# Patient Record
Sex: MALE | Race: WHITE | NOT HISPANIC OR LATINO | Employment: OTHER | URBAN - METROPOLITAN AREA
[De-identification: names, ages, dates, MRNs, and addresses within clinical notes are randomized per-mention and may not be internally consistent; named-entity substitution may affect disease eponyms.]

---

## 2019-12-05 ENCOUNTER — APPOINTMENT (INPATIENT)
Dept: RADIOLOGY | Facility: HOSPITAL | Age: 84
DRG: 089 | End: 2019-12-05
Payer: MEDICARE

## 2019-12-05 ENCOUNTER — APPOINTMENT (EMERGENCY)
Dept: RADIOLOGY | Facility: HOSPITAL | Age: 84
DRG: 089 | End: 2019-12-05
Payer: MEDICARE

## 2019-12-05 ENCOUNTER — APPOINTMENT (OUTPATIENT)
Dept: RADIOLOGY | Facility: HOSPITAL | Age: 84
DRG: 089 | End: 2019-12-05
Payer: MEDICARE

## 2019-12-05 ENCOUNTER — HOSPITAL ENCOUNTER (INPATIENT)
Facility: HOSPITAL | Age: 84
LOS: 4 days | Discharge: NURSING, REHAB OR LONG-TERM CARE | DRG: 089 | End: 2019-12-09
Attending: SURGERY | Admitting: EMERGENCY MEDICINE
Payer: MEDICARE

## 2019-12-05 DIAGNOSIS — K59.00 CONSTIPATION: ICD-10-CM

## 2019-12-05 DIAGNOSIS — I48.91 ATRIAL FIBRILLATION WITH RAPID VENTRICULAR RESPONSE (HCC): ICD-10-CM

## 2019-12-05 DIAGNOSIS — S43.015A ANTERIOR DISLOCATION OF LEFT SHOULDER, INITIAL ENCOUNTER: Primary | ICD-10-CM

## 2019-12-05 DIAGNOSIS — R42 POSTURAL DIZZINESS WITH NEAR SYNCOPE: ICD-10-CM

## 2019-12-05 DIAGNOSIS — N32.1 VESICOCOLONIC FISTULA: ICD-10-CM

## 2019-12-05 DIAGNOSIS — S00.531A CONTUSION OF LIP, INITIAL ENCOUNTER: ICD-10-CM

## 2019-12-05 DIAGNOSIS — R55 POSTURAL DIZZINESS WITH NEAR SYNCOPE: ICD-10-CM

## 2019-12-05 DIAGNOSIS — R26.2 AMBULATORY DYSFUNCTION: ICD-10-CM

## 2019-12-05 DIAGNOSIS — S43.015A ANTERIOR DISLOCATION OF LEFT SHOULDER: ICD-10-CM

## 2019-12-05 PROBLEM — Z87.09 HISTORY OF CHRONIC OBSTRUCTIVE AIRWAY DISEASE: Status: ACTIVE | Noted: 2019-12-05

## 2019-12-05 PROBLEM — I10 BENIGN HYPERTENSION: Status: ACTIVE | Noted: 2019-12-05

## 2019-12-05 PROBLEM — N40.0 BENIGN PROSTATIC HYPERPLASIA: Status: ACTIVE | Noted: 2019-12-05

## 2019-12-05 PROBLEM — K52.9 COLITIS: Status: ACTIVE | Noted: 2019-12-05

## 2019-12-05 PROBLEM — S06.0XAA CLOSED HEAD INJURY WITH CONCUSSION: Status: ACTIVE | Noted: 2019-12-05

## 2019-12-05 PROBLEM — M10.9 GOUT: Status: ACTIVE | Noted: 2019-12-05

## 2019-12-05 PROBLEM — S06.0X9A CLOSED HEAD INJURY WITH CONCUSSION: Status: ACTIVE | Noted: 2019-12-05

## 2019-12-05 PROBLEM — R73.01 IMPAIRED FASTING GLUCOSE: Status: ACTIVE | Noted: 2019-12-05

## 2019-12-05 LAB
ANION GAP SERPL CALCULATED.3IONS-SCNC: 2 MMOL/L (ref 4–13)
APTT PPP: 33 SECONDS (ref 23–37)
BASOPHILS # BLD AUTO: 0.06 THOUSANDS/ΜL (ref 0–0.1)
BASOPHILS NFR BLD AUTO: 1 % (ref 0–1)
BUN SERPL-MCNC: 11 MG/DL (ref 5–25)
CALCIUM SERPL-MCNC: 8.9 MG/DL (ref 8.3–10.1)
CHLORIDE SERPL-SCNC: 110 MMOL/L (ref 100–108)
CO2 SERPL-SCNC: 31 MMOL/L (ref 21–32)
CREAT SERPL-MCNC: 1.09 MG/DL (ref 0.6–1.3)
EOSINOPHIL # BLD AUTO: 0.27 THOUSAND/ΜL (ref 0–0.61)
EOSINOPHIL NFR BLD AUTO: 2 % (ref 0–6)
ERYTHROCYTE [DISTWIDTH] IN BLOOD BY AUTOMATED COUNT: 16.4 % (ref 11.6–15.1)
GFR SERPL CREATININE-BSD FRML MDRD: 63 ML/MIN/1.73SQ M
GLUCOSE SERPL-MCNC: 118 MG/DL (ref 65–140)
HCT VFR BLD AUTO: 48.6 % (ref 36.5–49.3)
HGB BLD-MCNC: 14.8 G/DL (ref 12–17)
IMM GRANULOCYTES # BLD AUTO: 0.11 THOUSAND/UL (ref 0–0.2)
IMM GRANULOCYTES NFR BLD AUTO: 1 % (ref 0–2)
INR PPP: 1.22 (ref 0.84–1.19)
LYMPHOCYTES # BLD AUTO: 1.03 THOUSANDS/ΜL (ref 0.6–4.47)
LYMPHOCYTES NFR BLD AUTO: 9 % (ref 14–44)
MCH RBC QN AUTO: 27.1 PG (ref 26.8–34.3)
MCHC RBC AUTO-ENTMCNC: 30.5 G/DL (ref 31.4–37.4)
MCV RBC AUTO: 89 FL (ref 82–98)
MONOCYTES # BLD AUTO: 0.96 THOUSAND/ΜL (ref 0.17–1.22)
MONOCYTES NFR BLD AUTO: 9 % (ref 4–12)
NEUTROPHILS # BLD AUTO: 8.67 THOUSANDS/ΜL (ref 1.85–7.62)
NEUTS SEG NFR BLD AUTO: 78 % (ref 43–75)
NRBC BLD AUTO-RTO: 0 /100 WBCS
PLATELET # BLD AUTO: 195 THOUSANDS/UL (ref 149–390)
PMV BLD AUTO: 10.6 FL (ref 8.9–12.7)
POTASSIUM SERPL-SCNC: 4.1 MMOL/L (ref 3.5–5.3)
PROTHROMBIN TIME: 15 SECONDS (ref 11.6–14.5)
RBC # BLD AUTO: 5.47 MILLION/UL (ref 3.88–5.62)
SODIUM SERPL-SCNC: 143 MMOL/L (ref 136–145)
WBC # BLD AUTO: 11.1 THOUSAND/UL (ref 4.31–10.16)

## 2019-12-05 PROCEDURE — 70486 CT MAXILLOFACIAL W/O DYE: CPT

## 2019-12-05 PROCEDURE — 93005 ELECTROCARDIOGRAM TRACING: CPT

## 2019-12-05 PROCEDURE — NC001 PR NO CHARGE: Performed by: SURGERY

## 2019-12-05 PROCEDURE — 99222 1ST HOSP IP/OBS MODERATE 55: CPT | Performed by: INTERNAL MEDICINE

## 2019-12-05 PROCEDURE — 70450 CT HEAD/BRAIN W/O DYE: CPT

## 2019-12-05 PROCEDURE — 85610 PROTHROMBIN TIME: CPT | Performed by: SURGERY

## 2019-12-05 PROCEDURE — NC001 PR NO CHARGE: Performed by: NURSE PRACTITIONER

## 2019-12-05 PROCEDURE — 96366 THER/PROPH/DIAG IV INF ADDON: CPT

## 2019-12-05 PROCEDURE — 85014 HEMATOCRIT: CPT

## 2019-12-05 PROCEDURE — 90471 IMMUNIZATION ADMIN: CPT

## 2019-12-05 PROCEDURE — 74177 CT ABD & PELVIS W/CONTRAST: CPT

## 2019-12-05 PROCEDURE — 82947 ASSAY GLUCOSE BLOOD QUANT: CPT

## 2019-12-05 PROCEDURE — 99285 EMERGENCY DEPT VISIT HI MDM: CPT

## 2019-12-05 PROCEDURE — 36415 COLL VENOUS BLD VENIPUNCTURE: CPT

## 2019-12-05 PROCEDURE — 12011 RPR F/E/E/N/L/M 2.5 CM/<: CPT | Performed by: SURGERY

## 2019-12-05 PROCEDURE — 82803 BLOOD GASES ANY COMBINATION: CPT

## 2019-12-05 PROCEDURE — 99223 1ST HOSP IP/OBS HIGH 75: CPT | Performed by: COLON & RECTAL SURGERY

## 2019-12-05 PROCEDURE — 85730 THROMBOPLASTIN TIME PARTIAL: CPT | Performed by: SURGERY

## 2019-12-05 PROCEDURE — 0CQ0XZZ REPAIR UPPER LIP, EXTERNAL APPROACH: ICD-10-PCS | Performed by: SURGERY

## 2019-12-05 PROCEDURE — 23650 CLTX SHO DSLC W/MNPJ WO ANES: CPT | Performed by: EMERGENCY MEDICINE

## 2019-12-05 PROCEDURE — 84295 ASSAY OF SERUM SODIUM: CPT

## 2019-12-05 PROCEDURE — 0RSKXZZ REPOSITION LEFT SHOULDER JOINT, EXTERNAL APPROACH: ICD-10-PCS | Performed by: EMERGENCY MEDICINE

## 2019-12-05 PROCEDURE — 1123F ACP DISCUSS/DSCN MKR DOCD: CPT | Performed by: INTERNAL MEDICINE

## 2019-12-05 PROCEDURE — 84132 ASSAY OF SERUM POTASSIUM: CPT

## 2019-12-05 PROCEDURE — 80048 BASIC METABOLIC PNL TOTAL CA: CPT | Performed by: SURGERY

## 2019-12-05 PROCEDURE — 82330 ASSAY OF CALCIUM: CPT

## 2019-12-05 PROCEDURE — 96375 TX/PRO/DX INJ NEW DRUG ADDON: CPT

## 2019-12-05 PROCEDURE — 72125 CT NECK SPINE W/O DYE: CPT

## 2019-12-05 PROCEDURE — 73060 X-RAY EXAM OF HUMERUS: CPT

## 2019-12-05 PROCEDURE — 96365 THER/PROPH/DIAG IV INF INIT: CPT

## 2019-12-05 PROCEDURE — 72072 X-RAY EXAM THORAC SPINE 3VWS: CPT

## 2019-12-05 PROCEDURE — 99223 1ST HOSP IP/OBS HIGH 75: CPT | Performed by: SURGERY

## 2019-12-05 PROCEDURE — 99223 1ST HOSP IP/OBS HIGH 75: CPT | Performed by: INTERNAL MEDICINE

## 2019-12-05 PROCEDURE — 73030 X-RAY EXAM OF SHOULDER: CPT

## 2019-12-05 PROCEDURE — NC001 PR NO CHARGE: Performed by: EMERGENCY MEDICINE

## 2019-12-05 PROCEDURE — 85025 COMPLETE CBC W/AUTO DIFF WBC: CPT | Performed by: SURGERY

## 2019-12-05 PROCEDURE — 71045 X-RAY EXAM CHEST 1 VIEW: CPT

## 2019-12-05 PROCEDURE — 90715 TDAP VACCINE 7 YRS/> IM: CPT | Performed by: EMERGENCY MEDICINE

## 2019-12-05 RX ORDER — ZOLPIDEM TARTRATE 6.25 MG/1
10 TABLET, FILM COATED, EXTENDED RELEASE ORAL
COMMUNITY
End: 2019-12-09 | Stop reason: HOSPADM

## 2019-12-05 RX ORDER — SODIUM CHLORIDE, SODIUM GLUCONATE, SODIUM ACETATE, POTASSIUM CHLORIDE, MAGNESIUM CHLORIDE, SODIUM PHOSPHATE, DIBASIC, AND POTASSIUM PHOSPHATE .53; .5; .37; .037; .03; .012; .00082 G/100ML; G/100ML; G/100ML; G/100ML; G/100ML; G/100ML; G/100ML
INJECTION, SOLUTION INTRAVENOUS
Status: COMPLETED | OUTPATIENT
Start: 2019-12-05 | End: 2019-12-05

## 2019-12-05 RX ORDER — ALLOPURINOL 100 MG/1
100 TABLET ORAL DAILY
COMMUNITY

## 2019-12-05 RX ORDER — FENTANYL CITRATE 50 UG/ML
INJECTION, SOLUTION INTRAMUSCULAR; INTRAVENOUS CODE/TRAUMA/SEDATION MEDICATION
Status: COMPLETED | OUTPATIENT
Start: 2019-12-05 | End: 2019-12-05

## 2019-12-05 RX ORDER — ETOMIDATE 2 MG/ML
10 INJECTION INTRAVENOUS ONCE
Status: COMPLETED | OUTPATIENT
Start: 2019-12-05 | End: 2019-12-05

## 2019-12-05 RX ORDER — METOPROLOL TARTRATE 50 MG/1
50 TABLET, FILM COATED ORAL EVERY 12 HOURS SCHEDULED
COMMUNITY

## 2019-12-05 RX ORDER — ALLOPURINOL 100 MG/1
100 TABLET ORAL DAILY
Status: DISCONTINUED | OUTPATIENT
Start: 2019-12-05 | End: 2019-12-09 | Stop reason: HOSPADM

## 2019-12-05 RX ORDER — SENNOSIDES 8.6 MG
2 TABLET ORAL
Status: DISCONTINUED | OUTPATIENT
Start: 2019-12-05 | End: 2019-12-09 | Stop reason: HOSPADM

## 2019-12-05 RX ORDER — LOSARTAN POTASSIUM 25 MG/1
25 TABLET ORAL DAILY
Status: DISCONTINUED | OUTPATIENT
Start: 2019-12-05 | End: 2019-12-06

## 2019-12-05 RX ORDER — DOCUSATE SODIUM 100 MG/1
100 CAPSULE, LIQUID FILLED ORAL 2 TIMES DAILY
Status: DISCONTINUED | OUTPATIENT
Start: 2019-12-05 | End: 2019-12-09 | Stop reason: HOSPADM

## 2019-12-05 RX ORDER — ASPIRIN 81 MG/1
81 TABLET ORAL DAILY
COMMUNITY

## 2019-12-05 RX ORDER — LIDOCAINE HYDROCHLORIDE AND EPINEPHRINE 10; 10 MG/ML; UG/ML
1 INJECTION, SOLUTION INFILTRATION; PERINEURAL ONCE
Status: DISCONTINUED | OUTPATIENT
Start: 2019-12-05 | End: 2019-12-05

## 2019-12-05 RX ORDER — SODIUM CHLORIDE, SODIUM GLUCONATE, SODIUM ACETATE, POTASSIUM CHLORIDE, MAGNESIUM CHLORIDE, SODIUM PHOSPHATE, DIBASIC, AND POTASSIUM PHOSPHATE .53; .5; .37; .037; .03; .012; .00082 G/100ML; G/100ML; G/100ML; G/100ML; G/100ML; G/100ML; G/100ML
1000 INJECTION, SOLUTION INTRAVENOUS ONCE
Status: COMPLETED | OUTPATIENT
Start: 2019-12-05 | End: 2019-12-05

## 2019-12-05 RX ORDER — ASPIRIN 81 MG/1
81 TABLET, CHEWABLE ORAL DAILY
Status: DISCONTINUED | OUTPATIENT
Start: 2019-12-05 | End: 2019-12-09 | Stop reason: HOSPADM

## 2019-12-05 RX ORDER — METOPROLOL TARTRATE 50 MG/1
50 TABLET, FILM COATED ORAL EVERY 12 HOURS SCHEDULED
Status: DISCONTINUED | OUTPATIENT
Start: 2019-12-05 | End: 2019-12-06

## 2019-12-05 RX ORDER — VALSARTAN 40 MG/1
10 TABLET ORAL DAILY
COMMUNITY

## 2019-12-05 RX ORDER — OMEGA-3S/DHA/EPA/FISH OIL/D3 300MG-1000
400 CAPSULE ORAL DAILY
COMMUNITY

## 2019-12-05 RX ORDER — POLYETHYLENE GLYCOL 3350 17 G/17G
17 POWDER, FOR SOLUTION ORAL DAILY PRN
Status: DISCONTINUED | OUTPATIENT
Start: 2019-12-05 | End: 2019-12-09 | Stop reason: HOSPADM

## 2019-12-05 RX ORDER — ACETAMINOPHEN 325 MG/1
975 TABLET ORAL EVERY 8 HOURS SCHEDULED
Status: DISCONTINUED | OUTPATIENT
Start: 2019-12-05 | End: 2019-12-09 | Stop reason: HOSPADM

## 2019-12-05 RX ORDER — LIDOCAINE HYDROCHLORIDE AND EPINEPHRINE 10; 10 MG/ML; UG/ML
5 INJECTION, SOLUTION INFILTRATION; PERINEURAL ONCE
Status: COMPLETED | OUTPATIENT
Start: 2019-12-05 | End: 2019-12-05

## 2019-12-05 RX ADMIN — LIDOCAINE HYDROCHLORIDE AND EPINEPHRINE 5 ML: 10; 10 INJECTION, SOLUTION INFILTRATION; PERINEURAL at 04:53

## 2019-12-05 RX ADMIN — SODIUM CHLORIDE, SODIUM GLUCONATE, SODIUM ACETATE, POTASSIUM CHLORIDE, MAGNESIUM CHLORIDE, SODIUM PHOSPHATE, DIBASIC, AND POTASSIUM PHOSPHATE 1000 ML: .53; .5; .37; .037; .03; .012; .00082 INJECTION, SOLUTION INTRAVENOUS at 03:50

## 2019-12-05 RX ADMIN — ACETAMINOPHEN 975 MG: 325 TABLET ORAL at 11:03

## 2019-12-05 RX ADMIN — SODIUM CHLORIDE, SODIUM GLUCONATE, SODIUM ACETATE, POTASSIUM CHLORIDE, MAGNESIUM CHLORIDE, SODIUM PHOSPHATE, DIBASIC, AND POTASSIUM PHOSPHATE 1000 ML: .53; .5; .37; .037; .03; .012; .00082 INJECTION, SOLUTION INTRAVENOUS at 09:20

## 2019-12-05 RX ADMIN — TETANUS TOXOID, REDUCED DIPHTHERIA TOXOID AND ACELLULAR PERTUSSIS VACCINE, ADSORBED 0.5 ML: 5; 2.5; 8; 8; 2.5 SUSPENSION INTRAMUSCULAR at 04:52

## 2019-12-05 RX ADMIN — DOCUSATE SODIUM 100 MG: 100 CAPSULE, LIQUID FILLED ORAL at 17:56

## 2019-12-05 RX ADMIN — METOPROLOL TARTRATE 50 MG: 50 TABLET, FILM COATED ORAL at 09:49

## 2019-12-05 RX ADMIN — ETOMIDATE 10 MG: 20 INJECTION, SOLUTION INTRAVENOUS at 04:28

## 2019-12-05 RX ADMIN — SENNOSIDES 17.2 MG: 8.6 TABLET, FILM COATED ORAL at 22:18

## 2019-12-05 RX ADMIN — APIXABAN 5 MG: 5 TABLET, FILM COATED ORAL at 11:03

## 2019-12-05 RX ADMIN — METOPROLOL TARTRATE 50 MG: 50 TABLET, FILM COATED ORAL at 22:17

## 2019-12-05 RX ADMIN — ASPIRIN 81 MG 81 MG: 81 TABLET ORAL at 09:49

## 2019-12-05 RX ADMIN — DOCUSATE SODIUM 100 MG: 100 CAPSULE, LIQUID FILLED ORAL at 09:49

## 2019-12-05 RX ADMIN — FENTANYL CITRATE 50 MCG: 50 INJECTION, SOLUTION INTRAMUSCULAR; INTRAVENOUS at 03:50

## 2019-12-05 RX ADMIN — LOSARTAN POTASSIUM 25 MG: 25 TABLET, FILM COATED ORAL at 11:03

## 2019-12-05 RX ADMIN — IOHEXOL 100 ML: 350 INJECTION, SOLUTION INTRAVENOUS at 04:25

## 2019-12-05 RX ADMIN — APIXABAN 5 MG: 5 TABLET, FILM COATED ORAL at 17:56

## 2019-12-05 RX ADMIN — ACETAMINOPHEN 975 MG: 325 TABLET ORAL at 22:17

## 2019-12-05 RX ADMIN — ALLOPURINOL 100 MG: 100 TABLET ORAL at 11:03

## 2019-12-05 NOTE — ASSESSMENT & PLAN NOTE
Small laceration along the mucosal surface that did not penetrate through the outer skin, closed with a single Vicryl suture in the ER

## 2019-12-05 NOTE — CONSULTS
Orthopedics   Jordyn Greenwood 82 y o  male MRN: [id number]  Unit/Bed#: [facility] 604-01      Chief Complaint:   left shoulder pain    HPI:  82 y o  left hand dominant male complaining of left shoulder pain  Patient was admitted as a trauma after he fell down a flight of stairs  Patient underwent conscious sedation and reduction by the trauma staff  Patient denies any numbness or tingling in the extremity  Patient denies any further orthopaedic complaints at this time  He notes previous pain to the left shoulder mostly with overhead activities  Review Of Systems:   · Skin: Normal  · Neuro: See HPI  · Musculoskeletal: See HPI  · 14 point review of systems negative except as stated above     Past Medical History:   Past Medical History:   Diagnosis Date    [facility]     [facility] ([facility] )     Hypertension        Past Surgical History:   Past Surgical History:   Procedure Laterality Date    REPLACEMENT TOTAL HIP W/  RESURFACING IMPLANTS         Family History:  Family history reviewed and non-contributory  History reviewed  No pertinent family history      Social History:  Social History     Socioeconomic History    Marital status: /Civil Union     Spouse name: None    Number of children: None    Years of education: None    Highest education level: None   Occupational History    None   Social Needs    Financial resource strain: None    Food insecurity:     Worry: None     Inability: None    Transportation needs:     Medical: None     Non-medical: None   Tobacco Use    Smoking status: Never Smoker    Smokeless tobacco: Never Used   Substance and Sexual Activity    Alcohol use: Not Currently    Drug use: Not Currently    Sexual activity: None   Lifestyle    Physical activity:     Days per week: None     Minutes per session: None    Stress: None   Relationships    Social connections:     Talks on phone: None     Gets together: None     Attends Confucianism service: None     Active member of club or organization: None     Attends meetings of clubs or organizations: None     Relationship status: None    Intimate partner violence:     Fear of current or ex partner: None     Emotionally abused: None     Physically abused: None     Forced sexual activity: None   Other Topics Concern    None   Social History Narrative    None       Allergies:   No Known Allergies        Labs:  0   Lab Value Date/Time    HCT 48 6 12/05/2019 0353    HGB 14 8 12/05/2019 0353    INR 1 22 (H) 12/05/2019 0353    WBC 11 10 (H) 12/05/2019 0353       Meds:    Current Facility-Administered Medications:     acetaminophen (TYLENOL) tablet 975 mg, 975 mg, Oral, Q8H [facility], CONY Boudreaux, 975 mg at 12/05/19 1103    allopurinol (ZYLOPRIM) tablet 100 mg, 100 mg, Oral, Daily, Ellie Torres MD, 100 mg at 12/05/19 1103    apixaban (ELIQUIS) tablet 5 mg, 5 mg, Oral, BID, Ellie Torres MD, 5 mg at 12/05/19 1103    aspirin chewable tablet 81 mg, 81 mg, Oral, Daily, Ellie Torres MD, 81 mg at 12/05/19 0949    docusate sodium (COLACE) capsule 100 mg, 100 mg, Oral, BID, CONY Costello, 100 mg at 12/05/19 7186    losartan (COZAAR) tablet 25 mg, 25 mg, Oral, Daily, Ellie Torres MD, 25 mg at 12/05/19 1103    metoprolol tartrate (LOPRESSOR) tablet 50 mg, 50 mg, Oral, Q12H [facility], Ellie Torres MD, 50 mg at 12/05/19 0949    polyethylene glycol (MIRALAX) packet 17 g, 17 g, Oral, Daily PRN, Lorrie Linton MD    senna (SENOKOT) tablet 17 2 mg, 2 tablet, Oral, HS, CONY Rashid    Blood Culture:   No results found for: BLOODCX    Wound Culture:   No results found for: WOUNDCULT    Ins and Outs:  No intake/output data recorded            Physical Exam:   BP (!) 105/49   Pulse 104   Temp 98 1 °F (36 7 °C)   Resp 18   Ht 5' 9" (1 753 m)   Wt 105 kg (231 lb 7 7 oz)   SpO2 95%   BMI 34 18 kg/m²   Gen: Alert and oriented to person, place  HEENT: EOMI, eyes clear, moist mucus membranes, hearing intact  Respiratory: Bilateral chest rise  No audible wheezing found  Cardiovascular: Regular Rate and intact distal pulses   Abdomen: soft nontender/nondistended  Musculoskeletal: left upper extremity  · Skin pink, dry, and intact, ecchymosis of there anterior aspect of the proximal upper arm  · Painful range of motion especially to ER of the shoulder   · Active and passive ROM limited by pain  · Sensation intact to axillary, musculocutaneous, radial, ulna, median nerves  Sensation intact over the deltoid  · Motor intact to musculocutaneous, radial, ulnar, and median nerves  Intact deltoid twitch  · Palpable radial pulse    Radiology:   I personally reviewed the films    X-rays of left shoulder shows interval reduction of the glenohumeral joint, also with apparent calcific tendinosis, post reduction films do not demonstrate obvious fracture of the glenoid    _*_*_*_*_*_*_*_*_*_*_*_*_*_*_*_*_*_*_*_*_*_*_*_*_*_*_*_*_*_*_*_*_*_*_*_*_*_*_*_*_*           _*_*_*_*_*_*_*_*_*_*_*_*_*_*_*_*_*_*_*_*_*_*_*_*_*_*_*_*_*_*_*_*_*_*_*_*_*_*_*_*_*    Assessment:  82 y o  male with left shoulder dislocation s/p closed reduction and likely acute on chronic rotator cuff injury    Plan:   · NWB left upper extremity in sling  · Will obtain left shoulder CT to better assess glenoid and proximal humerus   · Analgesics for pain  · PT/OT evaluation   · F/U in 2 weeks for follow up and scheduling of OP PT  · Ortho will follow       Shayla Ramirez MD

## 2019-12-05 NOTE — H&P
H&P Exam - Trauma   Lizy Davis 82 y o  male MRN: [id number]  Unit/Bed#: ED 11 Encounter: [id number]    Assessment/Plan   Trauma Alert: Level B  Model of Arrival: Ambulance  Trauma Team: Attending Mali Fraga and Residents Billy  Consultants: None        Atrial fibrillation with rapid ventricular response [facility])  Assessment & Plan  Patient has reported history of atrial fibrillation on metoprolol daily and Eliquis  Continue home medications  Bolus 1 L normal saline  Patient reports near syncopal episode with standing up, this may be symptomatic from the rapid ventricular response  Admit for observation on telemetry  Consider cardiology consult if no improvement  * Ambulatory dysfunction  Assessment & Plan  History of using cane for arthritis of LLE, unable to given LUE in sling  Will get PT/OT consult    Postural dizziness with near syncope  Assessment & Plan  Labs stable  EKG shows AFib with RVR, may be the cause of symptoms  Continue to monitor on tele    Anterior dislocation of left shoulder  Assessment & Plan  Reduced in the emergency department  Shoulder immobilizer to left upper extremity  Pain control    Vesicocolonic fistula  Assessment & Plan  Identified on trauma CT of the abdomen and pelvis  Patient denies any urinary symptoms  Denies previous history  Gout  Assessment & Plan  Continue home allopurinol    Closed head injury with concussion  Assessment & Plan  Patient presented with confusion and amnesia to event, confusion improved while in the emergency department though he remains amnestic, suspect concussion    Contusion of lip  Assessment & Plan  Small laceration along the mucosal surface that did not penetrate through the outer skin, closed with a single Vicryl suture in the ER      Benign hypertension  Assessment & Plan  Continue home medications      Chief Complaint:  Left shoulder pain    History of Present Illness   HPI:  Lizy Davis is a 82 y o  male who presents with left shoulder pain and bleeding from his mouth after his wife found him laying at the bottom of his stairs early this morning  On presentation the patient is confused and EMS provide the history  Wife told them that he took an Ambien last night and when he does he occasionally will get confused and wander in the house  She was sleeping downstairs and did not hear him fall but when she woke up in the middle the night she noticed him lying at the bottom of the stairs  On EMS arrival he was confused and did not recall the events around his fall  His only complaint was left shoulder pain and pain at the left side of his lip where he has bleeding  After observation in the emergency department patient's mental status improved and he continued to deny any complaints other than above  Continues to not remember how he fell  Mechanism:Fall    Review of Systems   Constitutional: Negative  Negative for fatigue  HENT: Positive for facial swelling  Negative for dental problem, nosebleeds, trouble swallowing and voice change  Eyes: Negative for visual disturbance  Respiratory: Negative for chest tightness and shortness of breath  Cardiovascular: Negative for chest pain and palpitations  Gastrointestinal: Negative for abdominal pain, nausea and vomiting  Genitourinary: Negative  Musculoskeletal: Positive for arthralgias  Negative for back pain, gait problem, joint swelling, neck pain and neck stiffness  Skin: Positive for wound  Negative for pallor  Neurological: Negative for weakness, numbness and headaches  12-point, complete review of systems was reviewed and negative except as stated above         Historical Information       Past Medical History:   Diagnosis Date    [facility]     [facility]     Hypertension      Past Surgical History:   Procedure Laterality Date    REPLACEMENT TOTAL HIP W/  RESURFACING IMPLANTS       Social History   Social History     Substance and Sexual Activity   Alcohol Use Not Currently     Social History     Substance and Sexual Activity   Drug Use Not Currently     Social History     Tobacco Use   Smoking Status Never Smoker   Smokeless Tobacco Never Used     Immunization History   Administered Date(s) Administered    Tdap 12/05/2019     Last Tetanus:  Unknown  Family History: Non-contributory    Meds/Allergies   PTA meds:   Prior to Admission Medications   Prescriptions Last Dose Informant Patient Reported? Taking?   allopurinol (ZYLOPRIM) 100 mg tablet   Yes Yes   Sig: Take 100 mg by mouth daily   apixaban (ELIQUIS) 5 mg   Yes Yes   Sig: Take 5 mg by mouth 2 (two) times a day   aspirin (ECOTRIN LOW STRENGTH) 81 mg EC tablet   Yes Yes   Sig: Take 81 mg by mouth daily   cholecalciferol (VITAMIN D3) 400 units tablet   Yes Yes   Sig: Take 400 Units by mouth daily   metoprolol tartrate (LOPRESSOR) 50 mg tablet   Yes Yes   Sig: Take 50 mg by mouth every 12 (twelve) hours   valsartan (DIOVAN) 40 mg tablet   Yes Yes   Sig: Take 10 mg by mouth daily   vitamin A 10,000 units capsule   Yes Yes   Sig: Take 10,000 Units by mouth daily   zolpidem (AMBIEN CR) 6 25 MG CR tablet   Yes Yes   Sig: Take 10 mg by mouth daily at bedtime as needed for sleep      Facility-Administered Medications: None       No Known Allergies      PHYSICAL EXAM    Objective   Vitals:   First set: Temperature: 97 8 °F (36 6 °C) (12/05/19 0340)  Pulse: 101 (12/05/19 0340)  Respirations: 18 (12/05/19 0340)  Blood Pressure: 118/69 (12/05/19 0340)    Primary Survey:   (A) Airway:  Intact  (B) Breathing:  CTAB  (C) Circulation: Pulses:   normal  (D) Disabliity:  GCS Total:  15  (E) Expose:  Completed    Secondary Survey: (Click on Physical Exam tab above)  Physical Exam   Constitutional: He is oriented to person, place, and time  He appears well-developed and well-nourished  HENT:   Head: Normocephalic     Right Ear: External ear normal    Left Ear: External ear normal    Nose: Nose normal    Mouth/Throat: Oropharynx is clear and moist    Contusion to the left upper lip with 1 cm laceration on the mucosal surface  Eyes: Pupils are equal, round, and reactive to light  EOM are normal    Neck: No tracheal deviation present  No neck tenderness   Cardiovascular: Normal rate, regular rhythm, normal heart sounds and intact distal pulses  Pulmonary/Chest: Effort normal and breath sounds normal  No respiratory distress  He exhibits no tenderness  Abdominal: Soft  He exhibits no distension  There is no tenderness  Musculoskeletal: He exhibits tenderness and deformity  He exhibits no edema  On presentation patient is holding his left upper extremity across his chest   Has extremely limited range of motion secondary to pain in the shoulder  There is visible deformity of the left shoulder when compared to the right with loss of shoulder contour  No midline spinal tenderness  Stable pelvis  No pain with AP or lateral compression of the chest wall  No bony tenderness to the  lower extremities  Neurological: He is alert and oriented to person, place, and time  No cranial nerve deficit or sensory deficit (Normal sensation over the deltoid)  He exhibits normal muscle tone  Coordination normal    Skin: Skin is warm and dry  Capillary refill takes less than 2 seconds  No pallor  Nursing note and vitals reviewed        Invasive Devices     Peripheral Intravenous Line            Peripheral IV 12/05/19 Right Antecubital less than 1 day    Peripheral IV 12/05/19 Right Hand less than 1 day                Lab Results:   BMP/CMP:   Lab Results   Component Value Date    SODIUM 143 12/05/2019    K 4 1 12/05/2019     (H) 12/05/2019    CO2 31 12/05/2019    BUN 11 12/05/2019    CREATININE 1 09 12/05/2019    CALCIUM 8 9 12/05/2019    EGFR 63 12/05/2019   , CBC:   Lab Results   Component Value Date    WBC 11 10 (H) 12/05/2019    HGB 14 8 12/05/2019    HCT 48 6 12/05/2019    MCV 89 12/05/2019     12/05/2019    MCH 27 1 12/05/2019 MCHC 30 5 (L) 12/05/2019    RDW 16 4 (H) 12/05/2019    MPV 10 6 12/05/2019    NRBC 0 12/05/2019    and Coagulation:   Lab Results   Component Value Date    INR 1 22 (H) 12/05/2019     Imaging/EKG Studies: Results: I have personally reviewed pertinent reports  and I have personally reviewed pertinent films in PACS   Trauma - Ct Head Wo Contrast    Addendum Date: 12/5/2019    ADDENDUM: Large soft tissue hematoma overlying the right parietal bone  Result Date: 12/5/2019  Impression: No intracranial hemorrhage or calvarial fracture  Findings discussed with Dr Mali Fraga 4:30 AM 12/5/19 Workstation performed: [id number]     Trauma - Ct Facial Bones Wo Contrast    Result Date: 12/5/2019  Impression: No evidence of acute traumatic injury to the facial bones  Findings discussed with Dr Mali Fraga at 4:30 AM, 12/5/2019 Workstation performed: [id number]     Trauma - Ct Spine Cervical Wo Contrast    Addendum Date: 12/5/2019    ADDENDUM: Findings discussed with Dr Mali Fraga at 4:30 AM, 12/5/2019    Result Date: 12/5/2019  Impression: No cervical spine fracture or traumatic malalignment  Workstation performed: [id number]     Trauma - Ct Abdomen Pelvis W Contrast    Result Date: 12/5/2019  Impression: No evidence of acute traumatic injury throughout the abdomen or pelvis  3 3 cm thick-walled air and fluid collection contiguous with sigmoid colon and abutting the left superior dome of the bladder, potentially representing chronic diverticular abscess versus prominent diverticulum  No evidence of active inflammation  Wall thickening along the left superior bladder dome which abuts adjacent thick-walled air and fluid collection with air within the bladder  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

## 2019-12-05 NOTE — PROGRESS NOTES
Progress Note - Tertiary Trauma Survery   Gianluca Vines 82 y o  male MRN: [id number]  Unit/Bed#: ED 11 Encounter: [id number]    Summary of Diagnosed Injuries: Upper lip laceration and hematoma  Eft anterior shoulder dislocation  Right ear ecchymosis  LLE edema  Vertigo with possible syncope and fall  Atrial fibrillation  Gout  Concussion    Clinical Plan: Admitted to trauma  GEriatric consult  Cardiology consult  Orthopedic consult  Pain control  LUE sling and NWB, Neruovascular checks  Neuro checks  DVT prophylaxis        Bedside nurse rounds completed with nurse Johnny Vinson  Prophylaxis: Sequential compression device (Venodyne)     Disposition: case management to assist with this, previously at home with his wife    Code status:  Level 1 - Full Code    Consultants: Cardiology  Orthopedics  Therapy    Is the patient 72 years or older?: YES:    1  Before the illness or injury that brought you to the Emergency, did you need someone to help you on a regular basis? 0=No   2  Since the illness or injury that brought you to the Emergency, have you needed more help than usual to take care of yourself? 1=Yes   3  Have you been hospitalized for one or more nights during the past 6 months (excluding a stay in the Emergency Department)? 0=No   4  In general, do you see well? 0=Yes   5  In general, do you have serious problems with your memory? 0=No   6  Do you take more than three different medications everyday? 1=Yes   TOTAL   3     Did you order a geriatric consult if the score was 2 or greater?: yes    SUBJECTIVE:     Transfer from: site of injury  Outside Films Received: no  Tertiary Exam Due on: 12/15/19    Mechanism of Injury:Fall    Details related to Injury: +LOC:  unknown    Chief Complaint: Hungry    HPI/Last 24 hour events: Admitted and Geriatrics and Cardiology consulted, Upper lip sutures, LLE edema and A-fib with tachycardia in 130 to 150"s    Awake and alert, dislocated left shoulder and re-located, Ortho consulted  Active medications:           Current Facility-Administered Medications:     acetaminophen (TYLENOL) tablet 975 mg, 975 mg, Oral, Q8H ROYER    allopurinol (ZYLOPRIM) tablet 100 mg, 100 mg, Oral, Daily    apixaban (ELIQUIS) tablet 5 mg, 5 mg, Oral, BID    aspirin chewable tablet 81 mg, 81 mg, Oral, Daily    docusate sodium (COLACE) capsule 100 mg, 100 mg, Oral, BID    losartan (COZAAR) tablet 25 mg, 25 mg, Oral, Daily    metoprolol tartrate (LOPRESSOR) tablet 50 mg, 50 mg, Oral, Q12H ROYER    multi-electrolyte (PLASMALYTE-A/ISOLYTE-S PH 7 4) IV solution 1,000 mL, 1,000 mL, Intravenous, Once    polyethylene glycol (MIRALAX) packet 17 g, 17 g, Oral, Daily PRN    senna (SENOKOT) tablet 17 2 mg, 2 tablet, Oral, HS    Current Outpatient Medications:     allopurinol (ZYLOPRIM) 100 mg tablet    apixaban (ELIQUIS) 5 mg    aspirin (ECOTRIN LOW STRENGTH) 81 mg EC tablet    cholecalciferol (VITAMIN D3) 400 units tablet    metoprolol tartrate (LOPRESSOR) 50 mg tablet    valsartan (DIOVAN) 40 mg tablet    vitamin A 10,000 units capsule    zolpidem (AMBIEN CR) 6 25 MG CR tablet      OBJECTIVE:     Vitals:   Vitals:    12/05/19 0859   BP: 128/63   Pulse: (!) 127   Resp: 18   Temp:    SpO2: 93%       Physical Exam:   GENERAL APPEARANCE: slightly agitated/confusion  Pulled out his IV's  Easily re-directed  NEURO: GCS - 15  HEENT: EOm's intact,  Left upper lip internal lac and hematoma, Right ear ecchymosis, no hematoma  CV: tachycardic, medications given, Consult to Cardiology, LLE edema  LUNGS: CTA bilaterally, no shortness of breath  GI: good appetite  : voiding  MSK: difficulty lifting and moving left leg, Left arm in a sling  SKIN: warm and dry, slightly reddened in left lower extremity      I/O:   I/O     None          Invasive Devices:    Invasive Devices     Peripheral Intravenous Line            Peripheral IV 12/05/19 Right Antecubital less than 1 day    Peripheral IV 12/05/19 Right Hand less than 1 day                  Imaging:   Xr Shoulder 2+ Vw Left    Result Date: 12/5/2019  Impression: Interval reduction of anterior shoulder dislocation with now anatomic alignment  Workstation performed: [id number]     Trauma - Ct Head Wo Contrast    Addendum Date: 12/5/2019    ADDENDUM: Large soft tissue hematoma overlying the right parietal bone  Result Date: 12/5/2019  Impression: No intracranial hemorrhage or calvarial fracture  Findings discussed with Dr Irina Williamson 4:30 AM 12/5/19 Workstation performed: [id number]     Trauma - Ct Facial Bones Wo Contrast    Result Date: 12/5/2019  Impression: No evidence of acute traumatic injury to the facial bones  Findings discussed with Dr Irina Williamson at 4:30 AM, 12/5/2019 Workstation performed: [id number]     Trauma - Ct Spine Cervical Wo Contrast    Addendum Date: 12/5/2019    ADDENDUM: Findings discussed with Dr Irina Williamson at 4:30 AM, 12/5/2019    Result Date: 12/5/2019  Impression: No cervical spine fracture or traumatic malalignment  Workstation performed: [id number]     Xr Trauma Multiple    Result Date: 12/5/2019  Impression: No acute cardiopulmonary disease within limitations of supine imaging  Anterior shoulder dislocation with Hill-Sachs deformity and Bankart fracture  Workstation performed: [id number]     Trauma - Ct Abdomen Pelvis W Contrast    Result Date: 12/5/2019  Impression: No evidence of acute traumatic injury throughout the abdomen or pelvis  3 3 cm thick-walled air and fluid collection contiguous with sigmoid colon and abutting the left superior dome of the bladder, potentially representing chronic diverticular abscess versus prominent diverticulum  No evidence of active inflammation  Wall thickening along the left superior bladder dome which abuts adjacent thick-walled air and fluid collection with air within the bladder  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   Findings discussed with Dr Irina Williamson at 4:50 AM, 12/5/2019 Workstation performed: [id number]       Labs: Results for Bin Tom (MRN [id number]) as of 12/5/2019 09:12   Ref   Range 12/5/2019 03:53 12/5/2019 04:47   Sodium Latest Ref Range: 136 - 145 mmol/L 143    Potassium Latest Ref Range: 3 5 - 5 3 mmol/L 4 1    Chloride Latest Ref Range: 100 - 108 mmol/L 110 (H)    CO2 Latest Ref Range: 21 - 32 mmol/L 31    Anion Gap Latest Ref Range: 4 - 13 mmol/L 2 (L)    BUN Latest Ref Range: 5 - 25 mg/dL 11    Creatinine Latest Ref Range: 0 60 - 1 30 mg/dL 1 09    Glucose, Random Latest Ref Range: 65 - 140 mg/dL 118    Calcium Latest Ref Range: 8 3 - 10 1 mg/dL 8 9    eGFR Latest Units: ml/min/1 73sq m 63    WBC Latest Ref Range: 4 31 - 10 16 Thousand/uL 11 10 (H)    Red Blood Cell Count Latest Ref Range: 3 88 - 5 62 Million/uL 5 47    Hemoglobin Latest Ref Range: 12 0 - 17 0 g/dL 14 8    HCT Latest Ref Range: 36 5 - 49 3 % 48 6    MCV Latest Ref Range: 82 - 98 fL 89    MCH Latest Ref Range: 26 8 - 34 3 pg 27 1    MCHC Latest Ref Range: 31 4 - 37 4 g/dL 30 5 (L)    RDW Latest Ref Range: 11 6 - 15 1 % 16 4 (H)    Platelet Count Latest Ref Range: 149 - 390 Thousands/uL 195    MPV Latest Ref Range: 8 9 - 12 7 fL 10 6    nRBC Latest Units: /100 WBCs 0    Neutrophils % Latest Ref Range: 43 - 75 % 78 (H)    Immat GRANS % Latest Ref Range: 0 - 2 % 1    Lymphocytes Relative Latest Ref Range: 14 - 44 % 9 (L)    Monocytes Relative Latest Ref Range: 4 - 12 % 9    Eosinophils Latest Ref Range: 0 - 6 % 2    Basophils Relative Latest Ref Range: 0 - 1 % 1    Immature Grans Absolute Latest Ref Range: 0 00 - 0 20 Thousand/uL 0 11    Absolute Neutrophils Latest Ref Range: 1 85 - 7 62 Thousands/µL 8 67 (H)    Lymphocytes Absolute Latest Ref Range: 0 60 - 4 47 Thousands/µL 1 03    Absolute Monocytes Latest Ref Range: 0 17 - 1 22 Thousand/µL 0 96    Absolute Eosinophils Latest Ref Range: 0 00 - 0 61 Thousand/µL 0 27    Basophils Absolute Latest Ref Range: 0 00 - 0 10 Thousands/µL 0 06 Protime Latest Ref Range: 11 6 - 14 5 seconds 15 0 (H)    INR Latest Ref Range: 0 84 - 1 19  1 22 (H)    PTT Latest Ref Range: 23 - 37 seconds 33    XR SHOULDER 2+ VW LEFT Unknown  Rpt

## 2019-12-05 NOTE — PLAN OF CARE
Problem: Prexisting or High Potential for Compromised Skin Integrity  Goal: Skin integrity is maintained or improved  Description  INTERVENTIONS:  - Identify patients at risk for skin breakdown  - Assess and monitor skin integrity  - Assess and monitor nutrition and hydration status  - Monitor labs   - Assess for incontinence   - Turn and reposition patient  - Assist with mobility/ambulation  - Relieve pressure over bony prominences  - Avoid friction and shearing  - Provide appropriate hygiene as needed including keeping skin clean and dry  - Evaluate need for skin moisturizer/barrier cream  - Collaborate with interdisciplinary team   - Patient/family teaching  - Consider wound care consult   Outcome: Progressing     Problem: PAIN - ADULT  Goal: Verbalizes/displays adequate comfort level or baseline comfort level  Description  Interventions:  - Encourage patient to monitor pain and request assistance  - Assess pain using appropriate pain scale  - Administer analgesics based on type and severity of pain and evaluate response  - Implement non-pharmacological measures as appropriate and evaluate response  - Consider cultural and social influences on pain and pain management  - Notify physician/advanced practitioner if interventions unsuccessful or patient reports new pain  Outcome: Progressing     Problem: INFECTION - ADULT  Goal: Absence or prevention of progression during hospitalization  Description  INTERVENTIONS:  - Assess and monitor for signs and symptoms of infection  - Monitor lab/diagnostic results  - Monitor all insertion sites, i e  indwelling lines, tubes, and drains  - Administer medications as ordered  - Instruct and encourage patient and family to use good hand hygiene technique  - Identify and instruct in appropriate isolation precautions for identified infection/condition  Outcome: Progressing  Goal: Absence of fever/infection during neutropenic period  Description  INTERVENTIONS:  - Monitor WBC    Outcome: Progressing

## 2019-12-05 NOTE — CONSULTS
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 given CT findings  Plan:  No acute intervention  Non-urgent workup for colo-vesicular fistula w/ colorectal surgery - will see us outpatient and we will likely repeat scope, discuss w/ urology CT cysto vs regular cysto at that time  Follow up information placed in chart        History of Present Illness     HPI:  Mandi Dawson is a 82 y o  male who presents to the trauma service s/p fall  His injuries include a left shoulder disclocation and lip swelling/abrasion  On CTAP imaging there is seen a 3 3 cm thick walled air fluid collection contiguous w/ the sigmoid and abutting the bladder  The patient does relay that he has a known history of diverticulosis but does not believe he has had diverticulitis in the past  He sees Dr Cyndy Jean Baptiste and his last colonscopy was 3 yrs ago, reported as normal  He is due for one this coming summer he states  He denies any symptoms of a colovesicular fistula such as air in his urinary stream or burning / UTIs             Inpatient consult to Colorectal Surgery     Date/Time 12/5/2019 7:14 AM     Performed by  Evert Combs MD     Authorized by Mao Aiken MD              Review of Systems 12 point ROS completed, negative unless noted in HPI    Historical Information   Past Medical History:   Diagnosis Date    Atrial fibrillation ([facility] [address] )     Cancer ([facility] [address] )     Hypertension      Past Surgical History:   Procedure Laterality Date    REPLACEMENT TOTAL HIP W/  RESURFACING IMPLANTS       Social History   Social History     Substance and Sexual Activity   Alcohol Use Not Currently     Social History     Substance and Sexual Activity   Drug Use Not Currently     Social History     Tobacco Use   Smoking Status Never Smoker   Smokeless Tobacco Never Used     Family History: History reviewed  No pertinent family history  Meds/Allergies   all current active meds have been reviewed  No Known Allergies    Objective   First Vitals:   Blood Pressure: 118/69 (12/05/19 0340)  Pulse: 101 (12/05/19 0340)  Temperature: 97 8 °F (36 6 °C) (12/05/19 0340)  Temp Source: Tympanic (12/05/19 0340)  Respirations: 18 (12/05/19 0340)  Weight - Scale: 105 kg (231 lb 7 7 oz) (12/05/19 0345)  SpO2: 91 % (12/05/19 0340)    Current Vitals:   Blood Pressure: 121/59 (12/05/19 0630)  Pulse: 104 (12/05/19 0630)  Temperature: 97 8 °F (36 6 °C) (12/05/19 0340)  Temp Source: Tympanic (12/05/19 0340)  Respirations: 18 (12/05/19 0630)  Weight - Scale: 105 kg (231 lb 7 7 oz) (12/05/19 0345)  SpO2: 92 % (12/05/19 0630)    No intake or output data in the 24 hours ending 12/05/19 0714    Invasive Devices     Peripheral Intravenous Line            Peripheral IV 12/05/19 Right Antecubital less than 1 day    Peripheral IV 12/05/19 Right Hand less than 1 day                Physical Exam   Constitutional: He is oriented to person, place, and time  He appears well-developed and well-nourished  No distress  HENT:   Head: Normocephalic and atraumatic  Mouth/Throat:       Left upper lip swelling   Eyes: Pupils are equal, round, and reactive to light  EOM are normal    Neck: Normal range of motion  Cardiovascular: Normal rate and regular rhythm  Pulmonary/Chest: Effort normal  No respiratory distress  Abdominal: Soft  He exhibits no distension and no mass  There is no tenderness  There is no guarding  Musculoskeletal: Normal range of motion  He exhibits no edema, tenderness or deformity  Neurological: He is alert and oriented to person, place, and time  Skin: Skin is warm   Capillary refill takes less than 2 seconds  He is not diaphoretic  Psychiatric: He has a normal mood and affect  His behavior is normal        Lab Results:   CBC:   Lab Results   Component Value Date    WBC 11 10 (H) 12/05/2019    HGB 14 8 12/05/2019    HCT 48 6 12/05/2019    MCV 89 12/05/2019     12/05/2019    MCH 27 1 12/05/2019    MCHC 30 5 (L) 12/05/2019    RDW 16 4 (H) 12/05/2019    MPV 10 6 12/05/2019    NRBC 0 12/05/2019   , CMP:   Lab Results   Component Value Date    SODIUM 143 12/05/2019    K 4 1 12/05/2019     (H) 12/05/2019    CO2 31 12/05/2019    BUN 11 12/05/2019    CREATININE 1 09 12/05/2019    CALCIUM 8 9 12/05/2019    EGFR 63 12/05/2019   , Coagulation:   Lab Results   Component Value Date    INR 1 22 (H) 12/05/2019     Imaging: I have personally reviewed pertinent reports  TRAUMA - CT head wo contrast   Final Result by  (12/05 0741)   Addendum 1 of 1 by Phu Millard MD (12/05 5968)   ADDENDUM:      Large soft tissue hematoma overlying the right parietal bone  Final      TRAUMA - CT spine cervical wo contrast   Final Result by  (12/05 0741)   Addendum 1 of 1 by Phu Millard MD (12/05 1743)   ADDENDUM:      Findings discussed with Dr Sang Torres at 4:30 AM, 12/5/2019      Final      TRAUMA - CT facial bones wo contrast   Final Result by Phu Millard MD (12/05 4336)      No evidence of acute traumatic injury to the facial bones  Findings discussed with Dr Sang Torres at 4:30 AM, 12/5/2019               Workstation performed: [id number]         TRAUMA - CT abdomen pelvis w contrast   Final Result by Phu Millard MD (12/05 6499)      No evidence of acute traumatic injury throughout the abdomen or pelvis  3 3 cm thick-walled air and fluid collection contiguous with sigmoid colon and abutting the left superior dome of the bladder, potentially representing chronic diverticular abscess versus prominent diverticulum  No evidence of active inflammation        Wall thickening along the left superior bladder dome which abuts adjacent thick-walled air and fluid collection with air within the bladder  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  Findings discussed with Dr Patricia Gifford at 4:50 AM, 12/5/2019            Workstation performed: [id number]         XR Trauma multiple    (Results Pending)   XR chest 1 view    (Results Pending)   XR humerus left    (Results Pending)   XR shoulder 2+ vw left    (Results Pending)       EKG, Pathology, and Other Studies: I have personally reviewed pertinent reports  Counseling / Coordination of Care  Total floor / unit time spent today 30 minutes  Greater than 50% of total time was spent with the patient and / or family counseling and / or coordination of care  A description of the counseling / coordination of care: Veronica Laboy

## 2019-12-05 NOTE — PROCEDURES
Pre-Procedural Sedation  Performed by: Effie Viera MD  Authorized by: Effie Viera MD     Consent:     Consent obtained:  Emergent situation  Universal protocol:     Procedure explained and questions answered to patient or proxy's satisfaction: yes      Relevant documents present and verified: yes      Test results available and properly labeled: yes      Radiology Images displayed and confirmed    If images not available, report reviewed: yes      Required blood products, implants, devices, and special equipment available: yes      Site/side marked: yes      Immediately prior to procedure a time out was called: yes      Patient identity confirmation method:  Verbally with patient  Indications:     Sedation purpose:  Dislocation reduction    Procedure necessitating sedation performed by:  Physician performing sedation    Intended level of sedation:  Moderate (conscious sedation)  Pre-sedation assessment:     NPO status caution: unable to specify NPO status      ASA classification: class 3 - patient with severe systemic disease      Neck mobility: reduced      Mouth openin finger widths    Thyromental distance:  3 finger widths    Mallampati score:  III - soft palate, base of uvula visible    Pre-sedation assessments completed and reviewed: airway patency, cardiovascular function, hydration status, mental status, nausea/vomiting, pain level, respiratory function and temperature      History of difficult intubation: no      Pre-sedation assessment completed:  2019 4:15 AM  Procedural Sedation  Date/Time: 2019 4:25 AM  Performed by: Effie Viera MD  Authorized by: Effie Viera MD     Immediate pre-procedure details:     Reassessment: Patient reassessed immediately prior to procedure      Reviewed: vital signs and relevant labs/tests      Verified: bag valve mask available, emergency equipment available, intubation equipment available, IV patency confirmed, oxygen available and suction available    Procedure details (see MAR for exact dosages):     Sedation start time:  12/5/2019 4:27 AM    Preoxygenation:  Nasal cannula    Sedation:  Etomidate    Analgesia:  Fentanyl    Intra-procedure monitoring:  Blood pressure monitoring, continuous capnometry, frequent LOC assessments, frequent vital sign checks, continuous pulse oximetry and cardiac monitor    Intra-procedure events: none      Sedation end time:  12/5/2019 4:40 AM    Total sedation time (minutes):  13  Post-procedure details:     Post-sedation assessment completed:  12/5/2019 6:11 AM    Attendance: Constant attendance by certified staff until patient recovered      Recovery: Patient returned to pre-procedure baseline      Post-sedation assessments completed and reviewed: airway patency, cardiovascular function, hydration status, mental status, nausea/vomiting, pain level and respiratory function      Patient is stable for discharge or admission: no      Patient tolerance: Tolerated well, no immediate complications  Orthopedic injury treatment  Date/Time: 12/5/2019 7:12 AM  Performed by: Juanito Mills MD  Authorized by: Juanito Mills MD     Patient Location:  ED  Emergent situation    Patient states understanding of procedure being performed: Yes    Patient's understanding of procedure matches consent: Yes    Procedure consent matches procedure scheduled: Yes    Relevant documents present and verified: Yes    Test results available and properly labeled: Yes    Site marked: Yes    Radiology Images displayed and confirmed   If images not available, report reviewed: Yes    Required items: Required blood products, implants, devices and special equipment available    Patient identity confirmed:  Verbally with patient  Time out: Immediately prior to the procedure a time out was called    Injury location:  Shoulder  Location details:  Left shoulder  Injury type:  Dislocation  Dislocation type: anterior    Chronicity:  New  Hill-Sachs deformity?: No    Neurovascular status: Neurovascularly intact    Distal perfusion: normal    Neurological function: normal    Range of motion: reduced    Local anesthesia used?: No    General anesthesia used?: No    Sedation type:   Moderate (conscious) sedation (See separate Procedural Sedation form)  Manipulation performed?: Yes    Reduction method:  External rotation and traction and counter traction  Reduction method:  External rotation and traction and counter traction  Reduction method:  External rotation and traction and counter traction  Reduction method:  External rotation and traction and counter traction  Reduction method:  External rotation and traction and counter traction  Reduction method:  External rotation and traction and counter traction  Reduction successful?: Yes    Confirmation: Reduction confirmed by x-ray    Immobilization:  Sling  Neurovascular status: Neurovascularly intact    Distal perfusion: normal    Neurological function: normal    Range of motion: normal    Patient tolerance:  Patient tolerated the procedure well with no immediate complications

## 2019-12-05 NOTE — ED NOTES
Wife contacted at this time, medications and history updated at this time        Caty Paul, RN  12/05/19 5396

## 2019-12-05 NOTE — CONSULTS
Consultation - Cardiology Team One  Negra Aguillon 82 y o  male MRN: [id number]  Unit/Bed#: ED 11 Encounter: [id number]    Inpatient consult to Cardiology  Consult performed by: CONY Leblanc  Consult ordered by: CONY Cason          Physician Requesting Consult: Melia Hall MD  Reason for Consult / Principal Problem: Atrial fibrillation       Assessment/ Plan    1  Fall with L shoulder dislocation s/p reduced and placed sling   Orthopedic surgery consulted   Falls appears mechanical but patient does no recall event  He reports no dizziness or lightheaded with ambulation  No history of syncope or near syncope in the past     2  Atrial fibrillation with RVR   Patient has a history of paroxysmal atrial fibrillation  On eliquis for [address]   Currently on metoprolol 50 mg PO BID, if HR remains elevated can titrate to 75 mg PO BID    3  Hypertension- 138/73 average BP  Continue losartan and metoprolol     History of Present Illness   HPI: Negra Aguillon is a 82y o  year old male who has a history of paroxysmal atrial fibrillation on eliquis for [address], hypertension, peripheral vascular disease ambulatory dysfunction, parotid gland adenocarcinoma status post radiation, tobacco abuse and diverticulosis  He follows with cardiologist Dr Jones Early  He presents to [facility] ER from home s/p fall  Patient took [place] last night to sleep and does not recall fall  His wife is at bedside and reports when he takes [place] he wonders at night  His wife found him early this morning at the bottom of the stairs  She was sleeping on the couch and reports she did not hear him fall which makes her believe he only fell down a couple stairs but she did hear him moaning in pain  Patient reports he does not recall if he was dizzy or lightheaded but his wife reports he was awake when she found him  He has no history of syncope, near syncope or report of dizziness or lightheaded   He has a history of frequent falls in the setting of ambulatory dysfunction  Patient reports he has difficulty lifting his legs up  Last fall was over a month ago and he tripped over his sneaker  Echocardiogram 11/29/2018 showed EF 33%, mild diastolic dysfunction and no significant valvular disease  Nuclear stress test 12/03/2018 showed no evidence of perfusion defects  EKG reviewed personally:  Atrial fibrillation with RVR  Ventricular rate 125 bpm  QRSD 78 ms  QT interval 300 ms  QTc interval 433  ms    Telemetry reviewed personally:  Atrial fibrillation -130s    Review of Systems   Constitution: Negative for chills, fever and malaise/fatigue  Cardiovascular: Positive for leg swelling  Negative for chest pain, dyspnea on exertion, orthopnea and palpitations  Respiratory: Negative for shortness of breath  Musculoskeletal: Positive for falls and joint pain  Gastrointestinal: Negative for bloating, nausea and vomiting  Neurological: Negative for dizziness and light-headedness  Psychiatric/Behavioral: Negative for altered mental status  All other systems reviewed and are negative  Historical Information   Past Medical History:   Diagnosis Date    [facility] fibrillation ([facility] )     Cancer ([facility] )     Hypertension      Past Surgical History:   Procedure Laterality Date    REPLACEMENT TOTAL HIP W/  RESURFACING IMPLANTS       Social History     Substance and Sexual Activity   Alcohol Use Not Currently     Social History     Substance and Sexual Activity   Drug Use Not Currently     Social History     Tobacco Use   Smoking Status Never Smoker   Smokeless Tobacco Never Used     Family History: History reviewed  No pertinent family history      Meds/Allergies   all current active meds have been reviewed and current meds:   Current Facility-Administered Medications   Medication Dose Route Frequency    acetaminophen (TYLENOL) tablet 975 mg  975 mg Oral Q8H [address]    allopurinol (ZYLOPRIM) tablet 100 mg  100 mg Oral Daily    apixaban (ELIQUIS) tablet 5 mg  5 mg Oral BID    aspirin chewable tablet 81 mg  81 mg Oral Daily    docusate sodium (COLACE) capsule 100 mg  100 mg Oral BID    losartan (COZAAR) tablet 25 mg  25 mg Oral Daily    metoprolol tartrate (LOPRESSOR) tablet 50 mg  50 mg Oral Q12H [facility]    polyethylene glycol (MIRALAX) packet 17 g  17 g Oral Daily PRN    senna (SENOKOT) tablet 17 2 mg  2 tablet Oral HS          No Known Allergies    Objective   Vitals: Blood pressure 128/63, pulse (!) 127, temperature 97 8 °F (36 6 °C), temperature source Tympanic, resp  rate 18, weight 105 kg (231 lb 7 7 oz), SpO2 93 %  ,     There is no height or weight on file to calculate BMI ,     Systolic (40GAQ), TC , Min:99 , YJF:801     Diastolic (15CNB), AHB:28, Min:59, Max:100      No intake or output data in the 24 hours ending 19 1057  Weight (last 2 days)     Date/Time   Weight    19 0345   105 (231 48)            Invasive Devices     Peripheral Intravenous Line            Peripheral IV 19 Right Antecubital less than 1 day    Peripheral IV 19 Right Hand less than 1 day                  Physical Exam   Constitutional: He is oriented to person, place, and time  No distress  HENT:   Head: Normocephalic  Neck: Neck supple  No JVD present  Cardiovascular: Intact distal pulses  Irregular rhythm and rate    Pulmonary/Chest: Effort normal  No stridor  No respiratory distress  Course   No crackles   RA    Abdominal: Soft  Bowel sounds are normal  He exhibits distension  Obese    Musculoskeletal: He exhibits edema  + 1 edema bilateral LE   LUE + sling    Neurological: He is alert and oriented to person, place, and time  Skin: Skin is warm and dry  He is not diaphoretic  Laceration L lip with hematoma     Psychiatric: He has a normal mood and affect   His behavior is normal          LABORATORY RESULTS:      CBC with diff:   Results from last 7 days   Lab Units 19  0353   WBC Thousand/uL 11 10*   HEMOGLOBIN g/dL 14 8   HEMATOCRIT % 48 6   MCV fL 89   PLATELETS Thousands/uL 195   MCH pg 27 1   MCHC g/dL 30 5*   RDW % 16 4*   MPV fL 10 6   NRBC AUTO /100 WBCs 0       CMP:  Results from last 7 days   Lab Units 12/05/19  0353   POTASSIUM mmol/L 4 1   CHLORIDE mmol/L 110*   CO2 mmol/L 31   BUN mg/dL 11   CREATININE mg/dL 1 09   CALCIUM mg/dL 8 9   EGFR ml/min/1 73sq m 63       BMP:  Results from last 7 days   Lab Units 12/05/19  0353   POTASSIUM mmol/L 4 1   CHLORIDE mmol/L 110*   CO2 mmol/L 31   BUN mg/dL 11   CREATININE mg/dL 1 09   CALCIUM mg/dL 8 9            Results from last 7 days   Lab Units 12/05/19  0353   INR  1 22*     Lipid Profile:   No results found for: CHOL  No results found for: HDL  No results found for: LDLCALC  No results found for: TRIG      Cardiac testing:   No results found for this or any previous visit  No results found for this or any previous visit  No procedure found  No results found for this or any previous visit  Imaging: I have personally reviewed pertinent reports  Xr Shoulder 2+ Vw Left    Result Date: 12/5/2019  Narrative: LEFT SHOULDER INDICATION:   sedation  COMPARISON:  Trauma series 12/5/2019 VIEWS:  XR SHOULDER 2+ VW LEFT FINDINGS: There is been interval reduction of a shoulder dislocation with now anatomic alignment  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  No lytic or blastic lesions are seen  Soft tissues are unremarkable  Impression: Interval reduction of anterior shoulder dislocation with now anatomic alignment  Workstation performed: [id number]     Xr Humerus Left    Result Date: 12/5/2019  Narrative: TRAUMA SERIES INDICATION:  TRAUMA  COMPARISON:  None VIEWS:  XR TRAUMA MULTIPLE  FINDINGS: CHEST: Supine frontal view of the chest is obtained  Cardiomediastinal silhouette is within normal limits accounting for technique and patient positioning  Lungs are clear  No layering pleural effusions detected    No pneumothorax is seen on this supine film  Upright images are more sensitive to detect anterior pneumothoraces if relevant  No displaced fractures  LEFT SHOULDER: 2 views reviewed  Anterior shoulder dislocation is present  There is defect along the lateral humeral head, consistent with Hill-Sachs deformity  There is also a mildly displaced fracture of the inferior glenoid  Impression: No acute cardiopulmonary disease within limitations of supine imaging  Anterior shoulder dislocation with Hill-Sachs deformity and Bankart fracture  Workstation performed: [id number]     Trauma - Ct Head Wo Contrast    Addendum Date: 12/5/2019 Addendum:   ADDENDUM: Large soft tissue hematoma overlying the right parietal bone  Result Date: 12/5/2019  Narrative: CT BRAIN - WITHOUT CONTRAST INDICATION:   trauma  COMPARISON:  None  TECHNIQUE:  CT examination of the brain was performed  In addition to axial images, coronal 2D reformatted images were created and submitted for interpretation  Radiation dose length product (DLP) for this visit:  901 82 mGy-cm   This examination, like all CT scans performed in the [facility], was performed utilizing techniques to minimize radiation dose exposure, including the use of iterative  reconstruction and automated exposure control  IMAGE QUALITY:  Diagnostic  FINDINGS: PARENCHYMA:  No intracranial mass, mass effect or midline shift  No CT signs of acute infarction  No acute parenchymal hemorrhage  VENTRICLES AND EXTRA-AXIAL SPACES:  Normal for the patient's age  VISUALIZED ORBITS AND PARANASAL SINUSES:  Unremarkable  CALVARIUM AND EXTRACRANIAL SOFT TISSUES:  Normal      Impression: No intracranial hemorrhage or calvarial fracture  Findings discussed with Dr Candice Sahni 4:30 AM 12/5/19 Workstation performed: [id number]     Trauma - Ct Facial Bones Wo Contrast    Result Date: 12/5/2019  Narrative: CT FACIAL BONES WITHOUT INTRAVENOUS CONTRAST INDICATION:   Facial trauma  COMPARISON: None  TECHNIQUE:  Axial CT images were obtained through the facial bones with additional sagittal and coronal reconstructions  Radiation dose length product (DLP) for this visit:  430 41 mGy-cm   This examination, like all CT scans performed in the [facility], was performed utilizing techniques to minimize radiation dose exposure, including the use of iterative  reconstruction and automated exposure control  IMAGE QUALITY:  Diagnostic  FINDINGS: FACIAL BONES:   No facial bone fracture identified  Normal alignment of the temporomandibular joints  No lytic or blastic lesion  ORBITS:  Orbital globes, optic nerves, and extraocular muscles appear symmetric and normal  There is no evidence of retrobulbar mass, abscess, or hematoma  SINUSES:  Normal  SOFT TISSUES:  Normal      Impression: No evidence of acute traumatic injury to the facial bones  Findings discussed with Dr Edgar Wick at 4:30 AM, 12/5/2019 Workstation performed: [id number]     Trauma - Ct Spine Cervical Wo Contrast    Addendum Date: 12/5/2019 Addendum:   ADDENDUM: Findings discussed with Dr Edgar Wick at 4:30 AM, 12/5/2019    Result Date: 12/5/2019  Narrative: CT CERVICAL SPINE - WITHOUT CONTRAST INDICATION:   trauma  COMPARISON:  None  TECHNIQUE:  CT examination of the cervical spine was performed without intravenous contrast   Contiguous axial images were obtained  Sagittal and coronal reconstructions were performed  Radiation dose length product (DLP) for this visit:  465 45 mGy-cm   This examination, like all CT scans performed in the [facility], was performed utilizing techniques to minimize radiation dose exposure, including the use of iterative  reconstruction and automated exposure control  IMAGE QUALITY:  Diagnostic  FINDINGS: ALIGNMENT:  Normal alignment of the cervical spine  No subluxation  VERTEBRAL BODIES:  No fracture  DEGENERATIVE CHANGES:  Moderate multilevel cervical degenerative changes are noted   No critical central canal stenosis  PREVERTEBRAL AND PARASPINAL SOFT TISSUES:  Unremarkable  THORACIC INLET:  Normal      Impression: No cervical spine fracture or traumatic malalignment  Workstation performed: [id number]     Xr Chest 1 View    Result Date: 12/5/2019  Narrative: TRAUMA SERIES INDICATION:  TRAUMA  COMPARISON:  None VIEWS:  XR TRAUMA MULTIPLE  FINDINGS: CHEST: Supine frontal view of the chest is obtained  Cardiomediastinal silhouette is within normal limits accounting for technique and patient positioning  Lungs are clear  No layering pleural effusions detected  No pneumothorax is seen on this supine film  Upright images are more sensitive to detect anterior pneumothoraces if relevant  No displaced fractures  LEFT SHOULDER: 2 views reviewed  Anterior shoulder dislocation is present  There is defect along the lateral humeral head, consistent with Hill-Sachs deformity  There is also a mildly displaced fracture of the inferior glenoid  Impression: No acute cardiopulmonary disease within limitations of supine imaging  Anterior shoulder dislocation with Hill-Sachs deformity and Bankart fracture  Workstation performed: [id number]     Xr Trauma Multiple    Result Date: 12/5/2019  Narrative: TRAUMA SERIES INDICATION:  TRAUMA  COMPARISON:  None VIEWS:  XR TRAUMA MULTIPLE  FINDINGS: CHEST: Supine frontal view of the chest is obtained  Cardiomediastinal silhouette is within normal limits accounting for technique and patient positioning  Lungs are clear  No layering pleural effusions detected  No pneumothorax is seen on this supine film  Upright images are more sensitive to detect anterior pneumothoraces if relevant  No displaced fractures  LEFT SHOULDER: 2 views reviewed  Anterior shoulder dislocation is present  There is defect along the lateral humeral head, consistent with Hill-Sachs deformity  There is also a mildly displaced fracture of the inferior glenoid       Impression: No acute cardiopulmonary disease within limitations of supine imaging  Anterior shoulder dislocation with Hill-Sachs deformity and Bankart fracture  Workstation performed: [id number]     Trauma - Ct Abdomen Pelvis W Contrast    Result Date: 12/5/2019  Narrative: CT ABDOMEN AND PELVIS WITH IV CONTRAST INDICATION:   trauma  COMPARISON:  None  TECHNIQUE:  CT examination of the abdomen and pelvis was performed  Axial, sagittal, and coronal 2D reformatted images were created from the source data and submitted for interpretation  Radiation dose length product (DLP) for this visit:  1012 39 mGy-cm   This examination, like all CT scans performed in the [facility], was performed utilizing techniques to minimize radiation dose exposure, including the use of iterative reconstruction and automated exposure control  IV Contrast:  100 mL of iohexol (OMNIPAQUE) Enteric Contrast:  Enteric contrast was not administered  FINDINGS: ABDOMEN LOWER CHEST:  No clinically significant abnormality identified in the visualized lower chest  LIVER/BILIARY TREE:  There are one or more hepatic simple cyst(s) present  No CT evidence of suspicious solid hepatic mass  Normal hepatic contours  No biliary dilatation  GALLBLADDER:  No calcified gallstones  No pericholecystic inflammatory change  SPLEEN:  Unremarkable  PANCREAS:  Unremarkable  ADRENAL GLANDS:  Unremarkable  KIDNEYS/URETERS:  Unremarkable  No hydronephrosis  STOMACH AND BOWEL:  Diverticulosis  3 3 x 2 7 x 2 8 cm thick-walled air and fluid collection adjacent to the sigmoid colon and abutting the left superior dome of the bladder  APPENDIX:  No findings to suggest appendicitis  ABDOMINOPELVIC CAVITY:  No ascites or free intraperitoneal air  No lymphadenopathy  VESSELS:  Atherosclerotic changes are present  No evidence of aneurysm  PELVIS REPRODUCTIVE ORGANS:  Unremarkable for patient's age   URINARY BLADDER:  Left superior bladder dome wall thickening adjacent to air and fluid collection presumed to represent diverticular abscess  Air within the bladder  ABDOMINAL WALL/INGUINAL REGIONS:  Unremarkable  OSSEOUS STRUCTURES:  No acute fracture or destructive osseous lesion  Impression: No evidence of acute traumatic injury throughout the abdomen or pelvis  3 3 cm thick-walled air and fluid collection contiguous with sigmoid colon and abutting the left superior dome of the bladder, potentially representing chronic diverticular abscess versus prominent diverticulum  No evidence of active inflammation  Wall thickening along the left superior bladder dome which abuts adjacent thick-walled air and fluid collection with air within the bladder  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  Findings discussed with Dr Lana Arana at 4:50 AM, 12/5/2019 Workstation performed: [id number]     Thank you for allowing us to participate in this patient's care  This pt will follow up with Dr Jones Early once discharged  Counseling / Coordination of Care  Total floor / unit time spent today 45 minutes  Greater than 50% of total time was spent with the patient and / or family counseling and / or coordination of care  A description of the counseling / coordination of care: Review of history, current assessment, development of a plan  Code Status: Level 1 - Full Code    ** Please Note: Dragon 360 Dictation voice to text software may have been used in the creation of this document   **

## 2019-12-05 NOTE — ED PROVIDER NOTES
Emergency Department Airway Evaluation and Management Form    History  Obtained from: ems, pt  Review of patient's allergies indicates no known allergies  Chief Complaint:  Trauma Alert    HPI: Pt is a 82 y o  male presents s/p fall down unknown number of steps  Pt on eliquis and does not remember falling  Pt took [employer]  Pt with left arm pain and noted facial lip swelling and laceration  I have reviewed and agree with the history as documented  Physical Exam    Vitals:    12/05/19 0340   BP: 118/69   Pulse: 101   Resp: 18   Temp: 97 8 °F (36 6 °C)   SpO2: 91%     Supplemental Oxygen: 2 liters    GCS: 14    Neuro: Alert and oriented  Psych: not combative, not anxious, cooperative for exam  Neck: In collar, No JVD, No midline tenderness  Cardio:  Normal  Respiratory: Normal  Mouth:  Normal  Pharynx: Normal    Monitor:  NSR      ED Medications    No current facility-administered medications for this encounter  No current outpatient medications on file        Intubation    No intubation required    Final Diagnosis:  Left shoulder dislocation, facial laceration    ED Provider  Electronically Signed by       Eddi Bakrer MD  12/05/19 5943

## 2019-12-05 NOTE — ASSESSMENT & PLAN NOTE
Patient has reported history of atrial fibrillation on metoprolol daily and Eliquis  Continue home medications  Bolus 1 L normal saline  Patient reports near syncopal episode with standing up, this may be symptomatic from the rapid ventricular response  Admit for observation on telemetry  Consider cardiology consult if no improvement

## 2019-12-05 NOTE — UTILIZATION REVIEW
Initial Clinical Review    Admission: Date/Time/Statement: OBSERVATION 12/5/19 @0646 CONVERTED TO INPATIENT ADMISSION DUE TO >2MN REQUIRED TO EVALUATE AND TREAT ELDERLY PATIENT WITH HEAD INJURY AND CONCUSSION DISCOVERED TO ALSO BE IN RAPID A FIB  CARDIOLOGY CONSULTED  Inpatient Admission Orders (From admission, onward)     Ordered        12/05/19 1113  Inpatient Admission  Once                   Orders Placed This Encounter   Procedures    Inpatient Admission     Standing Status:   Standing     Number of Occurrences:   1     Order Specific Question:   Admitting Physician     Answer:   Nalini Conquest [[id number]]     Order Specific Question:   Level of Care     Answer:   Med Surg [16]     Order Specific Question:   Bed Type     Answer:   Trauma [7]     Order Specific Question:   Estimated length of stay     Answer:   More than 2 Midnights     Order Specific Question:   Certification     Answer:   I certify that inpatient services are medically necessary for this patient for a duration of greater than two midnights  See H&P and MD Progress Notes for additional information about the patient's course of treatment  ED Arrival Information     Expected Arrival Acuity Means of Arrival Escorted By Service Admission Type    - 12/5/2019 03:28 Immediate Ambulance [place] [id number]    Arrival Complaint    -        Chief Complaint   Patient presents with    Fall     Pt presents from home where per EMS tonight had unwittnessed fall down/up steps after taking an [place]  Unknown down time, unknown LOC, +[place], GCS 14  Reports L arm pain upon arrival in trauma bay  Assessment/Plan: 82 y o  male who presents with left shoulder pain and bleeding from his mouth after his wife found him laying at the bottom of his stairs early this morning  On presentation the patient is confused and EMS provide the history    Wife told them that he took an Ambien last night and when he does he occasionally will get confused and wander in the house  She was sleeping downstairs and did not hear him fall but when she woke up in the middle the night she noticed him lying at the bottom of the stairs  On EMS arrival he was confused and did not recall the events around his fall  His only complaint was left shoulder pain and pain at the left side of his lip where he has bleeding  After observation in the emergency department patient's mental status improved and he continued to deny any complaints other than above  Continues to not remember how he fell  Mechanism:Fall     Closed head injury with concussion  Assessment & Plan  Patient presented with confusion and amnesia to event, confusion improved while in the emergency department though he remains amnestic, suspect concussion     Contusion of lip  Assessment & Plan  Small laceration along the mucosal surface that did not penetrate through the outer skin, closed with a single Vicryl suture in the ER  Anterior dislocation of left shoulder  Assessment & Plan  Reduced in the emergency department  Shoulder immobilizer to left upper extremity  Pain control     Vesicocolonic fistula  Assessment & Plan  Identified on trauma CT of the abdomen and pelvis  Patient denies any urinary symptoms  Denies previous history  A: 84M who presents as Level B alert s/p fall down steps on Eliquis  With L shoulder dislocation and lip laceration  Imaging negative other than shoulder dislocation, but also incidentally noted chronic sigmoid diverticulitis with colovesicular fistula      P:   Admit to trauma 2/2 ambulatory dysfunction  L shoulder dislocation reduced and placed in sling; will c/s orthopedics   Colorectal surgery evaluation re diverticulitis with fistula; abdominal exam benign  Will need PT/OT/gerontology consultation in AM  Afib c RVR POA - given IV metoprolol and continue home toprol  Place on telemetry     Patient seen in the ED at 0400    Expected hospital length of stay (LOS) is more than 2 midnight stays  12/5 per colorectal:  Assessment:  81 yo M with hx of diverticulosis and large diverticulum vs chronic diverticular abscess seen on CT  Patient is asymptomatic without known history of acute diverticulitis   However, there is air seen in the bladder which is atypical and could represent colovesicular fistula given CT findings      Plan:  No acute intervention  Non-urgent workup for colo-vesicular fistula w/ colorectal surgery - will see us outpatient and we will likely repeat scope, discuss w/ urology CT cysto vs regular cysto at that time  Follow up information placed in chart    12/5 per Trauma @0901: Summary of Diagnosed Injuries: Upper lip laceration and hematoma  Eft anterior shoulder dislocation  Right ear ecchymosis  LLE edema  Vertigo with possible syncope and fall  Atrial fibrillation  Gout  Concussion     Clinical Plan: Admitted to trauma  GEriatric consult  Cardiology consult  Orthopedic consult  Pain control  LUE sling and NWB, Neruovascular checks  Neuro checks  DVT prophylaxis    12/5 per cardio: Atrial fibrillation with RVR   Patient has a history of paroxysmal atrial fibrillation  On [employer] for [address] [place] [address] Road   Currently on metoprolol 50 mg PO BID, if HR remains elevated can titrate to 75 mg PO BID  ED Triage Vitals   Temperature Pulse Respirations Blood Pressure SpO2   12/05/19 0340 12/05/19 0340 12/05/19 0340 12/05/19 0340 12/05/19 0340   97 8 °F (36 6 °C) 101 18 118/69 91 %      Temp Source Heart Rate Source Patient Position - Orthostatic VS BP Location FiO2 (%)   12/05/19 0340 12/05/19 0340 12/05/19 0340 12/05/19 0420 --   Tympanic Monitor Lying Right arm       Pain Score       12/05/19 0339       5        Wt Readings from Last 1 Encounters:   12/05/19 105 kg (231 lb 7 7 oz)     Additional Vital Signs:   Date/Time  Temp  Pulse  Resp  BP  SpO2  O2 Device  Patient Position - Orthostatic VS   12/05/19 0859    127Abnormal   18  128/63  93 %       12/05/19 0729    126Abnormal 18  143/67  96 %       12/05/19 0630    104  18  121/59  92 %    Lying   12/05/19 0600    120Abnormal   18  127/79  94 %    Lying   12/05/19 0530    100  18  118/63  94 %    Lying   12/05/19 0500    108Abnormal   16  136/70  98 %    Lying   12/05/19 0450    103  18  149/86  100 %    Lying   12/05/19 0445    104  18  135/75  99 %    Lying   12/05/19 0435    112Abnormal   20  157/83  98 %    Lying   12/05/19 0430    124Abnormal   16  190/100Abnormal   95 %  Nasal cannula  Lying   12/05/19 04:26:12    94  16  137/71  100 %  Nasal cannula  Lying   12/05/19 04:20:49    94  16  205/80Abnormal   90 %  None (Room air)  Lying   12/05/19 0420            Nasal cannula         Pertinent Labs/Diagnostic Test Results:   No EKG  XR shoulder 2+ vw left   Final Result by Joe Dorsey MD (12/05 1091)      Interval reduction of anterior shoulder dislocation with now anatomic alignment  TRAUMA - CT head wo contrast   Final Result by  (12/05 1019)   Large soft tissue hematoma overlying the right parietal bone  TRAUMA - CT spine cervical wo contrast   Final Result by  (12/05 1019)   No fx   TRAUMA - CT facial bones wo contrast   Final Result by Leah Sellers MD (12/05 0448)      No evidence of acute traumatic injury to the facial bones  TRAUMA - CT abdomen pelvis w contrast   Final Result by Leah Sellers MD (12/05 0510)      No evidence of acute traumatic injury throughout the abdomen or pelvis  3 3 cm thick-walled air and fluid collection contiguous with sigmoid colon and abutting the left superior dome of the bladder, potentially representing chronic diverticular abscess versus prominent diverticulum  No evidence of active inflammation  Wall thickening along the left superior bladder dome which abuts adjacent thick-walled air and fluid collection with air within the bladder  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     XR Trauma multiple   Final Result by Priyank Fernandez Keily Mendez MD (12/05 8000)      No acute cardiopulmonary disease within limitations of supine imaging  Anterior shoulder dislocation with Hill-Sachs deformity and Bankart fracture  XR chest 1 view   Final Result by Raymond Rubio MD (12/05 7847)      No acute cardiopulmonary disease within limitations of supine imaging  Anterior shoulder dislocation with Hill-Sachs deformity and Bankart fracture  XR humerus left   Final Result by Raymond Rubio MD (12/05 2931)      No acute cardiopulmonary disease within limitations of supine imaging  Anterior shoulder dislocation with Hill-Sachs deformity and Bankart fracture         Results from last 7 days   Lab Units 12/05/19  0353   WBC Thousand/uL 11 10*   HEMOGLOBIN g/dL 14 8   HEMATOCRIT % 48 6   PLATELETS Thousands/uL 195   NEUTROS ABS Thousands/µL 8 67*         Results from last 7 days   Lab Units 12/05/19  0353   SODIUM mmol/L 143   POTASSIUM mmol/L 4 1   CHLORIDE mmol/L 110*   CO2 mmol/L 31   ANION GAP mmol/L 2*   BUN mg/dL 11   CREATININE mg/dL 1 09   EGFR ml/min/1 73sq m 63   CALCIUM mg/dL 8 9     Results from last 7 days   Lab Units 12/05/19  0353   GLUCOSE RANDOM mg/dL 118     Results from last 7 days   Lab Units 12/05/19  0353   PROTIME seconds 15 0*   INR  1 22*   PTT seconds 33     ED Treatment:   Medication Administration from 12/05/2019 0328 to 12/05/2019 1018       Date/Time Order Dose Route Action Action by Comments     12/05/2019 0350 multi-electrolyte (ISOLYTE-S PH 7 4) bolus 1,000 mL Intravenous [address] Beryl Rebolledo RN      12/05/2019 0350 fentanyl citrate (PF) 100 MCG/2ML 50 mcg Intravenous Given Beryl Rebolledo RN      12/05/2019 0428 etomidate (AMIDATE) 2 mg/mL injection 10 mg 10 mg Intravenous Given John Vega RN      12/05/2019 0425 iohexol (OMNIPAQUE) 350 MG/ML injection (MULTI-DOSE) 100 mL 100 mL Intravenous Given Meron Dumas      12/05/2019 0446 lidocaine-epinephrine (XYLOCAINE-MPF/EPINEPHRINE) 1%-1:200,000 injection 5 mL   Infiltration Canceled Entry Queta Sanchez RN      12/05/2019 3754 tetanus-diphtheria-acellular pertussis (BOOSTRIX) IM injection 0 5 mL 0 5 mL Intramuscular Given Queta Sanchez RN      12/05/2019 0453 lidocaine-epinephrine (XYLOCAINE/EPINEPHRINE) 1 %-1:100,000 injection 5 mL 5 mL Infiltration Given by Other Queta Sanchez RN      12/05/2019 9543 aspirin chewable tablet 81 mg 81 mg Oral Given Nitin Krishnamurthy RN      12/05/2019 0949 metoprolol tartrate (LOPRESSOR) tablet 50 mg 50 mg Oral Given Nitin Krishnamurthy RN      12/05/2019 0920 multi-electrolyte (PLASMALYTE-A/ISOLYTE-S PH 7 4) IV solution 1,000 mL 1,000 mL Intravenous Given [place], RN      12/05/2019 0448 docusate sodium (COLACE) capsule 100 mg 100 mg Oral Given Nitin Krishnamurthy RN         Past Medical History:   Diagnosis Date    [facility]     Cancer ([facility] )     Hypertension      Present on Admission:   Benign hypertension      Admitting Diagnosis: Unspecified multiple injuries, initial encounter [T07  XXXA]  Age/Sex: 82 y o  male  Admission Orders:  Scheduled Medications:    Medications:  acetaminophen 975 mg Oral Q8H [address]   allopurinol 100 mg Oral Daily   apixaban 5 mg Oral BID   aspirin 81 mg Oral Daily   docusate sodium 100 mg Oral BID   losartan 25 mg Oral Daily   metoprolol tartrate 50 mg Oral Q12H [address]   senna 2 tablet Oral HS          PRN Meds:    polyethylene glycol 17 g Oral Daily PRN     Tele  SCD's    IP CONSULT TO CASE MANAGEMENT  IP CONSULT TO ORTHOPEDIC SURGERY  IP CONSULT TO CASE MANAGEMENT  IP CONSULT TO COLORECTAL SURGERY  IP CONSULT TO GERONTOLOGY  IP CONSULT TO CARDIOLOGY    Network Utilization Review Department  [email or web address]  ATTENTION: Please call with any questions or concerns to [phone] and carefully listen to the prompts so that you are directed to the right person   All voicemails are confidential   Cesilia Bernardo all requests for admission clinical reviews, approved or denied determinations and any other requests to dedicated fax number below belonging to the campus where the patient is receiving treatment   List of dedicated fax numbers for the Facilities:  [address] [place] [address] DENIALS (Administrative/Medical Necessity) [phone]   [address] (Maternity/NICU/Pediatrics) [phone]   Nathanielbenjaminnury Erazo [phone]   Napolebipin Hoots [phone]   [facility] [phone]   [address] [place] [address] [address] [place] [address] [phone]   [facility] [phone]   Jerry Angulo [address] 1000 [facility] [phone]

## 2019-12-05 NOTE — ASSESSMENT & PLAN NOTE
Identified on trauma CT of the abdomen and pelvis  Patient denies any urinary symptoms  Denies previous history

## 2019-12-05 NOTE — ASSESSMENT & PLAN NOTE
Patient presented with confusion and amnesia to event, confusion improved while in the emergency department though he remains amnestic, suspect concussion

## 2019-12-06 ENCOUNTER — APPOINTMENT (INPATIENT)
Dept: RADIOLOGY | Facility: HOSPITAL | Age: 84
DRG: 089 | End: 2019-12-06
Payer: MEDICARE

## 2019-12-06 LAB
ANION GAP SERPL CALCULATED.3IONS-SCNC: 4 MMOL/L (ref 4–13)
ATRIAL RATE: 468 BPM
BUN SERPL-MCNC: 15 MG/DL (ref 5–25)
CALCIUM SERPL-MCNC: 8.7 MG/DL (ref 8.3–10.1)
CHLORIDE SERPL-SCNC: 114 MMOL/L (ref 100–108)
CO2 SERPL-SCNC: 26 MMOL/L (ref 21–32)
CREAT SERPL-MCNC: 0.91 MG/DL (ref 0.6–1.3)
ERYTHROCYTE [DISTWIDTH] IN BLOOD BY AUTOMATED COUNT: 16.4 % (ref 11.6–15.1)
GFR SERPL CREATININE-BSD FRML MDRD: 78 ML/MIN/1.73SQ M
GLUCOSE SERPL-MCNC: 104 MG/DL (ref 65–140)
HCT VFR BLD AUTO: 36.1 % (ref 36.5–49.3)
HCT VFR BLD AUTO: 36.6 % (ref 36.5–49.3)
HGB BLD-MCNC: 11.3 G/DL (ref 12–17)
HGB BLD-MCNC: 11.3 G/DL (ref 12–17)
MCH RBC QN AUTO: 27.4 PG (ref 26.8–34.3)
MCHC RBC AUTO-ENTMCNC: 31.3 G/DL (ref 31.4–37.4)
MCV RBC AUTO: 87 FL (ref 82–98)
P AXIS: 264 DEGREES
PLATELET # BLD AUTO: 160 THOUSANDS/UL (ref 149–390)
PMV BLD AUTO: 11.3 FL (ref 8.9–12.7)
POTASSIUM SERPL-SCNC: 3.8 MMOL/L (ref 3.5–5.3)
QRS AXIS: 58 DEGREES
QRSD INTERVAL: 78 MS
QT INTERVAL: 300 MS
QTC INTERVAL: 433 MS
RBC # BLD AUTO: 4.13 MILLION/UL (ref 3.88–5.62)
SODIUM SERPL-SCNC: 144 MMOL/L (ref 136–145)
T WAVE AXIS: 67 DEGREES
VENTRICULAR RATE: 125 BPM
WBC # BLD AUTO: 8.17 THOUSAND/UL (ref 4.31–10.16)

## 2019-12-06 PROCEDURE — 99232 SBSQ HOSP IP/OBS MODERATE 35: CPT | Performed by: INTERNAL MEDICINE

## 2019-12-06 PROCEDURE — 85014 HEMATOCRIT: CPT | Performed by: NURSE PRACTITIONER

## 2019-12-06 PROCEDURE — 97167 OT EVAL HIGH COMPLEX 60 MIN: CPT

## 2019-12-06 PROCEDURE — G8979 MOBILITY GOAL STATUS: HCPCS

## 2019-12-06 PROCEDURE — 93010 ELECTROCARDIOGRAM REPORT: CPT | Performed by: INTERNAL MEDICINE

## 2019-12-06 PROCEDURE — 85027 COMPLETE CBC AUTOMATED: CPT | Performed by: SURGERY

## 2019-12-06 PROCEDURE — 23620 CLTX GR HMRL TBRS FX WO MNPJ: CPT | Performed by: ORTHOPAEDIC SURGERY

## 2019-12-06 PROCEDURE — G8988 SELF CARE GOAL STATUS: HCPCS

## 2019-12-06 PROCEDURE — 99222 1ST HOSP IP/OBS MODERATE 55: CPT | Performed by: ORTHOPAEDIC SURGERY

## 2019-12-06 PROCEDURE — 73200 CT UPPER EXTREMITY W/O DYE: CPT

## 2019-12-06 PROCEDURE — 99232 SBSQ HOSP IP/OBS MODERATE 35: CPT | Performed by: NURSE PRACTITIONER

## 2019-12-06 PROCEDURE — G8987 SELF CARE CURRENT STATUS: HCPCS

## 2019-12-06 PROCEDURE — 80048 BASIC METABOLIC PNL TOTAL CA: CPT | Performed by: SURGERY

## 2019-12-06 PROCEDURE — 85018 HEMOGLOBIN: CPT | Performed by: NURSE PRACTITIONER

## 2019-12-06 PROCEDURE — G8978 MOBILITY CURRENT STATUS: HCPCS

## 2019-12-06 PROCEDURE — 97163 PT EVAL HIGH COMPLEX 45 MIN: CPT

## 2019-12-06 PROCEDURE — 73630 X-RAY EXAM OF FOOT: CPT

## 2019-12-06 PROCEDURE — 99233 SBSQ HOSP IP/OBS HIGH 50: CPT | Performed by: INTERNAL MEDICINE

## 2019-12-06 PROCEDURE — 73110 X-RAY EXAM OF WRIST: CPT

## 2019-12-06 RX ORDER — METOPROLOL TARTRATE 50 MG/1
50 TABLET, FILM COATED ORAL EVERY 12 HOURS SCHEDULED
Status: DISCONTINUED | OUTPATIENT
Start: 2019-12-06 | End: 2019-12-06

## 2019-12-06 RX ORDER — LANOLIN ALCOHOL/MO/W.PET/CERES
3 CREAM (GRAM) TOPICAL
Status: DISCONTINUED | OUTPATIENT
Start: 2019-12-06 | End: 2019-12-09 | Stop reason: HOSPADM

## 2019-12-06 RX ADMIN — APIXABAN 5 MG: 5 TABLET, FILM COATED ORAL at 17:45

## 2019-12-06 RX ADMIN — DOCUSATE SODIUM 100 MG: 100 CAPSULE, LIQUID FILLED ORAL at 08:56

## 2019-12-06 RX ADMIN — ALLOPURINOL 100 MG: 100 TABLET ORAL at 08:56

## 2019-12-06 RX ADMIN — SENNOSIDES 17.2 MG: 8.6 TABLET, FILM COATED ORAL at 21:01

## 2019-12-06 RX ADMIN — ACETAMINOPHEN 975 MG: 325 TABLET ORAL at 13:51

## 2019-12-06 RX ADMIN — MELATONIN 3 MG: 3 TAB ORAL at 21:01

## 2019-12-06 RX ADMIN — ACETAMINOPHEN 975 MG: 325 TABLET ORAL at 05:23

## 2019-12-06 RX ADMIN — DOCUSATE SODIUM 100 MG: 100 CAPSULE, LIQUID FILLED ORAL at 17:45

## 2019-12-06 RX ADMIN — METOPROLOL TARTRATE 75 MG: 50 TABLET, FILM COATED ORAL at 21:01

## 2019-12-06 RX ADMIN — ASPIRIN 81 MG 81 MG: 81 TABLET ORAL at 08:56

## 2019-12-06 RX ADMIN — ACETAMINOPHEN 975 MG: 325 TABLET ORAL at 21:01

## 2019-12-06 RX ADMIN — APIXABAN 5 MG: 5 TABLET, FILM COATED ORAL at 08:56

## 2019-12-06 RX ADMIN — METOPROLOL TARTRATE 50 MG: 50 TABLET, FILM COATED ORAL at 11:45

## 2019-12-06 NOTE — PHYSICAL THERAPY NOTE
PHYSICAL THERAPY EVALUATION  NAME:  Johnny aBer  DATE: 12/06/19    AGE:   82 y o  Mrn:   [id number]  ADMIT DX:  Atrial fibrillation with rapid ventricular response (HCC) [I48 91]  Vesicocolonic fistula [N32 1]  Ambulatory dysfunction [R26 2]  Anterior dislocation of left shoulder, initial encounter [S43 015A]  Postural dizziness with near syncope [R42, R55]  Unspecified multiple injuries, initial encounter [T07  XXXA]    Past Medical History:   Diagnosis Date    [facility] )     [facility]     Hypertension        Past Surgical History:   Procedure Laterality Date    REPLACEMENT TOTAL HIP W/  RESURFACING IMPLANTS         Length Of Stay: 1    PHYSICAL THERAPY EVALUATION:        12/06/19 1421   Note Type   Note type Eval only   Pain Assessment   Pain Assessment 0-10   Pain Score No Pain  (pt initially reports - pain, then c/o LLE pain with movement)   Home Living   Type of [address] [place] [address] Two level;Bed/bath upstairs   Home Equipment Walker   Additional Comments Pt reports living with spouse who is able to assist pt if needed  Pts spouse present and reports she is able to assist and has local family who sometimes provide assist if needed    Prior Function   Level of [place] Independent with ADLs and functional mobility   Lives With Spouse   Receives Help From Family   ADL Assistance Independent   Falls in the last 6 months 1 to 4   Vocational Retired   Comments Pt reports the use of a RW in the community and no AD in the home    Restrictions/Precautions   Weight Bearing Precautions Per Order Yes   RUE Weight Bearing Per Order WBAT   LUE Weight Bearing Per Order NWB   RLE Weight Bearing Per Order WBAT   LLE Weight Bearing Per Order WBAT   Braces or Orthoses Sling  (LUE )   Other Precautions Cognitive; Chair Alarm; Bed Alarm;WBS;Fall Risk;Pain   General   Family/Caregiver Present Yes  (spouse )   Cognition   Overall Cognitive Status Impaired   Arousal/Participation Alert   Attention Attends with cues to redirect   Orientation Level Oriented to person;Oriented to place;Oriented to time   Memory Decreased recall of recent events;Decreased short term memory   Following Commands Follows one step commands without difficulty   RUE Assessment   RUE Assessment WFL   LUE Assessment   LUE Assessment X   RLE Assessment   RLE Assessment WFL   Strength RLE   RLE Overall Strength 3+/5   LLE Assessment   LLE Assessment X  (decreased AROM 2* to pain )   Strength LLE   LLE Overall Strength 3/5   Bed Mobility   Supine to Sit 2  Maximal assistance   Additional items Assist x 2; Increased time required;Verbal cues   Sit to Supine 2  Maximal assistance   Additional items Assist x 2; Increased time required;Verbal cues   Transfers   Sit to Stand 2  Maximal assistance   Additional items Assist x 2; Increased time required;Verbal cues   Stand to Sit 2  Maximal assistance   Additional items Assist x 2; Increased time required;Verbal cues   Stand pivot 2  Maximal assistance   Additional items Assist x 2; Increased time required;Verbal cues   Additional Comments VC and TC needed for hand placement and safety  HHA utilized for transfer and ambulation   Ambulation/Elevation   Gait pattern Excessively slow; Short stride; Foward flexed; Inconsistent yeny   Gait Assistance 2  Maximal assist   Additional items Assist x 2   Assistive Device Other (Comment)  (HHA )   Distance 3ft from bed to stretcher  (limited by pain and fatigue)   Balance   Static Sitting Poor +   Static Standing Poor   Ambulatory Poor -   Endurance Deficit   Endurance Deficit Yes   Endurance Deficit Description pain and fatigue    Activity Tolerance   Activity Tolerance Patient limited by fatigue;Patient limited by pain   Nurse Made Aware Pt appropriate to be seen and mobilize per nsg    Assessment   Prognosis Fair   Problem List Decreased strength;Decreased range of motion;Decreased endurance; Impaired balance;Decreased mobility; Decreased cognition; Impaired judgement;Decreased safety awareness;Pain;Orthopedic restrictions   Assessment Pt is 82 y o  male seen for PT evaluation s/p admit to [facility] on 12/5/2019  Two pt identifiers were used to confirm  Pt presented s/p being found down at the bottom of the stairs by wife  Pt was admitted with a primary dx of: closed head injury with concussion, contusion of lip, anterior dislocation of L shoulder s/p reduction, ambulatory dysfunction,   PT now consulted for assessment of mobility and d/c needs  Pt with Up with assistance orders  Pts current co morbidities effecting treatment include: a fib, cancer, HTN  Pts current clinical presentation is Unstable/ Unpredictable (high complexity) due to Ongoing medical management for primary dx, Decreased activity tolerance compared to baseline, Fall risk, Increased assistance needed from caregiver at current time, Ongoing telemetry monitoring, Cog status, Current WBS, Continuous pulse oximetry monitoring     Prior to admission, pt was I with ambulation with the use of a RW in the community and no AD in the home as per pt and spouse  Upon evaluation, pt currently is requiring max Ax2 for bed mobility; max Ax2 for transfers and max Ax2 for ambulation w/ HHA   Pt denies any lightheadedness or dizziness with ambulation  Pt presents at PT eval functioning below baseline and currently w/ overall mobility deficits 2* to: BLE weakness, decreased ROM, impaired balance, decreased endurance, gait deviations, pain, decreased activity tolerance compared to baseline, decreased safety awareness, impaired judgement, fall risk, orthopedic restrictions, decreased cognition  Pt currently at a fall risk 2* to impairments listed above  Based on the aforementioned PT evaluation, pt will continue to benefit from skilled Acute PT interventions to address stated impairments; to maximize functional mobility; for ongoing pt/ family training; and DME needs   At conclusion of PT session pt assisted onto stretcher with transport with all needs within reach  Pt left with transport at conclusion of PT eval  Pt denies any further questions at this time  PT is currently recommending rehab  Pt/ family agreeable to plan and goals as stated on evaluation  PT will continue to follow during hospital stay  Barriers to Discharge Inaccessible home environment;Decreased caregiver support   Goals   Patient Goals none stated 2* to cog status   STG Expiration Date 12/16/19   Short Term Goal #1 In 10 days pt will complete: 1) Bed mobility skills with mod Ax1 to increase safety and independence as well as decrease caregiver burden  2) Functional transfers with mod Ax1 to promote increased independence, safety, and QOL in the home environment  3) Ambulate 100' using least restrictive AD with mod Ax1 without LOB and stable vitals so that pt can negotiate home environment safely and promote independence with functional mobility and return to PLOF  4) Improve balance grades to Good to increase safety with all mobility and decrease fall risk  5) Improve BLE strength by 1/2 grade to help increase overall functional mobility and decrease fall risk  6) PT for ongoing pt and family education; DME needs and D/C planning to promote highest level of function in least restrictive environment  Plan   Treatment/Interventions Functional transfer training;LE strengthening/ROM; Therapeutic exercise; Endurance training;Patient/family training;Equipment eval/education; Bed mobility;Gait training;Spoke to nursing;OT;Family   PT Frequency Other (Comment)  (3-6x a week )   Recommendation   Recommendation Short-term skilled PT   PT - OK to Discharge Yes  (to rehab when medically cleared )   Modified Fan Scale   Modified Gaston Scale 4   Barthel Index   Feeding 5   Bathing 0   Grooming Score 0   Dressing Score 0   Bladder Score 5   Bowels Score 5   Toilet Use Score 5   Transfers (Bed/Chair) Score 5   Mobility (Level Surface) Score 0   Stairs Score 0   Barthel Index Score 25   Smith Delatorre, PT

## 2019-12-06 NOTE — PROGRESS NOTES
Progress Note - Geriatric Medicine   Bill Dowling 82 y o  male MRN: [id number]  Unit/Bed#: [facility] 604-01 Encounter: [id number]      Assessment/Plan:    Close head injury with concussion  -mental status improving today, much more awake, alert, mentally answering questions  -continue to avoid sedating medications  -strongly encourage avoidance or tapering of Ambien given is likely contribution to fall and risk of recurrent events in the future    Insomnia  -discussed normal sleep-wake cycle, sleep hygiene, or alternative medications and safe use, recommend following up with PCP as outpatient to discuss Ambien taper  -consider melatonin     Left anterior shoulder dislocation  -ortho following, repeat imaging pending for later today  -continue acute pain control per geriatric pain protocol  -currently nonweightbearing in soft sling    Ambulatory dysfunction with recurrent falls  -continue appropriate footwear at all times  -continue PT and OT  -strongly encourage cessation of Ambien other sedating medications which are likely contributing  -continue fall precautions  -recommend home safety eval on discharge    Impaired vision  -patient requires use of glasses, encourage use at all appropriate times    Subjective:   Patient seen and examined at the bedside where he is lying resting comfortably   He reports that he slept well overnight and his pain is well controlled  He states that his wife will be here shortly  He denies any acute complaints  Review of Systems   Constitutional: Negative for appetite change, chills and fever  HENT: Negative for dental problem, sinus pressure and trouble swallowing  Eyes: Positive for visual disturbance (Wears glasses)  Respiratory: Negative for cough, shortness of breath and wheezing  Cardiovascular: Negative for chest pain, palpitations and leg swelling  Gastrointestinal: Negative for constipation, diarrhea, nausea and vomiting     Genitourinary: Negative for difficulty urinating  Musculoskeletal: Positive for arthralgias (Left shoulder)  Negative for back pain  Skin:        Bruising left shoulder   Neurological: Negative for dizziness, light-headedness and headaches  Hematological: Bruises/bleeds easily  Psychiatric/Behavioral: Negative for decreased concentration and sleep disturbance (Slept well last evening)  All other systems reviewed and are negative  Objective:     Vitals: Blood pressure 103/58, pulse 100, temperature 97 9 °F (36 6 °C), resp  rate (!) 3, height 5' 9" (1 753 m), weight 105 kg (231 lb 7 7 oz), SpO2 93 %  ,Body mass index is 34 18 kg/m²  Intake/Output Summary (Last 24 hours) at 12/6/2019 0845  Last data filed at 12/6/2019 0500  Gross per 24 hour   Intake 600 ml   Output 250 ml   Net 350 ml     Current Medications: Reviewed    Physical Exam:   Physical Exam   Constitutional: He is oriented to person, place, and time  Vital signs are normal  He appears well-developed and well-nourished  Appears stated age, no acute distress, sitting resting comfortably   HENT:   Head: Normocephalic  Left upper lip ecchymosis  Glasses in place     Eyes: Conjunctivae and EOM are normal  No scleral icterus  Neck: Neck supple  No tracheal deviation present  Cardiovascular: Normal rate  No murmur heard  Pulmonary/Chest: Effort normal and breath sounds normal  No respiratory distress  Abdominal: Soft  Bowel sounds are normal  There is no tenderness  Musculoskeletal: He exhibits deformity (Left upper extremity in sling, some edema and ecchymosis anterior shoulder)  Neurological: He is alert and oriented to person, place, and time  Skin: Skin is warm and dry  Psychiatric: He has a normal mood and affect  Nursing note and vitals reviewed       Invasive Devices     Peripheral Intravenous Line            Peripheral IV 12/05/19 Right Antecubital 1 day              Lab, Imaging and other studies:   -sodium 144, potassium 3 8, chloride 114, CO2 26, BUN 15, creatinine 0 91, GFR 78  -hemoglobin 11 3, hematocrit 36 1, WBC 8 7, platelets 325

## 2019-12-06 NOTE — ASSESSMENT & PLAN NOTE
Reduced in the emergency department  NWB NILAE in sling  CT Shoulder today per ortho  Pain control  PT/OT

## 2019-12-06 NOTE — ASSESSMENT & PLAN NOTE
Patient has reported history of atrial fibrillation on metoprolol daily and Eliquis  Continue home medications  Cardiology evaluation recommending cont home dose metoprolol, uptitrate to 75 mg bid if needed for HR > 100 bpm  HR is controlled overnight and this morning since fluid bolus given

## 2019-12-06 NOTE — ASSESSMENT & PLAN NOTE
Patient presented with confusion and amnesia to event, confusion improved while in the emergency department though he remains amnestic  No symptoms of headache, vertigo, nausea - suspect likely side effect from [place]   No evidence of concussion

## 2019-12-06 NOTE — PLAN OF CARE
Problem: Prexisting or High Potential for Compromised Skin Integrity  Goal: Skin integrity is maintained or improved  Description  INTERVENTIONS:  - Identify patients at risk for skin breakdown  - Assess and monitor skin integrity  - Assess and monitor nutrition and hydration status  - Monitor labs   - Assess for incontinence   - Turn and reposition patient  - Assist with mobility/ambulation  - Relieve pressure over bony prominences  - Avoid friction and shearing  - Provide appropriate hygiene as needed including keeping skin clean and dry  - Evaluate need for skin moisturizer/barrier cream  - Collaborate with interdisciplinary team   - Patient/family teaching  - Consider wound care consult   Outcome: Progressing     Problem: PAIN - ADULT  Goal: Verbalizes/displays adequate comfort level or baseline comfort level  Description  Interventions:  - Encourage patient to monitor pain and request assistance  - Assess pain using appropriate pain scale  - Administer analgesics based on type and severity of pain and evaluate response  - Implement non-pharmacological measures as appropriate and evaluate response  - Consider cultural and social influences on pain and pain management  - Notify physician/advanced practitioner if interventions unsuccessful or patient reports new pain  Outcome: Progressing     Problem: INFECTION - ADULT  Goal: Absence or prevention of progression during hospitalization  Description  INTERVENTIONS:  - Assess and monitor for signs and symptoms of infection  - Monitor lab/diagnostic results  - Monitor all insertion sites, i e  indwelling lines, tubes, and drains  - Monitor endotracheal if appropriate and nasal secretions for changes in amount and color  - [place] appropriate cooling/warming therapies per order  - Administer medications as ordered  - Instruct and encourage patient and family to use good hand hygiene technique  - Identify and instruct in appropriate isolation precautions for identified infection/condition  Outcome: Progressing  Goal: Absence of fever/infection during neutropenic period  Description  INTERVENTIONS:  - Monitor WBC    Outcome: Progressing     Problem: SAFETY ADULT  Goal: Patient will remain free of falls  Description  INTERVENTIONS:  - Assess patient frequently for physical needs  -  Identify cognitive and physical deficits and behaviors that affect risk of falls    -  [place] fall precautions as indicated by assessment   - Educate patient/family on patient safety including physical limitations  - Instruct patient to call for assistance with activity based on assessment  - Modify environment to reduce risk of injury  - Consider OT/PT consult to assist with strengthening/mobility  Outcome: Progressing  Goal: Maintain or return to baseline ADL function  Description  INTERVENTIONS:  -  Assess patient's ability to carry out ADLs; assess patient's baseline for ADL function and identify physical deficits which impact ability to perform ADLs (bathing, care of mouth/teeth, toileting, grooming, dressing, etc )  - Assess/evaluate cause of self-care deficits   - Assess range of motion  - Assess patient's mobility; develop plan if impaired  - Assess patient's need for assistive devices and provide as appropriate  - Encourage maximum independence but intervene and supervise when necessary  - Involve family in performance of ADLs  - Assess for home care needs following discharge   - Consider OT consult to assist with ADL evaluation and planning for discharge  - Provide patient education as appropriate  Outcome: Progressing  Goal: Maintain or return mobility status to optimal level  Description  INTERVENTIONS:  - Assess patient's baseline mobility status (ambulation, transfers, stairs, etc )    - Identify cognitive and physical deficits and behaviors that affect mobility  - Identify mobility aids required to assist with transfers and/or ambulation (gait belt, sit-to-stand, lift, walker, cane, etc )  - [place] fall precautions as indicated by assessment  - Record patient progress and toleration of activity level on Mobility SBAR; progress patient to next Phase/Stage  - Instruct patient to call for assistance with activity based on assessment  - Consider rehabilitation consult to assist with strengthening/weightbearing, etc   Outcome: Progressing     Problem: DISCHARGE PLANNING  Goal: Discharge to home or other facility with appropriate resources  Description  INTERVENTIONS:  - Identify barriers to discharge w/patient and caregiver  - Arrange for needed discharge resources and transportation as appropriate  - Identify discharge learning needs (meds, wound care, etc )  - Arrange for interpretive services to assist at discharge as needed  - Refer to Case Management Department for coordinating discharge planning if the patient needs post-hospital services based on physician/advanced practitioner order or complex needs related to functional status, cognitive ability, or social support system  Outcome: Progressing     Problem: Knowledge Deficit  Goal: Patient/family/caregiver demonstrates understanding of disease process, treatment plan, medications, and discharge instructions  Description  Complete learning assessment and assess knowledge base  Interventions:  - Provide teaching at level of understanding  - Provide teaching via preferred learning methods  Outcome: Progressing     Problem: Potential for Falls  Goal: Patient will remain free of falls  Description  INTERVENTIONS:  - Assess patient frequently for physical needs  -  Identify cognitive and physical deficits and behaviors that affect risk of falls    -  [place] fall precautions as indicated by assessment   - Educate patient/family on patient safety including physical limitations  - Instruct patient to call for assistance with activity based on assessment  - Modify environment to reduce risk of injury  - Consider OT/PT consult to assist with strengthening/mobility  Outcome: Progressing

## 2019-12-06 NOTE — PROGRESS NOTES
Progress Note - Jordyn Greenwood 1935, 82 y o  male MRN: [id number]    Unit/Bed#: [facility] 604-01 Encounter: [id number]    Primary Care Provider: Faith Helms MD   Date and time admitted to hospital: 12/5/2019  3:28 AM        Gout  Assessment & Plan  Continue home allopurinol    Atrial fibrillation with rapid ventricular response [facility])  Assessment & Plan  Patient has reported history of atrial fibrillation on metoprolol daily and Eliquis  Continue home medications  Cardiology evaluation recommending cont home dose metoprolol, uptitrate to 75 mg bid if needed for HR > 100 bpm  HR is controlled overnight and this morning since fluid bolus given  Postural dizziness with near syncope  Assessment & Plan  Denies any symptoms of presyncope today  Remains completely amnestic of event, likely related to [place]   PT/OT today  Consider orthostatics if symptomatic   PAF is known/chronic  No evidence of significant tachy or anna events overnight    Vesicocolonic fistula  Assessment & Plan  Identified on trauma CT of the abdomen and pelvis  Patient denies any urinary symptoms  Denies previous history  Colorectal consultation completed - recommending non-urgent outpatient follow-up with them  No further inpatient work-up required     Closed head injury with concussion  Assessment & Plan  Patient presented with confusion and amnesia to event, confusion improved while in the emergency department though he remains amnestic  No symptoms of headache, vertigo, nausea - suspect likely side effect from [place]  No evidence of concussion     Contusion of lip  Assessment & Plan  Small laceration along the mucosal surface that did not penetrate through the outer skin, closed with a single Vicryl suture in the ER      Anterior dislocation of left shoulder  Assessment & Plan  Reduced in the emergency department  NWB MONICA in sling  CT Shoulder today per ortho  Pain control  PT/OT     Benign hypertension  Assessment & Plan  Continue home medications    * Ambulatory dysfunction  Assessment & Plan  History of using cane for arthritis of LLE, unable to given LUE in sling  Will get PT/OT consult    Left 4th toe bruising - will XRay today  Denies pain  Lumbar spine degenerative changes: no radiculopathy/parasthesias  Outpatient follow-up with PCP  ABLA - HGb drop from 14 to 11 - no evidence of active bleeding  Will repeat at noon  Remains on home eliquis, if HGB continues to steadily drop or hemodynamics become unstable would plan to reverse and hold       Disposition: PT/OT evals, CT and XR imaging       SUBJECTIVE:  Chief Complaint: "My shoulder is ok until I move it  Then it hurts pretty bad "    Subjective: denies headache, nausea, dizziness, vertigo, constipation, abdominal pain, or any other joint pain besides left shoulder  Denies any paraesthesias         OBJECTIVE:     Meds/Allergies     Current Facility-Administered Medications:     acetaminophen (TYLENOL) tablet 975 mg, 975 mg, Oral, Q8H [address], CONY Boudreaux, 975 mg at 12/06/19 3780    allopurinol (ZYLOPRIM) tablet 100 mg, 100 mg, Oral, Daily, Elida Ibanez MD, 100 mg at 12/06/19 0856    apixaban (ELIQUIS) tablet 5 mg, 5 mg, Oral, BID, Elida Ibanez MD, 5 mg at 12/06/19 6016    aspirin chewable tablet 81 mg, 81 mg, Oral, Daily, Elida Ibanez MD, 81 mg at 12/06/19 0856    docusate sodium (COLACE) capsule 100 mg, 100 mg, Oral, BID, CONY Castorena, 100 mg at 12/06/19 2751    losartan (COZAAR) tablet 25 mg, 25 mg, Oral, Daily, Elida Ibanez MD, 25 mg at 12/05/19 1103    melatonin tablet 3 mg, 3 mg, Oral, HS, CONY Aguirre    metoprolol tartrate (LOPRESSOR) tablet 50 mg, 50 mg, Oral, Q12H [address], Elida Ibanez MD, 50 mg at 12/05/19 2217    polyethylene glycol (MIRALAX) packet 17 g, 17 g, Oral, Daily PRN, Clif Penn MD    [facility]) tablet 17 2 mg, 2 tablet, Oral, HS, CONY Treadwell, 17 2 mg at 12/05/19 3932     Vitals:   Vitals: 12/06/19 0855   BP: 104/57   Pulse: 90   Resp: 18   Temp:    SpO2:        Intake/Output:  I/O       12/04 0701 - 12/05 0700 12/05 0701 - 12/06 0700 12/06 0701 - 12/07 0700    P  O   600     Total Intake(mL/kg)  600 (5 7)     Urine (mL/kg/hr)  250 (0 1)     Total Output  250     Net  +350            Unmeasured Urine Occurrence  1 x            Nutrition/GI Proph/Bowel Reg: regular     Physical Exam:   Physical Exam   Constitutional: He is oriented to person, place, and time  No distress  HENT:   Head: Normocephalic  Lip contusion, minimal swelling, no bleeding  No dental injury    Eyes: Pupils are equal, round, and reactive to light  Conjunctivae and EOM are normal  Right eye exhibits no discharge  Left eye exhibits no discharge  Neck: Normal range of motion  No tracheal deviation present  Cardiovascular: Normal rate, normal heart sounds and intact distal pulses  Pulmonary/Chest: Effort normal and breath sounds normal  No stridor  No respiratory distress  He has no wheezes  He has no rales  He exhibits no tenderness  Abdominal: Soft  Bowel sounds are normal  He exhibits no distension and no mass  There is no tenderness  There is no rebound and no guarding  Musculoskeletal:   Left shoulder swelling, bruising, compartments soft, + sensory and motor in extremity through fingers  Pain in left middle and ring finger on exam, bruising on left hand    Neurological: He is alert and oriented to person, place, and time  Moves all extremities, equal strength bilaterally   Clear speech  Facial symmetry    Skin: Skin is warm and dry  Capillary refill takes less than 2 seconds  He is not diaphoretic  Small skin abrasion left shin - no bruising, swelling or tenderness    Psychiatric: He has a normal mood and affect  Invasive Devices     Peripheral Intravenous Line            Peripheral IV 12/05/19 Right Antecubital 1 day                 Lab Results:   Results: I have personally reviewed pertinent reports  and I have personally reviewed pertinent films in PACS and BMP/CMP:   Lab Results   Component Value Date    SODIUM 144 12/06/2019    K 3 8 12/06/2019     (H) 12/06/2019    CO2 26 12/06/2019    BUN 15 12/06/2019    CREATININE 0 91 12/06/2019    CALCIUM 8 7 12/06/2019    EGFR 78 12/06/2019     Imaging/EKG Studies: Results: I have personally reviewed pertinent reports     and I have personally reviewed pertinent films in PACS  Other Studies:   VTE Prophylaxis: Sequential compression device (Venodyne)  Eliquis

## 2019-12-06 NOTE — ASSESSMENT & PLAN NOTE
Denies any symptoms of presyncope today  Remains completely amnestic of event, likely related to [place]   PT/OT today  Consider orthostatics if symptomatic   PAF is known/chronic   No evidence of significant tachy or anna events overnight

## 2019-12-06 NOTE — OCCUPATIONAL THERAPY NOTE
[address] Evaluation     Patient Name: Rickey Gustafson  Today's Date: 12/6/2019  Problem List  Principal Problem:    Ambulatory dysfunction  Active Problems:    Benign hypertension    Anterior dislocation of left shoulder    Contusion of lip    Closed head injury with concussion    Vesicocolonic fistula    Postural dizziness with near syncope    Atrial fibrillation with rapid ventricular response ([facility] [address] )    Gout    Past Medical History  Past Medical History:   Diagnosis Date    Atrial fibrillation ([facility] [address] )     Cancer ([facility] [address] )     Hypertension      Past Surgical History  Past Surgical History:   Procedure Laterality Date    REPLACEMENT TOTAL HIP W/  RESURFACING IMPLANTS           12/06/19 1420   Note Type   Note type Eval/Treat   Restrictions/Precautions   Weight Bearing Precautions Per Order Yes   RUE Weight Bearing Per Order WBAT   LUE Weight Bearing Per Order NWB   RLE Weight Bearing Per Order WBAT   LLE Weight Bearing Per Order WBAT   Braces or Orthoses Sling   Other Precautions Cognitive; Chair Alarm; Bed Alarm;WBS;Fall Risk;Pain   Pain Assessment   Pain Assessment 0-10   Pain Score No Pain  (initially states no pain - then c/o pain in LLE with movemen)   Home Living   Type of Home House   Home Layout Two level;Bed/bath upstairs   Bathroom Shower/Tub Tub/shower unit   Bathroom Toilet Standard   Home Equipment Walker   Prior Function   Level of [place] Independent with ADLs and functional mobility   Lives With Spouse   Receives Help From Family   ADL Assistance Independent   IADLs Needs assistance   Falls in the last 6 months 1 to 4   Vocational Retired   Lifestyle   Autonomy I adls and mobility - ambulates with use of RW but does not always use per wife - wife manages [de-identified] of iadls   Reciprocal Relationships supportive family    Service to Others retired   Intrinsic Gratification mostly sedentary - enjoys doing puzzles    Subjective   Subjective offers no c/o    ADL   Eating Assistance 4  Minimal Assistance   Grooming Assistance 4  Minimal Assistance   UB Bathing Assistance 2  Maximal Assistance   LB Bathing Assistance 2  Maximal Assistance   UB Dressing Assistance 2  Maximal Assistance   LB Dressing Assistance 2  Maximal Assistance   Toileting Assistance  2  Maximal Assistance   Bed Mobility   Supine to Sit 2  Maximal assistance   Additional items Assist x 2   Sit to Supine 2  Maximal assistance   Additional items Assist x 2   Transfers   Sit to Stand 2  Maximal assistance   Additional items Assist x 2   Stand to Sit 2  Maximal assistance   Additional items Assist x 2   Stand pivot 2  Maximal assistance   Additional items Assist x 2   Balance   Static Sitting Fair -   Dynamic Sitting Poor   Static Standing Poor   Dynamic Standing Poor   Activity Tolerance   Activity Tolerance Patient limited by fatigue;Patient limited by pain;Treatment limited secondary to medical complications (Comment)   RUE Assessment   RUE Assessment WFL   LUE Assessment   LUE Assessment   (in sling )   Cognition   Overall Cognitive Status Impaired   Arousal/Participation Arousable;Responsive   Attention Attends with cues to redirect   Orientation Level Oriented to person;Oriented to place;Oriented to time;Oriented to situation   Memory Decreased short term memory;Decreased recall of recent events;Decreased recall of precautions   Following Commands Follows one step commands without difficulty   Comments wife reports pt seems more confused than normal    Assessment   Limitation Decreased ADL status; Decreased UE ROM; Decreased Safe judgement during ADL;Decreased endurance;Decreased cognition;Decreased self-care trans;Decreased high-level ADLs; Non-func L UE   Prognosis Good;Fair   Assessment Pt is a 82 y o  male who was [facility] to [facility] 12/5/2019 with Ambulatory dysfunction    Pt's problem list also includes PMH of HTN, previous surgery and cancer history   At baseline pt was completing adls and mobility independently with occasional use of spc - wife manages iadls  Pt lives with spouse in 2 story home with 2nd floor bed/bath  Currently pt requires max assist for overall ADLS and max a x 2  for functional mobility/transfers  Pt currently presents with impairments in the following categories -steps to enter environment, difficulty performing ADLS, difficulty performing IADLS , limited insight into deficits, decreased initiation and engagement  and health management  activity tolerance, endurance, standing balance/tolerance, sitting balance/tolerance, UE ROM, memory, insight, safety , judgement , attention , sequencing  and task initiation   These impairments, as well as pt's fatigue, pain, orthopedic restricitions , WBS , risk for falls and home environment  limit pt's ability to safely engage in all baseline areas of occupation, includingeating, grooming, bathing, dressing, toileting, functional mobility/transfers, community mobility, social participation  and leisure activities  From OT standpoint, recommend inpt rehab  upon D/C  OT will continue to follow to address the below stated goals  Goals   Patient Goals none stated 2* cognitive limitations   LTG Time Frame 10-14   Long Term Goal #1 refer to established goals below   Plan   Treatment Interventions ADL retraining;Functional transfer training;Cognitive reorientation;Patient/family training;Equipment evaluation/education; Compensatory technique education; Activityengagement   Goal Expiration Date 12/20/19   OT Frequency 3-5x/wk   Recommendation   OT Discharge Recommendation Short Term Rehab   Barthel Index   Feeding 5   Bathing 0   Grooming Score 0   Dressing Score 0   Bladder Score 5   Bowels Score 5   Toilet Use Score 5   Transfers (Bed/Chair) Score 5   Mobility (Level Surface) Score 0   Stairs Score 0   Barthel Index Score 25     OCCUPATIONAL THERAPY GOALS:      *I feeding/grooming after setup  *Min a adls after setup with use of AE and 1 handed techniques  *Min a toileting and clothing management  *Min a functional mob and transfers to all surfaces with fair to fair+ balance/safety for participation in dynamic adls  *Demonstrate fair+ carryover with NWB LUE and use of 1 handed techniques during functional tasks   *Increase activity tolerance to 35-40 min for participation in adls and enjoyable activities  *Pt to participate in ongoing functional cognitive assessment with good attention/participation to assist with safe d/c recommendations      Enedina Johnson, OT

## 2019-12-06 NOTE — SOCIAL WORK
Cm reviewed patient during care coordination rounds  Patient is not medically stable for dc today  Cm informed by therapies, that patient is being recommended for inpatient rehab  Cm met with patient and provided rehab choices to him  A post acute care recommendation was made by your care team for STR  Discussed Freedom of Choice with patient  List of facilities given to patient via in person  patient aware the list is custom filtered for them by preference  and that [facility] post acute providers are designated  Cm awaiting patient choices

## 2019-12-06 NOTE — ASSESSMENT & PLAN NOTE
Identified on trauma CT of the abdomen and pelvis  Patient denies any urinary symptoms  Denies previous history  Colorectal consultation completed - recommending non-urgent outpatient follow-up with them   No further inpatient work-up required

## 2019-12-06 NOTE — PLAN OF CARE
Problem: PHYSICAL THERAPY ADULT  Goal: Performs mobility at highest level of function for planned discharge setting  See evaluation for individualized goals  Description  Treatment/Interventions: Functional transfer training, LE strengthening/ROM, Therapeutic exercise, Endurance training, Patient/family training, Equipment eval/education, Bed mobility, Gait training, Spoke to nursing, OT, Family          See flowsheet documentation for full assessment, interventions and recommendations  Note:   Prognosis: Fair  Problem List: Decreased strength, Decreased range of motion, Decreased endurance, Impaired balance, Decreased mobility, Decreased cognition, Impaired judgement, Decreased safety awareness, Pain, Orthopedic restrictions  Assessment: Pt is 82 y o  male seen for PT evaluation s/p admit to [facility] on 12/5/2019  Two pt identifiers were used to confirm  Pt presented s/p being found down at the bottom of the stairs by wife  Pt was admitted with a primary dx of: closed head injury with concussion, contusion of lip, anterior dislocation of L shoulder s/p reduction, ambulatory dysfunction,   PT now consulted for assessment of mobility and d/c needs  Pt with Up with assistance orders  Pts current co morbidities effecting treatment include: a fib, cancer, HTN  Pts current clinical presentation is Unstable/ Unpredictable (high complexity) due to Ongoing medical management for primary dx, Decreased activity tolerance compared to baseline, Fall risk, Increased assistance needed from caregiver at current time, Ongoing telemetry monitoring, Cog status, Current WBS, Continuous pulse oximetry monitoring     Prior to admission, pt was I with ambulation with the use of a RW in the community and no AD in the home as per pt and spouse  Upon evaluation, pt currently is requiring max Ax2 for bed mobility; max Ax2 for transfers and max Ax2 for ambulation w/ HHA    Pt denies any lightheadedness or dizziness with ambulation  Pt presents at PT eval functioning below baseline and currently w/ overall mobility deficits 2* to: BLE weakness, decreased ROM, impaired balance, decreased endurance, gait deviations, pain, decreased activity tolerance compared to baseline, decreased safety awareness, impaired judgement, fall risk, orthopedic restrictions, decreased cognition  Pt currently at a fall risk 2* to impairments listed above  Based on the aforementioned PT evaluation, pt will continue to benefit from skilled Acute PT interventions to address stated impairments; to maximize functional mobility; for ongoing pt/ family training; and DME needs  At conclusion of PT session pt assisted onto stretcher with transport with all needs within reach  Pt left with transport at conclusion of PT eval  Pt denies any further questions at this time  PT is currently recommending rehab  Pt/ family agreeable to plan and goals as stated on evaluation  PT will continue to follow during hospital stay  Barriers to Discharge: Inaccessible home environment, Decreased caregiver support     Recommendation: Short-term skilled PT     PT - OK to Discharge: Yes(to rehab when medically cleared )    See flowsheet documentation for full assessment

## 2019-12-06 NOTE — PROGRESS NOTES
Subjective: No acute events overnight  No acute distress  Objective:  A 10 point ROS was performed; negative except as noted above       Lab Results   Component Value Date/Time    WBC 11 10 (H) 12/05/2019 03:53 AM    HGB 14 8 12/05/2019 03:53 AM       Vitals:    12/06/19 0300   BP: 104/60   Pulse:    Resp:    Temp:    SpO2:      Left upper extremity  Sling in place   Motor grossly intact to median, radial and ulnar nerve distributions as well as axillary   Sensation intact to light touch in m/r/u/mc/ax distributions  Digits warm and well perfused with brisk capillary refill     Assessment: 82 y o  male with Left anterior shoulder dislocation sp reduction     Plan:  NWB LUE in sling  Pain control  DVT ppx: Apixaban (Eliquis)  PT/OT  Follow up left shoulder CT scan  Dispo: Ortho will follow will likely be cleared for discharge from ortho standpoint following CT of the shoulder and review of images

## 2019-12-06 NOTE — PROGRESS NOTES
General Cardiology   Progress Note -  [facility]   Randi Coulter 82 y o  male MRN: [id number]    Unit/Bed#: [facility] 604-01 Encounter: [id number]    Assessment/ Plan    1  Atrial fibrillation with RVR   Reviewed telemetry showing HR 90-100s  Patient has a history of paroxysmal atrial fibrillation  On eliquis for [address]   Currently on metoprolol 50 mg PO BID but held today due to hold parameter: hold if SBP < 110  Changed hold parameters     2  Fall with L shoulder dislocation s/p reduced and placed sling   Orthopedic surgery following  Plan for CT of shoulder today   Falls appears mechanical but patient does no recall event  He reports no dizziness or lightheaded with ambulation  No history of syncope or near syncope in the past      3  Hypertension- 109/57 average BP  Continue losartan and metoprolol   Losartan held this morning due borderline BP        Subjective  Review of Systems   Constitution: Negative for chills and fever  Cardiovascular: Negative for chest pain, dyspnea on exertion, leg swelling, orthopnea and palpitations  Respiratory: Negative for shortness of breath  Musculoskeletal: Positive for joint pain  Negative for falls  Gastrointestinal: Negative for bloating, nausea and vomiting  Neurological: Negative for dizziness and light-headedness  Psychiatric/Behavioral: Negative for altered mental status  Objective:   Vitals: Blood pressure 104/57, pulse 90, temperature 97 9 °F (36 6 °C), resp  rate 18, height 5' 9" (1 753 m), weight 105 kg (231 lb 7 7 oz), SpO2 93 %  ,       Body mass index is 34 18 kg/m²  ,     Systolic (18TYG), BRM:481 , Min:94 , GUILLERMO:912     Diastolic (40BYE), DNV:29, Min:49, Max:61          Intake/Output Summary (Last 24 hours) at 12/6/2019 0942  Last data filed at 12/6/2019 0500  Gross per 24 hour   Intake 600 ml   Output 250 ml   Net 350 ml     Weight (last 2 days)     Date/Time   Weight    12/05/19 1501   105 (231 48)    12/05/19 0345   105 (231 48)            Telemetry Review: Atrial fibrillation HR 90-100s      Physical Exam   Constitutional: He is oriented to person, place, and time  No distress  HENT:   Head: Normocephalic  Neck: Neck supple  Cardiovascular: Intact distal pulses  Irregular rate and rhythm    Pulmonary/Chest: Effort normal  No stridor  No respiratory distress  Course   RA    Abdominal: Soft  Bowel sounds are normal  He exhibits distension  Obese    Musculoskeletal: He exhibits no edema  L arm sling    Neurological: He is alert and oriented to person, place, and time  Skin: Skin is warm and dry  He is not diaphoretic  Psychiatric: He has a normal mood and affect  His behavior is normal        LABORATORY RESULTS      CBC with diff:   Results from last 7 days   Lab Units 12/06/19  0438 12/05/19  0353   WBC Thousand/uL 8 17 11 10*   HEMOGLOBIN g/dL 11 3* 14 8   HEMATOCRIT % 36 1* 48 6   MCV fL 87 89   PLATELETS Thousands/uL 160 195   MCH pg 27 4 27 1   MCHC g/dL 31 3* 30 5*   RDW % 16 4* 16 4*   MPV fL 11 3 10 6   NRBC AUTO /100 WBCs  --  0       CMP:  Results from last 7 days   Lab Units 12/06/19  0438 12/05/19  0353   POTASSIUM mmol/L 3 8 4 1   CHLORIDE mmol/L 114* 110*   CO2 mmol/L 26 31   BUN mg/dL 15 11   CREATININE mg/dL 0 91 1 09   CALCIUM mg/dL 8 7 8 9   EGFR ml/min/1 73sq m 78 63       BMP:  Results from last 7 days   Lab Units 12/06/19  0438 12/05/19  0353   POTASSIUM mmol/L 3 8 4 1   CHLORIDE mmol/L 114* 110*   CO2 mmol/L 26 31   BUN mg/dL 15 11   CREATININE mg/dL 0 91 1 09   CALCIUM mg/dL 8 7 8 9       No results found for: NTBNP                            Results from last 7 days   Lab Units 12/05/19  0353   INR  1 22*       Lipid Profile:   No results found for: CHOL  No results found for: HDL  No results found for: LDLCALC  No results found for: TRIG    Cardiac testing:   No results found for this or any previous visit  No results found for this or any previous visit  No results found for this or any previous visit    No procedure found  No results found for this or any previous visit  Meds/Allergies   all current active meds have been reviewed and current meds:   Current Facility-Administered Medications   Medication Dose Route Frequency    acetaminophen (TYLENOL) tablet 975 mg  975 mg Oral Q8H [facility]    allopurinol (ZYLOPRIM) tablet 100 mg  100 mg Oral Daily    apixaban (ELIQUIS) tablet 5 mg  5 mg Oral BID    aspirin chewable tablet 81 mg  81 mg Oral Daily    docusate sodium (COLACE) capsule 100 mg  100 mg Oral BID    losartan (COZAAR) tablet 25 mg  25 mg Oral Daily    melatonin tablet 3 mg  3 mg Oral HS    metoprolol tartrate (LOPRESSOR) tablet 50 mg  50 mg Oral Q12H [place]    polyethylene glycol (MIRALAX) packet 17 g  17 g Oral Daily PRN    senna (SENOKOT) tablet 17 2 mg  2 tablet Oral HS     Medications Prior to Admission   Medication    allopurinol (ZYLOPRIM) 100 mg tablet    apixaban (ELIQUIS) 5 mg    aspirin (ECOTRIN LOW STRENGTH) 81 mg EC tablet    cholecalciferol (VITAMIN D3) 400 units tablet    metoprolol tartrate (LOPRESSOR) 50 mg tablet    valsartan (DIOVAN) 40 mg tablet    vitamin A 10,000 units capsule    zolpidem (AMBIEN CR) 6 25 MG CR tablet          Counseling / Coordination of Care  Total floor / unit time spent today 20 minutes  Greater than 50% of total time was spent with the patient and / or family counseling and / or coordination of care  ** Please Note: Dragon 360 Dictation voice to text software may have been used in the creation of this document   **

## 2019-12-07 LAB
ANION GAP SERPL CALCULATED.3IONS-SCNC: 5 MMOL/L (ref 4–13)
BASOPHILS # BLD AUTO: 0.03 THOUSANDS/ΜL (ref 0–0.1)
BASOPHILS NFR BLD AUTO: 0 % (ref 0–1)
BUN SERPL-MCNC: 15 MG/DL (ref 5–25)
CALCIUM SERPL-MCNC: 9.1 MG/DL (ref 8.3–10.1)
CHLORIDE SERPL-SCNC: 111 MMOL/L (ref 100–108)
CO2 SERPL-SCNC: 26 MMOL/L (ref 21–32)
CREAT SERPL-MCNC: 0.84 MG/DL (ref 0.6–1.3)
EOSINOPHIL # BLD AUTO: 0.13 THOUSAND/ΜL (ref 0–0.61)
EOSINOPHIL NFR BLD AUTO: 2 % (ref 0–6)
ERYTHROCYTE [DISTWIDTH] IN BLOOD BY AUTOMATED COUNT: 16.4 % (ref 11.6–15.1)
GFR SERPL CREATININE-BSD FRML MDRD: 82 ML/MIN/1.73SQ M
GLUCOSE SERPL-MCNC: 99 MG/DL (ref 65–140)
HCT VFR BLD AUTO: 34.4 % (ref 36.5–49.3)
HGB BLD-MCNC: 10.6 G/DL (ref 12–17)
IMM GRANULOCYTES # BLD AUTO: 0.04 THOUSAND/UL (ref 0–0.2)
IMM GRANULOCYTES NFR BLD AUTO: 1 % (ref 0–2)
LYMPHOCYTES # BLD AUTO: 0.74 THOUSANDS/ΜL (ref 0.6–4.47)
LYMPHOCYTES NFR BLD AUTO: 9 % (ref 14–44)
MCH RBC QN AUTO: 27.2 PG (ref 26.8–34.3)
MCHC RBC AUTO-ENTMCNC: 30.8 G/DL (ref 31.4–37.4)
MCV RBC AUTO: 88 FL (ref 82–98)
MONOCYTES # BLD AUTO: 1.03 THOUSAND/ΜL (ref 0.17–1.22)
MONOCYTES NFR BLD AUTO: 13 % (ref 4–12)
NEUTROPHILS # BLD AUTO: 5.99 THOUSANDS/ΜL (ref 1.85–7.62)
NEUTS SEG NFR BLD AUTO: 75 % (ref 43–75)
NRBC BLD AUTO-RTO: 0 /100 WBCS
PLATELET # BLD AUTO: 142 THOUSANDS/UL (ref 149–390)
PMV BLD AUTO: 11.1 FL (ref 8.9–12.7)
POTASSIUM SERPL-SCNC: 4 MMOL/L (ref 3.5–5.3)
RBC # BLD AUTO: 3.9 MILLION/UL (ref 3.88–5.62)
SODIUM SERPL-SCNC: 142 MMOL/L (ref 136–145)
WBC # BLD AUTO: 7.96 THOUSAND/UL (ref 4.31–10.16)

## 2019-12-07 PROCEDURE — 85025 COMPLETE CBC W/AUTO DIFF WBC: CPT | Performed by: NURSE PRACTITIONER

## 2019-12-07 PROCEDURE — 97530 THERAPEUTIC ACTIVITIES: CPT

## 2019-12-07 PROCEDURE — 80048 BASIC METABOLIC PNL TOTAL CA: CPT | Performed by: NURSE PRACTITIONER

## 2019-12-07 PROCEDURE — 99232 SBSQ HOSP IP/OBS MODERATE 35: CPT | Performed by: SURGERY

## 2019-12-07 PROCEDURE — 97116 GAIT TRAINING THERAPY: CPT

## 2019-12-07 RX ADMIN — ASPIRIN 81 MG 81 MG: 81 TABLET ORAL at 08:16

## 2019-12-07 RX ADMIN — ACETAMINOPHEN 975 MG: 325 TABLET ORAL at 21:15

## 2019-12-07 RX ADMIN — DOCUSATE SODIUM 100 MG: 100 CAPSULE, LIQUID FILLED ORAL at 18:24

## 2019-12-07 RX ADMIN — DOCUSATE SODIUM 100 MG: 100 CAPSULE, LIQUID FILLED ORAL at 08:16

## 2019-12-07 RX ADMIN — APIXABAN 5 MG: 5 TABLET, FILM COATED ORAL at 18:24

## 2019-12-07 RX ADMIN — ACETAMINOPHEN 975 MG: 325 TABLET ORAL at 13:02

## 2019-12-07 RX ADMIN — METOPROLOL TARTRATE 75 MG: 50 TABLET, FILM COATED ORAL at 08:16

## 2019-12-07 RX ADMIN — APIXABAN 5 MG: 5 TABLET, FILM COATED ORAL at 08:16

## 2019-12-07 RX ADMIN — SENNOSIDES 17.2 MG: 8.6 TABLET, FILM COATED ORAL at 21:15

## 2019-12-07 RX ADMIN — METOPROLOL TARTRATE 75 MG: 50 TABLET, FILM COATED ORAL at 21:16

## 2019-12-07 RX ADMIN — ALLOPURINOL 100 MG: 100 TABLET ORAL at 08:16

## 2019-12-07 RX ADMIN — ACETAMINOPHEN 975 MG: 325 TABLET ORAL at 06:04

## 2019-12-07 RX ADMIN — MELATONIN 3 MG: 3 TAB ORAL at 21:15

## 2019-12-07 NOTE — PLAN OF CARE
Problem: Prexisting or High Potential for Compromised Skin Integrity  Goal: Skin integrity is maintained or improved  Description  INTERVENTIONS:  - Identify patients at risk for skin breakdown  - Assess and monitor skin integrity  - Assess and monitor nutrition and hydration status  - Monitor labs   - Assess for incontinence   - Turn and reposition patient  - Assist with mobility/ambulation  - Relieve pressure over bony prominences  - Avoid friction and shearing  - Provide appropriate hygiene as needed including keeping skin clean and dry  - Evaluate need for skin moisturizer/barrier cream  - Collaborate with interdisciplinary team   - Patient/family teaching  - Consider wound care consult   Outcome: Progressing     Problem: PAIN - ADULT  Goal: Verbalizes/displays adequate comfort level or baseline comfort level  Description  Interventions:  - Encourage patient to monitor pain and request assistance  - Assess pain using appropriate pain scale  - Administer analgesics based on type and severity of pain and evaluate response  - Implement non-pharmacological measures as appropriate and evaluate response  - Consider cultural and social influences on pain and pain management  - Notify physician/advanced practitioner if interventions unsuccessful or patient reports new pain  Outcome: Progressing     Problem: INFECTION - ADULT  Goal: Absence or prevention of progression during hospitalization  Description  INTERVENTIONS:  - Assess and monitor for signs and symptoms of infection  - Monitor lab/diagnostic results  - Monitor all insertion sites, i e  indwelling lines, tubes, and drains  - Monitor endotracheal if appropriate and nasal secretions for changes in amount and color  - [place] appropriate cooling/warming therapies per order  - Administer medications as ordered  - Instruct and encourage patient and family to use good hand hygiene technique  - Identify and instruct in appropriate isolation precautions for identified infection/condition  Outcome: Progressing  Goal: Absence of fever/infection during neutropenic period  Description  INTERVENTIONS:  - Monitor WBC    Outcome: Progressing     Problem: SAFETY ADULT  Goal: Patient will remain free of falls  Description  INTERVENTIONS:  - Assess patient frequently for physical needs  -  Identify cognitive and physical deficits and behaviors that affect risk of falls    -  [place] fall precautions as indicated by assessment   - Educate patient/family on patient safety including physical limitations  - Instruct patient to call for assistance with activity based on assessment  - Modify environment to reduce risk of injury  - Consider OT/PT consult to assist with strengthening/mobility  Outcome: Progressing  Goal: Maintain or return to baseline ADL function  Description  INTERVENTIONS:  -  Assess patient's ability to carry out ADLs; assess patient's baseline for ADL function and identify physical deficits which impact ability to perform ADLs (bathing, care of mouth/teeth, toileting, grooming, dressing, etc )  - Assess/evaluate cause of self-care deficits   - Assess range of motion  - Assess patient's mobility; develop plan if impaired  - Assess patient's need for assistive devices and provide as appropriate  - Encourage maximum independence but intervene and supervise when necessary  - Involve family in performance of ADLs  - Assess for home care needs following discharge   - Consider OT consult to assist with ADL evaluation and planning for discharge  - Provide patient education as appropriate  Outcome: Progressing  Goal: Maintain or return mobility status to optimal level  Description  INTERVENTIONS:  - Assess patient's baseline mobility status (ambulation, transfers, stairs, etc )    - Identify cognitive and physical deficits and behaviors that affect mobility  - Identify mobility aids required to assist with transfers and/or ambulation (gait belt, sit-to-stand, lift, walker, cane, etc )  - [place] fall precautions as indicated by assessment  - Record patient progress and toleration of activity level on Mobility SBAR; progress patient to next Phase/Stage  - Instruct patient to call for assistance with activity based on assessment  - Consider rehabilitation consult to assist with strengthening/weightbearing, etc   Outcome: Progressing     Problem: DISCHARGE PLANNING  Goal: Discharge to home or other facility with appropriate resources  Description  INTERVENTIONS:  - Identify barriers to discharge w/patient and caregiver  - Arrange for needed discharge resources and transportation as appropriate  - Identify discharge learning needs (meds, wound care, etc )  - Arrange for interpretive services to assist at discharge as needed  - Refer to Case Management Department for coordinating discharge planning if the patient needs post-hospital services based on physician/advanced practitioner order or complex needs related to functional status, cognitive ability, or social support system  Outcome: Progressing     Problem: Knowledge Deficit  Goal: Patient/family/caregiver demonstrates understanding of disease process, treatment plan, medications, and discharge instructions  Description  Complete learning assessment and assess knowledge base  Interventions:  - Provide teaching at level of understanding  - Provide teaching via preferred learning methods  Outcome: Progressing     Problem: Potential for Falls  Goal: Patient will remain free of falls  Description  INTERVENTIONS:  - Assess patient frequently for physical needs  -  Identify cognitive and physical deficits and behaviors that affect risk of falls    -  [place] fall precautions as indicated by assessment   - Educate patient/family on patient safety including physical limitations  - Instruct patient to call for assistance with activity based on assessment  - Modify environment to reduce risk of injury  - Consider OT/PT consult to assist with strengthening/mobility  Outcome: Progressing

## 2019-12-07 NOTE — PROGRESS NOTES
Progress Note - James Spencer 1935, 82 y o  male MRN: [id number]    Unit/Bed#: [facility] 604-01 Encounter: [id number]    Primary Care Provider: Noemi Workman MD   Date and time admitted to hospital: 12/5/2019  3:28 AM        Gout  Assessment & Plan  Continue home allopurinol    Atrial fibrillation with rapid ventricular response [facility])  Assessment & Plan  Patient has reported history of atrial fibrillation on metoprolol daily and Eliquis  Continue home medications  Cardiology evaluation recommending cont home dose metoprolol, uptitrate to 75 mg bid if needed for HR > 100 bpm  HR is controlled overnight and this morning since fluid bolus given  Postural dizziness with near syncope  Assessment & Plan  Denies any symptoms of presyncope today  Remains completely amnestic of event, likely related to [place]   PT/OT today  Consider orthostatics if symptomatic   PAF is known/chronic  No evidence of significant tachy or anna events overnight  cardiology signed off; outpatient follow-up with Dr Jarrett Clemens as routine     Vesicocolonic fistula  Assessment & Plan  Identified on trauma CT of the abdomen and pelvis  Patient denies any urinary symptoms  Denies previous history  Colorectal consultation completed - recommending non-urgent outpatient follow-up with them  No further inpatient work-up required     Closed head injury with concussion  Assessment & Plan  Patient presented with confusion and amnesia to event, confusion improved while in the emergency department though he remains amnestic  No symptoms of headache, vertigo, nausea - suspect likely side effect from [place]  No evidence of concussion     Contusion of lip  Assessment & Plan  Small laceration along the mucosal surface that did not penetrate through the outer skin, closed with a single Vicryl suture in the ER      Anterior dislocation of left shoulder  Assessment & Plan  Reduced in the emergency department  NWSAQIB ANDERSON in sling  CT Shoulder completed: follow-up ortho plan   Pain control  PT/OT - awaiting rehab placement     Benign hypertension  Assessment & Plan  Continue home medications    * Ambulatory dysfunction  Assessment & Plan  History of using cane for arthritis of LLE, unable to given LUE in sling  Pt/OT recommending rehab         Disposition: stable for DC to rehab, follow-up ortho plan       SUBJECTIVE:  Chief Complaint: "I am doing ok"    Subjective: reports left shoulder pain is mildly improved, denies any other complaints, denies nausea or vomiting, denies SOB or chest pain       OBJECTIVE:     Meds/Allergies     Current Facility-Administered Medications:     acetaminophen (TYLENOL) tablet 975 mg, 975 mg, Oral, Q8H [address], DORIS BoudreauxNP, 975 mg at 12/07/19 0604    allopurinol (ZYLOPRIM) tablet 100 mg, 100 mg, Oral, Daily, Sameera Mora MD, 100 mg at 12/06/19 0856    apixaban (ELIQUIS) tablet 5 mg, 5 mg, Oral, BID, Sameera Mora MD, 5 mg at 12/06/19 1745    aspirin chewable tablet 81 mg, 81 mg, Oral, Daily, Sameera Mora MD, 81 mg at 12/06/19 0856    docusate sodium (COLACE) capsule 100 mg, 100 mg, Oral, BID, CONY Durant, 100 mg at 12/06/19 1745    melatonin tablet 3 mg, 3 mg, Oral, HS, CONY Aguirre, 3 mg at 12/06/19 2101    metoprolol tartrate (LOPRESSOR) tablet 25 mg, 25 mg, Oral, Once, Tyler Rolon MD    metoprolol tartrate (LOPRESSOR) tablet 75 mg, 75 mg, Oral, Q12H [address], Tyler Rolon MD, 75 mg at 12/06/19 2101    polyethylene glycol (MIRALAX) packet 17 g, 17 g, Oral, Daily PRN, Sita De La Paz MD    [facility]) tablet 17 2 mg, 2 tablet, Oral, HS, CONY Durant, 17 2 mg at 12/06/19 2101     Vitals:   Vitals:    12/07/19 0237   BP: 99/67   Pulse: 91   Resp: 18   Temp: 98 4 °F (36 9 °C)   SpO2: 100%       Intake/Output:  I/O       12/05 0701 - 12/06 0700 12/06 0701 - 12/07 0700 12/07 0701 - 12/08 0700    P  O  600 780     Total Intake(mL/kg) 600 (5 7) 780 (7 4)     Urine (mL/kg/hr) 250 (0 1) 800 (0 3) Stool  0     Total Output 250 800     Net +350 -20            Unmeasured Urine Occurrence 1 x      Unmeasured Stool Occurrence  1 x            Nutrition/GI Proph/Bowel Reg:     Physical Exam:   Physical Exam   Constitutional: He is oriented to person, place, and time  No distress  HENT:   Head: Normocephalic  Facial bruising around lip is improved, no swelling, right ear bruising improved, no swelling, hematoma or laceration    Eyes: Pupils are equal, round, and reactive to light  Conjunctivae are normal  Right eye exhibits no discharge  Left eye exhibits no discharge  Neck: Normal range of motion  No tracheal deviation present  Cardiovascular: Normal rate, regular rhythm, normal heart sounds and intact distal pulses  Pulmonary/Chest: Effort normal and breath sounds normal  No stridor  No respiratory distress  He has no wheezes  He has no rales  He exhibits no tenderness  Abdominal: Soft  Bowel sounds are normal  He exhibits no distension and no mass  There is no tenderness  There is no rebound and no guarding  Musculoskeletal: He exhibits edema and tenderness (left shoulder swelling, bruising is improving)  Bruise left hand/arm stable, no swelling, bruise left toe stable    Neurological: He is alert and oriented to person, place, and time  Moves all extremities with equal strength bilaterally  A&O x 3 but is noted to be forgetful of instructions today, easily reoriented     Skin: Skin is warm and dry  Capillary refill takes less than 2 seconds  He is not diaphoretic  Psychiatric: He has a normal mood and affect  Invasive Devices     Peripheral Intravenous Line            Peripheral IV 12/05/19 Right Antecubital 2 days                 Lab Results:   Results: I have personally reviewed pertinent reports     and I have personally reviewed pertinent films in PACS, BMP/CMP:   Lab Results   Component Value Date    SODIUM 142 12/07/2019    K 4 0 12/07/2019     (H) 12/07/2019    CO2 26 12/07/2019    BUN 15 12/07/2019    CREATININE 0 84 12/07/2019    CALCIUM 9 1 12/07/2019    EGFR 82 12/07/2019   , CBC:   Lab Results   Component Value Date    WBC 7 96 12/07/2019    HGB 10 6 (L) 12/07/2019    HCT 34 4 (L) 12/07/2019    MCV 88 12/07/2019     (L) 12/07/2019    MCH 27 2 12/07/2019    MCHC 30 8 (L) 12/07/2019    RDW 16 4 (H) 12/07/2019    MPV 11 1 12/07/2019    NRBC 0 12/07/2019    and Coagulation: No results found for: PT, INR, APTT  Imaging/EKG Studies: Results: I have personally reviewed pertinent reports     and I have personally reviewed pertinent films in PACS  Other Studies:   VTE Prophylaxis: Sequential compression device (Venodyne)  Eliquis

## 2019-12-07 NOTE — SOCIAL WORK
CM met with Pt and his spouse Ba to follow up on SNF choices  Pt and Ciera Palacios requested a referral to [facility]  CM explained the difference between acute versus sub acute rehab, and the qualifying factors  Ciera Palacios remain adamant of a referral to [facility] and requested referrals to [address] [place] [address] 26, [address] [facility], [facility] and [facility] as back up [employer]  CM made all the requested referral and will continue to follow

## 2019-12-07 NOTE — ASSESSMENT & PLAN NOTE
History of using cane for arthritis of LLE, unable to given LUE in sling    Pt/OT recommending rehab

## 2019-12-07 NOTE — PLAN OF CARE
Problem: Prexisting or High Potential for Compromised Skin Integrity  Goal: Skin integrity is maintained or improved  Description  INTERVENTIONS:  - Identify patients at risk for skin breakdown  - Assess and monitor skin integrity  - Assess and monitor nutrition and hydration status  - Monitor labs   - Assess for incontinence   - Turn and reposition patient  - Assist with mobility/ambulation  - Relieve pressure over bony prominences  - Avoid friction and shearing  - Provide appropriate hygiene as needed including keeping skin clean and dry  - Evaluate need for skin moisturizer/barrier cream  - Collaborate with interdisciplinary team   - Patient/family teaching  - Consider wound care consult   Outcome: Progressing     Problem: PAIN - ADULT  Goal: Verbalizes/displays adequate comfort level or baseline comfort level  Description  Interventions:  - Encourage patient to monitor pain and request assistance  - Assess pain using appropriate pain scale  - Administer analgesics based on type and severity of pain and evaluate response  - Implement non-pharmacological measures as appropriate and evaluate response  - Consider cultural and social influences on pain and pain management  - Notify physician/advanced practitioner if interventions unsuccessful or patient reports new pain  Outcome: Progressing     Problem: INFECTION - ADULT  Goal: Absence or prevention of progression during hospitalization  Description  INTERVENTIONS:  - Assess and monitor for signs and symptoms of infection  - Monitor lab/diagnostic results  - Monitor all insertion sites, i e  indwelling lines, tubes, and drains  - Monitor endotracheal if appropriate and nasal secretions for changes in amount and color  - [place] appropriate cooling/warming therapies per order  - Administer medications as ordered  - Instruct and encourage patient and family to use good hand hygiene technique  - Identify and instruct in appropriate isolation precautions for identified infection/condition  Outcome: Progressing  Goal: Absence of fever/infection during neutropenic period  Description  INTERVENTIONS:  - Monitor WBC    Outcome: Progressing     Problem: SAFETY ADULT  Goal: Patient will remain free of falls  Description  INTERVENTIONS:  - Assess patient frequently for physical needs  -  Identify cognitive and physical deficits and behaviors that affect risk of falls    -  [place] fall precautions as indicated by assessment   - Educate patient/family on patient safety including physical limitations  - Instruct patient to call for assistance with activity based on assessment  - Modify environment to reduce risk of injury  - Consider OT/PT consult to assist with strengthening/mobility  Outcome: Progressing  Goal: Maintain or return to baseline ADL function  Description  INTERVENTIONS:  -  Assess patient's ability to carry out ADLs; assess patient's baseline for ADL function and identify physical deficits which impact ability to perform ADLs (bathing, care of mouth/teeth, toileting, grooming, dressing, etc )  - Assess/evaluate cause of self-care deficits   - Assess range of motion  - Assess patient's mobility; develop plan if impaired  - Assess patient's need for assistive devices and provide as appropriate  - Encourage maximum independence but intervene and supervise when necessary  - Involve family in performance of ADLs  - Assess for home care needs following discharge   - Consider OT consult to assist with ADL evaluation and planning for discharge  - Provide patient education as appropriate  Outcome: Progressing  Goal: Maintain or return mobility status to optimal level  Description  INTERVENTIONS:  - Assess patient's baseline mobility status (ambulation, transfers, stairs, etc )    - Identify cognitive and physical deficits and behaviors that affect mobility  - Identify mobility aids required to assist with transfers and/or ambulation (gait belt, sit-to-stand, lift, walker, cane, etc )  - [place] fall precautions as indicated by assessment  - Record patient progress and toleration of activity level on Mobility SBAR; progress patient to next Phase/Stage  - Instruct patient to call for assistance with activity based on assessment  - Consider rehabilitation consult to assist with strengthening/weightbearing, etc   Outcome: Progressing     Problem: DISCHARGE PLANNING  Goal: Discharge to home or other facility with appropriate resources  Description  INTERVENTIONS:  - Identify barriers to discharge w/patient and caregiver  - Arrange for needed discharge resources and transportation as appropriate  - Identify discharge learning needs (meds, wound care, etc )  - Arrange for interpretive services to assist at discharge as needed  - Refer to Case Management Department for coordinating discharge planning if the patient needs post-hospital services based on physician/advanced practitioner order or complex needs related to functional status, cognitive ability, or social support system  Outcome: Progressing     Problem: Knowledge Deficit  Goal: Patient/family/caregiver demonstrates understanding of disease process, treatment plan, medications, and discharge instructions  Description  Complete learning assessment and assess knowledge base  Interventions:  - Provide teaching at level of understanding  - Provide teaching via preferred learning methods  Outcome: Progressing     Problem: Potential for Falls  Goal: Patient will remain free of falls  Description  INTERVENTIONS:  - Assess patient frequently for physical needs  -  Identify cognitive and physical deficits and behaviors that affect risk of falls    -  [place] fall precautions as indicated by assessment   - Educate patient/family on patient safety including physical limitations  - Instruct patient to call for assistance with activity based on assessment  - Modify environment to reduce risk of injury  - Consider OT/PT consult to assist with strengthening/mobility  Outcome: Progressing

## 2019-12-07 NOTE — ASSESSMENT & PLAN NOTE
Denies any symptoms of presyncope today  Remains completely amnestic of event, likely related to [place]   PT/OT today  Consider orthostatics if symptomatic   PAF is known/chronic   No evidence of significant tachy or anna events overnight  cardiology signed off; outpatient follow-up with Dr Larry Garcia as routine

## 2019-12-07 NOTE — PHYSICAL THERAPY NOTE
Physical Therapy Progress Note    Gotebo Awkaashish, PTA        12/07/19 1025   Pain Assessment   Pain Assessment 0-10   Pain Score 3   Pain Type Acute pain   Pain Location Shoulder   Pain Orientation Left   Hospital Pain Intervention(s) Repositioned; Emotional support   Response to Interventions tolerated   Restrictions/Precautions   Weight Bearing Precautions Per Order Yes   LUE Weight Bearing Per Order NWB   Braces or Orthoses Sling   Other Precautions WBS; Fall Risk;Pain   General   Chart Reviewed Yes   Family/Caregiver Present Yes  (wife at end of tx)   Cognition   Arousal/Participation Alert;Arousable   Orientation Level Oriented X4   Following Commands Follows one step commands without difficulty   Subjective   Subjective states that he has difficulty moving    Bed Mobility   Supine to Sit 2  Maximal assistance   Additional items Assist x 1;HOB elevated; Bedrails; Increased time required;Verbal cues   Transfers   Sit to Stand 3  Moderate assistance   Additional items Assist x 2; Increased time required;Verbal cues   Stand to Sit 3  Moderate assistance   Additional items Assist x 2; Increased time required;Verbal cues   Stand pivot 3  Moderate assistance   Additional items Assist x 2; Increased time required;Verbal cues   Ambulation/Elevation   Gait pattern Narrow JACKIE; Decreased foot clearance;Shuffling; Short stride; Excessively slow   Gait Assistance 3  Moderate assist   Additional items Assist x 2;Verbal cues; Tactile cues   Assistive Device Other (Comment)  (HHA x2)   Distance 3 ft   Balance   Static Sitting Fair -   Static Standing Poor   Ambulatory Poor -   Endurance Deficit   Endurance Deficit Yes   Endurance Deficit Description weakness   Activity Tolerance   Activity Tolerance Patient limited by fatigue  (muscle weakness)   Nurse Made Aware Omaira Grossman RN   Exercises   Heelslides Sitting;20 reps;AROM; Bilateral   Hip Abduction Sitting;20 reps;AROM; Bilateral   Hip Adduction Sitting;20 reps;AROM; Bilateral   Knee AROM [employer] Sitting;20 reps;AROM; Bilateral   Ankle Pumps Sitting;20 reps;AROM; Bilateral   Assessment   Prognosis Fair   Problem List Decreased strength;Decreased range of motion;Decreased endurance; Impaired balance;Decreased mobility;Orthopedic restrictions;Pain   Assessment pt  requires   mod A x2  for upright  mobility  he  moves  slowly  c great effort  instruction needed fro proper technique for  bed mob and trnasfer   pt did shuffle over to chr from bed   he is very unsteady  and a  fall risk   reminders needed  to maintain nwb on  left ue  pt  was left in chr   wife present at end of session     he will need cont PT intervention   Barriers to Discharge Inaccessible home environment   Goals   Patient Goals get moving    STG Expiration Date 12/16/19   PT Treatment Day 1   Plan   Treatment/Interventions Functional transfer training;LE strengthening/ROM; Patient/family training;Bed mobility;Gait training;Spoke to nursing   Progress Slow progress, decreased activity tolerance   PT Frequency   (3-6xwk)   Recommendation   Recommendation Short-term skilled PT   Equipment Recommended   (TBD one handed device)   PT - OK to Discharge Yes  (when med ready)

## 2019-12-07 NOTE — PLAN OF CARE
Problem: PHYSICAL THERAPY ADULT  Goal: Performs mobility at highest level of function for planned discharge setting  See evaluation for individualized goals  Description  Treatment/Interventions: Functional transfer training, LE strengthening/ROM, Therapeutic exercise, Endurance training, Patient/family training, Equipment eval/education, Bed mobility, Gait training, Spoke to nursing, OT, Family          See flowsheet documentation for full assessment, interventions and recommendations  Outcome: Progressing  Note:   Prognosis: Fair  Problem List: Decreased strength, Decreased range of motion, Decreased endurance, Impaired balance, Decreased mobility, Orthopedic restrictions, Pain  Assessment: pt  requires   mod A x2  for upright  mobility  he  moves  slowly  c great effort  instruction needed fro proper technique for  bed mob and trnasfer   pt did shuffle over to chr from bed   he is very unsteady  and a  fall risk   reminders needed  to maintain nwb on  left ue  pt  was left in chr   wife present at end of session     he will need cont PT intervention   Barriers to Discharge: Inaccessible home environment     Recommendation: Short-term skilled PT     PT - OK to Discharge: Yes(when med ready)    See flowsheet documentation for full assessment

## 2019-12-08 PROCEDURE — 99232 SBSQ HOSP IP/OBS MODERATE 35: CPT | Performed by: SURGERY

## 2019-12-08 RX ADMIN — ALLOPURINOL 100 MG: 100 TABLET ORAL at 09:06

## 2019-12-08 RX ADMIN — SENNOSIDES 17.2 MG: 8.6 TABLET, FILM COATED ORAL at 21:21

## 2019-12-08 RX ADMIN — ACETAMINOPHEN 975 MG: 325 TABLET ORAL at 05:37

## 2019-12-08 RX ADMIN — METOPROLOL TARTRATE 75 MG: 50 TABLET, FILM COATED ORAL at 09:07

## 2019-12-08 RX ADMIN — ASPIRIN 81 MG 81 MG: 81 TABLET ORAL at 09:07

## 2019-12-08 RX ADMIN — APIXABAN 5 MG: 5 TABLET, FILM COATED ORAL at 18:16

## 2019-12-08 RX ADMIN — APIXABAN 5 MG: 5 TABLET, FILM COATED ORAL at 09:07

## 2019-12-08 RX ADMIN — MELATONIN 3 MG: 3 TAB ORAL at 21:21

## 2019-12-08 RX ADMIN — DOCUSATE SODIUM 100 MG: 100 CAPSULE, LIQUID FILLED ORAL at 09:07

## 2019-12-08 RX ADMIN — ACETAMINOPHEN 975 MG: 325 TABLET ORAL at 13:09

## 2019-12-08 RX ADMIN — ACETAMINOPHEN 975 MG: 325 TABLET ORAL at 21:21

## 2019-12-08 RX ADMIN — METOPROLOL TARTRATE 75 MG: 50 TABLET, FILM COATED ORAL at 21:21

## 2019-12-08 RX ADMIN — DOCUSATE SODIUM 100 MG: 100 CAPSULE, LIQUID FILLED ORAL at 18:16

## 2019-12-08 NOTE — ASSESSMENT & PLAN NOTE
Reduced in the emergency department  NWB MONICA in sling  CT Shoulder completed: discussed with ortho - non-op management, stable for DC with outpatient follow-up from their standpoint  Pain control  PT/OT - awaiting rehab placement

## 2019-12-08 NOTE — PROGRESS NOTES
Progress Note - Azael Jacinto 1935, 82 y o  male MRN: [id number]    Unit/Bed#: [facility] 604-01 Encounter: [id number]    Primary Care Provider: Chandler Sawyer MD   Date and time admitted to hospital: 12/5/2019  3:28 AM        Gout  Assessment & Plan  Continue home allopurinol    Atrial fibrillation with rapid ventricular response [facility])  Assessment & Plan  Patient has reported history of atrial fibrillation on metoprolol daily and Eliquis  Continue lopressor 75 BID  Rate controlled       Postural dizziness with near syncope  Assessment & Plan  Denies any symptoms of presyncope today  Remains completely amnestic of event, likely related to [place]   PT/OT: no evidence of orthostasis or symptoms  PAF is known/chronic  No evidence of significant tachy or anna events overnight  cardiology signed off; outpatient follow-up with Dr Page Santiago as routine     Vesicocolonic fistula  Assessment & Plan  Identified on trauma CT of the abdomen and pelvis  Patient denies any urinary symptoms  Denies previous history  Colorectal consultation completed - recommending non-urgent outpatient follow-up with them  No further inpatient work-up required     Closed head injury with concussion  Assessment & Plan  Patient presented with confusion and amnesia to event, confusion improved while in the emergency department though he remains amnestic  No symptoms of headache, vertigo, nausea - suspect likely side effect from [place]  No evidence of concussion     Contusion of lip  Assessment & Plan  Small laceration along the mucosal surface that did not penetrate through the outer skin, closed with a single Vicryl suture in the ER      Anterior dislocation of left shoulder  Assessment & Plan  Reduced in the emergency department  NWB LUE in sling  CT Shoulder completed: discussed with ortho - non-op management, stable for DC with outpatient follow-up from their standpoint  Pain control  PT/OT - awaiting rehab placement     Benign hypertension  Assessment & Plan  Continue home medications    * Ambulatory dysfunction  Assessment & Plan  History of using cane for arthritis of LLE, unable to given LUE in sling  Pt/OT recommending rehab           Disposition: stable for DC to rehab, CM assisting       SUBJECTIVE:  Chief Complaint: "I am ready to go to rehab "    Subjective: reports shoulder pain persists, mildly improved, overall controlled  Denies pain anywhere else  Admits to no BM but denies symptoms of constipation  Denies nausea or vomiting  Offers no complaints  OBJECTIVE:     Meds/Allergies     Current Facility-Administered Medications:     acetaminophen (TYLENOL) tablet 975 mg, 975 mg, Oral, Q8H [facility], CONY Boudreaux, 975 mg at 12/08/19 0537    allopurinol (ZYLOPRIM) tablet 100 mg, 100 mg, Oral, Daily, Roger Bullard MD, 100 mg at 12/07/19 0816    apixaban (ELIQUIS) tablet 5 mg, 5 mg, Oral, BID, Roger Bullard MD, 5 mg at 12/07/19 1824    aspirin chewable tablet 81 mg, 81 mg, Oral, Daily, Roger Bullard MD, 81 mg at 12/07/19 0816    docusate sodium (COLACE) capsule 100 mg, 100 mg, Oral, BID, CONY Boothe, 100 mg at 12/07/19 1824    melatonin tablet 3 mg, 3 mg, Oral, HS, CONY Aguirre, 3 mg at 12/07/19 2115    metoprolol tartrate (LOPRESSOR) tablet 25 mg, 25 mg, Oral, Once, Kary Diaz MD    metoprolol tartrate (LOPRESSOR) tablet 75 mg, 75 mg, Oral, Q12H [facility], Kary Diaz MD, 75 mg at 12/07/19 2116    polyethylene glycol (MIRALAX) packet 17 g, 17 g, Oral, Daily PRN, Servando Tse MD    [facility]) tablet 17 2 mg, 2 tablet, Oral, HS, CONY Boothe, 17 2 mg at 12/07/19 2115     Vitals:   Vitals:    12/08/19 0808   BP: 116/77   Pulse: 91   Resp: 18   Temp: (!) 97 3 °F (36 3 °C)   SpO2: 98%       Intake/Output:  I/O       12/06 0701 - 12/07 0700 12/07 0701 - 12/08 0700 12/08 0701 - 12/09 0700    P  O  780 960     Total Intake(mL/kg) 780 (7 4) 960 (9 1)     Urine (mL/kg/hr) 800 (0 3) 420 (0 2)     Stool 0      Total Output 800 420     Net -20 +540            Unmeasured Stool Occurrence 1 x             Nutrition/GI Proph/Bowel Reg:     Physical Exam:   Physical Exam   Constitutional: He is oriented to person, place, and time  No distress  HENT:   Head: Normocephalic  Facial bruising is improved/resolving, right ear bruising resolving without complication    Eyes: Pupils are equal, round, and reactive to light  Conjunctivae are normal  Right eye exhibits no discharge  Left eye exhibits no discharge  Neck: Normal range of motion  No tracheal deviation present  Cardiovascular: Normal rate, normal heart sounds and intact distal pulses  Pulmonary/Chest: Effort normal and breath sounds normal  No stridor  No respiratory distress  He has no wheezes  He has no rales  He exhibits no tenderness  Abdominal: Soft  Bowel sounds are normal  He exhibits no distension and no mass  There is no tenderness  There is no rebound and no guarding  Musculoskeletal: He exhibits edema (left shoudler edema is improving) and tenderness (left shoulder tendnerss with pressure and movement but none at rest )  Motor and sensory remains intact distally in fingers and hand   Neurological: He is alert and oriented to person, place, and time  Moves all extremities, equal strength bilaterally in lower extremities, LUE in sling  Facial symmetry  Clear speech   GCS 15   Skin: Skin is warm and dry  Capillary refill takes less than 2 seconds  He is not diaphoretic  Psychiatric: He has a normal mood and affect  Invasive Devices     Peripheral Intravenous Line            Peripheral IV 12/05/19 Right Antecubital 3 days                 Lab Results: Results: I have personally reviewed pertinent reports     and I have personally reviewed pertinent films in PACS, BMP/CMP: No results found for: SODIUM, K, CL, CO2, ANIONGAP, BUN, CREATININE, GLUCOSE, CALCIUM, AST, ALT, ALKPHOS, PROT, BILITOT, EGFR and CBC: No results found for: WBC, HGB, HCT, MCV, PLT, ADJUSTEDWBC, MCH, MCHC, RDW, MPV, NRBC  Imaging/EKG Studies: Results: I have personally reviewed pertinent reports     and I have personally reviewed pertinent films in PACS  Other Studies:   VTE Prophylaxis: Sequential compression device (Venodyne) eliquis

## 2019-12-08 NOTE — ASSESSMENT & PLAN NOTE
Patient has reported history of atrial fibrillation on metoprolol daily and Eliquis  Continue home medications   Rate controlled

## 2019-12-08 NOTE — PLAN OF CARE
Problem: Prexisting or High Potential for Compromised Skin Integrity  Goal: Skin integrity is maintained or improved  Description  INTERVENTIONS:  - Identify patients at risk for skin breakdown  - Assess and monitor skin integrity  - Assess and monitor nutrition and hydration status  - Monitor labs   - Assess for incontinence   - Turn and reposition patient  - Assist with mobility/ambulation  - Relieve pressure over bony prominences  - Avoid friction and shearing  - Provide appropriate hygiene as needed including keeping skin clean and dry  - Evaluate need for skin moisturizer/barrier cream  - Collaborate with interdisciplinary team   - Patient/family teaching  - Consider wound care consult   Outcome: Progressing     Problem: PAIN - ADULT  Goal: Verbalizes/displays adequate comfort level or baseline comfort level  Description  Interventions:  - Encourage patient to monitor pain and request assistance  - Assess pain using appropriate pain scale  - Administer analgesics based on type and severity of pain and evaluate response  - Implement non-pharmacological measures as appropriate and evaluate response  - Consider cultural and social influences on pain and pain management  - Notify physician/advanced practitioner if interventions unsuccessful or patient reports new pain  Outcome: Progressing     Problem: INFECTION - ADULT  Goal: Absence or prevention of progression during hospitalization  Description  INTERVENTIONS:  - Assess and monitor for signs and symptoms of infection  - Monitor lab/diagnostic results  - Monitor all insertion sites, i e  indwelling lines, tubes, and drains  - Monitor endotracheal if appropriate and nasal secretions for changes in amount and color  - [place] appropriate cooling/warming therapies per order  - Administer medications as ordered  - Instruct and encourage patient and family to use good hand hygiene technique  - Identify and instruct in appropriate isolation precautions for identified infection/condition  Outcome: Progressing  Goal: Absence of fever/infection during neutropenic period  Description  INTERVENTIONS:  - Monitor WBC    Outcome: Progressing     Problem: SAFETY ADULT  Goal: Patient will remain free of falls  Description  INTERVENTIONS:  - Assess patient frequently for physical needs  -  Identify cognitive and physical deficits and behaviors that affect risk of falls    -  [place] fall precautions as indicated by assessment   - Educate patient/family on patient safety including physical limitations  - Instruct patient to call for assistance with activity based on assessment  - Modify environment to reduce risk of injury  - Consider OT/PT consult to assist with strengthening/mobility  Outcome: Progressing  Goal: Maintain or return to baseline ADL function  Description  INTERVENTIONS:  -  Assess patient's ability to carry out ADLs; assess patient's baseline for ADL function and identify physical deficits which impact ability to perform ADLs (bathing, care of mouth/teeth, toileting, grooming, dressing, etc )  - Assess/evaluate cause of self-care deficits   - Assess range of motion  - Assess patient's mobility; develop plan if impaired  - Assess patient's need for assistive devices and provide as appropriate  - Encourage maximum independence but intervene and supervise when necessary  - Involve family in performance of ADLs  - Assess for home care needs following discharge   - Consider OT consult to assist with ADL evaluation and planning for discharge  - Provide patient education as appropriate  Outcome: Progressing  Goal: Maintain or return mobility status to optimal level  Description  INTERVENTIONS:  - Assess patient's baseline mobility status (ambulation, transfers, stairs, etc )    - Identify cognitive and physical deficits and behaviors that affect mobility  - Identify mobility aids required to assist with transfers and/or ambulation (gait belt, sit-to-stand, lift, walker, cane, etc )  - [place] fall precautions as indicated by assessment  - Record patient progress and toleration of activity level on Mobility SBAR; progress patient to next Phase/Stage  - Instruct patient to call for assistance with activity based on assessment  - Consider rehabilitation consult to assist with strengthening/weightbearing, etc   Outcome: Progressing     Problem: DISCHARGE PLANNING  Goal: Discharge to home or other facility with appropriate resources  Description  INTERVENTIONS:  - Identify barriers to discharge w/patient and caregiver  - Arrange for needed discharge resources and transportation as appropriate  - Identify discharge learning needs (meds, wound care, etc )  - Arrange for interpretive services to assist at discharge as needed  - Refer to Case Management Department for coordinating discharge planning if the patient needs post-hospital services based on physician/advanced practitioner order or complex needs related to functional status, cognitive ability, or social support system  Outcome: Progressing     Problem: Knowledge Deficit  Goal: Patient/family/caregiver demonstrates understanding of disease process, treatment plan, medications, and discharge instructions  Description  Complete learning assessment and assess knowledge base  Interventions:  - Provide teaching at level of understanding  - Provide teaching via preferred learning methods  Outcome: Progressing     Problem: Potential for Falls  Goal: Patient will remain free of falls  Description  INTERVENTIONS:  - Assess patient frequently for physical needs  -  Identify cognitive and physical deficits and behaviors that affect risk of falls    -  [place] fall precautions as indicated by assessment   - Educate patient/family on patient safety including physical limitations  - Instruct patient to call for assistance with activity based on assessment  - Modify environment to reduce risk of injury  - Consider OT/PT consult to assist with strengthening/mobility  Outcome: Progressing

## 2019-12-08 NOTE — ASSESSMENT & PLAN NOTE
Denies any symptoms of presyncope today  Remains completely amnestic of event, likely related [place]   PT/OT: no evidence of orthostasis or symptoms  PAF is known/chronic   No evidence of significant tachy or anna events overnight  cardiology signed off; outpatient follow-up with Dr Ellen Esquivel as routine

## 2019-12-09 VITALS
BODY MASS INDEX: 34.29 KG/M2 | WEIGHT: 231.48 LBS | HEART RATE: 94 BPM | HEIGHT: 69 IN | SYSTOLIC BLOOD PRESSURE: 98 MMHG | RESPIRATION RATE: 17 BRPM | TEMPERATURE: 97.7 F | OXYGEN SATURATION: 98 % | DIASTOLIC BLOOD PRESSURE: 65 MMHG

## 2019-12-09 LAB
BASE EXCESS BLDA CALC-SCNC: 2 MMOL/L (ref -2–3)
CA-I BLD-SCNC: 1.22 MMOL/L (ref 1.12–1.32)
GLUCOSE SERPL-MCNC: 118 MG/DL (ref 65–140)
HCO3 BLDA-SCNC: 29.8 MMOL/L (ref 24–30)
HCT VFR BLD CALC: 46 % (ref 36.5–49.3)
HGB BLDA-MCNC: 15.6 G/DL (ref 12–17)
PCO2 BLD: 32 MMOL/L (ref 21–32)
PCO2 BLD: 57.3 MM HG (ref 42–50)
PH BLD: 7.33 [PH] (ref 7.3–7.4)
PO2 BLD: 21 MM HG (ref 35–45)
POTASSIUM BLD-SCNC: 4.1 MMOL/L (ref 3.5–5.3)
SAO2 % BLD FROM PO2: 30 % (ref 60–85)
SODIUM BLD-SCNC: 144 MMOL/L (ref 136–145)
SPECIMEN SOURCE: ABNORMAL

## 2019-12-09 PROCEDURE — NC001 PR NO CHARGE: Performed by: SURGERY

## 2019-12-09 PROCEDURE — 99232 SBSQ HOSP IP/OBS MODERATE 35: CPT | Performed by: INTERNAL MEDICINE

## 2019-12-09 PROCEDURE — 99238 HOSP IP/OBS DSCHRG MGMT 30/<: CPT | Performed by: SURGERY

## 2019-12-09 RX ORDER — METOPROLOL TARTRATE 75 MG/1
75 TABLET, FILM COATED ORAL EVERY 12 HOURS SCHEDULED
Refills: 0
Start: 2019-12-09

## 2019-12-09 RX ORDER — SENNOSIDES 8.6 MG
2 TABLET ORAL
Qty: 120 EACH | Refills: 0 | Status: SHIPPED | OUTPATIENT
Start: 2019-12-09

## 2019-12-09 RX ORDER — POLYETHYLENE GLYCOL 3350 17 G/17G
17 POWDER, FOR SOLUTION ORAL DAILY PRN
Qty: 14 EACH | Refills: 0 | Status: SHIPPED | OUTPATIENT
Start: 2019-12-09

## 2019-12-09 RX ORDER — DOCUSATE SODIUM 100 MG/1
100 CAPSULE, LIQUID FILLED ORAL 2 TIMES DAILY
Qty: 10 CAPSULE | Refills: 0 | Status: SHIPPED | OUTPATIENT
Start: 2019-12-09

## 2019-12-09 RX ORDER — ACETAMINOPHEN 325 MG/1
975 TABLET ORAL EVERY 8 HOURS SCHEDULED
Qty: 30 TABLET | Refills: 0 | Status: SHIPPED | OUTPATIENT
Start: 2019-12-09

## 2019-12-09 RX ADMIN — ACETAMINOPHEN 975 MG: 325 TABLET ORAL at 13:05

## 2019-12-09 RX ADMIN — DOCUSATE SODIUM 100 MG: 100 CAPSULE, LIQUID FILLED ORAL at 08:06

## 2019-12-09 RX ADMIN — METOPROLOL TARTRATE 75 MG: 50 TABLET, FILM COATED ORAL at 08:06

## 2019-12-09 RX ADMIN — ALLOPURINOL 100 MG: 100 TABLET ORAL at 08:06

## 2019-12-09 RX ADMIN — ACETAMINOPHEN 975 MG: 325 TABLET ORAL at 05:01

## 2019-12-09 RX ADMIN — APIXABAN 5 MG: 5 TABLET, FILM COATED ORAL at 08:06

## 2019-12-09 RX ADMIN — ASPIRIN 81 MG 81 MG: 81 TABLET ORAL at 08:06

## 2019-12-09 NOTE — PROGRESS NOTES
Progress Note - Geriatric Medicine   James Spencer 82 y o  male MRN: [id number]  Unit/Bed#: [facility] 604-01 Encounter: [id number]      Assessment/Plan:    Anterior dislocation of left shoulder  -remains in soft splint and NWB per Ortho   -continue acute pain control    -PT/OT recommending STR at dc    Left humerus fracture  -s/p anterior dislocation with reduction by Ortho on admission  -confirmed on CT shoulder 12/6/19, comminuted and displaced greater tuberosity fracture   -Ortho recommended NWB with sling, no surgical intervention at this time    Closed head injury with concussion  -A/Ox4 and appropriate today  -confusion on admission likely related to Ambien more than fall  -sleeping well this admission without use of Ambien, strongly encourage permanent discontinuation of Ambien on discharge and recommend against restarting as o/p    Ambulatory dysfunction with recurrent falls  -continue assistance with all transfers  -avoid Ambien as noted above as likely contributing to falls  -keep personal items close to prevent reaching  -continue PT and OT    Impaired vision  -requires use of glasses, encourage use at all appropriates times    Insomnia  -continue melatonin, patient is sleeping well without use of Ambien this admission  -maintain normal sleep-wake cycle  -continue to encourage good sleep hygiene  -continue to discourage use of Ambien as outpatient     Subjective:   Patient seen examined at the chairside where he is sitting resting comfortably  He is sleeping well and reports no pain and is inquiring about when he can go home  Review of Systems   Constitutional: Negative for appetite change, chills and fever  HENT: Positive for hearing loss  Negative for trouble swallowing  Eyes: Positive for visual disturbance (wears glasses )  Respiratory: Negative for shortness of breath and wheezing  Cardiovascular: Negative for chest pain and palpitations     Gastrointestinal: Negative for abdominal distention, abdominal pain (improved significantly), constipation, diarrhea, nausea and vomiting  Genitourinary: Negative for difficulty urinating  Musculoskeletal: Positive for gait problem  Left arm/shoulder sore   Skin: Negative  Neurological: Positive for weakness (generalized)  Negative for dizziness, light-headedness and headaches  Psychiatric/Behavioral: Negative for sleep disturbance  All other systems reviewed and are negative  Objective:     Vitals: Blood pressure 98/65, pulse 94, temperature 97 7 °F (36 5 °C), temperature source Oral, resp  rate 17, height 5' 9" (1 753 m), weight 105 kg (231 lb 7 7 oz), SpO2 98 %  ,Body mass index is 34 18 kg/m²  Intake/Output Summary (Last 24 hours) at 12/9/2019 0755  Last data filed at 12/9/2019 0600  Gross per 24 hour   Intake 960 ml   Output 1075 ml   Net -115 ml       Current Medications: Reviewed    Physical Exam:   Physical Exam   Constitutional: He appears well-developed and well-nourished  No distress  HENT:   Head: Normocephalic and atraumatic  Mucous membranes tachy/dry  Glasses in place  Left upper lip contusion improving   Eyes: EOM are normal  No scleral icterus  Neck: Normal range of motion  Neck supple  No tracheal deviation present  Cardiovascular: Normal rate and intact distal pulses  Pulmonary/Chest: Effort normal and breath sounds normal  No respiratory distress  Abdominal: Soft  Bowel sounds are normal  He exhibits no distension  There is no tenderness  No rebound or guarding   Musculoskeletal: He exhibits no edema  LUE in soft sling   Neurological: He is alert  He exhibits normal muscle tone  Skin: Skin is warm and dry  He is not diaphoretic  Psychiatric: He has a normal mood and affect  Nursing note and vitals reviewed       Invasive Devices     Peripheral Intravenous Line            Peripheral IV 12/05/19 Right Antecubital 4 days              Lab, Imaging and other studies:   -no new labs or imaging overnight

## 2019-12-09 NOTE — DISCHARGE SUMMARY
Discharge- Dionne Peterson 1935, 82 y o  male MRN: [id number]    Unit/Bed#: [facility] 604-01 Encounter: [id number]    Primary Care Provider: Twila Pallas, MD   Date and time admitted to hospital: 12/5/2019  3:28 AM        * Ambulatory dysfunction  Assessment & Plan  History of using cane for arthritis of LLE, unable to given LUE in sling  PT/OT recommending rehab  D/C to [facility] at 3:00 pm today  Closed head injury with concussion  Assessment & Plan  Patient presented with confusion and amnesia to event, confusion improved while in the emergency department though he remains amnestic  No symptoms of headache, vertigo, nausea - suspect likely side effect from [place]  No evidence of concussion at this time  Atrial fibrillation with rapid ventricular response [facility])  Assessment & Plan  Patient has reported history of atrial fibrillation on metoprolol daily and Eliquis  Continue home medications  Rate controlled       Postural dizziness with near syncope  Assessment & Plan  Denies any symptoms of presyncope today  Remains completely amnestic of event, likely related to [place]   PT/OT: no evidence of orthostasis or symptoms  PAF is known/chronic  No evidence of significant tachy or anna events overnight  cardiology signed off; outpatient follow-up with Dr Tawanda Martini as routine     Anterior dislocation of left shoulder  Assessment & Plan  Reduced in the emergency department  NWB LUE in sling  NVI in the LUE  CT Shoulder completed: discussed with ortho - non-op management, stable for DC with outpatient follow-up from their standpoint  Pain control  PT/OT - awaiting rehab placement     Vesicocolonic fistula  Assessment & Plan  Identified on trauma CT of the abdomen and pelvis  Patient denies any urinary symptoms  Denies previous history  Colorectal consultation completed - recommending non-urgent outpatient follow-up with them   No further inpatient work-up required     Benign hypertension  Assessment & Plan  Continue home medications    Contusion of lip  Assessment & Plan  Small laceration along the mucosal surface that did not penetrate through the outer skin, closed with a single Vicryl suture in the ER  Gout  Assessment & Plan  Continue home allopurinol                Resolved Problems  Date Reviewed: 12/9/2019    None          Admission Date:   Admission Orders (From admission, onward)     Ordered        12/05/19 1113  Inpatient Admission  Once         12/05/19 0646  Place in Observation  Once                     Admitting Diagnosis: Atrial fibrillation with rapid ventricular response ([address] ) [I48 91]  Vesicocolonic fistula [N32 1]  Ambulatory dysfunction [R26 2]  Anterior dislocation of left shoulder, initial encounter [S43 015A]  Postural dizziness with near syncope [R42, R55]  Unspecified multiple injuries, initial encounter [T07  XXXA]    HPI: As documented by Dr Jamal Vernon who evaluated the patient on admission, "Corine Gayle is a 82 y o  male who presents with left shoulder pain and bleeding from his mouth after his wife found him laying at the bottom of his stairs early this morning  On presentation the patient is confused and EMS provide the history  Wife told them that he took an Ambien last night and when he does he occasionally will get confused and wander in the house  She was sleeping downstairs and did not hear him fall but when she woke up in the middle the night she noticed him lying at the bottom of the stairs  On EMS arrival he was confused and did not recall the events around his fall  His only complaint was left shoulder pain and pain at the left side of his lip where he has bleeding  After observation in the emergency department patient's mental status improved and he continued to deny any complaints other than above    Continues to not remember how he fell "    Procedures Performed:   Orders Placed This Encounter   Procedures    Orthopedic injury treatment    Pre-Procedural Sedation    Procedural Sedation       Summary of Hospital Course:   Patient was placed on the trauma service following fall when he sustained a left shoulder dislocation and anchored fracture of the glenoid  He was seen by Ortho in his shoulder was reduced  He was managed non operatively in a sling, nonweightbearing to the left upper extremity  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 and Colorectal surgery was consulted  They recommended no acute intervention and recommended outpatient follow-up as this was a chronic finding  He had a consult placed to Cardiology due to atrial fibrillation with RVR and his home metoprolol 50 mg q 12 hours was increased to 75 mg q 12 hours  He was also restarted on his home Eliquis as well  Cardiology signed off as his heart was then well controlled  He was up and ambulatory with physical therapy recommended discharge to inpatient rehab  He was tolerating a diet, sleeping well and pain was controlled  He was medically stable for discharge and discharged to [facility] on 12/09/2019  He will follow up with Orthopedics in 2 weeks, with cardiology's previous schedule, with colorectal and geriatrics  Significant Findings, Care, Treatment and Services Provided:   Xr Spine Thoracic 3 Vw    Result Date: 12/6/2019  Impression: No acute osseous abnormality  Degenerative changes as described  Workstation performed: [id number]     Xr Shoulder 2+ Vw Left    Result Date: 12/5/2019  Impression: Interval reduction of anterior shoulder dislocation with now anatomic alignment  Workstation performed: [id number]     Xr Humerus Left    Result Date: 12/5/2019  Impression: No acute cardiopulmonary disease within limitations of supine imaging  Anterior shoulder dislocation with Hill-Sachs deformity and Bankart fracture  Workstation performed: [id number]     Xr Wrist 3+ Vw Left    Result Date: 12/6/2019  Impression: No acute osseous abnormality   Degenerative changes as described  Workstation performed: [id number]     Xr Foot 3+ Vw Left    Result Date: 12/6/2019  Impression: No evidence of acute bony abnormality in the left foot  Workstation performed: [id number]     Trauma - Ct Head Wo Contrast    Addendum Date: 12/5/2019    ADDENDUM: Large soft tissue hematoma overlying the right parietal bone  Result Date: 12/5/2019  Impression: No intracranial hemorrhage or calvarial fracture  Findings discussed with Dr Sachin Mitchell 4:30 AM 12/5/19 Workstation performed: [id number]     Trauma - Ct Facial Bones Wo Contrast    Result Date: 12/5/2019  Impression: No evidence of acute traumatic injury to the facial bones  Findings discussed with Dr Sachin Mitchell at 4:30 AM, 12/5/2019 Workstation performed: [id number]     Trauma - Ct Spine Cervical Wo Contrast    Addendum Date: 12/5/2019    ADDENDUM: Findings discussed with Dr Sachin Mitchell at 4:30 AM, 12/5/2019    Result Date: 12/5/2019  Impression: No cervical spine fracture or traumatic malalignment  Workstation performed: [id number]     Ct Shoulder Left Wo Contrast    Result Date: 12/6/2019  Impression: Findings consistent with prior left glenohumeral joint anterior dislocation:  comminuted and displaced greater tuberosity fracture and mildly displaced anteroinferior glenoid (Bankart) fracture involving approximately 5% of the glenoid articular surface  Workstation performed: [id number]     Xr Chest 1 View    Result Date: 12/5/2019  Impression: No acute cardiopulmonary disease within limitations of supine imaging  Anterior shoulder dislocation with Hill-Sachs deformity and Bankart fracture  Workstation performed: [id number]     Xr Trauma Multiple    Result Date: 12/5/2019  Impression: No acute cardiopulmonary disease within limitations of supine imaging  Anterior shoulder dislocation with Hill-Sachs deformity and Bankart fracture   Workstation performed: [id number]     Ct Recon Only Lumbar Spine (no Charge)    Result Date: 12/5/2019  Impression: No fracture or traumatic subluxation  Scoliosis without lateral subluxation, spondylolysis or spondylolisthesis  Multilevel lumbar spondylosis with notable left foraminal protrusion at L3-L4, correlate for ipsilateral L3 radiculopathy  Prominent spurring arising from the right L5-S1 facet encroaching on the right proximal neural foramina and lateral recess  Correlate for right L5 and S1 radiculopathy  Workstation performed: [id number]     Trauma - Ct Abdomen Pelvis W Contrast    Result Date: 12/5/2019  Impression: No evidence of acute traumatic injury throughout the abdomen or pelvis  3 3 cm thick-walled air and fluid collection contiguous with sigmoid colon and abutting the left superior dome of the bladder, potentially representing chronic diverticular abscess versus prominent diverticulum  No evidence of active inflammation  Wall thickening along the left superior bladder dome which abuts adjacent thick-walled air and fluid collection with air within the bladder  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  Findings discussed with Dr Stephanie Mazariegos at 4:50 AM, 12/5/2019 Workstation performed: [id number]       Complications: none    Condition at Discharge: good         Discharge instructions/Information to patient and family:   See after visit summary for information provided to patient and family  Provisions for Follow-Up Care:  See after visit summary for information related to follow-up care and any pertinent home health orders  PCP: Clayton Duarte MD    Disposition: Home    Planned Readmission: No    Discharge Statement   I spent 30 minutes discharging the patient  This time was spent on the day of discharge  I had direct contact with the patient on the day of discharge  Additional documentation is required if more than 30 minutes were spent on discharge  Discharge Medications:  See after visit summary for reconciled discharge medications provided to patient and family

## 2019-12-09 NOTE — SOCIAL WORK
Cm reviewed patient during care coordination rounds  Cm continues to work on [facility] [employer] for patient  Cm confirmed that [facility] will not be able to accept patient  Cm met with patient and wife at bedside to review additional choices  Patient and wife would like to work on a bed at [facility], even though both  and [facility] will be able to accept  Cm sent communications to [facility] and awaiting finale response  Cm informed that patient will have a bed at [facility] today  Cm informed that bed would by private by admission director  Cm confirmed with discharging RN that patient had Bm  Cm informed facility admission director  Cm informed family and they are in agreement  Patient and wife in agreement with dc to [facility]  Patient and wife in agreement with OOP cost for [address] transport  Transport is set for 3pm today  All parties aware and in agreement

## 2019-12-09 NOTE — PROGRESS NOTES
Progress Note - Dionne Peterson 1935, 82 y o  male MRN: [id number]    Unit/Bed#: [place] 604-01 Encounter: [id number]    Primary Care Provider: Twila Pallas, MD   Date and time admitted to hospital: 12/5/2019  3:28 AM        * Ambulatory dysfunction  Assessment & Plan  History of using cane for arthritis of LLE, unable to given LUE in sling  PT/OT recommending rehab  CM following for placement: [facility] vs SNF options placed over the wknd  Will f/u today  Closed head injury with concussion  Assessment & Plan  Patient presented with confusion and amnesia to event, confusion improved while in the emergency department though he remains amnestic  No symptoms of headache, vertigo, nausea - suspect likely side effect from [place]  No evidence of concussion at this time  Atrial fibrillation with rapid ventricular response [facility])  Assessment & Plan  Patient has reported history of atrial fibrillation on metoprolol daily and Eliquis  Continue home medications  Rate controlled       Postural dizziness with near syncope  Assessment & Plan  Denies any symptoms of presyncope today  Remains completely amnestic of event, likely related to [place]   PT/OT: no evidence of orthostasis or symptoms  PAF is known/chronic  No evidence of significant tachy or anna events overnight  cardiology signed off; outpatient follow-up with Dr Tawanda Martini as routine     Anterior dislocation of left shoulder  Assessment & Plan  Reduced in the emergency department  NWB LUE in sling  NVI in the LUE  CT Shoulder completed: discussed with ortho - non-op management, stable for DC with outpatient follow-up from their standpoint  Pain control  PT/OT - awaiting rehab placement     Vesicocolonic fistula  Assessment & Plan  Identified on trauma CT of the abdomen and pelvis  Patient denies any urinary symptoms  Denies previous history  Colorectal consultation completed - recommending non-urgent outpatient follow-up with them   No further inpatient work-up required     Benign hypertension  Assessment & Plan  Continue home medications    Contusion of lip  Assessment & Plan  Small laceration along the mucosal surface that did not penetrate through the outer skin, closed with a single Vicryl suture in the ER  Gout  Assessment & Plan  Continue home allopurinol          Bedside rounds completed with nurse Classie Screen  Disposition: continue med-surg status, placement pending      SUBJECTIVE:  Chief Complaint: "I'd be better if I could have a bowel movement"      Subjective: Patient was able to have a bowel movement after my initial evaluation this morning  He states he is feeling better now  Notes some discomfort in the left shoulder as expected  Tolerating a diet and sleeping well         OBJECTIVE:     Meds/Allergies     Current Facility-Administered Medications:     acetaminophen (TYLENOL) tablet 975 mg, 975 mg, Oral, Q8H [facility], CONY Boudreaux, 975 mg at 12/09/19 0501    allopurinol (ZYLOPRIM) tablet 100 mg, 100 mg, Oral, Daily, Effie Viera MD, 100 mg at 12/09/19 0628    apixaban (ELIQUIS) tablet 5 mg, 5 mg, Oral, BID, Effie Viera MD, 5 mg at 12/09/19 0806    aspirin chewable tablet 81 mg, 81 mg, Oral, Daily, Effie Viera MD, 81 mg at 12/09/19 0806    docusate sodium (COLACE) capsule 100 mg, 100 mg, Oral, BID, CONY Xiong, 100 mg at 12/09/19 0806    melatonin tablet 3 mg, 3 mg, Oral, HS, CONY Aguirre, 3 mg at 12/08/19 2121    metoprolol tartrate (LOPRESSOR) tablet 25 mg, 25 mg, Oral, Once, Kennedy Darling MD    metoprolol tartrate (LOPRESSOR) tablet 75 mg, 75 mg, Oral, Q12H [facility], Kennedy Darling MD, 75 mg at 12/09/19 0806    polyethylene glycol (MIRALAX) packet 17 g, 17 g, Oral, Daily PRN, Evert Lyles MD    [facility]) tablet 17 2 mg, 2 tablet, Oral, HS, CONY Xiong, 17 2 mg at 12/08/19 2121     Vitals:   Vitals:    12/09/19 0744   BP: 98/65   Pulse: 94   Resp: 17   Temp:    SpO2: 98%       Intake/Output:  I/O 12/07 0701 - 12/08 0700 12/08 0701 - 12/09 0700 12/09 0701 - 12/10 0700    P  O  960 960     Total Intake(mL/kg) 960 (9 1) 960 (9 1)     Urine (mL/kg/hr) 420 (0 2) 1275 (0 5)     Stool  0     Total Output 420 1275     Net +540 -315            Unmeasured Stool Occurrence  1 x            Nutrition/GI Proph/Bowel Reg: Regular    Physical Exam:   GENERAL APPEARANCE: NAD  NEURO: GCS 15, non-focal exam  HEENT:   CV: RRR, no MGR  LUNGS: CTA bilaterally  GI: soft, non-tender, non-distended  : voiding  MSK: moving all equally and with full strength with the exception of the LUE which is in a sling, NVI distally  SKIN: pink, warm, dry    Invasive Devices     Peripheral Intravenous Line            Peripheral IV 12/05/19 Right Antecubital 4 days                 Lab Results: Results: I have personally reviewed pertinent reports   , BMP/CMP: No results found for: SODIUM, K, CL, CO2, ANIONGAP, BUN, CREATININE, GLUCOSE, CALCIUM, AST, ALT, ALKPHOS, PROT, BILITOT, EGFR and CBC: No results found for: WBC, HGB, HCT, MCV, PLT, ADJUSTEDWBC, MCH, MCHC, RDW, MPV, NRBC  Imaging/EKG Studies: Results: I have personally reviewed pertinent reports      Other Studies: no new  VTE Prophylaxis: Sequential compression device (Venodyne) , home Eliquis

## 2019-12-09 NOTE — ASSESSMENT & PLAN NOTE
Denies any symptoms of presyncope today  Remains completely amnestic of event, likely related to [place]   PT/OT: no evidence of orthostasis or symptoms  PAF is known/chronic   No evidence of significant tachy or anna events overnight  cardiology signed off; outpatient follow-up with Dr Alaina Mckinley as routine

## 2019-12-09 NOTE — INCIDENTAL FINDINGS
The following findings require follow up:  Radiographic finding   Finding: Scoliosis without lateral subluxation, spondylolysis or spondylolisthesis      Multilevel lumbar spondylosis with notable left foraminal protrusion at L3-L4, correlate for ipsilateral L3 radiculopathy       Prominent spurring arising from the right L5-S1 facet encroaching on the right proximal neural foramina and lateral recess  Correlate for right L5 and S1 radiculopathy  3 3 cm thick-walled air and fluid collection contiguous with sigmoid colon and abutting the left superior dome of the bladder, potentially representing chronic diverticular abscess versus prominent diverticulum  No evidence of active inflammation      Wall thickening along the left superior bladder dome which abuts adjacent thick-walled air and fluid collection with air within the bladder  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

## 2019-12-09 NOTE — DISCHARGE INSTRUCTIONS
Please follow up with Colorectal Surgery (Dr Mio Manning), make appointment in approximately 1 month    Do not bear weight on the left arm  Keep the L arm in a sling

## 2019-12-09 NOTE — PLAN OF CARE
Problem: Prexisting or High Potential for Compromised Skin Integrity  Goal: Skin integrity is maintained or improved  Description  INTERVENTIONS:  - Identify patients at risk for skin breakdown  - Assess and monitor skin integrity  - Assess and monitor nutrition and hydration status  - Monitor labs   - Assess for incontinence   - Turn and reposition patient  - Assist with mobility/ambulation  - Relieve pressure over bony prominences  - Avoid friction and shearing  - Provide appropriate hygiene as needed including keeping skin clean and dry  - Evaluate need for skin moisturizer/barrier cream  - Collaborate with interdisciplinary team   - Patient/family teaching  - Consider wound care consult   Outcome: Progressing     Problem: PAIN - ADULT  Goal: Verbalizes/displays adequate comfort level or baseline comfort level  Description  Interventions:  - Encourage patient to monitor pain and request assistance  - Assess pain using appropriate pain scale  - Administer analgesics based on type and severity of pain and evaluate response  - Implement non-pharmacological measures as appropriate and evaluate response  - Consider cultural and social influences on pain and pain management  - Notify physician/advanced practitioner if interventions unsuccessful or patient reports new pain  Outcome: Progressing     Problem: INFECTION - ADULT  Goal: Absence or prevention of progression during hospitalization  Description  INTERVENTIONS:  - Assess and monitor for signs and symptoms of infection  - Monitor lab/diagnostic results  - Monitor all insertion sites, i e  indwelling lines, tubes, and drains  - Monitor endotracheal if appropriate and nasal secretions for changes in amount and color  - [place] appropriate cooling/warming therapies per order  - Administer medications as ordered  - Instruct and encourage patient and family to use good hand hygiene technique  - Identify and instruct in appropriate isolation precautions for identified infection/condition  Outcome: Progressing  Goal: Absence of fever/infection during neutropenic period  Description  INTERVENTIONS:  - Monitor WBC    Outcome: Progressing     Problem: SAFETY ADULT  Goal: Patient will remain free of falls  Description  INTERVENTIONS:  - Assess patient frequently for physical needs  -  Identify cognitive and physical deficits and behaviors that affect risk of falls    -  [place] fall precautions as indicated by assessment   - Educate patient/family on patient safety including physical limitations  - Instruct patient to call for assistance with activity based on assessment  - Modify environment to reduce risk of injury  - Consider OT/PT consult to assist with strengthening/mobility  Outcome: Progressing  Goal: Maintain or return to baseline ADL function  Description  INTERVENTIONS:  -  Assess patient's ability to carry out ADLs; assess patient's baseline for ADL function and identify physical deficits which impact ability to perform ADLs (bathing, care of mouth/teeth, toileting, grooming, dressing, etc )  - Assess/evaluate cause of self-care deficits   - Assess range of motion  - Assess patient's mobility; develop plan if impaired  - Assess patient's need for assistive devices and provide as appropriate  - Encourage maximum independence but intervene and supervise when necessary  - Involve family in performance of ADLs  - Assess for home care needs following discharge   - Consider OT consult to assist with ADL evaluation and planning for discharge  - Provide patient education as appropriate  Outcome: Progressing  Goal: Maintain or return mobility status to optimal level  Description  INTERVENTIONS:  - Assess patient's baseline mobility status (ambulation, transfers, stairs, etc )    - Identify cognitive and physical deficits and behaviors that affect mobility  - Identify mobility aids required to assist with transfers and/or ambulation (gait belt, sit-to-stand, lift, walker, cane, etc )  - [place] fall precautions as indicated by assessment  - Record patient progress and toleration of activity level on Mobility SBAR; progress patient to next Phase/Stage  - Instruct patient to call for assistance with activity based on assessment  - Consider rehabilitation consult to assist with strengthening/weightbearing, etc   Outcome: Progressing     Problem: DISCHARGE PLANNING  Goal: Discharge to home or other facility with appropriate resources  Description  INTERVENTIONS:  - Identify barriers to discharge w/patient and caregiver  - Arrange for needed discharge resources and transportation as appropriate  - Identify discharge learning needs (meds, wound care, etc )  - Arrange for interpretive services to assist at discharge as needed  - Refer to Case Management Department for coordinating discharge planning if the patient needs post-hospital services based on physician/advanced practitioner order or complex needs related to functional status, cognitive ability, or social support system  Outcome: Progressing     Problem: Knowledge Deficit  Goal: Patient/family/caregiver demonstrates understanding of disease process, treatment plan, medications, and discharge instructions  Description  Complete learning assessment and assess knowledge base  Interventions:  - Provide teaching at level of understanding  - Provide teaching via preferred learning methods  Outcome: Progressing     Problem: Potential for Falls  Goal: Patient will remain free of falls  Description  INTERVENTIONS:  - Assess patient frequently for physical needs  -  Identify cognitive and physical deficits and behaviors that affect risk of falls    -  English fall precautions as indicated by assessment   - Educate patient/family on patient safety including physical limitations  - Instruct patient to call for assistance with activity based on assessment  - Modify environment to reduce risk of injury  - Consider OT/PT consult to assist with strengthening/mobility  Outcome: Progressing

## 2019-12-09 NOTE — ASSESSMENT & PLAN NOTE
History of using cane for arthritis of LLE, unable to given LUE in sling  PT/OT recommending rehab  CM following for placement: [facility] vs SNF options placed over the wknd  Will f/u today

## 2019-12-09 NOTE — ASSESSMENT & PLAN NOTE
Patient presented with confusion and amnesia to event, confusion improved while in the emergency department though he remains amnestic  No symptoms of headache, vertigo, nausea - suspect likely side effect from [place]  No evidence of concussion at this time

## 2019-12-09 NOTE — ASSESSMENT & PLAN NOTE
Reduced in the emergency department  NWB LUE in sling  NVI in the LUE     CT Shoulder completed: discussed with ortho - non-op management, stable for DC with outpatient follow-up from their standpoint  Pain control  PT/OT - awaiting rehab placement

## 2019-12-09 NOTE — ASSESSMENT & PLAN NOTE
History of using cane for arthritis of LLE, unable to given LUE in sling  PT/OT recommending rehab  D/C to [facility] at 3:00 pm today

## 2019-12-09 NOTE — NURSING NOTE
Discharge report called to and accepted by Cesilia San RN at [facility]

## 2019-12-10 ENCOUNTER — TELEPHONE (OUTPATIENT)
Dept: OBGYN CLINIC | Facility: HOSPITAL | Age: 84
End: 2019-12-10

## 2019-12-10 ENCOUNTER — NURSING HOME VISIT (OUTPATIENT)
Dept: GERIATRICS | Facility: OTHER | Age: 84
End: 2019-12-10
Payer: MEDICARE

## 2019-12-10 DIAGNOSIS — S06.0X0D CLOSED HEAD INJURY WITH CONCUSSION, WITHOUT LOSS OF CONSCIOUSNESS, SUBSEQUENT ENCOUNTER: ICD-10-CM

## 2019-12-10 DIAGNOSIS — I10 BENIGN HYPERTENSION: ICD-10-CM

## 2019-12-10 DIAGNOSIS — G89.11 ACUTE PAIN DUE TO TRAUMA: ICD-10-CM

## 2019-12-10 DIAGNOSIS — N32.1 VESICOCOLONIC FISTULA: ICD-10-CM

## 2019-12-10 DIAGNOSIS — R53.81 DEBILITY: ICD-10-CM

## 2019-12-10 DIAGNOSIS — S43.015D ANTERIOR DISLOCATION OF LEFT SHOULDER, SUBSEQUENT ENCOUNTER: Primary | ICD-10-CM

## 2019-12-10 DIAGNOSIS — G47.09 OTHER INSOMNIA: ICD-10-CM

## 2019-12-10 DIAGNOSIS — D62 ACUTE BLOOD LOSS ANEMIA: ICD-10-CM

## 2019-12-10 DIAGNOSIS — M1A.9XX0 CHRONIC GOUT WITHOUT TOPHUS, UNSPECIFIED CAUSE, UNSPECIFIED SITE: ICD-10-CM

## 2019-12-10 DIAGNOSIS — R29.6 RECURRENT FALLS: ICD-10-CM

## 2019-12-10 DIAGNOSIS — I48.91 ATRIAL FIBRILLATION WITH RAPID VENTRICULAR RESPONSE (HCC): ICD-10-CM

## 2019-12-10 PROCEDURE — 99306 1ST NF CARE HIGH MDM 50: CPT | Performed by: FAMILY MEDICINE

## 2019-12-10 NOTE — TELEPHONE ENCOUNTER
I called patient to schedule a 2wk  follow up appointment (approx  12/17 or 12/19) with Dr Shonda De La Rosa The patient was seen at [facility]/19 for lt anterior shoulder dislocation  I advised jake siddiqi from [address] [place] [address] to call us back to schedule this appointment  Please schedule patient accordingly

## 2019-12-10 NOTE — PROGRESS NOTES
[facility]   [address] [facility], [place], [address]  History and Physical  POS: SNF- 31    Records Reviewed include: [facility] records  Unable to obtain from patient due to: N/A; history obtained from patient along with record review    Chief Complaint/ Reason for Admission: Closed head injury with concussion, Afib w/RVR, syncope, left shoulder anterior dislocation    History of Present Illness:            82year old male admitted for SNF rehab following hospitalization at [facility]  He was admitted following a fall down the stairs; he suffered an anterior left shoulder dislocation along with closed head injury/ TBI  See multiple imaging scans as below  He was evaluated by orthopedics with closed reduction performed; he is to be nonweightbearing to LUE and has sling in place; patient is left handed  He is able to feed himself with his right hand and even completed part of a crossword puzzle  He notes continued LUE pain, exacerbated with movement  He is currently on scheduled acetaminophen  Per record review, patient had been taking ambien at home for insomnia with family having behavioral concerns including wandering at nighttime; this mediation was stopped inpatient  He was seen in consultation by geriatrics  Additional inpatient workup revealed Afib with RVR; he was evaluated by cardiology with increase in metoprolol to 75mg BID; HR trending 70s-100 since SNF admission; denies chest pain, palpitations, SOB  Noted to have downtrending Hgb along with platelets; is on aspirin and eliquis both of which were restarted at hospital discharge  Continues on valsaran and metoprolol for hypertension; SBP trending 130s since SNF admission  Reviewed goals of care with patient; he plans to return home following SNF rehab  In the event of cardiopulmonary arrest, he elects to have CPR performed         Allergies    Allergies   Allergen Reactions    Neosporin [Neomycin-Bacitracin Zn-Polymyx] Past Medical History  Past Medical History:   Diagnosis Date    [facility]     [facility]     Hypertension       CHF baseline EF: 60% (11/2018)  CKD: baseline creatinine 0 8    Past Surgical History:   Procedure Laterality Date    REPLACEMENT TOTAL HIP W/  RESURFACING IMPLANTS         Family History  Family History   Problem Relation Age of Onset    Stroke Mother        Social History  Social History     Tobacco Use   Smoking Status Never Smoker   Smokeless Tobacco Never Used      Social History     Substance and Sexual Activity   Alcohol Use Not Currently      Social History     Substance and Sexual Activity   Drug Use Not Currently        Lives: Home, with spouse,  Social Support: Family  Occupation: Retired  Fall in the past 12 months: Yes  Use of assistance Device: None    Physical Exam    Weight: 218 2lb Temp:97 5F BP:131/76  Pulse:100 (74-) Resp:18 O2 Sat:95% RA  Constitutional: Well-nourished and Pallor  Orientation:Person and Place, situation     Physical Exam   Constitutional: He appears well-developed and well-nourished  No distress  HENT:   Head: Normocephalic  Mouth/Throat: Oropharynx is clear and moist  No oropharyngeal exudate  Eyes: Conjunctivae are normal  Right eye exhibits no discharge  Left eye exhibits no discharge  No scleral icterus  Neck: Neck supple  Cardiovascular: Normal rate, S1 normal and S2 normal  An irregular rhythm present  Pulmonary/Chest: Effort normal and breath sounds normal    Abdominal: Soft  Bowel sounds are normal    Musculoskeletal: He exhibits no edema  LUE in sling   Skin: Skin is warm and dry  He is not diaphoretic  Multiple ecchymotic areas LUE   Psychiatric: He has a normal mood and affect  Nursing note and vitals reviewed  Review of Systems:  Review of Systems   Constitutional: Negative for appetite change, fatigue and fever  HENT: Negative for trouble swallowing      Respiratory: Negative for cough and shortness of breath  Cardiovascular: Negative for chest pain, palpitations and leg swelling  Gastrointestinal: Negative for abdominal pain and constipation  Genitourinary: Negative for difficulty urinating  Musculoskeletal: Positive for arthralgias  Neurological: Negative for light-headedness  Hematological: Does not bruise/bleed easily  Psychiatric/Behavioral: Negative for confusion and sleep disturbance  All other systems reviewed and are negative        List of Current Medications:  PRN: MoM, dulcolax, fleet enema, maalox, miralax    Acetaminophen 975mg TID  Allopurinol 100mg daily  Aspirin 81mg daily  Vitamin D3 400IU daily  Docusate 100mg BID  Eliquis 5mg BID  Senna 8 6mg daily  Valsartan 40mg daily  Metoprolol tartrate 75mg BID  Vitamin A 10,000 units daily      Allergies    Allergies   Allergen Reactions    Neosporin [Neomycin-Bacitracin Zn-Polymyx]        Labs/Diagnostics (reviewed by this provider): Hospital Paperwork  CBC: Hb:10 6 (11 3<14 8) Hct:34 4 WBC:7 96 (11 10) PLT:142  BMP: Na:142 K:4 0 Cl:111 CO:26 BUN:15 Cr:0 84 (1 09) Glu:99 Ca:9 1    Imaging Reviewed:  EKG: (12/5/19)- Afib w/RVR  CT Scan: Left shoulder (12/6/19)- prior left glenohumeral joint anterior dislocation; comminuted and displaced greater tuberosity fracture and mildly displaced anteroinferior glenoid fracture involving 5% of glenoid articular surface   CT L-spine (12/5/19)- no fracture; scoliosis; multilevel lumbar spondylosis; degenerative changes  CT C/A/P (12/5/19)- no acute traumatic injury; 3 3cm thick walled air fluid collection contiguous with sigmoid colon and left superior dome of bladder  CT facial bones (12/5/19)- no fractures  CT c-spine (12/5/19)- no fracture  CT Head (12/5/19)- large soft tissue hematoma overlying R parietal bone; no fracture or acute intracranial hemorrhage   Other: Xray L wrist (12/6/19)- no fractures; degenerative changes of 1st carpal metacarpal articulation as well as DIP and PIP joints  Xray L foot (12/6/19)- no fracture; soft tissue swelling of dorsum   Xray T-spine (12/5/19)- no fracture; degenerative changes  Xray L humerus (12/5/19)- anterior shoulder dislocation    Assessment/Plan:  82year old male with:    Anterior dislocation of left shoulder  S/p closed reduction  Follow up with orthopedics 12/24/19 as scheduled  Pain control as outlined  Continue NWB/sling to LUE  Patient is left handed- requires ADL support    Closed head injury with concussion  Cognitive evaluation/therapy  Monitor neuro status    Atrial fibrillation with rapid ventricular response (HCC)  Goal HR<100  Continue metoprolol- increased to 75mg BID inpatient  Continue eliquis  At increased risk of bleeding on dual therapy with aspirin- risks vs benefits of chronic use    Acute blood loss anemia  In setting of trauma  Associated mild thrombocytopenia  Eliquis and aspirin restarted at hospital discharge  CBC w/diff and CMP ordered for 12/11    Benign hypertension  Goal <140-150/90  Continue valsartan, metoprolol  CMP ordered for 12/11    Debility  Multifactorial  Admit to SNF for rehab  PT/OT consult- evaluate and treat  Supportive care, nutritional support, ADL support  Fall precautions    Recurrent falls  Fall precautions  Continue Vitamin D supplementation  PT/OT    Other insomnia  Ambien stopped inpatient, do not restart  Sleep hygiene  Plan to add melatonin if needed    Acute pain due to trauma  Continue scheduled acetaminophen  Consider adding topical agents and/or low dose OxyIR PRN for severe pain if not well controlled  Ice PRN    Gout  Continue allopurinol    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

## 2019-12-12 ENCOUNTER — PATIENT OUTREACH (OUTPATIENT)
Dept: CASE MANAGEMENT | Facility: HOSPITAL | Age: 84
End: 2019-12-12

## 2019-12-12 PROBLEM — G47.09 OTHER INSOMNIA: Status: ACTIVE | Noted: 2019-12-12

## 2019-12-12 PROBLEM — R53.81 DEBILITY: Status: ACTIVE | Noted: 2019-12-12

## 2019-12-12 PROBLEM — D62 ACUTE BLOOD LOSS ANEMIA: Status: ACTIVE | Noted: 2019-12-12

## 2019-12-12 PROBLEM — R29.6 RECURRENT FALLS: Status: ACTIVE | Noted: 2019-12-12

## 2019-12-12 PROBLEM — G89.11 ACUTE PAIN DUE TO TRAUMA: Status: ACTIVE | Noted: 2019-12-12

## 2019-12-12 NOTE — ASSESSMENT & PLAN NOTE
S/p closed reduction  Follow up with orthopedics 12/24/19 as scheduled  Pain control as outlined  Continue NWB/sling to LUE  Patient is left handed- requires ADL support

## 2019-12-12 NOTE — ASSESSMENT & PLAN NOTE
Multifactorial  Admit to SNF for rehab  PT/OT consult- evaluate and treat  Supportive care, nutritional support, ADL support  Fall precautions

## 2019-12-12 NOTE — ASSESSMENT & PLAN NOTE
Continue scheduled acetaminophen  Consider adding topical agents and/or low dose OxyIR PRN for severe pain if not well controlled  Ice PRN

## 2019-12-12 NOTE — ASSESSMENT & PLAN NOTE
In setting of trauma  Associated mild thrombocytopenia  Eliquis and aspirin restarted at hospital discharge  CBC w/diff and CMP ordered for 12/11

## 2019-12-12 NOTE — ASSESSMENT & PLAN NOTE
Goal HR<100  Continue metoprolol- increased to 75mg BID inpatient  Continue eliquis  At increased risk of bleeding on dual therapy with aspirin- risks vs benefits of chronic use

## 2019-12-13 ENCOUNTER — NURSING HOME VISIT (OUTPATIENT)
Dept: GERIATRICS | Facility: OTHER | Age: 84
End: 2019-12-13
Payer: MEDICARE

## 2019-12-13 DIAGNOSIS — G89.11 ACUTE PAIN DUE TO TRAUMA: ICD-10-CM

## 2019-12-13 DIAGNOSIS — S06.0X0D CLOSED HEAD INJURY WITH CONCUSSION, WITHOUT LOSS OF CONSCIOUSNESS, SUBSEQUENT ENCOUNTER: ICD-10-CM

## 2019-12-13 DIAGNOSIS — S43.015D ANTERIOR DISLOCATION OF LEFT SHOULDER, SUBSEQUENT ENCOUNTER: Primary | ICD-10-CM

## 2019-12-13 DIAGNOSIS — R26.2 AMBULATORY DYSFUNCTION: ICD-10-CM

## 2019-12-13 DIAGNOSIS — R53.81 DEBILITY: ICD-10-CM

## 2019-12-13 DIAGNOSIS — I48.91 ATRIAL FIBRILLATION WITH RAPID VENTRICULAR RESPONSE (HCC): ICD-10-CM

## 2019-12-13 DIAGNOSIS — R06.2 WHEEZING: ICD-10-CM

## 2019-12-13 DIAGNOSIS — M41.9 SCOLIOSIS: ICD-10-CM

## 2019-12-13 DIAGNOSIS — R29.6 RECURRENT FALLS: ICD-10-CM

## 2019-12-13 DIAGNOSIS — D62 ACUTE BLOOD LOSS ANEMIA: ICD-10-CM

## 2019-12-13 DIAGNOSIS — R60.0 BILATERAL LOWER EXTREMITY EDEMA: ICD-10-CM

## 2019-12-13 DIAGNOSIS — R05.9 COUGH: ICD-10-CM

## 2019-12-13 PROCEDURE — 99309 SBSQ NF CARE MODERATE MDM 30: CPT | Performed by: NURSE PRACTITIONER

## 2019-12-13 NOTE — PROGRESS NOTES
[facility]   [address] [facility], [facility], [address]  STR Note    Chief Complaint/Reason for visit: STR follow up-anterior dislocation of left shoulder; acute pain due to trauma; closed head injury with concussion; atrial fibrillation with rapid ventricular response; acute blood loss anemia; cough and wheezing; debility; recurring falls; ambulatory dysfunction  History of Present Illness: 82-year-old male seen and examined for follow-up visit  Observed patient getting out of bed into chair in room  Nonproductive cough noted and dyspnea noted on exertion  Patient does not appear toxic  Ecchymotic areas noted on face and left upper extremity  Left arm with sling in place for mobilization  Pain is well controlled per patient  He states that his appetite is good and sleeping well  He is participating in therapy  He stated that he would like to go away for leave of absence on Saturday for approximately 5 hours  He denies shortness of breath, chest pain, headache, dizziness, abdominal pain, nausea, vomiting, diarrhea, or constipation  Past Medical History: unchanged from history and physical  Past Medical History:   Diagnosis Date    [facility] fibrillation ([facility] )     Cancer ([facility] )     Hypertension      Family History: unchanged from history and physical  Social History: unchanged from history and physical  Resident Since:  12/09/2019  Review of systems: Review of Systems   Constitutional: Negative for appetite change, chills, diaphoresis and fatigue  HENT: Negative  Eyes: Negative  Respiratory: Positive for cough and wheezing  Genitourinary: Negative  Musculoskeletal: Positive for arthralgias and gait problem  Neurological: Negative for dizziness, tremors, seizures, syncope, speech difficulty, weakness, light-headedness, numbness and headaches  Hematological: Bruises/bleeds easily  Psychiatric/Behavioral: Negative  Medications: All medication orders were reviewed at facility EMR  Changes made- see written orders  Allergies: Reviewed and unchanged  Consults reviewed:PT, OT and Other  Labs/Diagnostics (reviewed by this provider): Copy in Chart    Imaging Reviewed:  None today    Physical Exam    Weight:  Temp:  97 5 BP:  106/63  Pulse:  96 Resp:  18 O2 Sat:  95% on room air  Constitutional: Normocephalic  Orientation:Person, Place, Day and Date     Physical Exam   Constitutional: He is oriented to person, place, and time  He appears well-developed  No distress  HENT:   Head: Normocephalic  Ecchymotic areas noted on face  Contusion of lip healing  Eyes: Pupils are equal, round, and reactive to light  EOM are normal  Right eye exhibits no discharge  Left eye exhibits no discharge  No scleral icterus  Neck: Neck supple  No JVD present  Cardiovascular:   Apical rate 96 with irregular rhythm  +1-2 pitting edema left lower extremity  Trace edema right lower extremity   Pulmonary/Chest: Effort normal  No respiratory distress  He has wheezes  Nonproductive cough noted  Abdominal: Soft  Bowel sounds are normal    Musculoskeletal: He exhibits edema  Able to move all extremities  With limited mobility left upper extremity due to nonweightbearing status  Left arm sling in place  Lymphadenopathy:     He has no cervical adenopathy  Neurological: He is alert and oriented to person, place, and time  Skin: Skin is warm and dry  Capillary refill takes less than 2 seconds  He is not diaphoretic  Multiple ecchymotic areas noted on left upper extremity and face  Psychiatric: He has a normal mood and affect  His behavior is normal  Thought content normal    Nursing note and vitals reviewed  Assessment/Plan:  82-year-old male with:    Anterior dislocation of left shoulder  -status post closed reduction surgery  -no c/o of pain at time of visit    No numbness or tingling of left upper extremity  -continue scheduled acetaminophen which is effective as per patient  -continue nonweightbearing left upper extremity  -continue left arm sling at all times  -order voltaren gel 1% topical to LUE ecchymotic aeas BID for 2 weeks  -follow up with orthopedics on 12/24/2019 as scheduled    Acute pain due to trauma  -see plan above    Closed head injury with concussion  -ecchymotic areas noted on face, no c/o pain  -no reports of change in mental status from nursing  -continue to monitor    Atrial fibrillation with rapid ventricular response  -goal HR <100  -metoprolol recently increased 75 mg b i d  Inpatient  -heart rate trending 80s-100  -will monitor and may need to increase metoprolol to 100 mg if heart rate continues to trend upward  -continue Eliquis and aspirin  At increase risk of bleeding on told therapy-consider discontinuing aspirin    Acute blood loss anemia  -in setting of trauma  -CBC with diff reviewed on 12/11/2019 hemoglobin 10 8 hematocrit 33 0 < 10 6/34 4 inpatient  -recheck CBC with diff in 1 week    Cough and wheezing  -viral mild bronchitis  -patient does not appear toxic  -decreased breath sounds noted bilaterally with expiratory wheezes mostly right upper lung field  -order nebulizer treatment with albuterol 2 5 mg q i d  X1 week  -order Robitussin for cough q i d  X5 days  -monitor vital signs with pulse ox Q shift x3 days  -chest x-ray for temp >100 2    Bilateral lower extremity edema  -LLE  > RLE   -has history of replacement of total hip w/ resurfacing implants  -no history of CHF noted  -order compression stockings q a m  And remove q p m  -order daily weights    Debility/recurrent falls/ambulatory dysfunction  -no recent falls reported  -continue care and support at SNF for ADLs  -fall precautions  -provide nutritional support    Scoliosis without lateral subluxation, spondylolysis or spondylolisthesis  -incidental finding by trauma inpatient  -trauma recommending follow-up with PCP and Colorectal surgery within 2 weeks    ** please note:   This progress note was constructed using a voice recognition system  [address] [facility] [place], [place]  [id number] PM

## 2019-12-16 ENCOUNTER — NURSING HOME VISIT (OUTPATIENT)
Dept: GERIATRICS | Facility: OTHER | Age: 84
End: 2019-12-16
Payer: MEDICARE

## 2019-12-16 DIAGNOSIS — R26.2 AMBULATORY DYSFUNCTION: ICD-10-CM

## 2019-12-16 DIAGNOSIS — S06.0X0D CLOSED HEAD INJURY WITH CONCUSSION, WITHOUT LOSS OF CONSCIOUSNESS, SUBSEQUENT ENCOUNTER: ICD-10-CM

## 2019-12-16 DIAGNOSIS — S43.015D ANTERIOR DISLOCATION OF LEFT SHOULDER, SUBSEQUENT ENCOUNTER: Primary | ICD-10-CM

## 2019-12-16 DIAGNOSIS — I48.91 ATRIAL FIBRILLATION WITH RAPID VENTRICULAR RESPONSE (HCC): ICD-10-CM

## 2019-12-16 DIAGNOSIS — R60.0 BILATERAL LOWER EXTREMITY EDEMA: ICD-10-CM

## 2019-12-16 DIAGNOSIS — R29.6 RECURRENT FALLS: ICD-10-CM

## 2019-12-16 DIAGNOSIS — J40 BRONCHITIS: ICD-10-CM

## 2019-12-16 DIAGNOSIS — G89.11 ACUTE PAIN DUE TO TRAUMA: ICD-10-CM

## 2019-12-16 DIAGNOSIS — R53.81 DEBILITY: ICD-10-CM

## 2019-12-16 PROCEDURE — 99309 SBSQ NF CARE MODERATE MDM 30: CPT | Performed by: NURSE PRACTITIONER

## 2019-12-16 NOTE — PROGRESS NOTES
[facility]   [address] [place] [address], [place], [address]  STR Note    Chief Complaint/Reason for visit: STR Follow up- anterior dislocation of left shoulder; acute pain due to trauma; closed head injury with concussion; atrial fibrillation with rapid ventricular response; bilateral lower extremity edema; bronchitis debility; recurrent falls; ambulatory dysfunction  History of Present Illness: 31-year-old male seen and examined for follow-up care in collaboration with nursing for the update and treatment plan  Patient was found sitting out of bed in recliner chair and appeared in no distress  He developed a nonproductive cough last week and wheezing was noted on auscultation  Denies worsening of respiratory symptoms and stated that he does not feel bad  LUE with left arm sling in place  Appetite is good  Denies shortness of breath, chest pain, dizziness, lightheadedness, abdominal pain, nausea, vomiting, diarrhea, or constipation  He is participating in therapy  Past Medical History: unchanged from history and physical  Past Medical History:   Diagnosis Date    At[facility] fibrillation ([facility] )     Cancer ([facility] )     Hypertension      Family History: unchanged from history and physical  Social History: unchanged from history and physical  Resident Since:  12/09/2019  Review of systems: Review of Systems   Constitutional: Negative for appetite change, chills, diaphoresis and fatigue  HENT: Negative  Eyes: Negative  Respiratory: Positive for cough  Cardiovascular: Positive for leg swelling  Negative for chest pain and palpitations  Gastrointestinal: Negative  Genitourinary: Negative  Musculoskeletal: Positive for arthralgias and myalgias  Skin: Positive for color change  Neurological: Negative  Psychiatric/Behavioral: Negative  All other systems reviewed and are negative  Medications: All medication orders were reviewed at facility EMR    Changes made- see written orders  Allergies: Reviewed and unchanged  Consults reviewed:PT, OT and Other  Labs/Diagnostics (reviewed by this provider): Copy in Chart  Imaging Reviewed:  None today    Physical Exam    Weight:  Temp:  97 8 BP:  101/63  Pulse:  93 Resp:  18 O2 Sat:  90% on room air  Constitutional: Well-nourished and Normocephalic  Orientation:Person, Place, Day, Date, Month and Year     Physical Exam   Constitutional: He is oriented to person, place, and time  He appears well-developed and well-nourished  No distress  HENT:   Ecchymotic areas around lips   Eyes: Pupils are equal, round, and reactive to light  Conjunctivae and EOM are normal  Right eye exhibits no discharge  Left eye exhibits no discharge  No scleral icterus  Cardiovascular: Normal rate  Irregular rhythm on auscultation  Bilateral lower extremity with pitting edema with LLE > RLE  Pulmonary/Chest: Effort normal  He has wheezes  Decreased breath sounds bibasilar with expiratory wheezes upper lung fields bilaterally  Abdominal: Soft  Bowel sounds are normal  He exhibits no distension  There is no tenderness  There is no guarding  Musculoskeletal: He exhibits edema  Able to move all extremities  Neurological: He is alert and oriented to person, place, and time  Skin: Skin is warm and dry  Capillary refill takes less than 2 seconds  Psychiatric: He has a normal mood and affect  His behavior is normal  Thought content normal    Nursing note and vitals reviewed      Assessment/Plan:  33-year-old male with:    Anterior dislocation of left shoulder  -status post closed reduction surgery  -continue left arm sling at all times  -no numbness or tingling of left upper extremity    Acute pain due to trauma  -pain is controlled with scheduled Tylenol, Voltaren gel 1% topical to left upper extremity  -continue nonweightbearing LUE, maintain sling  -follow up with orthopedics on 12/24/2019 as scheduled    Closed head injury with concussion  -no change in mental status  -resolving ecchymosis on face    Atrial fibrillation with rapid ventricular response  -nuclear stress test 12/03/2018 showed no evidence of perfusion defects  -echo 11/29/2018 showed EF 60%  -heart rate trending mostly 70s to 90s  -continue Eliquis and aspirin    Bilateral lower extremity edema  -weight has been stable, will change to weekly weights  -patient states that he has chronic edema to bilateral lower extremities since he had hip surgery in 2012  -pitting edema +1-2 LLE  +1 edema RLE  -continue Ace wraps bilateral lower extremity q a m  And remove q p m , patient is compliant  -follow up with PCP, cardiology o/p    Bronchitis  -with mild symptoms  No dyspnea or labored respirations  -with occasional nonproductive cough  -no worsening of symptoms  -change nebulizer treatment to p r n  q i d  For shortness of breath/wheezing    Debility/recurrent falls/ambulatory dysfunction  -no recent falls reported at [facility]  -continue PT/OT  -continue fall precautions    ** please note: This progress note was constructed using a voice recognition system  [address] [place] [facility]  82/73/[id number]:28 AM

## 2019-12-17 NOTE — PROCEDURES
Laceration repair  Date/Time: 12/5/2019 6:30 AM  Performed by: Senia García MD  Authorized by: Senai García MD   Consent: Verbal consent obtained  Risks and benefits: risks, benefits and alternatives were discussed  Consent given by: patient  Patient understanding: patient states understanding of the procedure being performed  Patient consent: the patient's understanding of the procedure matches consent given  Procedure consent: procedure consent matches procedure scheduled  Relevant documents: relevant documents present and verified  Test results: test results available and properly labeled  Site marked: the operative site was marked  Radiology Images displayed and confirmed  If images not available, report reviewed: imaging studies available  Required items: required blood products, implants, devices, and special equipment available  Patient identity confirmed: verbally with patient  Time out: Immediately prior to procedure a "time out" was called to verify the correct patient, procedure, equipment, support staff and site/side marked as required  Body area: head/neck  Location details: upper lip  Full thickness lip laceration: no  Vermilion border involved: no  Laceration length: 1 cm  Foreign bodies: no foreign bodies  Tendon involvement: none  Nerve involvement: none  Vascular damage: no    Anesthesia:  Local Anesthetic: lidocaine 1% without epinephrine  Anesthetic total: 1 mL    Wound Dehiscence:  Superficial Wound Dehiscence: simple closure      Procedure Details:  Preparation: Patient was prepped and draped in the usual sterile fashion  Irrigation solution: saline  Irrigation method: syringe  Amount of cleaning: extensive  Debridement: none  Degree of undermining: none  Wound mucous membrane closure material used: 5-0 vycril    Number of sutures: 1  Technique: simple  Approximation: close  Approximation difficulty: simple  Patient tolerance: Patient tolerated the procedure well with no immediate complications

## 2019-12-19 ENCOUNTER — NURSING HOME VISIT (OUTPATIENT)
Dept: GERIATRICS | Facility: OTHER | Age: 84
End: 2019-12-19
Payer: MEDICARE

## 2019-12-19 DIAGNOSIS — R06.2 WHEEZING: ICD-10-CM

## 2019-12-19 DIAGNOSIS — S43.015D ANTERIOR DISLOCATION OF LEFT SHOULDER, SUBSEQUENT ENCOUNTER: Primary | ICD-10-CM

## 2019-12-19 DIAGNOSIS — R53.81 DEBILITY: ICD-10-CM

## 2019-12-19 DIAGNOSIS — R29.6 RECURRENT FALLS: ICD-10-CM

## 2019-12-19 DIAGNOSIS — G89.11 ACUTE PAIN DUE TO TRAUMA: ICD-10-CM

## 2019-12-19 DIAGNOSIS — I48.91 ATRIAL FIBRILLATION WITH RAPID VENTRICULAR RESPONSE (HCC): ICD-10-CM

## 2019-12-19 DIAGNOSIS — R26.2 AMBULATORY DYSFUNCTION: ICD-10-CM

## 2019-12-19 PROCEDURE — 99309 SBSQ NF CARE MODERATE MDM 30: CPT | Performed by: NURSE PRACTITIONER

## 2019-12-19 NOTE — PROGRESS NOTES
[facility]   [address] [place] [address], [place], [address]  STR Note    Chief Complaint/Reason for visit: STR Follow up- anterior dislocation of left shoulder; acute pain due to trauma; persistent wheeze; atrial fibrillation with rapid ventricular response; debility; recurrent falls; ambulatory dysfunction  History of Present Illness: 79-year-old male seen and examined for follow-up care  Patient was sitting out of bed and had just finished doing exercises with OT  He stated that he is doing fine  Discussed smoking cessation with patient since he is currently a smoker  He is not interested in any smoking cessation programs or interventions at this time  Denies shortness of breath at rest, chest pain, headache, dizziness, lightheadedness, abdominal pain, nausea, vomiting, diarrhea, or constipation  No acute concerns reported by nursing or from patient  Past Medical History: unchanged from history and physical  Past Medical History:   Diagnosis Date    [facility]     Cancer ([facility] )     Hypertension      Family History: unchanged from history and physical  Social History: unchanged from history and physical  Resident Since:  12/09/2019  Review of systems: Review of Systems   Constitutional: Negative  HENT: Negative  Eyes: Negative  Respiratory: Positive for shortness of breath  Shortness of breath on moderate exertion  Cardiovascular: Positive for leg swelling  Negative for chest pain and palpitations  Gastrointestinal: Negative  Genitourinary: Negative  Musculoskeletal: Positive for arthralgias  Neurological: Negative for dizziness, syncope, speech difficulty, light-headedness and headaches  Psychiatric/Behavioral: Negative  All other systems reviewed and are negative      Medications: No changes made  Allergies: Reviewed and unchanged  Consults reviewed:PT, OT and Other  Labs/Diagnostics (reviewed by this provider): Copy in Chart   CBC with diff and CMP was reviewed on 12/11/2019 and results stable for patient    Imaging Reviewed:  None today    Physical Exam    Weight:  Temp: 97 8         BP:  101/63  Pulse:  93 Resp:  18 O2 Sat:  92% on room air  Constitutional: Normocephalic  Orientation:Person, Place, Day, Date, Month and Year     Physical Exam   Constitutional: He is oriented to person, place, and time  He appears well-developed  No distress  HENT:   Head: Normocephalic and atraumatic  Mouth/Throat: No oropharyngeal exudate  Eyes: Pupils are equal, round, and reactive to light  EOM are normal    Neck: Normal range of motion  Neck supple  No JVD present  Cardiovascular: Normal rate and normal heart sounds  Irregular heart rhythm with heart rate of 90  Bilateral lower extremity edema noted with LLE > RLE  Pulmonary/Chest: Effort normal  He has wheezes  Dyspneic on exertion I e  After exercises  End expiratory wheezes bilaterally with decreased breath sounds bilateral lower lung fields  Abdominal: Soft  Bowel sounds are normal  He exhibits no distension  There is no tenderness  There is no guarding  Musculoskeletal: He exhibits edema  LUE with left arm sling in place  Lymphadenopathy:     He has no cervical adenopathy  Neurological: He is alert and oriented to person, place, and time  Skin: Skin is warm and dry  Capillary refill takes less than 2 seconds  He is not diaphoretic  Nursing note and vitals reviewed      Assessment/Plan:  82-year-old male with:    Anterior dislocation of left shoulder  -status post closed reduction surgery  -NWB LUE  -patient compliant with keeping left arm sling on  -follow up with orthopedic as scheduled on 12/24/2019  -neurovascular status intact to left upper extremity without numbness or tingling    Acute pain due to trauma  -patient stated that he does have occasional pain to left shoulder but able to participate in therapy  -continue scheduled Tylenol as medication is controlling pain    Closed head injury with concussion  -remains stable    Persistent wheeze  -and expiratory wheezes noted bilateral lung fields with decreased breath sounds lower bases on auscultation  -no episodes of respiratory distress  -may have underlying COPD as patient is a current smoker  He has tried nicotine patch in the past and refuses to try again  Did offer smoking cessation interventions  Recommend follow up with PCP for smoking cessation counseling, nonpharmacological and or pharmacological interventions for smoking cessation  -recommend pulmonary consult, PFT test    Atrial fibrillation with rapid ventricular response  -heart rate trending 70s-90s with occasional heart rate low 100s  -continue Eliquis and aspirin  -recommend PPI for PPX    Debility/recurrent falls/ambulatory dysfunction  -no recent falls reported  -continue care and support at SNF with ADLs  -continue PT/OT  -patient doing well at SNF    **Please note: This follow-up note was constructed using a voice recognition system  **    [address]ar[address]   01/84/[id number]:20 AM

## 2019-12-24 ENCOUNTER — OFFICE VISIT (OUTPATIENT)
Dept: OBGYN CLINIC | Facility: HOSPITAL | Age: 84
End: 2019-12-24

## 2019-12-24 ENCOUNTER — NURSING HOME VISIT (OUTPATIENT)
Dept: GERIATRICS | Facility: OTHER | Age: 84
End: 2019-12-24
Payer: MEDICARE

## 2019-12-24 ENCOUNTER — HOSPITAL ENCOUNTER (OUTPATIENT)
Dept: RADIOLOGY | Facility: HOSPITAL | Age: 84
Discharge: HOME | End: 2019-12-24
Attending: ORTHOPAEDIC SURGERY
Payer: COMMERCIAL

## 2019-12-24 VITALS
WEIGHT: 218 LBS | HEIGHT: 68 IN | BODY MASS INDEX: 33.04 KG/M2 | DIASTOLIC BLOOD PRESSURE: 62 MMHG | HEART RATE: 84 BPM | SYSTOLIC BLOOD PRESSURE: 119 MMHG

## 2019-12-24 DIAGNOSIS — M25.512 ACUTE PAIN OF LEFT SHOULDER: Primary | ICD-10-CM

## 2019-12-24 DIAGNOSIS — F17.210 CIGARETTE SMOKER: ICD-10-CM

## 2019-12-24 DIAGNOSIS — M25.512 ACUTE PAIN OF LEFT SHOULDER: ICD-10-CM

## 2019-12-24 DIAGNOSIS — R26.2 AMBULATORY DYSFUNCTION: ICD-10-CM

## 2019-12-24 DIAGNOSIS — I48.91 ATRIAL FIBRILLATION WITH RAPID VENTRICULAR RESPONSE (HCC): ICD-10-CM

## 2019-12-24 DIAGNOSIS — R29.6 RECURRENT FALLS: ICD-10-CM

## 2019-12-24 DIAGNOSIS — S43.015D ANTERIOR DISLOCATION OF LEFT SHOULDER, SUBSEQUENT ENCOUNTER: Primary | ICD-10-CM

## 2019-12-24 DIAGNOSIS — R53.81 DEBILITY: ICD-10-CM

## 2019-12-24 PROBLEM — J40 BRONCHITIS: Status: RESOLVED | Noted: 2019-12-16 | Resolved: 2019-12-24

## 2019-12-24 PROCEDURE — 73030 X-RAY EXAM OF SHOULDER: CPT

## 2019-12-24 PROCEDURE — 99024 POSTOP FOLLOW-UP VISIT: CPT | Performed by: ORTHOPAEDIC SURGERY

## 2019-12-24 PROCEDURE — 99309 SBSQ NF CARE MODERATE MDM 30: CPT | Performed by: NURSE PRACTITIONER

## 2019-12-24 NOTE — PROGRESS NOTES
[facility]   [address] [place] [address], [place], [address]  STR Note    Chief Complaint/Reason for visit: STR Follow up- anterior dislocation of left shoulder; atrial fibrillation with rapid ventricular response; cigarette smoker; debility; recurrent falls; ambulatory dysfunction  History of Present Illness: 44-year-old male seen and examined for follow-up care  Patient was sitting out of bed and appeared in no distress  Patient is an appointment today with orthopedic follow-up of anterior dislocation of the shoulder  Patient stated that he he gets pain in his left shoulder depending on what he is doing but no pain at rest   Bilateral lower extremity edema-Ace wraps found not on  Instructed the patient on importance of compression therapy for edema  Denies shortness of breath, chest pain,, lightheadedness, dizziness, abdominal pain, nausea, vomiting, diarrhea, or constipation  Past Medical History: unchanged from history and physical  Past Medical History:   Diagnosis Date    [facility] )     Cancer ([facility] )     Hypertension      Family History: unchanged from history and physical  Social History: unchanged from history and physical  Resident Since:  12/09/2019  Review of systems: Review of Systems   Constitutional: Negative  HENT: Negative  Eyes: Negative  Respiratory: Negative  Cardiovascular: Positive for leg swelling  Gastrointestinal: Negative  Genitourinary: Negative  Musculoskeletal: Positive for arthralgias  Neurological: Negative for dizziness, syncope, light-headedness, numbness and headaches  Psychiatric/Behavioral: Negative        Medications: No changes made  Allergies: Reviewed and unchanged  Consults reviewed:PT, OT and Other  Labs/Diagnostics (reviewed by this provider): Copy in Chart    Imaging Reviewed:  None today    Physical Exam    Weight:  Temp:  97 9 BP:  104/50 Pulse:  84 Resp:  18 O2 Sat:  93% on room air  Constitutional: Normocephalic  Orientation:Person, Place, Day, Date, Month and Year     Physical Exam   Constitutional: He is oriented to person, place, and time  He appears well-developed and well-nourished  No distress  Cardiovascular: Normal rate and normal heart sounds  Slightly irregular heart rhythm  Chronic Bilateral lower extremity edema with LLE > RLE  Pulmonary/Chest: Effort normal  No respiratory distress  He has wheezes  Decreased breath sounds throughout bilaterally with few scattered wheezes upper lung fields  Musculoskeletal: He exhibits edema  Able to move all extremities  Neurological: He is alert and oriented to person, place, and time  Skin: Skin is warm and dry  He is not diaphoretic  Nursing note and vitals reviewed  Assessment/Plan:  53-year-old male with    Anterior dislocation of left shoulder  -status post close reduction surgery  -no c/o left shoulder pain at rest in states that Tylenol is effective for pain relief  -continue NWB LUE  Left arm sling for immobilization  -has follow-up appointment with Orthopedics today at 11:00 a m  Atrial fibrillation with rapid ventricular response  -heart rate remains stable  -continue Eliquis and aspirin  -continue metoprolol tartrate 75 mg p o  B i d  Smoker, cigarette  -current and suspect underlying COPD  -patient stated that he has not smoked any cigarettes since he has been admitted to SNF  -refusing pharmacological/nonpharmacological smoking cessation interventions  -recommend follow-up with smoking cessation discussion at next outpatient visit with PCP  -continue nebulizer treatments with albuterol p r n  For shortness of breath/wheezing  Patient stated that he has not required p r n  nebulizer treatment    Debility/recurrent falls/ambulatory dysfunction  -no recent falls reported  -doing well in therapy  -continue care and support at SNF with ADLs    **Please note:   This follow-up note was constructed using a voice recognition system  [address] [place] [facility]  41/99/81679:94 AM

## 2019-12-24 NOTE — PROGRESS NOTES
Assessment:  1  Acute pain of left shoulder         Plan: Nonweightbearing left upper extremity  Sling for comfort  Physical therapy pendulum exercises active assisted motion  Advised no provocative motions of left shoulder for anterior dislocation  Follow-up in 6 weeks time repeat x-rays left shoulder      The above stated was discussed in layman's terms and the patient expressed understanding  All questions were answered to the patient's satisfaction  Subjective:   Damon Edmonds is a 82 y o  male who presents left shoulder pain  Patient is left-hand dominant  Patient is present today with his wife and daughter  Patient is present in a wheelchair  Patient states had a fall on 12/05/2019 he states he fell down steps at home  This injury happened at 2 o'clock in the morning  Patient went to the ED and had x-rays the left shoulder demonstrated dislocation of the left shoulder and also had left shoulder reduction  He  also sustained a concussion  Patient has been nonweightbearing left upper extremity and has been in a arm sling  He states pain over the anterior and lateral aspect of the left shoulder  Patient is taking Tylenol p r n  for pain        Review of systems negative unless otherwise specified in HPI    Past Medical History:   Diagnosis Date    [facility] )     Cancer ([facility] )     Hypertension        Past Surgical History:   Procedure Laterality Date    REPLACEMENT TOTAL HIP W/  RESURFACING IMPLANTS         Family History   Problem Relation Age of Onset    Stroke Mother        Social History     Occupational History    Not on file   Tobacco Use    Smoking status: Never Smoker    Smokeless tobacco: Never Used   Substance and Sexual Activity    Alcohol use: Not Currently    Drug use: Not Currently    Sexual activity: Not on file         Current Outpatient Medications:     acetaminophen (TYLENOL) 325 mg tablet, Take 3 tablets (975 mg total) by mouth every 8 (eight) hours, Disp: 30 tablet, Rfl: 0    allopurinol (ZYLOPRIM) 100 mg tablet, Take 100 mg by mouth daily, Disp: , Rfl:     apixaban (ELIQUIS) 5 mg, Take 5 mg by mouth 2 (two) times a day, Disp: , Rfl:     aspirin (ECOTRIN LOW STRENGTH) 81 mg EC tablet, Take 81 mg by mouth daily, Disp: , Rfl:     cholecalciferol (VITAMIN D3) 400 units tablet, Take 400 Units by mouth daily, Disp: , Rfl:     docusate sodium (COLACE) 100 mg capsule, Take 1 capsule (100 mg total) by mouth 2 (two) times a day, Disp: 10 capsule, Rfl: 0    metoprolol tartrate (LOPRESSOR) 50 mg tablet, Take 50 mg by mouth every 12 (twelve) hours, Disp: , Rfl:     Metoprolol Tartrate 75 MG TABS, Take 1 tablet (75 mg total) by mouth every 12 (twelve) hours, Disp: , Rfl: 0    polyethylene glycol (MIRALAX) 17 g packet, Take 17 g by mouth daily as needed (constipation), Disp: 14 each, Rfl: 0    senna (SENOKOT) 8 6 mg, Take 2 tablets (17 2 mg total) by mouth daily at bedtime, Disp: 120 each, Rfl: 0    valsartan (DIOVAN) 40 mg tablet, Take 10 mg by mouth daily, Disp: , Rfl:     vitamin A 10,000 units capsule, Take 10,000 Units by mouth daily, Disp: , Rfl:     Allergies   Allergen Reactions    Balsam     Neosporin [Neomycin-Bacitracin Zn-Polymyx]             Vitals:    12/24/19 1112   BP: 119/62   Pulse: 84       Objective:            Physical Exam  · General: Awake, Alert, Oriented  · Eyes: Pupils equal, round and reactive to light  · Heart: regular rate and rhythm  · Lungs: No audible wheezing  · Abdomen: soft                    Ortho Exam    Examination of patient's left upper extremity shoulder motion deferred active left elbow wrist and hand motion  strength 5/5 active thumb retropulsion intrinsic strength 5/5 sensation tacked distal pulses present left upper extremity  Neurovascularly Intact Distally     Diagnostics, reviewed and taken today if performed as documented:    X-rays of left shoulder reviewed x-rays reveal well located left glenohumeral joint evidence of greater tuberosity fracture as well as glenohumeral osteoarthritis joint space narrowing noted  The attending physician has personally reviewed the pertinent films in PACS and interpretation is as follows:      Procedures, if performed today:    Procedures    None performed      Scribe Attestation    I,:    am acting as a scribe while in the presence of the attending physician :        I,:    personally performed the services described in this documentation    as scribed in my presence :              Portions of the record may have been created with voice recognition software  Occasional wrong word or "sound a like" substitutions may have occurred due to the inherent limitations of voice recognition software  Read the chart carefully and recognize, using context, where substitutions have occurred

## 2019-12-30 ENCOUNTER — NURSING HOME VISIT (OUTPATIENT)
Dept: GERIATRICS | Facility: OTHER | Age: 84
End: 2019-12-30
Payer: MEDICARE

## 2019-12-30 DIAGNOSIS — D62 ACUTE BLOOD LOSS ANEMIA: ICD-10-CM

## 2019-12-30 DIAGNOSIS — R53.81 DEBILITY: ICD-10-CM

## 2019-12-30 DIAGNOSIS — R29.6 RECURRENT FALLS: ICD-10-CM

## 2019-12-30 DIAGNOSIS — I48.91 ATRIAL FIBRILLATION WITH RAPID VENTRICULAR RESPONSE (HCC): ICD-10-CM

## 2019-12-30 DIAGNOSIS — S43.015D ANTERIOR DISLOCATION OF LEFT SHOULDER, SUBSEQUENT ENCOUNTER: Primary | ICD-10-CM

## 2019-12-30 DIAGNOSIS — F17.210 CIGARETTE SMOKER: ICD-10-CM

## 2019-12-30 PROBLEM — S00.531A CONTUSION OF LIP: Status: RESOLVED | Noted: 2019-12-05 | Resolved: 2019-12-30

## 2019-12-30 PROCEDURE — 99316 NF DSCHRG MGMT 30 MIN+: CPT | Performed by: NURSE PRACTITIONER

## 2019-12-30 NOTE — PROGRESS NOTES
[facility]  [address]  [phone]  [address] [place] [address]:  [facility] CHILDREN  Tonny Rivera N Jamie Elmer    NAME: Mj Dumont  AGE: 82 y o  SEX: male  DATE OF ADMISSION:  12/09/2019    DATE OF DISCHARGE: 01/01/2020 DISCHARGE DISPOSITION:  Home    Reason for admission: Patient was admitted from [facility] for rehabilitation after hospitalization for status post fall down the stairs, left anterior shoulder dislocation along with closed head injury/TBI, atrial fibrillation with RVR  Admission Diagnoses:  Closed head injury with concussion, atrial fibrillation with RVR, syncope, left shoulder anterior dislocation  Additional Problems:  Past Medical History:   Diagnosis Date    Atrial fibrillation ([facility] [address] )     Cancer ([facility] [address] )     Hypertension      Discharge Diagnoses:  See problem list follow-up recommendations below  Course of stay: Patient was admitted to [facility] for rehabilitation following hospitalization at [facility]  During the resident's stay at [facility], he received skilled nursing care, OT, PT, nutritional support, social service support, and medical management for an overall improvement in his functional status  He had mild bronchitis which subsided with nebulizer treatments and cough medicine  He is requesting to be discharged home on January 1, 2020  Labs and testing performed during stay:  CBC and BMP  Discharge Medications: See discharge medication list which was reviewed  Status at time of discharge exam: Stable    Today's Visit: 12/30/20196:16 PM    Subjective:  Constitutional:  Negative  HEENT:  Negative  Respiratory:  Negative for shortness of breath or cough  Cardiovascular:  Positive for leg swelling  Negative for chest pain or palpitations  Genitourinary:  Negative for dysuria  Musculoskeletal:  Positive for occasional left shoulder pain with activity    Neurological:  Negative for dizziness, syncope, lightheadedness, numbness and headaches  Psychiatric/behavioral:  Negative  Vitals:  Weight:  220 4 lb   BP:  134/72    TPR:  97 9/72/20   O2 sat:  95% on room air    Exam: Physical Exam   Constitutional: He appears well-developed and well-nourished  No distress  HENT:   Head: Normocephalic  Left Ear: External ear normal    Mouth/Throat: No oropharyngeal exudate  Eyes: Pupils are equal, round, and reactive to light  Conjunctivae and EOM are normal  Right eye exhibits no discharge  Left eye exhibits no discharge  Neck: Neck supple  No JVD present  Cardiovascular: Normal rate and normal heart sounds  Slightly irregular heart rhythm  Abdominal: Soft  Bowel sounds are normal    Lymphadenopathy:     He has no cervical adenopathy  Skin: He is not diaphoretic  Nursing note and vitals reviewed  Discussion with patient/family and further instructions:  -Fall precautions  -Bleeding precautions  -Monitor for signs/symptoms of infection  -Medication list was reviewed at facility EMR    Follow-up Recommendations:   1  Please follow-up with your primary care physician within 7-10 days of discharge to review medication changes and current status  2  Please follow up with orthopedic as scheduled  3  Recommend patient see a pulmonologist  4   Please follow up with Cardiology as per routine    Problem List Follow-up Recommendations:  82-year-old male with:    Left anterior glenohumeral dislocation  -status post fall at home  -was seen by orthopedic on 12/24/2019  -status post closed reduction of left shoulder  -continue NWB LUE  -continue left arm sling for comfort as per Ortho  -follow up with Ortho pubic in 6 weeks for exam and repeat x-ray of left shoulder    Atrial fibrillation with rapid ventricular response  -stable  -continue Eliquis and aspirin  -continue metoprolol tartrate 75 mg p o  B i d   -no s/s of active bleeding  -follow up with Cardiology    Smoker, cigarette  -current cigarette smoker  -suspect underlying COPD due to decreased breath sounds bilaterally with occasional wheezing  Recommend PFT   -continues to refuse pharmacological/nonpharmacological smoking cessation interventions  -recommend follow-up with smoking cessation discussion at next outpatient visit with PCP  -will discontinue nebulizer treatments p r n  patient has not required    Debility/recurrent falls  -continue PT/OT as per Orthopedic  -continue vitamin-D supplementation  -continue fall precautions at home    Acute blood loss anemia  -in setting of trauma  -CBC stable on 12/11    Benign hypertension  -blood pressures well controlled with valsartan, metoprolol  -BMP stable on 12/11  -follow up with PCP for management    Closed head injury with concussion  -status post fall  -stable without any changes in mental status since admission to SNF  -neuro checks have been negative    Contusion of lip  -resolved    I have spent >30 minutes with patient today in which greater than 50% of this time was spent in counseling/coordination of care regarding Intructions for management and Importance of tx compliance     **Please note: This discharge summary was constructed using a voice recognition system  [address] [place] [facility]  28/35/42972:66 PM

## 2020-01-23 ENCOUNTER — TELEPHONE (OUTPATIENT)
Dept: OBGYN CLINIC | Facility: HOSPITAL | Age: 85
End: 2020-01-23

## 2020-01-23 NOTE — TELEPHONE ENCOUNTER
Caller: Rupal Marie  Call back number: [phone]  Patient's doctor: Dr Rodriguez Manual    Patient's last note states he is none weightn bearing on his arm  He has not been compliant and is weight bearing at home  Do you want him to remain none weight bearing for PT or can that be changed?  Please advise

## 2020-02-04 ENCOUNTER — OFFICE VISIT (OUTPATIENT)
Dept: OBGYN CLINIC | Facility: HOSPITAL | Age: 85
End: 2020-02-04

## 2020-02-04 ENCOUNTER — HOSPITAL ENCOUNTER (OUTPATIENT)
Dept: RADIOLOGY | Facility: HOSPITAL | Age: 85
Discharge: HOME | End: 2020-02-04
Attending: ORTHOPAEDIC SURGERY
Payer: MEDICARE

## 2020-02-04 VITALS
WEIGHT: 217.37 LBS | DIASTOLIC BLOOD PRESSURE: 72 MMHG | HEART RATE: 96 BPM | BODY MASS INDEX: 33.05 KG/M2 | SYSTOLIC BLOOD PRESSURE: 122 MMHG

## 2020-02-04 DIAGNOSIS — M25.512 ACUTE PAIN OF LEFT SHOULDER: ICD-10-CM

## 2020-02-04 DIAGNOSIS — S43.015D ANTERIOR DISLOCATION OF LEFT SHOULDER, SUBSEQUENT ENCOUNTER: Primary | ICD-10-CM

## 2020-02-04 PROCEDURE — 99024 POSTOP FOLLOW-UP VISIT: CPT | Performed by: ORTHOPAEDIC SURGERY

## 2020-02-04 PROCEDURE — 73030 X-RAY EXAM OF SHOULDER: CPT

## 2020-02-04 RX ORDER — VALSARTAN 40 MG/1
40 TABLET ORAL
COMMUNITY
Start: 2020-01-08

## 2020-02-04 RX ORDER — METOPROLOL TARTRATE 50 MG/1
50 TABLET, FILM COATED ORAL 2 TIMES DAILY
COMMUNITY
Start: 2020-01-07

## 2020-02-04 NOTE — PROGRESS NOTES
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 on exam today  We will have physical therapy start strengthening at this time which should help with this    -Continue physical therapy, start strengthening  -Continue Tylenol OTC as needed for pain      To do next visit:  Return in about 10 months (around 12/4/2020) for x-rays  The above stated was discussed in layman's terms and the patient expressed understanding  All questions were answered to the patient's satisfaction  Scribe Attestation    I,:   Haim Christensen am acting as a scribe while in the presence of the attending physician :        I,:   Mary Paulino MD personally performed the services described in this documentation    as scribed in my presence :              Subjective:   Charlette Young is a 82 y o  male who presents today for follow up of left anterior [facility] dislocation  Injury occured 12/05/2019 from a fall down steps at home  Patient has been attending physical therapy as instructed  He presents with the assistance of a cane today  Patient denies any pain today  He states he has increased pain after PT which is well controled by Tylenol OTC         Review of systems negative unless otherwise specified in HPI    Past Medical History:   Diagnosis Date    Atrial fibrillation ([facility] [address] )     Cancer ([facility] [address] )     Hypertension        Past Surgical History:   Procedure Laterality Date    REPLACEMENT TOTAL HIP W/  RESURFACING IMPLANTS         Family History   Problem Relation Age of Onset    Stroke Mother        Social History     Occupational History    Not on file   Tobacco Use    Smoking status: Never Smoker    Smokeless tobacco: Never Used   Substance and Sexual Activity    Alcohol use: Not Currently    Drug use: Not Currently    Sexual activity: Not on file         Current Outpatient Medications:     acetaminophen (TYLENOL) 325 mg tablet, Take 3 tablets (975 mg total) by mouth every 8 (eight) hours, Disp: 30 tablet, Rfl: 0    allopurinol (ZYLOPRIM) 100 mg tablet, Take 100 mg by mouth daily, Disp: , Rfl:     apixaban (ELIQUIS) 5 mg, Take 5 mg by mouth 2 (two) times a day, Disp: , Rfl:     aspirin (ECOTRIN LOW STRENGTH) 81 mg EC tablet, Take 81 mg by mouth daily, Disp: , Rfl:     cholecalciferol (VITAMIN D3) 400 units tablet, Take 400 Units by mouth daily, Disp: , Rfl:     docusate sodium (COLACE) 100 mg capsule, Take 1 capsule (100 mg total) by mouth 2 (two) times a day, Disp: 10 capsule, Rfl: 0    metoprolol tartrate (LOPRESSOR) 50 mg tablet, Take 50 mg by mouth every 12 (twelve) hours, Disp: , Rfl:     metoprolol tartrate (LOPRESSOR) 50 mg tablet, Take 50 mg by mouth 2 (two) times a day, Disp: , Rfl:     Metoprolol Tartrate 75 MG TABS, Take 1 tablet (75 mg total) by mouth every 12 (twelve) hours, Disp: , Rfl: 0    polyethylene glycol (MIRALAX) 17 g packet, Take 17 g by mouth daily as needed (constipation), Disp: 14 each, Rfl: 0    senna (SENOKOT) 8 6 mg, Take 2 tablets (17 2 mg total) by mouth daily at bedtime, Disp: 120 each, Rfl: 0    valsartan (DIOVAN) 40 mg tablet, Take 10 mg by mouth daily, Disp: , Rfl:     valsartan (DIOVAN) 40 mg tablet, Take 40 mg by mouth, Disp: , Rfl:     vitamin A 10,000 units capsule, Take 10,000 Units by mouth daily, Disp: , Rfl:     Allergies   Allergen Reactions    Balsam Dermatitis    Neomycin-Bacitracin Zn-Polymyx Hives and Rash            Vitals:    02/04/20 1141   BP: 122/72   Pulse: 96       Objective:                    Left Shoulder Exam     Tenderness   The patient is experiencing no tenderness  Range of Motion   External rotation: 30 (PROM)   Forward flexion: 40 (PROM 70)     Other   Erythema: absent  Sensation: normal  Pulse: present     Comments:    Patient can actively abduct but weak on exam today  Diagnostics, reviewed and taken today if performed as documented:     The attending physician has personally reviewed the pertinent films in PACS and interpretation is as follows:  Left shoulder x-rays demonstrate well located left glenohumeral joint evidence of greater tuberosity fracture remain in acceptable alignment and position      Procedures, if performed today:    Procedures    None performed      Portions of the record may have been created with voice recognition software  Occasional wrong word or "sound a like" substitutions may have occurred due to the inherent limitations of voice recognition software  Read the chart carefully and recognize, using context, where substitutions have occurred

## 2020-02-06 ENCOUNTER — TELEPHONE (OUTPATIENT)
Dept: OBGYN CLINIC | Facility: HOSPITAL | Age: 85
End: 2020-02-06

## 2020-02-06 NOTE — TELEPHONE ENCOUNTER
Thong Watson   [phone]    Dr Tori Calderón  [phone]   [phone]    States patient was told to do home exercises but has not received anything  Please fax to above number

## 2020-02-06 NOTE — TELEPHONE ENCOUNTER
It looks like we wanted the patient to see physical therapy for formal exercises per the last office note

## 2020-02-10 ENCOUNTER — TELEPHONE (OUTPATIENT)
Dept: OBGYN CLINIC | Facility: HOSPITAL | Age: 85
End: 2020-02-10

## 2020-04-14 ENCOUNTER — TELEMEDICINE (OUTPATIENT)
Dept: OBGYN CLINIC | Facility: HOSPITAL | Age: 85
End: 2020-04-14
Payer: MEDICARE

## 2020-04-14 DIAGNOSIS — M25.512 LEFT SHOULDER PAIN, UNSPECIFIED CHRONICITY: Primary | ICD-10-CM

## 2020-04-14 PROCEDURE — 99442 PR PHYS/QHP TELEPHONE EVALUATION 11-20 MIN: CPT | Performed by: ORTHOPAEDIC SURGERY

## 2020-07-15 ENCOUNTER — TELEPHONE (OUTPATIENT)
Dept: OBGYN CLINIC | Facility: MEDICAL CENTER | Age: 85
End: 2020-07-15

## 2020-09-29 NOTE — ASSESSMENT & PLAN NOTE
Denies any symptoms of presyncope today  Remains completely amnestic of event, likely related to [place]   PT/OT: no evidence of orthostasis or symptoms  PAF is known/chronic   No evidence of significant tachy or anna events overnight  cardiology signed off; outpatient follow-up with Dr Lisa Vitale as routine no

## 2022-01-01 ENCOUNTER — HOME CARE VISIT (OUTPATIENT)
Dept: HOME HOSPICE | Facility: HOSPICE | Age: 87
End: 2022-01-01

## 2022-01-01 ENCOUNTER — APPOINTMENT (INPATIENT)
Dept: RADIOLOGY | Facility: HOSPITAL | Age: 87
End: 2022-01-01

## 2022-01-01 ENCOUNTER — HOME CARE VISIT (OUTPATIENT)
Dept: HOME HEALTH SERVICES | Facility: HOME HEALTHCARE | Age: 87
End: 2022-01-01

## 2022-01-01 ENCOUNTER — APPOINTMENT (INPATIENT)
Dept: NEUROLOGY | Facility: CLINIC | Age: 87
End: 2022-01-01

## 2022-01-01 ENCOUNTER — APPOINTMENT (INPATIENT)
Dept: NON INVASIVE DIAGNOSTICS | Facility: HOSPITAL | Age: 87
End: 2022-01-01

## 2022-01-01 ENCOUNTER — HOSPITAL ENCOUNTER (INPATIENT)
Facility: HOSPITAL | Age: 87
LOS: 18 days | End: 2022-12-22
Attending: EMERGENCY MEDICINE | Admitting: FAMILY MEDICINE

## 2022-01-01 ENCOUNTER — NURSING HOME VISIT (OUTPATIENT)
Dept: GERIATRICS | Facility: OTHER | Age: 87
End: 2022-01-01

## 2022-01-01 ENCOUNTER — TELEPHONE (OUTPATIENT)
Dept: PALLIATIVE MEDICINE | Facility: CLINIC | Age: 87
End: 2022-01-01

## 2022-01-01 ENCOUNTER — HOSPICE ADMISSION (OUTPATIENT)
Dept: HOME HOSPICE | Facility: HOSPICE | Age: 87
End: 2022-01-01

## 2022-01-01 VITALS
DIASTOLIC BLOOD PRESSURE: 88 MMHG | OXYGEN SATURATION: 90 % | TEMPERATURE: 97.3 F | WEIGHT: 223.11 LBS | HEART RATE: 120 BPM | BODY MASS INDEX: 33.81 KG/M2 | RESPIRATION RATE: 20 BRPM | HEIGHT: 68 IN | SYSTOLIC BLOOD PRESSURE: 150 MMHG

## 2022-01-01 VITALS
TEMPERATURE: 98.6 F | HEIGHT: 68 IN | BODY MASS INDEX: 33.9 KG/M2 | HEART RATE: 74 BPM | WEIGHT: 223.7 LBS | DIASTOLIC BLOOD PRESSURE: 78 MMHG | SYSTOLIC BLOOD PRESSURE: 130 MMHG | RESPIRATION RATE: 16 BRPM

## 2022-01-01 DIAGNOSIS — G93.41 ACUTE METABOLIC ENCEPHALOPATHY: ICD-10-CM

## 2022-01-01 DIAGNOSIS — N30.00 ACUTE CYSTITIS WITHOUT HEMATURIA: ICD-10-CM

## 2022-01-01 DIAGNOSIS — I48.91 A-FIB (HCC): ICD-10-CM

## 2022-01-01 DIAGNOSIS — L89.626 PRESSURE INJURY OF DEEP TISSUE OF LEFT HEEL: ICD-10-CM

## 2022-01-01 DIAGNOSIS — Z51.5 HOSPICE CARE PATIENT: ICD-10-CM

## 2022-01-01 DIAGNOSIS — I65.23 STENOSIS OF BOTH INTERNAL CAROTID ARTERIES: ICD-10-CM

## 2022-01-01 DIAGNOSIS — G93.41 ACUTE METABOLIC ENCEPHALOPATHY: Primary | ICD-10-CM

## 2022-01-01 DIAGNOSIS — R65.10 SIRS (SYSTEMIC INFLAMMATORY RESPONSE SYNDROME) (HCC): ICD-10-CM

## 2022-01-01 DIAGNOSIS — Z51.5 HOSPICE CARE: ICD-10-CM

## 2022-01-01 DIAGNOSIS — R29.90 STROKE-LIKE SYMPTOMS: Primary | ICD-10-CM

## 2022-01-01 DIAGNOSIS — S91.302A WOUND OF LEFT FOOT: ICD-10-CM

## 2022-01-01 DIAGNOSIS — I73.9 PAD (PERIPHERAL ARTERY DISEASE) (HCC): ICD-10-CM

## 2022-01-01 DIAGNOSIS — I48.91 ATRIAL FIBRILLATION, UNSPECIFIED TYPE (HCC): ICD-10-CM

## 2022-01-01 DIAGNOSIS — S81.802D OPEN WOUND OF LEFT LOWER EXTREMITY, SUBSEQUENT ENCOUNTER: ICD-10-CM

## 2022-01-01 DIAGNOSIS — Z51.5 HOSPICE CARE PATIENT: Primary | ICD-10-CM

## 2022-01-01 LAB
2HR DELTA HS TROPONIN: -4 NG/L
4HR DELTA HS TROPONIN: -1 NG/L
AAMA (MAX AMPLITUDE AA): 69.2 MM (ref 51–71)
ACTFMA(MAX AMPLITUDE ACTF): >30 MM (ref 2–19)
ADPMA(MAX AMPLITUDE ADP): 69.6 MM (ref 45–69)
ALBUMIN SERPL BCP-MCNC: 2 G/DL (ref 3.5–5)
ALBUMIN SERPL BCP-MCNC: 2.1 G/DL (ref 3.5–5)
ALBUMIN SERPL BCP-MCNC: 2.2 G/DL (ref 3.5–5)
ALP SERPL-CCNC: 109 U/L (ref 46–116)
ALP SERPL-CCNC: 111 U/L (ref 46–116)
ALP SERPL-CCNC: 111 U/L (ref 46–116)
ALT SERPL W P-5'-P-CCNC: 30 U/L (ref 12–78)
ALT SERPL W P-5'-P-CCNC: 33 U/L (ref 12–78)
ALT SERPL W P-5'-P-CCNC: 39 U/L (ref 12–78)
AMMONIA PLAS-SCNC: 17 UMOL/L (ref 11–35)
AMMONIA PLAS-SCNC: 31 UMOL/L (ref 11–35)
AMORPH URATE CRY URNS QL MICRO: ABNORMAL
ANION GAP SERPL CALCULATED.3IONS-SCNC: 1 MMOL/L (ref 4–13)
ANION GAP SERPL CALCULATED.3IONS-SCNC: 4 MMOL/L (ref 4–13)
ANION GAP SERPL CALCULATED.3IONS-SCNC: 4 MMOL/L (ref 4–13)
ANION GAP SERPL CALCULATED.3IONS-SCNC: 5 MMOL/L (ref 4–13)
ANION GAP SERPL CALCULATED.3IONS-SCNC: 5 MMOL/L (ref 4–13)
ANION GAP SERPL CALCULATED.3IONS-SCNC: 6 MMOL/L (ref 4–13)
ANION GAP SERPL CALCULATED.3IONS-SCNC: 6 MMOL/L (ref 4–13)
ANION GAP SERPL CALCULATED.3IONS-SCNC: 7 MMOL/L (ref 4–13)
ANION GAP SERPL CALCULATED.3IONS-SCNC: 8 MMOL/L (ref 4–13)
AORTIC ROOT: 3.8 CM
APICAL FOUR CHAMBER EJECTION FRACTION: 52 %
APTT PPP: 35 SECONDS (ref 23–37)
APTT PPP: 38 SECONDS (ref 23–37)
APTT PPP: 42 SECONDS (ref 23–37)
APTT PPP: 43 SECONDS (ref 23–37)
APTT PPP: 46 SECONDS (ref 23–37)
APTT PPP: 56 SECONDS (ref 23–37)
APTT PPP: 56 SECONDS (ref 23–37)
APTT PPP: 59 SECONDS (ref 23–37)
APTT PPP: 67 SECONDS (ref 23–37)
APTT PPP: 74 SECONDS (ref 23–37)
APTT PPP: 78 SECONDS (ref 23–37)
APTT PPP: >210 SECONDS (ref 23–37)
ASCENDING AORTA: 3.2 CM
AST SERPL W P-5'-P-CCNC: 29 U/L (ref 5–45)
AST SERPL W P-5'-P-CCNC: 40 U/L (ref 5–45)
AST SERPL W P-5'-P-CCNC: 41 U/L (ref 5–45)
ATRIAL RATE: 267 BPM
BACTERIA UR CULT: ABNORMAL
BACTERIA UR QL AUTO: ABNORMAL /HPF
BASOPHILS # BLD AUTO: 0.05 THOUSANDS/ÂΜL (ref 0–0.1)
BASOPHILS # BLD AUTO: 0.06 THOUSANDS/ÂΜL (ref 0–0.1)
BASOPHILS # BLD AUTO: 0.06 THOUSANDS/ÂΜL (ref 0–0.1)
BASOPHILS NFR BLD AUTO: 1 % (ref 0–1)
BILIRUB DIRECT SERPL-MCNC: 0.14 MG/DL (ref 0–0.2)
BILIRUB SERPL-MCNC: 0.42 MG/DL (ref 0.2–1)
BILIRUB SERPL-MCNC: 0.45 MG/DL (ref 0.2–1)
BILIRUB SERPL-MCNC: 0.52 MG/DL (ref 0.2–1)
BILIRUB UR QL STRIP: NEGATIVE
BUN SERPL-MCNC: 14 MG/DL (ref 5–25)
BUN SERPL-MCNC: 14 MG/DL (ref 5–25)
BUN SERPL-MCNC: 15 MG/DL (ref 5–25)
BUN SERPL-MCNC: 16 MG/DL (ref 5–25)
BUN SERPL-MCNC: 16 MG/DL (ref 5–25)
BUN SERPL-MCNC: 18 MG/DL (ref 5–25)
BUN SERPL-MCNC: 19 MG/DL (ref 5–25)
BUN SERPL-MCNC: 21 MG/DL (ref 5–25)
BUN SERPL-MCNC: 26 MG/DL (ref 5–25)
CALCIUM ALBUM COR SERPL-MCNC: 11 MG/DL (ref 8.3–10.1)
CALCIUM ALBUM COR SERPL-MCNC: 11 MG/DL (ref 8.3–10.1)
CALCIUM SERPL-MCNC: 10 MG/DL (ref 8.3–10.1)
CALCIUM SERPL-MCNC: 10.1 MG/DL (ref 8.3–10.1)
CALCIUM SERPL-MCNC: 9.2 MG/DL (ref 8.3–10.1)
CALCIUM SERPL-MCNC: 9.3 MG/DL (ref 8.3–10.1)
CALCIUM SERPL-MCNC: 9.4 MG/DL (ref 8.3–10.1)
CALCIUM SERPL-MCNC: 9.5 MG/DL (ref 8.3–10.1)
CALCIUM SERPL-MCNC: 9.6 MG/DL (ref 8.3–10.1)
CARDIAC TROPONIN I PNL SERPL HS: 10 NG/L
CARDIAC TROPONIN I PNL SERPL HS: 11 NG/L
CARDIAC TROPONIN I PNL SERPL HS: 7 NG/L
CHLORIDE SERPL-SCNC: 100 MMOL/L (ref 96–108)
CHLORIDE SERPL-SCNC: 104 MMOL/L (ref 96–108)
CHLORIDE SERPL-SCNC: 106 MMOL/L (ref 96–108)
CHLORIDE SERPL-SCNC: 110 MMOL/L (ref 96–108)
CHLORIDE SERPL-SCNC: 111 MMOL/L (ref 96–108)
CHLORIDE SERPL-SCNC: 113 MMOL/L (ref 96–108)
CHLORIDE SERPL-SCNC: 96 MMOL/L (ref 96–108)
CHOLEST SERPL-MCNC: 150 MG/DL
CLARITY UR: ABNORMAL
CO2 SERPL-SCNC: 26 MMOL/L (ref 21–32)
CO2 SERPL-SCNC: 27 MMOL/L (ref 21–32)
CO2 SERPL-SCNC: 28 MMOL/L (ref 21–32)
CO2 SERPL-SCNC: 31 MMOL/L (ref 21–32)
CO2 SERPL-SCNC: 31 MMOL/L (ref 21–32)
CO2 SERPL-SCNC: 33 MMOL/L (ref 21–32)
CO2 SERPL-SCNC: 34 MMOL/L (ref 21–32)
COLOR UR: YELLOW
CREAT SERPL-MCNC: 0.71 MG/DL (ref 0.6–1.3)
CREAT SERPL-MCNC: 0.75 MG/DL (ref 0.6–1.3)
CREAT SERPL-MCNC: 0.77 MG/DL (ref 0.6–1.3)
CREAT SERPL-MCNC: 0.79 MG/DL (ref 0.6–1.3)
CREAT SERPL-MCNC: 0.84 MG/DL (ref 0.6–1.3)
CREAT SERPL-MCNC: 0.85 MG/DL (ref 0.6–1.3)
CREAT SERPL-MCNC: 0.86 MG/DL (ref 0.6–1.3)
CREAT SERPL-MCNC: 0.9 MG/DL (ref 0.6–1.3)
CREAT SERPL-MCNC: 1.15 MG/DL (ref 0.6–1.3)
E WAVE DECELERATION TIME: 168 MS
EOSINOPHIL # BLD AUTO: 0.08 THOUSAND/ÂΜL (ref 0–0.61)
EOSINOPHIL # BLD AUTO: 0.11 THOUSAND/ÂΜL (ref 0–0.61)
EOSINOPHIL # BLD AUTO: 0.18 THOUSAND/ÂΜL (ref 0–0.61)
EOSINOPHIL NFR BLD AUTO: 1 % (ref 0–6)
EOSINOPHIL NFR BLD AUTO: 1 % (ref 0–6)
EOSINOPHIL NFR BLD AUTO: 2 % (ref 0–6)
ERYTHROCYTE [DISTWIDTH] IN BLOOD BY AUTOMATED COUNT: 17.7 % (ref 11.6–15.1)
ERYTHROCYTE [DISTWIDTH] IN BLOOD BY AUTOMATED COUNT: 17.9 % (ref 11.6–15.1)
ERYTHROCYTE [DISTWIDTH] IN BLOOD BY AUTOMATED COUNT: 18.1 % (ref 11.6–15.1)
ERYTHROCYTE [DISTWIDTH] IN BLOOD BY AUTOMATED COUNT: 18.1 % (ref 11.6–15.1)
ERYTHROCYTE [DISTWIDTH] IN BLOOD BY AUTOMATED COUNT: 18.3 % (ref 11.6–15.1)
ERYTHROCYTE [DISTWIDTH] IN BLOOD BY AUTOMATED COUNT: 18.5 % (ref 11.6–15.1)
ERYTHROCYTE [DISTWIDTH] IN BLOOD BY AUTOMATED COUNT: 18.5 % (ref 11.6–15.1)
ERYTHROCYTE [DISTWIDTH] IN BLOOD BY AUTOMATED COUNT: 18.6 % (ref 11.6–15.1)
ERYTHROCYTE [DISTWIDTH] IN BLOOD BY AUTOMATED COUNT: 18.6 % (ref 11.6–15.1)
ERYTHROCYTE [DISTWIDTH] IN BLOOD BY AUTOMATED COUNT: 18.8 % (ref 11.6–15.1)
ERYTHROCYTE [DISTWIDTH] IN BLOOD BY AUTOMATED COUNT: 19 % (ref 11.6–15.1)
EST. AVERAGE GLUCOSE BLD GHB EST-MCNC: 123 MG/DL
FLUAV RNA RESP QL NAA+PROBE: NEGATIVE
FLUAV RNA RESP QL NAA+PROBE: NEGATIVE
FLUBV RNA RESP QL NAA+PROBE: NEGATIVE
FLUBV RNA RESP QL NAA+PROBE: NEGATIVE
FOLATE SERPL-MCNC: 10.9 NG/ML (ref 3.1–17.5)
FRACTIONAL SHORTENING: 27 (ref 28–44)
GFR SERPL CREATININE-BSD FRML MDRD: 57 ML/MIN/1.73SQ M
GFR SERPL CREATININE-BSD FRML MDRD: 77 ML/MIN/1.73SQ M
GFR SERPL CREATININE-BSD FRML MDRD: 79 ML/MIN/1.73SQ M
GFR SERPL CREATININE-BSD FRML MDRD: 81 ML/MIN/1.73SQ M
GFR SERPL CREATININE-BSD FRML MDRD: 82 ML/MIN/1.73SQ M
GFR SERPL CREATININE-BSD FRML MDRD: 83 ML/MIN/1.73SQ M
GFR SERPL CREATININE-BSD FRML MDRD: 85 ML/MIN/1.73SQ M
GLUCOSE SERPL-MCNC: 105 MG/DL (ref 65–140)
GLUCOSE SERPL-MCNC: 106 MG/DL (ref 65–140)
GLUCOSE SERPL-MCNC: 111 MG/DL (ref 65–140)
GLUCOSE SERPL-MCNC: 117 MG/DL (ref 65–140)
GLUCOSE SERPL-MCNC: 121 MG/DL (ref 65–140)
GLUCOSE SERPL-MCNC: 135 MG/DL (ref 65–140)
GLUCOSE SERPL-MCNC: 138 MG/DL (ref 65–140)
GLUCOSE SERPL-MCNC: 83 MG/DL (ref 65–140)
GLUCOSE SERPL-MCNC: 91 MG/DL (ref 65–140)
GLUCOSE SERPL-MCNC: 95 MG/DL (ref 65–140)
GLUCOSE UR STRIP-MCNC: NEGATIVE MG/DL
HBA1C MFR BLD: 5.9 %
HCT VFR BLD AUTO: 40.1 % (ref 36.5–49.3)
HCT VFR BLD AUTO: 40.5 % (ref 36.5–49.3)
HCT VFR BLD AUTO: 41.2 % (ref 36.5–49.3)
HCT VFR BLD AUTO: 41.3 % (ref 36.5–49.3)
HCT VFR BLD AUTO: 42.8 % (ref 36.5–49.3)
HCT VFR BLD AUTO: 44.2 % (ref 36.5–49.3)
HCT VFR BLD AUTO: 44.7 % (ref 36.5–49.3)
HCT VFR BLD AUTO: 45.8 % (ref 36.5–49.3)
HCT VFR BLD AUTO: 45.9 % (ref 36.5–49.3)
HCT VFR BLD AUTO: 46.2 % (ref 36.5–49.3)
HCT VFR BLD AUTO: 48 % (ref 36.5–49.3)
HDLC SERPL-MCNC: 22 MG/DL
HGB BLD-MCNC: 12.4 G/DL (ref 12–17)
HGB BLD-MCNC: 12.4 G/DL (ref 12–17)
HGB BLD-MCNC: 12.6 G/DL (ref 12–17)
HGB BLD-MCNC: 12.9 G/DL (ref 12–17)
HGB BLD-MCNC: 13 G/DL (ref 12–17)
HGB BLD-MCNC: 13.2 G/DL (ref 12–17)
HGB BLD-MCNC: 13.3 G/DL (ref 12–17)
HGB BLD-MCNC: 13.7 G/DL (ref 12–17)
HGB BLD-MCNC: 13.7 G/DL (ref 12–17)
HGB BLD-MCNC: 13.8 G/DL (ref 12–17)
HGB BLD-MCNC: 14.8 G/DL (ref 12–17)
HGB UR QL STRIP.AUTO: ABNORMAL
HKHMA(MAX AMPLITUDE KAOLIN): 68.5 MM (ref 53–68)
IMM GRANULOCYTES # BLD AUTO: 0.11 THOUSAND/UL (ref 0–0.2)
IMM GRANULOCYTES # BLD AUTO: 0.14 THOUSAND/UL (ref 0–0.2)
IMM GRANULOCYTES # BLD AUTO: 0.14 THOUSAND/UL (ref 0–0.2)
IMM GRANULOCYTES NFR BLD AUTO: 1 % (ref 0–2)
IMM GRANULOCYTES NFR BLD AUTO: 1 % (ref 0–2)
IMM GRANULOCYTES NFR BLD AUTO: 2 % (ref 0–2)
INR PPP: 1.21 (ref 0.84–1.19)
INR PPP: 1.23 (ref 0.84–1.19)
INR PPP: 1.57 (ref 0.84–1.19)
INTERVENTRICULAR SEPTUM IN DIASTOLE (PARASTERNAL SHORT AXIS VIEW): 1.3 CM
INTERVENTRICULAR SEPTUM: 1.3 CM (ref 0.6–1.1)
KETONES UR STRIP-MCNC: NEGATIVE MG/DL
LAAS-AP2: 20.4 CM2
LAAS-AP4: 22.7 CM2
LACTATE SERPL-SCNC: 1.4 MMOL/L (ref 0.5–2)
LDLC SERPL CALC-MCNC: 97 MG/DL (ref 0–100)
LEFT ATRIUM SIZE: 3.5 CM
LEFT INTERNAL DIMENSION IN SYSTOLE: 3.6 CM (ref 2.1–4)
LEFT VENTRICULAR INTERNAL DIMENSION IN DIASTOLE: 4.9 CM (ref 3.5–6)
LEFT VENTRICULAR POSTERIOR WALL IN END DIASTOLE: 1.3 CM
LEFT VENTRICULAR STROKE VOLUME: 60 ML
LEUKOCYTE ESTERASE UR QL STRIP: ABNORMAL
LVSV (TEICH): 60 ML
LYMPHOCYTES # BLD AUTO: 0.71 THOUSANDS/ÂΜL (ref 0.6–4.47)
LYMPHOCYTES # BLD AUTO: 0.72 THOUSANDS/ÂΜL (ref 0.6–4.47)
LYMPHOCYTES # BLD AUTO: 0.8 THOUSANDS/ÂΜL (ref 0.6–4.47)
LYMPHOCYTES NFR BLD AUTO: 7 % (ref 14–44)
LYMPHOCYTES NFR BLD AUTO: 9 % (ref 14–44)
LYMPHOCYTES NFR BLD AUTO: 9 % (ref 14–44)
MCH RBC QN AUTO: 25 PG (ref 26.8–34.3)
MCH RBC QN AUTO: 25.1 PG (ref 26.8–34.3)
MCH RBC QN AUTO: 25.6 PG (ref 26.8–34.3)
MCH RBC QN AUTO: 25.7 PG (ref 26.8–34.3)
MCH RBC QN AUTO: 25.9 PG (ref 26.8–34.3)
MCH RBC QN AUTO: 26 PG (ref 26.8–34.3)
MCH RBC QN AUTO: 26.1 PG (ref 26.8–34.3)
MCH RBC QN AUTO: 26.3 PG (ref 26.8–34.3)
MCH RBC QN AUTO: 26.4 PG (ref 26.8–34.3)
MCH RBC QN AUTO: 26.5 PG (ref 26.8–34.3)
MCH RBC QN AUTO: 26.7 PG (ref 26.8–34.3)
MCHC RBC AUTO-ENTMCNC: 29 G/DL (ref 31.4–37.4)
MCHC RBC AUTO-ENTMCNC: 29.2 G/DL (ref 31.4–37.4)
MCHC RBC AUTO-ENTMCNC: 29.7 G/DL (ref 31.4–37.4)
MCHC RBC AUTO-ENTMCNC: 30.1 G/DL (ref 31.4–37.4)
MCHC RBC AUTO-ENTMCNC: 30.1 G/DL (ref 31.4–37.4)
MCHC RBC AUTO-ENTMCNC: 30.6 G/DL (ref 31.4–37.4)
MCHC RBC AUTO-ENTMCNC: 30.6 G/DL (ref 31.4–37.4)
MCHC RBC AUTO-ENTMCNC: 30.8 G/DL (ref 31.4–37.4)
MCHC RBC AUTO-ENTMCNC: 30.8 G/DL (ref 31.4–37.4)
MCHC RBC AUTO-ENTMCNC: 31.4 G/DL (ref 31.4–37.4)
MCHC RBC AUTO-ENTMCNC: 31.5 G/DL (ref 31.4–37.4)
MCV RBC AUTO: 84 FL (ref 82–98)
MCV RBC AUTO: 84 FL (ref 82–98)
MCV RBC AUTO: 85 FL (ref 82–98)
MCV RBC AUTO: 86 FL (ref 82–98)
MCV RBC AUTO: 87 FL (ref 82–98)
MONOCYTES # BLD AUTO: 0.7 THOUSAND/ÂΜL (ref 0.17–1.22)
MONOCYTES # BLD AUTO: 0.81 THOUSAND/ÂΜL (ref 0.17–1.22)
MONOCYTES # BLD AUTO: 0.83 THOUSAND/ÂΜL (ref 0.17–1.22)
MONOCYTES NFR BLD AUTO: 8 % (ref 4–12)
MONOCYTES NFR BLD AUTO: 9 % (ref 4–12)
MONOCYTES NFR BLD AUTO: 9 % (ref 4–12)
MV E'TISSUE VEL-SEP: 9 CM/S
MV PEAK E VEL: 96 CM/S
MV STENOSIS PRESSURE HALF TIME: 49 MS
MV VALVE AREA P 1/2 METHOD: 4.49
NEUTROPHILS # BLD AUTO: 6.55 THOUSANDS/ÂΜL (ref 1.85–7.62)
NEUTROPHILS # BLD AUTO: 7.02 THOUSANDS/ÂΜL (ref 1.85–7.62)
NEUTROPHILS # BLD AUTO: 9.08 THOUSANDS/ÂΜL (ref 1.85–7.62)
NEUTS SEG NFR BLD AUTO: 78 % (ref 43–75)
NEUTS SEG NFR BLD AUTO: 79 % (ref 43–75)
NEUTS SEG NFR BLD AUTO: 81 % (ref 43–75)
NITRITE UR QL STRIP: POSITIVE
NON-SQ EPI CELLS URNS QL MICRO: ABNORMAL /HPF
NRBC BLD AUTO-RTO: 0 /100 WBCS
PA ADP BLD-ACNC: 268 PRU (ref 194–418)
PH UR STRIP.AUTO: 7 [PH]
PLATELET # BLD AUTO: 213 THOUSANDS/UL (ref 149–390)
PLATELET # BLD AUTO: 216 THOUSANDS/UL (ref 149–390)
PLATELET # BLD AUTO: 243 THOUSANDS/UL (ref 149–390)
PLATELET # BLD AUTO: 245 THOUSANDS/UL (ref 149–390)
PLATELET # BLD AUTO: 245 THOUSANDS/UL (ref 149–390)
PLATELET # BLD AUTO: 255 THOUSANDS/UL (ref 149–390)
PLATELET # BLD AUTO: 266 THOUSANDS/UL (ref 149–390)
PLATELET # BLD AUTO: 267 THOUSANDS/UL (ref 149–390)
PLATELET # BLD AUTO: 267 THOUSANDS/UL (ref 149–390)
PLATELET # BLD AUTO: 268 THOUSANDS/UL (ref 149–390)
PLATELET # BLD AUTO: 279 THOUSANDS/UL (ref 149–390)
PMV BLD AUTO: 10.8 FL (ref 8.9–12.7)
PMV BLD AUTO: 10.8 FL (ref 8.9–12.7)
PMV BLD AUTO: 11 FL (ref 8.9–12.7)
PMV BLD AUTO: 11.1 FL (ref 8.9–12.7)
PMV BLD AUTO: 11.2 FL (ref 8.9–12.7)
PMV BLD AUTO: 11.3 FL (ref 8.9–12.7)
PMV BLD AUTO: 11.3 FL (ref 8.9–12.7)
PMV BLD AUTO: 11.5 FL (ref 8.9–12.7)
PMV BLD AUTO: 11.6 FL (ref 8.9–12.7)
PMV BLD AUTO: 11.8 FL (ref 8.9–12.7)
PMV BLD AUTO: 11.9 FL (ref 8.9–12.7)
POTASSIUM SERPL-SCNC: 3.6 MMOL/L (ref 3.5–5.3)
POTASSIUM SERPL-SCNC: 3.7 MMOL/L (ref 3.5–5.3)
POTASSIUM SERPL-SCNC: 3.8 MMOL/L (ref 3.5–5.3)
POTASSIUM SERPL-SCNC: 3.9 MMOL/L (ref 3.5–5.3)
POTASSIUM SERPL-SCNC: 4 MMOL/L (ref 3.5–5.3)
POTASSIUM SERPL-SCNC: 4.1 MMOL/L (ref 3.5–5.3)
POTASSIUM SERPL-SCNC: 4.6 MMOL/L (ref 3.5–5.3)
PROCALCITONIN SERPL-MCNC: 0.15 NG/ML
PROT SERPL-MCNC: 6.5 G/DL (ref 6.4–8.4)
PROT SERPL-MCNC: 6.9 G/DL (ref 6.4–8.4)
PROT SERPL-MCNC: 6.9 G/DL (ref 6.4–8.4)
PROT UR STRIP-MCNC: ABNORMAL MG/DL
PROTHROMBIN TIME: 15.5 SECONDS (ref 11.6–14.5)
PROTHROMBIN TIME: 15.8 SECONDS (ref 11.6–14.5)
PROTHROMBIN TIME: 19 SECONDS (ref 11.6–14.5)
QRS AXIS: -16 DEGREES
QRSD INTERVAL: 80 MS
QT INTERVAL: 322 MS
QTC INTERVAL: 419 MS
RA PRESSURE ESTIMATED: 8 MMHG
RBC # BLD AUTO: 4.68 MILLION/UL (ref 3.88–5.62)
RBC # BLD AUTO: 4.78 MILLION/UL (ref 3.88–5.62)
RBC # BLD AUTO: 4.84 MILLION/UL (ref 3.88–5.62)
RBC # BLD AUTO: 4.87 MILLION/UL (ref 3.88–5.62)
RBC # BLD AUTO: 5.09 MILLION/UL (ref 3.88–5.62)
RBC # BLD AUTO: 5.16 MILLION/UL (ref 3.88–5.62)
RBC # BLD AUTO: 5.26 MILLION/UL (ref 3.88–5.62)
RBC # BLD AUTO: 5.28 MILLION/UL (ref 3.88–5.62)
RBC # BLD AUTO: 5.29 MILLION/UL (ref 3.88–5.62)
RBC # BLD AUTO: 5.33 MILLION/UL (ref 3.88–5.62)
RBC # BLD AUTO: 5.63 MILLION/UL (ref 3.88–5.62)
RBC #/AREA URNS AUTO: ABNORMAL /HPF
RIGHT ATRIUM AREA SYSTOLE A4C: 21.8 CM2
RIGHT VENTRICLE ID DIMENSION: 4.2 CM
RSV RNA RESP QL NAA+PROBE: NEGATIVE
RSV RNA RESP QL NAA+PROBE: NEGATIVE
RV PSP: 57 MMHG
SARS-COV-2 RNA RESP QL NAA+PROBE: NEGATIVE
SARS-COV-2 RNA RESP QL NAA+PROBE: NEGATIVE
SL CV LEFT ATRIUM LENGTH A2C: 5.7 CM
SL CV LV EF: 65
SL CV PED ECHO LEFT VENTRICLE DIASTOLIC VOLUME (MOD BIPLANE) 2D: 113 ML
SL CV PED ECHO LEFT VENTRICLE SYSTOLIC VOLUME (MOD BIPLANE) 2D: 53 ML
SODIUM SERPL-SCNC: 134 MMOL/L (ref 135–147)
SODIUM SERPL-SCNC: 136 MMOL/L (ref 135–147)
SODIUM SERPL-SCNC: 139 MMOL/L (ref 135–147)
SODIUM SERPL-SCNC: 141 MMOL/L (ref 135–147)
SODIUM SERPL-SCNC: 142 MMOL/L (ref 135–147)
SODIUM SERPL-SCNC: 143 MMOL/L (ref 135–147)
SODIUM SERPL-SCNC: 145 MMOL/L (ref 135–147)
SODIUM SERPL-SCNC: 146 MMOL/L (ref 135–147)
SODIUM SERPL-SCNC: 146 MMOL/L (ref 135–147)
SP GR UR STRIP.AUTO: >=1.05 (ref 1–1.03)
T WAVE AXIS: 46 DEGREES
TR MAX PG: 49 MMHG
TR PEAK VELOCITY: 3.5 M/S
TRICUSPID VALVE PEAK REGURGITATION VELOCITY: 3.49 M/S
TRIGL SERPL-MCNC: 156 MG/DL
TSH SERPL DL<=0.05 MIU/L-ACNC: 2.21 UIU/ML (ref 0.45–4.5)
UROBILINOGEN UR STRIP-ACNC: <2 MG/DL
VENTRICULAR RATE: 102 BPM
VIT B12 SERPL-MCNC: 307 PG/ML (ref 100–900)
WBC # BLD AUTO: 10.22 THOUSAND/UL (ref 4.31–10.16)
WBC # BLD AUTO: 10.69 THOUSAND/UL (ref 4.31–10.16)
WBC # BLD AUTO: 11 THOUSAND/UL (ref 4.31–10.16)
WBC # BLD AUTO: 11.72 THOUSAND/UL (ref 4.31–10.16)
WBC # BLD AUTO: 8.03 THOUSAND/UL (ref 4.31–10.16)
WBC # BLD AUTO: 8.21 THOUSAND/UL (ref 4.31–10.16)
WBC # BLD AUTO: 8.53 THOUSAND/UL (ref 4.31–10.16)
WBC # BLD AUTO: 8.94 THOUSAND/UL (ref 4.31–10.16)
WBC # BLD AUTO: 9.03 THOUSAND/UL (ref 4.31–10.16)
WBC # BLD AUTO: 9.71 THOUSAND/UL (ref 4.31–10.16)
WBC # BLD AUTO: 9.8 THOUSAND/UL (ref 4.31–10.16)
WBC #/AREA URNS AUTO: ABNORMAL /HPF
WBC CLUMPS # UR AUTO: PRESENT /UL

## 2022-01-01 RX ORDER — ALLOPURINOL 100 MG/1
100 TABLET ORAL DAILY
Status: DISCONTINUED | OUTPATIENT
Start: 2022-01-01 | End: 2022-01-01

## 2022-01-01 RX ORDER — ONDANSETRON 4 MG/1
4 TABLET, ORALLY DISINTEGRATING ORAL EVERY 6 HOURS PRN
Status: DISCONTINUED | OUTPATIENT
Start: 2022-01-01 | End: 2022-01-01 | Stop reason: HOSPADM

## 2022-01-01 RX ORDER — COLCHICINE 0.6 MG/1
0.6 TABLET ORAL DAILY
COMMUNITY
End: 2022-01-01

## 2022-01-01 RX ORDER — LORAZEPAM 0.5 MG/1
0.5 TABLET ORAL ONCE
Qty: 1 TABLET | Refills: 0 | Status: SHIPPED | OUTPATIENT
Start: 2022-01-01 | End: 2022-01-01

## 2022-01-01 RX ORDER — METOPROLOL TARTRATE 50 MG/1
50 TABLET, FILM COATED ORAL EVERY 12 HOURS SCHEDULED
Status: DISCONTINUED | OUTPATIENT
Start: 2022-01-01 | End: 2022-01-01

## 2022-01-01 RX ORDER — HEPARIN SODIUM 1000 [USP'U]/ML
4000 INJECTION, SOLUTION INTRAVENOUS; SUBCUTANEOUS
Status: DISCONTINUED | OUTPATIENT
Start: 2022-01-01 | End: 2022-01-01

## 2022-01-01 RX ORDER — HEPARIN SODIUM 1000 [USP'U]/ML
2000 INJECTION, SOLUTION INTRAVENOUS; SUBCUTANEOUS
Status: DISCONTINUED | OUTPATIENT
Start: 2022-01-01 | End: 2022-01-01

## 2022-01-01 RX ORDER — HEPARIN SODIUM 10000 [USP'U]/100ML
3-20 INJECTION, SOLUTION INTRAVENOUS
Status: DISCONTINUED | OUTPATIENT
Start: 2022-01-01 | End: 2022-01-01

## 2022-01-01 RX ORDER — OMEGA-3S/DHA/EPA/FISH OIL/D3 300MG-1000
400 CAPSULE ORAL DAILY
Status: DISCONTINUED | OUTPATIENT
Start: 2022-01-01 | End: 2022-01-01

## 2022-01-01 RX ORDER — POTASSIUM CHLORIDE 20 MEQ/1
20 TABLET, EXTENDED RELEASE ORAL ONCE
Status: DISCONTINUED | OUTPATIENT
Start: 2022-01-01 | End: 2022-01-01

## 2022-01-01 RX ORDER — MORPHINE SULFATE 100 MG/5ML
5 SOLUTION, CONCENTRATE ORAL EVERY 2 HOUR PRN
Status: DISCONTINUED | OUTPATIENT
Start: 2022-01-01 | End: 2022-01-01 | Stop reason: HOSPADM

## 2022-01-01 RX ORDER — NICOTINE 21 MG/24HR
14 PATCH, TRANSDERMAL 24 HOURS TRANSDERMAL DAILY
Status: DISCONTINUED | OUTPATIENT
Start: 2022-01-01 | End: 2022-01-01 | Stop reason: HOSPADM

## 2022-01-01 RX ORDER — QUETIAPINE FUMARATE 25 MG/1
12.5 TABLET, FILM COATED ORAL
Status: DISCONTINUED | OUTPATIENT
Start: 2022-01-01 | End: 2022-01-01

## 2022-01-01 RX ORDER — FUROSEMIDE 40 MG/1
40 TABLET ORAL DAILY
COMMUNITY
End: 2022-01-01 | Stop reason: ALTCHOICE

## 2022-01-01 RX ORDER — POTASSIUM CHLORIDE 1.5 G/1.77G
20 POWDER, FOR SOLUTION ORAL 2 TIMES DAILY
Status: DISCONTINUED | OUTPATIENT
Start: 2022-01-01 | End: 2022-01-01

## 2022-01-01 RX ORDER — DOCUSATE SODIUM 100 MG/1
100 CAPSULE, LIQUID FILLED ORAL 2 TIMES DAILY
Status: DISCONTINUED | OUTPATIENT
Start: 2022-01-01 | End: 2022-01-01

## 2022-01-01 RX ORDER — SODIUM CHLORIDE 9 MG/ML
75 INJECTION, SOLUTION INTRAVENOUS CONTINUOUS
Status: DISCONTINUED | OUTPATIENT
Start: 2022-01-01 | End: 2022-01-01

## 2022-01-01 RX ORDER — METOPROLOL TARTRATE 5 MG/5ML
5 INJECTION INTRAVENOUS EVERY 8 HOURS SCHEDULED
Status: DISCONTINUED | OUTPATIENT
Start: 2022-01-01 | End: 2022-01-01

## 2022-01-01 RX ORDER — OLANZAPINE 10 MG/1
2.5 INJECTION, POWDER, LYOPHILIZED, FOR SOLUTION INTRAMUSCULAR ONCE AS NEEDED
Status: COMPLETED | OUTPATIENT
Start: 2022-01-01 | End: 2022-01-01

## 2022-01-01 RX ORDER — LORAZEPAM 2 MG/ML
0.5 CONCENTRATE ORAL EVERY 4 HOURS PRN
Qty: 30 ML | Refills: 0 | Status: SHIPPED | OUTPATIENT
Start: 2022-01-01

## 2022-01-01 RX ORDER — FUROSEMIDE 40 MG/1
40 TABLET ORAL
Status: DISCONTINUED | OUTPATIENT
Start: 2022-01-01 | End: 2022-01-01

## 2022-01-01 RX ORDER — POLYETHYLENE GLYCOL 3350 17 G/17G
17 POWDER, FOR SOLUTION ORAL DAILY
Status: DISCONTINUED | OUTPATIENT
Start: 2022-01-01 | End: 2022-01-01

## 2022-01-01 RX ORDER — LORAZEPAM 0.5 MG/1
0.5 TABLET ORAL EVERY 4 HOURS PRN
Status: DISCONTINUED | OUTPATIENT
Start: 2022-01-01 | End: 2022-01-01 | Stop reason: HOSPADM

## 2022-01-01 RX ORDER — HEPARIN SODIUM 10000 [USP'U]/100ML
3-24 INJECTION, SOLUTION INTRAVENOUS
Status: DISCONTINUED | OUTPATIENT
Start: 2022-01-01 | End: 2022-01-01

## 2022-01-01 RX ORDER — HALOPERIDOL 5 MG/ML
0.5 INJECTION INTRAMUSCULAR EVERY 2 HOUR PRN
Status: DISCONTINUED | OUTPATIENT
Start: 2022-01-01 | End: 2022-01-01

## 2022-01-01 RX ORDER — MORPHINE SULFATE 100 MG/5ML
SOLUTION ORAL
Qty: 15 ML | Refills: 0 | Status: SHIPPED | OUTPATIENT
Start: 2022-01-01 | End: 2022-01-01 | Stop reason: SDUPTHER

## 2022-01-01 RX ORDER — OXYCODONE HYDROCHLORIDE 5 MG/1
5 TABLET ORAL EVERY 8 HOURS SCHEDULED
Status: DISCONTINUED | OUTPATIENT
Start: 2022-01-01 | End: 2022-01-01

## 2022-01-01 RX ORDER — MORPHINE SULFATE 100 MG/5ML
SOLUTION ORAL
Qty: 30 ML | Refills: 0 | Status: SHIPPED | OUTPATIENT
Start: 2022-01-01

## 2022-01-01 RX ORDER — OXYCODONE HYDROCHLORIDE 5 MG/1
5 TABLET ORAL EVERY 2 HOUR PRN
Status: DISCONTINUED | OUTPATIENT
Start: 2022-01-01 | End: 2022-01-01

## 2022-01-01 RX ORDER — WATER 1000 ML/1000ML
INJECTION, SOLUTION INTRAVENOUS
Status: COMPLETED
Start: 2022-01-01 | End: 2022-01-01

## 2022-01-01 RX ORDER — COLCHICINE 0.6 MG/1
0.6 TABLET ORAL DAILY
Status: DISCONTINUED | OUTPATIENT
Start: 2022-01-01 | End: 2022-01-01

## 2022-01-01 RX ORDER — HYDROMORPHONE HCL/PF 1 MG/ML
0.3 SYRINGE (ML) INJECTION EVERY 2 HOUR PRN
Status: DISCONTINUED | OUTPATIENT
Start: 2022-01-01 | End: 2022-01-01

## 2022-01-01 RX ORDER — POTASSIUM CHLORIDE 20 MEQ/1
40 TABLET, EXTENDED RELEASE ORAL ONCE
Status: DISCONTINUED | OUTPATIENT
Start: 2022-01-01 | End: 2022-01-01

## 2022-01-01 RX ORDER — GUAIFENESIN 600 MG/1
1200 TABLET, EXTENDED RELEASE ORAL EVERY 12 HOURS SCHEDULED
Status: DISCONTINUED | OUTPATIENT
Start: 2022-01-01 | End: 2022-01-01

## 2022-01-01 RX ORDER — QUETIAPINE FUMARATE 25 MG/1
12.5 TABLET, FILM COATED ORAL
Status: DISCONTINUED | OUTPATIENT
Start: 2022-01-01 | End: 2022-01-01 | Stop reason: HOSPADM

## 2022-01-01 RX ORDER — ACETAMINOPHEN 325 MG/1
650 TABLET ORAL EVERY 6 HOURS PRN
Status: DISCONTINUED | OUTPATIENT
Start: 2022-01-01 | End: 2022-01-01 | Stop reason: SDUPTHER

## 2022-01-01 RX ORDER — FUROSEMIDE 20 MG/1
20 TABLET ORAL DAILY
Status: DISCONTINUED | OUTPATIENT
Start: 2022-01-01 | End: 2022-01-01 | Stop reason: HOSPADM

## 2022-01-01 RX ORDER — LORAZEPAM 2 MG/ML
CONCENTRATE ORAL
Qty: 30 ML | Refills: 0 | Status: SHIPPED | OUTPATIENT
Start: 2022-01-01

## 2022-01-01 RX ORDER — GLYCOPYRROLATE 0.2 MG/ML
0.1 INJECTION INTRAMUSCULAR; INTRAVENOUS EVERY 4 HOURS PRN
Status: DISCONTINUED | OUTPATIENT
Start: 2022-01-01 | End: 2022-01-01

## 2022-01-01 RX ORDER — MORPHINE SULFATE 100 MG/5ML
5 SOLUTION, CONCENTRATE ORAL EVERY 8 HOURS SCHEDULED
Status: DISCONTINUED | OUTPATIENT
Start: 2022-01-01 | End: 2022-01-01 | Stop reason: HOSPADM

## 2022-01-01 RX ORDER — BISACODYL 10 MG
10 SUPPOSITORY, RECTAL RECTAL DAILY PRN
Status: DISCONTINUED | OUTPATIENT
Start: 2022-01-01 | End: 2022-01-01 | Stop reason: HOSPADM

## 2022-01-01 RX ORDER — METOPROLOL TARTRATE 50 MG/1
50 TABLET, FILM COATED ORAL DAILY
Status: DISCONTINUED | OUTPATIENT
Start: 2022-01-01 | End: 2022-01-01 | Stop reason: HOSPADM

## 2022-01-01 RX ORDER — HEPARIN SODIUM 1000 [USP'U]/ML
4000 INJECTION, SOLUTION INTRAVENOUS; SUBCUTANEOUS ONCE
Status: COMPLETED | OUTPATIENT
Start: 2022-01-01 | End: 2022-01-01

## 2022-01-01 RX ORDER — ASPIRIN 81 MG/1
81 TABLET, CHEWABLE ORAL DAILY
Status: DISCONTINUED | OUTPATIENT
Start: 2022-01-01 | End: 2022-01-01 | Stop reason: HOSPADM

## 2022-01-01 RX ORDER — SODIUM CHLORIDE, SODIUM GLUCONATE, SODIUM ACETATE, POTASSIUM CHLORIDE, MAGNESIUM CHLORIDE, SODIUM PHOSPHATE, DIBASIC, AND POTASSIUM PHOSPHATE .53; .5; .37; .037; .03; .012; .00082 G/100ML; G/100ML; G/100ML; G/100ML; G/100ML; G/100ML; G/100ML
75 INJECTION, SOLUTION INTRAVENOUS CONTINUOUS
Status: DISCONTINUED | OUTPATIENT
Start: 2022-01-01 | End: 2022-01-01

## 2022-01-01 RX ORDER — ATROPINE SULFATE 10 MG/ML
2 SOLUTION/ DROPS OPHTHALMIC EVERY 2 HOUR PRN
Status: DISCONTINUED | OUTPATIENT
Start: 2022-01-01 | End: 2022-01-01 | Stop reason: HOSPADM

## 2022-01-01 RX ORDER — ACETAMINOPHEN 325 MG/1
975 TABLET ORAL EVERY 8 HOURS SCHEDULED
Status: DISCONTINUED | OUTPATIENT
Start: 2022-01-01 | End: 2022-01-01

## 2022-01-01 RX ORDER — OLANZAPINE 10 MG/1
2.5 INJECTION, POWDER, LYOPHILIZED, FOR SOLUTION INTRAMUSCULAR ONCE
Status: COMPLETED | OUTPATIENT
Start: 2022-01-01 | End: 2022-01-01

## 2022-01-01 RX ORDER — BISACODYL 10 MG
10 SUPPOSITORY, RECTAL RECTAL DAILY
Status: COMPLETED | OUTPATIENT
Start: 2022-01-01 | End: 2022-01-01

## 2022-01-01 RX ORDER — LOPERAMIDE HYDROCHLORIDE 2 MG/1
2 CAPSULE ORAL 4 TIMES DAILY PRN
Status: DISCONTINUED | OUTPATIENT
Start: 2022-01-01 | End: 2022-01-01 | Stop reason: HOSPADM

## 2022-01-01 RX ORDER — ONDANSETRON 2 MG/ML
4 INJECTION INTRAMUSCULAR; INTRAVENOUS EVERY 6 HOURS PRN
Status: DISCONTINUED | OUTPATIENT
Start: 2022-01-01 | End: 2022-01-01

## 2022-01-01 RX ORDER — ALBUTEROL SULFATE 90 UG/1
2 AEROSOL, METERED RESPIRATORY (INHALATION) EVERY 4 HOURS PRN
Status: DISCONTINUED | OUTPATIENT
Start: 2022-01-01 | End: 2022-01-01 | Stop reason: HOSPADM

## 2022-01-01 RX ORDER — GUAIFENESIN/DEXTROMETHORPHAN 100-10MG/5
10 SYRUP ORAL EVERY 4 HOURS PRN
Status: DISCONTINUED | OUTPATIENT
Start: 2022-01-01 | End: 2022-01-01 | Stop reason: HOSPADM

## 2022-01-01 RX ORDER — LACTULOSE 20 G/30ML
10 SOLUTION ORAL 2 TIMES DAILY
Status: DISCONTINUED | OUTPATIENT
Start: 2022-01-01 | End: 2022-01-01

## 2022-01-01 RX ORDER — GLYCOPYRROLATE 1 MG/1
1 TABLET ORAL 3 TIMES DAILY PRN
Status: DISCONTINUED | OUTPATIENT
Start: 2022-01-01 | End: 2022-01-01 | Stop reason: HOSPADM

## 2022-01-01 RX ORDER — METOPROLOL TARTRATE 5 MG/5ML
5 INJECTION INTRAVENOUS EVERY 8 HOURS
Status: DISCONTINUED | OUTPATIENT
Start: 2022-01-01 | End: 2022-01-01

## 2022-01-01 RX ORDER — POTASSIUM CHLORIDE 1.5 G/1.77G
20 POWDER, FOR SOLUTION ORAL 2 TIMES DAILY
COMMUNITY
End: 2022-01-01

## 2022-01-01 RX ORDER — OXYCODONE HYDROCHLORIDE 5 MG/1
5 TABLET ORAL EVERY 4 HOURS PRN
Status: DISCONTINUED | OUTPATIENT
Start: 2022-01-01 | End: 2022-01-01

## 2022-01-01 RX ORDER — FUROSEMIDE 40 MG/1
40 TABLET ORAL DAILY
Status: DISCONTINUED | OUTPATIENT
Start: 2022-01-01 | End: 2022-01-01

## 2022-01-01 RX ORDER — HALOPERIDOL 2 MG/ML
2 SOLUTION ORAL EVERY 4 HOURS PRN
Status: DISCONTINUED | OUTPATIENT
Start: 2022-01-01 | End: 2022-01-01 | Stop reason: HOSPADM

## 2022-01-01 RX ORDER — ACETAMINOPHEN 325 MG/1
975 TABLET ORAL EVERY 6 HOURS PRN
Status: DISCONTINUED | OUTPATIENT
Start: 2022-01-01 | End: 2022-01-01

## 2022-01-01 RX ORDER — ATORVASTATIN CALCIUM 40 MG/1
40 TABLET, FILM COATED ORAL EVERY EVENING
Status: DISCONTINUED | OUTPATIENT
Start: 2022-01-01 | End: 2022-01-01

## 2022-01-01 RX ADMIN — HEPARIN SODIUM 4000 UNITS: 1000 INJECTION INTRAVENOUS; SUBCUTANEOUS at 16:25

## 2022-01-01 RX ADMIN — DOCUSATE SODIUM 100 MG: 100 CAPSULE, LIQUID FILLED ORAL at 10:30

## 2022-01-01 RX ADMIN — QUETIAPINE FUMARATE 12.5 MG: 25 TABLET ORAL at 18:00

## 2022-01-01 RX ADMIN — METOPROLOL TARTRATE 50 MG: 50 TABLET, FILM COATED ORAL at 20:38

## 2022-01-01 RX ADMIN — NICOTINE 14 MG: 14 PATCH, EXTENDED RELEASE TRANSDERMAL at 08:04

## 2022-01-01 RX ADMIN — METOROPROLOL TARTRATE 5 MG: 5 INJECTION, SOLUTION INTRAVENOUS at 13:35

## 2022-01-01 RX ADMIN — MORPHINE SULFATE 5 MG: 20 SOLUTION ORAL at 22:22

## 2022-01-01 RX ADMIN — AMPICILLIN SODIUM 2000 MG: 2 INJECTION, POWDER, FOR SOLUTION INTRAMUSCULAR; INTRAVENOUS at 02:17

## 2022-01-01 RX ADMIN — Medication 400 UNITS: at 09:30

## 2022-01-01 RX ADMIN — QUETIAPINE FUMARATE 12.5 MG: 25 TABLET ORAL at 20:57

## 2022-01-01 RX ADMIN — METOROPROLOL TARTRATE 5 MG: 5 INJECTION, SOLUTION INTRAVENOUS at 05:40

## 2022-01-01 RX ADMIN — ASPIRIN 81 MG CHEWABLE TABLET 81 MG: 81 TABLET CHEWABLE at 10:30

## 2022-01-01 RX ADMIN — OXYCODONE HYDROCHLORIDE 5 MG: 5 TABLET ORAL at 08:02

## 2022-01-01 RX ADMIN — MORPHINE SULFATE 5 MG: 20 SOLUTION ORAL at 12:21

## 2022-01-01 RX ADMIN — HEPARIN SODIUM 2000 UNITS: 1000 INJECTION INTRAVENOUS; SUBCUTANEOUS at 07:40

## 2022-01-01 RX ADMIN — NICOTINE 14 MG: 14 PATCH, EXTENDED RELEASE TRANSDERMAL at 08:12

## 2022-01-01 RX ADMIN — ATORVASTATIN CALCIUM 40 MG: 40 TABLET, FILM COATED ORAL at 17:39

## 2022-01-01 RX ADMIN — QUETIAPINE FUMARATE 12.5 MG: 25 TABLET ORAL at 22:22

## 2022-01-01 RX ADMIN — NICOTINE 14 MG: 14 PATCH, EXTENDED RELEASE TRANSDERMAL at 09:05

## 2022-01-01 RX ADMIN — METOPROLOL TARTRATE 50 MG: 50 TABLET, FILM COATED ORAL at 21:45

## 2022-01-01 RX ADMIN — MORPHINE SULFATE 5 MG: 20 SOLUTION ORAL at 22:07

## 2022-01-01 RX ADMIN — LACTULOSE 10 G: 20 SOLUTION ORAL at 09:32

## 2022-01-01 RX ADMIN — SODIUM CHLORIDE 75 ML/HR: 0.9 INJECTION, SOLUTION INTRAVENOUS at 16:15

## 2022-01-01 RX ADMIN — METOPROLOL TARTRATE 50 MG: 50 TABLET, FILM COATED ORAL at 20:53

## 2022-01-01 RX ADMIN — METOPROLOL TARTRATE 50 MG: 50 TABLET, FILM COATED ORAL at 10:17

## 2022-01-01 RX ADMIN — QUETIAPINE FUMARATE 12.5 MG: 25 TABLET ORAL at 18:40

## 2022-01-01 RX ADMIN — QUETIAPINE FUMARATE 12.5 MG: 25 TABLET ORAL at 22:27

## 2022-01-01 RX ADMIN — QUETIAPINE FUMARATE 12.5 MG: 25 TABLET ORAL at 18:04

## 2022-01-01 RX ADMIN — METOPROLOL TARTRATE 50 MG: 50 TABLET, FILM COATED ORAL at 20:06

## 2022-01-01 RX ADMIN — NICOTINE 14 MG: 14 PATCH, EXTENDED RELEASE TRANSDERMAL at 10:08

## 2022-01-01 RX ADMIN — LACTULOSE 10 G: 20 SOLUTION ORAL at 10:19

## 2022-01-01 RX ADMIN — METOROPROLOL TARTRATE 5 MG: 5 INJECTION, SOLUTION INTRAVENOUS at 14:51

## 2022-01-01 RX ADMIN — FUROSEMIDE 20 MG: 20 TABLET ORAL at 09:30

## 2022-01-01 RX ADMIN — METOROPROLOL TARTRATE 5 MG: 5 INJECTION, SOLUTION INTRAVENOUS at 23:34

## 2022-01-01 RX ADMIN — LORAZEPAM 0.5 MG: 0.5 TABLET ORAL at 16:26

## 2022-01-01 RX ADMIN — POLYETHYLENE GLYCOL 3350 17 G: 17 POWDER, FOR SOLUTION ORAL at 09:11

## 2022-01-01 RX ADMIN — RIVAROXABAN 20 MG: 20 TABLET, FILM COATED ORAL at 17:12

## 2022-01-01 RX ADMIN — FUROSEMIDE 20 MG: 20 TABLET ORAL at 08:18

## 2022-01-01 RX ADMIN — DOCUSATE SODIUM 100 MG: 100 CAPSULE, LIQUID FILLED ORAL at 17:17

## 2022-01-01 RX ADMIN — ALLOPURINOL 100 MG: 100 TABLET ORAL at 08:42

## 2022-01-01 RX ADMIN — APIXABAN 5 MG: 5 TABLET, FILM COATED ORAL at 22:12

## 2022-01-01 RX ADMIN — MORPHINE SULFATE 5 MG: 20 SOLUTION ORAL at 05:29

## 2022-01-01 RX ADMIN — ASPIRIN 81 MG CHEWABLE TABLET 81 MG: 81 TABLET CHEWABLE at 08:02

## 2022-01-01 RX ADMIN — METOPROLOL TARTRATE 50 MG: 50 TABLET, FILM COATED ORAL at 10:19

## 2022-01-01 RX ADMIN — QUETIAPINE FUMARATE 12.5 MG: 25 TABLET ORAL at 18:26

## 2022-01-01 RX ADMIN — Medication 400 UNITS: at 09:03

## 2022-01-01 RX ADMIN — LACTULOSE 10 G: 20 SOLUTION ORAL at 10:46

## 2022-01-01 RX ADMIN — AMPICILLIN SODIUM 2000 MG: 2 INJECTION, POWDER, FOR SOLUTION INTRAMUSCULAR; INTRAVENOUS at 10:08

## 2022-01-01 RX ADMIN — LOPERAMIDE HYDROCHLORIDE 2 MG: 2 CAPSULE ORAL at 14:32

## 2022-01-01 RX ADMIN — METOROPROLOL TARTRATE 5 MG: 5 INJECTION, SOLUTION INTRAVENOUS at 05:47

## 2022-01-01 RX ADMIN — AMPICILLIN SODIUM 2000 MG: 2 INJECTION, POWDER, FOR SOLUTION INTRAMUSCULAR; INTRAVENOUS at 15:34

## 2022-01-01 RX ADMIN — METOPROLOL TARTRATE 50 MG: 50 TABLET, FILM COATED ORAL at 08:02

## 2022-01-01 RX ADMIN — NICOTINE 14 MG: 14 PATCH, EXTENDED RELEASE TRANSDERMAL at 09:30

## 2022-01-01 RX ADMIN — METOROPROLOL TARTRATE 5 MG: 5 INJECTION, SOLUTION INTRAVENOUS at 22:08

## 2022-01-01 RX ADMIN — LACTULOSE 10 G: 20 SOLUTION ORAL at 18:05

## 2022-01-01 RX ADMIN — METOROPROLOL TARTRATE 5 MG: 5 INJECTION, SOLUTION INTRAVENOUS at 21:25

## 2022-01-01 RX ADMIN — ASPIRIN 81 MG CHEWABLE TABLET 81 MG: 81 TABLET CHEWABLE at 09:05

## 2022-01-01 RX ADMIN — POLYETHYLENE GLYCOL 3350 17 G: 17 POWDER, FOR SOLUTION ORAL at 08:13

## 2022-01-01 RX ADMIN — DOCUSATE SODIUM 100 MG: 100 CAPSULE, LIQUID FILLED ORAL at 17:31

## 2022-01-01 RX ADMIN — METOPROLOL TARTRATE 50 MG: 50 TABLET, FILM COATED ORAL at 13:45

## 2022-01-01 RX ADMIN — MORPHINE SULFATE 5 MG: 20 SOLUTION ORAL at 13:12

## 2022-01-01 RX ADMIN — IOHEXOL 85 ML: 350 INJECTION, SOLUTION INTRAVENOUS at 17:38

## 2022-01-01 RX ADMIN — ATORVASTATIN CALCIUM 40 MG: 40 TABLET, FILM COATED ORAL at 17:58

## 2022-01-01 RX ADMIN — ASPIRIN 81 MG CHEWABLE TABLET 81 MG: 81 TABLET CHEWABLE at 08:58

## 2022-01-01 RX ADMIN — DOCUSATE SODIUM 100 MG: 100 CAPSULE, LIQUID FILLED ORAL at 08:20

## 2022-01-01 RX ADMIN — Medication 400 UNITS: at 10:17

## 2022-01-01 RX ADMIN — FUROSEMIDE 20 MG: 20 TABLET ORAL at 08:02

## 2022-01-01 RX ADMIN — METOPROLOL TARTRATE 50 MG: 50 TABLET, FILM COATED ORAL at 09:30

## 2022-01-01 RX ADMIN — ASPIRIN 81 MG CHEWABLE TABLET 81 MG: 81 TABLET CHEWABLE at 08:10

## 2022-01-01 RX ADMIN — HEPARIN SODIUM 15 UNITS/KG/HR: 10000 INJECTION, SOLUTION INTRAVENOUS at 18:43

## 2022-01-01 RX ADMIN — NICOTINE 14 MG: 14 PATCH, EXTENDED RELEASE TRANSDERMAL at 07:39

## 2022-01-01 RX ADMIN — ASPIRIN 81 MG CHEWABLE TABLET 81 MG: 81 TABLET CHEWABLE at 10:17

## 2022-01-01 RX ADMIN — GLYCOPYRROLATE 1 MG: 1 TABLET ORAL at 15:32

## 2022-01-01 RX ADMIN — NICOTINE 14 MG: 14 PATCH, EXTENDED RELEASE TRANSDERMAL at 10:23

## 2022-01-01 RX ADMIN — FUROSEMIDE 20 MG: 20 TABLET ORAL at 13:45

## 2022-01-01 RX ADMIN — ATORVASTATIN CALCIUM 40 MG: 40 TABLET, FILM COATED ORAL at 17:12

## 2022-01-01 RX ADMIN — METOPROLOL TARTRATE 50 MG: 50 TABLET, FILM COATED ORAL at 09:00

## 2022-01-01 RX ADMIN — ASPIRIN 81 MG CHEWABLE TABLET 81 MG: 81 TABLET CHEWABLE at 09:30

## 2022-01-01 RX ADMIN — FUROSEMIDE 20 MG: 20 TABLET ORAL at 09:13

## 2022-01-01 RX ADMIN — ASPIRIN 81 MG CHEWABLE TABLET 81 MG: 81 TABLET CHEWABLE at 09:00

## 2022-01-01 RX ADMIN — METOROPROLOL TARTRATE 5 MG: 5 INJECTION, SOLUTION INTRAVENOUS at 14:13

## 2022-01-01 RX ADMIN — QUETIAPINE FUMARATE 12.5 MG: 25 TABLET ORAL at 17:17

## 2022-01-01 RX ADMIN — ASPIRIN 81 MG CHEWABLE TABLET 81 MG: 81 TABLET CHEWABLE at 10:19

## 2022-01-01 RX ADMIN — Medication 400 UNITS: at 10:33

## 2022-01-01 RX ADMIN — LACTULOSE 10 G: 20 SOLUTION ORAL at 08:58

## 2022-01-01 RX ADMIN — METOROPROLOL TARTRATE 5 MG: 5 INJECTION, SOLUTION INTRAVENOUS at 21:44

## 2022-01-01 RX ADMIN — OXYCODONE HYDROCHLORIDE 5 MG: 5 TABLET ORAL at 11:43

## 2022-01-01 RX ADMIN — LACTULOSE 10 G: 20 SOLUTION ORAL at 17:17

## 2022-01-01 RX ADMIN — Medication 400 UNITS: at 08:58

## 2022-01-01 RX ADMIN — ASPIRIN 81 MG CHEWABLE TABLET 81 MG: 81 TABLET CHEWABLE at 08:20

## 2022-01-01 RX ADMIN — DOCUSATE SODIUM 100 MG: 100 CAPSULE, LIQUID FILLED ORAL at 10:17

## 2022-01-01 RX ADMIN — ATORVASTATIN CALCIUM 40 MG: 40 TABLET, FILM COATED ORAL at 19:48

## 2022-01-01 RX ADMIN — FUROSEMIDE 20 MG: 20 TABLET ORAL at 10:18

## 2022-01-01 RX ADMIN — CEFTRIAXONE SODIUM 1000 MG: 10 INJECTION, POWDER, FOR SOLUTION INTRAVENOUS at 09:42

## 2022-01-01 RX ADMIN — ATORVASTATIN CALCIUM 40 MG: 40 TABLET, FILM COATED ORAL at 18:04

## 2022-01-01 RX ADMIN — DOCUSATE SODIUM 100 MG: 100 CAPSULE, LIQUID FILLED ORAL at 17:33

## 2022-01-01 RX ADMIN — RIVAROXABAN 20 MG: 20 TABLET, FILM COATED ORAL at 18:26

## 2022-01-01 RX ADMIN — OXYCODONE HYDROCHLORIDE 5 MG: 5 TABLET ORAL at 14:22

## 2022-01-01 RX ADMIN — POLYETHYLENE GLYCOL 3350 17 G: 17 POWDER, FOR SOLUTION ORAL at 07:56

## 2022-01-01 RX ADMIN — WATER 2.1 ML: 1 INJECTION INTRAMUSCULAR; INTRAVENOUS; SUBCUTANEOUS at 23:01

## 2022-01-01 RX ADMIN — ASPIRIN 81 MG CHEWABLE TABLET 81 MG: 81 TABLET CHEWABLE at 09:13

## 2022-01-01 RX ADMIN — POLYETHYLENE GLYCOL 3350 17 G: 17 POWDER, FOR SOLUTION ORAL at 10:18

## 2022-01-01 RX ADMIN — LACTULOSE 10 G: 20 SOLUTION ORAL at 13:34

## 2022-01-01 RX ADMIN — QUETIAPINE FUMARATE 12.5 MG: 25 TABLET ORAL at 22:46

## 2022-01-01 RX ADMIN — MORPHINE SULFATE 5 MG: 20 SOLUTION ORAL at 14:32

## 2022-01-01 RX ADMIN — OXYCODONE HYDROCHLORIDE 5 MG: 5 TABLET ORAL at 06:05

## 2022-01-01 RX ADMIN — FUROSEMIDE 20 MG: 20 TABLET ORAL at 09:00

## 2022-01-01 RX ADMIN — AMPICILLIN SODIUM 2000 MG: 2 INJECTION, POWDER, FOR SOLUTION INTRAMUSCULAR; INTRAVENOUS at 14:56

## 2022-01-01 RX ADMIN — WATER 10 ML: 1 INJECTION INTRAMUSCULAR; INTRAVENOUS; SUBCUTANEOUS at 20:30

## 2022-01-01 RX ADMIN — METOPROLOL TARTRATE 50 MG: 50 TABLET, FILM COATED ORAL at 10:54

## 2022-01-01 RX ADMIN — DOCUSATE SODIUM 100 MG: 100 CAPSULE, LIQUID FILLED ORAL at 21:45

## 2022-01-01 RX ADMIN — ALLOPURINOL 100 MG: 100 TABLET ORAL at 10:17

## 2022-01-01 RX ADMIN — NICOTINE 14 MG: 14 PATCH, EXTENDED RELEASE TRANSDERMAL at 10:55

## 2022-01-01 RX ADMIN — NICOTINE 14 MG: 14 PATCH, EXTENDED RELEASE TRANSDERMAL at 08:18

## 2022-01-01 RX ADMIN — POLYETHYLENE GLYCOL 3350 17 G: 17 POWDER, FOR SOLUTION ORAL at 09:30

## 2022-01-01 RX ADMIN — POLYETHYLENE GLYCOL 3350 17 G: 17 POWDER, FOR SOLUTION ORAL at 08:42

## 2022-01-01 RX ADMIN — MORPHINE SULFATE 5 MG: 20 SOLUTION ORAL at 09:49

## 2022-01-01 RX ADMIN — Medication 400 UNITS: at 08:02

## 2022-01-01 RX ADMIN — MORPHINE SULFATE 5 MG: 20 SOLUTION ORAL at 22:27

## 2022-01-01 RX ADMIN — MORPHINE SULFATE 5 MG: 20 SOLUTION ORAL at 15:40

## 2022-01-01 RX ADMIN — DOCUSATE SODIUM 100 MG: 100 CAPSULE, LIQUID FILLED ORAL at 17:12

## 2022-01-01 RX ADMIN — AMPICILLIN SODIUM 2000 MG: 2 INJECTION, POWDER, FOR SOLUTION INTRAMUSCULAR; INTRAVENOUS at 08:10

## 2022-01-01 RX ADMIN — NICOTINE 14 MG: 14 PATCH, EXTENDED RELEASE TRANSDERMAL at 09:42

## 2022-01-01 RX ADMIN — METOPROLOL TARTRATE 50 MG: 50 TABLET, FILM COATED ORAL at 21:48

## 2022-01-01 RX ADMIN — MORPHINE SULFATE 5 MG: 20 SOLUTION ORAL at 15:29

## 2022-01-01 RX ADMIN — DOCUSATE SODIUM 100 MG: 100 CAPSULE, LIQUID FILLED ORAL at 17:58

## 2022-01-01 RX ADMIN — RIVAROXABAN 20 MG: 20 TABLET, FILM COATED ORAL at 16:01

## 2022-01-01 RX ADMIN — LACTULOSE 10 G: 20 SOLUTION ORAL at 17:12

## 2022-01-01 RX ADMIN — Medication 400 UNITS: at 10:19

## 2022-01-01 RX ADMIN — MORPHINE SULFATE 5 MG: 20 SOLUTION ORAL at 16:33

## 2022-01-01 RX ADMIN — HEPARIN SODIUM 11.1 UNITS/KG/HR: 10000 INJECTION, SOLUTION INTRAVENOUS at 16:27

## 2022-01-01 RX ADMIN — METOROPROLOL TARTRATE 5 MG: 5 INJECTION, SOLUTION INTRAVENOUS at 16:15

## 2022-01-01 RX ADMIN — DOCUSATE SODIUM 100 MG: 100 CAPSULE, LIQUID FILLED ORAL at 08:03

## 2022-01-01 RX ADMIN — POLYETHYLENE GLYCOL 3350 17 G: 17 POWDER, FOR SOLUTION ORAL at 10:08

## 2022-01-01 RX ADMIN — ALLOPURINOL 100 MG: 100 TABLET ORAL at 08:20

## 2022-01-01 RX ADMIN — ATROPINE SULFATE 2 DROP: 10 SOLUTION/ DROPS OPHTHALMIC at 15:32

## 2022-01-01 RX ADMIN — DOCUSATE SODIUM 100 MG: 100 CAPSULE, LIQUID FILLED ORAL at 09:03

## 2022-01-01 RX ADMIN — QUETIAPINE FUMARATE 12.5 MG: 25 TABLET ORAL at 22:07

## 2022-01-01 RX ADMIN — ALLOPURINOL 100 MG: 100 TABLET ORAL at 10:31

## 2022-01-01 RX ADMIN — METOROPROLOL TARTRATE 5 MG: 5 INJECTION, SOLUTION INTRAVENOUS at 22:53

## 2022-01-01 RX ADMIN — APIXABAN 5 MG: 5 TABLET, FILM COATED ORAL at 09:54

## 2022-01-01 RX ADMIN — ATORVASTATIN CALCIUM 40 MG: 40 TABLET, FILM COATED ORAL at 17:33

## 2022-01-01 RX ADMIN — MORPHINE SULFATE 5 MG: 20 SOLUTION ORAL at 11:38

## 2022-01-01 RX ADMIN — METOROPROLOL TARTRATE 5 MG: 5 INJECTION, SOLUTION INTRAVENOUS at 05:04

## 2022-01-01 RX ADMIN — ALLOPURINOL 100 MG: 100 TABLET ORAL at 08:58

## 2022-01-01 RX ADMIN — METOROPROLOL TARTRATE 5 MG: 5 INJECTION, SOLUTION INTRAVENOUS at 05:22

## 2022-01-01 RX ADMIN — CEFTRIAXONE SODIUM 1000 MG: 10 INJECTION, POWDER, FOR SOLUTION INTRAVENOUS at 09:53

## 2022-01-01 RX ADMIN — SODIUM CHLORIDE, SODIUM GLUCONATE, SODIUM ACETATE, POTASSIUM CHLORIDE, MAGNESIUM CHLORIDE, SODIUM PHOSPHATE, DIBASIC, AND POTASSIUM PHOSPHATE 75 ML/HR: .53; .5; .37; .037; .03; .012; .00082 INJECTION, SOLUTION INTRAVENOUS at 14:26

## 2022-01-01 RX ADMIN — MORPHINE SULFATE 5 MG: 20 SOLUTION ORAL at 05:52

## 2022-01-01 RX ADMIN — DOCUSATE SODIUM 100 MG: 100 CAPSULE, LIQUID FILLED ORAL at 08:42

## 2022-01-01 RX ADMIN — AMPICILLIN SODIUM 2000 MG: 2 INJECTION, POWDER, FOR SOLUTION INTRAMUSCULAR; INTRAVENOUS at 14:15

## 2022-01-01 RX ADMIN — Medication 400 UNITS: at 08:09

## 2022-01-01 RX ADMIN — GLYCOPYRROLATE 1 MG: 1 TABLET ORAL at 07:37

## 2022-01-01 RX ADMIN — MORPHINE SULFATE 5 MG: 20 SOLUTION ORAL at 06:44

## 2022-01-01 RX ADMIN — ASPIRIN 81 MG CHEWABLE TABLET 81 MG: 81 TABLET CHEWABLE at 08:18

## 2022-01-01 RX ADMIN — NICOTINE 14 MG: 14 PATCH, EXTENDED RELEASE TRANSDERMAL at 08:01

## 2022-01-01 RX ADMIN — AMPICILLIN SODIUM 2000 MG: 2 INJECTION, POWDER, FOR SOLUTION INTRAMUSCULAR; INTRAVENOUS at 09:01

## 2022-01-01 RX ADMIN — APIXABAN 5 MG: 5 TABLET, FILM COATED ORAL at 17:31

## 2022-01-01 RX ADMIN — MORPHINE SULFATE 5 MG: 20 SOLUTION ORAL at 22:46

## 2022-01-01 RX ADMIN — LACTULOSE 10 G: 20 SOLUTION ORAL at 18:26

## 2022-01-01 RX ADMIN — NICOTINE 14 MG: 14 PATCH, EXTENDED RELEASE TRANSDERMAL at 10:28

## 2022-01-01 RX ADMIN — NICOTINE 14 MG: 14 PATCH, EXTENDED RELEASE TRANSDERMAL at 09:01

## 2022-01-01 RX ADMIN — DOCUSATE SODIUM 100 MG: 100 CAPSULE, LIQUID FILLED ORAL at 09:05

## 2022-01-01 RX ADMIN — METOPROLOL TARTRATE 50 MG: 50 TABLET, FILM COATED ORAL at 09:05

## 2022-01-01 RX ADMIN — ALLOPURINOL 100 MG: 100 TABLET ORAL at 09:30

## 2022-01-01 RX ADMIN — POLYETHYLENE GLYCOL 3350 17 G: 17 POWDER, FOR SOLUTION ORAL at 08:58

## 2022-01-01 RX ADMIN — APIXABAN 5 MG: 5 TABLET, FILM COATED ORAL at 10:31

## 2022-01-01 RX ADMIN — APIXABAN 5 MG: 5 TABLET, FILM COATED ORAL at 17:39

## 2022-01-01 RX ADMIN — POLYETHYLENE GLYCOL 3350 17 G: 17 POWDER, FOR SOLUTION ORAL at 10:20

## 2022-01-01 RX ADMIN — QUETIAPINE FUMARATE 12.5 MG: 25 TABLET ORAL at 21:23

## 2022-01-01 RX ADMIN — BISACODYL 10 MG: 10 SUPPOSITORY RECTAL at 13:34

## 2022-01-01 RX ADMIN — METOROPROLOL TARTRATE 5 MG: 5 INJECTION, SOLUTION INTRAVENOUS at 14:58

## 2022-01-01 RX ADMIN — AMPICILLIN SODIUM 2000 MG: 2 INJECTION, POWDER, FOR SOLUTION INTRAMUSCULAR; INTRAVENOUS at 02:31

## 2022-01-01 RX ADMIN — RIVAROXABAN 20 MG: 20 TABLET, FILM COATED ORAL at 18:04

## 2022-01-01 RX ADMIN — LACTULOSE 10 G: 20 SOLUTION ORAL at 17:59

## 2022-01-01 RX ADMIN — Medication 400 UNITS: at 08:42

## 2022-01-01 RX ADMIN — METOROPROLOL TARTRATE 5 MG: 5 INJECTION, SOLUTION INTRAVENOUS at 05:33

## 2022-01-01 RX ADMIN — RIVAROXABAN 20 MG: 20 TABLET, FILM COATED ORAL at 17:17

## 2022-01-01 RX ADMIN — OLANZAPINE 2.5 MG: 10 INJECTION, POWDER, FOR SOLUTION INTRAMUSCULAR at 23:01

## 2022-01-01 RX ADMIN — FUROSEMIDE 20 MG: 20 TABLET ORAL at 10:55

## 2022-01-01 RX ADMIN — ASPIRIN 81 MG CHEWABLE TABLET 81 MG: 81 TABLET CHEWABLE at 10:58

## 2022-01-01 RX ADMIN — DOCUSATE SODIUM 100 MG: 100 CAPSULE, LIQUID FILLED ORAL at 09:30

## 2022-01-01 RX ADMIN — ASPIRIN 81 MG CHEWABLE TABLET 81 MG: 81 TABLET CHEWABLE at 08:42

## 2022-01-01 RX ADMIN — OLANZAPINE 2.5 MG: 10 INJECTION, POWDER, LYOPHILIZED, FOR SOLUTION INTRAMUSCULAR at 20:30

## 2022-01-01 RX ADMIN — APIXABAN 5 MG: 5 TABLET, FILM COATED ORAL at 09:03

## 2022-01-01 RX ADMIN — HEPARIN SODIUM 14 UNITS/KG/HR: 10000 INJECTION, SOLUTION INTRAVENOUS at 05:22

## 2022-01-01 RX ADMIN — METOPROLOL TARTRATE 50 MG: 50 TABLET, FILM COATED ORAL at 20:40

## 2022-01-01 RX ADMIN — OXYCODONE HYDROCHLORIDE 5 MG: 5 TABLET ORAL at 20:57

## 2022-01-01 RX ADMIN — ATORVASTATIN CALCIUM 40 MG: 40 TABLET, FILM COATED ORAL at 17:17

## 2022-01-01 RX ADMIN — CEFTRIAXONE SODIUM 1000 MG: 10 INJECTION, POWDER, FOR SOLUTION INTRAVENOUS at 12:25

## 2022-01-01 RX ADMIN — ATORVASTATIN CALCIUM 40 MG: 40 TABLET, FILM COATED ORAL at 17:31

## 2022-01-01 RX ADMIN — DOCUSATE SODIUM 100 MG: 100 CAPSULE, LIQUID FILLED ORAL at 17:46

## 2022-01-01 RX ADMIN — RIVAROXABAN 20 MG: 20 TABLET, FILM COATED ORAL at 17:58

## 2022-01-01 RX ADMIN — FUROSEMIDE 20 MG: 20 TABLET ORAL at 10:19

## 2022-01-01 RX ADMIN — NICOTINE 14 MG: 14 PATCH, EXTENDED RELEASE TRANSDERMAL at 08:02

## 2022-01-01 RX ADMIN — ALLOPURINOL 100 MG: 100 TABLET ORAL at 08:03

## 2022-01-01 RX ADMIN — ALLOPURINOL 100 MG: 100 TABLET ORAL at 08:10

## 2022-01-01 RX ADMIN — METOPROLOL TARTRATE 50 MG: 50 TABLET, FILM COATED ORAL at 08:18

## 2022-01-01 RX ADMIN — LACTULOSE 10 G: 20 SOLUTION ORAL at 08:11

## 2022-01-01 RX ADMIN — ATORVASTATIN CALCIUM 40 MG: 40 TABLET, FILM COATED ORAL at 18:26

## 2022-01-01 RX ADMIN — ALLOPURINOL 100 MG: 100 TABLET ORAL at 09:04

## 2022-01-01 RX ADMIN — AMPICILLIN SODIUM 2000 MG: 2 INJECTION, POWDER, FOR SOLUTION INTRAMUSCULAR; INTRAVENOUS at 05:22

## 2022-01-01 RX ADMIN — NICOTINE 14 MG: 14 PATCH, EXTENDED RELEASE TRANSDERMAL at 08:43

## 2022-01-01 RX ADMIN — AMPICILLIN SODIUM 2000 MG: 2 INJECTION, POWDER, FOR SOLUTION INTRAMUSCULAR; INTRAVENOUS at 22:53

## 2022-01-01 RX ADMIN — Medication 400 UNITS: at 08:20

## 2022-01-01 RX ADMIN — ALLOPURINOL 100 MG: 100 TABLET ORAL at 10:19

## 2022-01-01 RX ADMIN — HEPARIN SODIUM 16 UNITS/KG/HR: 10000 INJECTION, SOLUTION INTRAVENOUS at 11:35

## 2022-01-01 RX ADMIN — NICOTINE 14 MG: 14 PATCH, EXTENDED RELEASE TRANSDERMAL at 09:00

## 2022-01-01 RX ADMIN — DOCUSATE SODIUM 100 MG: 100 CAPSULE, LIQUID FILLED ORAL at 18:04

## 2022-01-01 RX ADMIN — LACTULOSE 10 G: 20 SOLUTION ORAL at 08:43

## 2022-01-01 RX ADMIN — HEPARIN SODIUM 2000 UNITS: 1000 INJECTION INTRAVENOUS; SUBCUTANEOUS at 23:55

## 2022-01-01 RX ADMIN — DOCUSATE SODIUM 100 MG: 100 CAPSULE, LIQUID FILLED ORAL at 08:10

## 2022-01-01 RX ADMIN — CEFTRIAXONE SODIUM 1000 MG: 10 INJECTION, POWDER, FOR SOLUTION INTRAVENOUS at 17:32

## 2022-01-01 RX ADMIN — METOPROLOL TARTRATE 50 MG: 50 TABLET, FILM COATED ORAL at 09:13

## 2022-01-01 RX ADMIN — METOROPROLOL TARTRATE 5 MG: 5 INJECTION, SOLUTION INTRAVENOUS at 06:28

## 2022-01-01 RX ADMIN — AMPICILLIN SODIUM 2000 MG: 2 INJECTION, POWDER, FOR SOLUTION INTRAMUSCULAR; INTRAVENOUS at 20:06

## 2022-01-01 RX ADMIN — AMPICILLIN SODIUM 2000 MG: 2 INJECTION, POWDER, FOR SOLUTION INTRAMUSCULAR; INTRAVENOUS at 22:07

## 2022-12-04 PROBLEM — E87.1 HYPONATREMIA: Status: ACTIVE | Noted: 2022-01-01

## 2022-12-04 PROBLEM — G93.40 ENCEPHALOPATHY: Status: ACTIVE | Noted: 2022-01-01

## 2022-12-04 PROBLEM — R29.90 STROKE-LIKE SYMPTOMS: Status: ACTIVE | Noted: 2022-01-01

## 2022-12-04 PROBLEM — D72.829 LEUKOCYTOSIS: Status: ACTIVE | Noted: 2022-01-01

## 2022-12-04 PROBLEM — S81.802A OPEN WOUND OF LEFT LOWER EXTREMITY: Status: ACTIVE | Noted: 2022-01-01

## 2022-12-04 NOTE — CONSULTS
Consultation - Stroke   Aaron Moreno 82 y o  male MRN: [id number]  Unit/Bed#: ED 13 Encounter: [id number]    Assessment/Plan   Stroke-like symptoms  Assessment & Plan  Assessment:  Aaron Moreno is a 82 y o  male with a PMH of aFib (on eliquis), BPH, COPD, Gout, HTN, Basal cell adenocarcinoma of salviatory gland, PAD and PVD, who presents as a stoke alert    It is reported by EMS that the patient's Sunday Walls was 10/4/2022 1600 after which there was acute onset left sided facial droop, left UA and LE weakness, and garbled speech  The patient is on ASA and Eliquis at baseline  His presenting BP was 133/88, glucose 121  His NIHSS on initial evaluation was 11  Initial CTH was not demonstrative of  acute intracranial abnormality, and showed table mild chronic microangiopathic changes within the brain  CTA HN showed atherosclerotic changes are seen within the carotid bifurcations and intracranial internal carotid arteries bilaterally  Suspected moderate stenosis of the right ICA origin and mild narrowing of the left carotid bifurcation  Intracranially there is also right greater than left stenosis, moderate on the left and mild on the right  The patient on subsequent examination was becoming much more distractible and was not able to cooperate with examination  He had difficulty following commands consistently  Imaging:  - CTH was not demonstrative of  acute intracranial abnormality, and showed table mild chronic microangiopathic changes within the brain   - CTA HN showed atherosclerotic changes are seen within the carotid bifurcations and intracranial internal carotid arteries bilaterally  Suspected moderate stenosis of the right ICA origin and mild narrowing of the left carotid bifurcation   Intracranially there is also right greater than left stenosis, moderate on the left and mild on the right   - MRI Brain wo: Pending  - TTE: Pending    Workup:  - A1C pending  - Lipid panel pending  - Ammonia pending  - 4-Plex: negative  - CXR pending  - UA w reflec scope and cx pending  - TEG Platelet mapping: Abnormal    Impression:  Considering the sudden onset of the symptoms and the imaging studies that are not suggestive of LVO, haemorrhage or infarct further workup is needed to determine the symptomatology  Imaging studies did reveal that there are atherosclerotic changes within the carotid bifurcations and intracranial internal carotid arteries bilaterally with both intracranial and extracranial ICA stenosis for which neurosurgery's input is appreciated  Stroke versus TIA versus vascular origin is to be considered  Plan:  - Plan discussed with attending physician Dr Tacho Fleming wo for acute changes in mental status or neurologic examination and please notify neurology  - Stroke Pathway  - Frequent neuro checks  - Telemetry  - Fall precautions  - Goals: Normotension, normothermia, euglycaemia  - MRI Brain wo pending  - TTE pending  - Continue Aspirin 81mg QD  - Continue Eliquis 5mg BID  - Continue Atorvastatin 40mg QD  - Obtain updated labs  - P2Y12 ordered  - PT/OT/ST/PMR as able  - Neurosurgery consulted; appreciate evaluation  - Rest per primary team; appreciated! Encephalopathy  Assessment & Plan  Assessment:  During assessment after initial NIHSS and imaging studies were completed it was noted that the patient had more trouble focusing on conversations, had more tangential speech, and was not able to follow commands as before  The patient kept perseverating on the numbers 9 and 14 which is the month and day respectively of his birthday  Impression:  Considering the elevated white count, hyponatremia, increased BUN, and increased CO2 an encephalopathy picture appears  Plan:  - Correct metabolic derangements  - Infectious workup recommended  --- CXR, UA with reflex to scope and UCx, Ammonia ordered  --- 4-plex negative  - Rest per primary team; appreciated! Thrombolytic Decision: Patient not a candidate  Bleeding risk  Recommendations for outpatient neurological follow up have yet to be determined  History of Present Illness   Reason for Consult / Principal Problem: Stroke Alert  Hx and PE limited by: Confusion  Patient last known well: 12/04/2022 1600  Stroke alert called: 12/04/2022 1720 (ETA of 9 minutes)  Neurology time of arrival: 12/04/2022 1725    HPI: Ashley Gutierrez is a 82 y o  (handedness not determinable) male with a PMH of aFib (on eliquis), BPH, COPD, Gout, HTN, Basal cell adenocarcinoma of salviatory gland, PAD and PVD, who presents as a stoke alert  It is reported by EMS that the patient's Johanny David was 10/4/2022 1600 after which there was acute onset left sided facial droop, left UA and LE weakness, and garbled speech  The patient is on ASA and Eliquis at baseline  His presenting BP was 133/88, glucose 121  His NIHSS on initial evaluation was 11 (+2 for gaze, +2 for L arm, +2 for L leg, +2 for R leg, +1 for mild sensory loss on LUE and LLE extremity, +1 moderate aphasia, +1 mild dysarthria)  Initial CTH was not demonstrative of  acute intracranial abnormality, and showed table mild chronic microangiopathic changes within the brain  CTA HN showed atherosclerotic changes are seen within the carotid bifurcations and intracranial internal carotid arteries bilaterally  Suspected moderate stenosis of the right ICA origin and mild narrowing of the left carotid bifurcation  Intracranially there is also right greater than left stenosis, moderate on the left and mild on the right  The patient on subsequent examination was becoming much more distractible and was not able to cooperate with examination  He had difficulty following commands consistently  Inpatient consult to Neurology  Consult performed by: Lakisha Dowling DO  Consult ordered by: Marisol Perez DO        Review of Systems   Constitutional: Negative for chills, diaphoresis, fatigue and fever     HENT: Negative for ear discharge, sinus pressure, sinus pain and sore throat  Eyes: Negative for pain and redness  Respiratory: Positive for cough  Negative for chest tightness and shortness of breath  Cardiovascular: Positive for leg swelling  Negative for chest pain and palpitations  Gastrointestinal: Negative for blood in stool, nausea and vomiting  Genitourinary: Negative for flank pain, hematuria and urgency  Musculoskeletal: Positive for back pain  Skin: Negative for pallor  Neurological: Positive for weakness and numbness  Negative for dizziness, tremors, seizures, syncope, facial asymmetry, speech difficulty, light-headedness and headaches  Psychiatric/Behavioral: Positive for confusion  Negative for behavioral problems, decreased concentration, dysphoric mood, hallucinations, self-injury, sleep disturbance and suicidal ideas  The patient is not nervous/anxious and is not hyperactive  Historical Information   Past Medical History:   Diagnosis Date   • Atrial fibrillation ([facility] )    • Cancer ([facility] )    • Hypertension      Past Surgical History:   Procedure Laterality Date   • REPLACEMENT TOTAL HIP W/  RESURFACING IMPLANTS       Social History   Social History     Substance and Sexual Activity   Alcohol Use Not Currently     Social History     Substance and Sexual Activity   Drug Use Not Currently     E-Cigarette/Vaping     E-Cigarette/Vaping Substances     Social History     Tobacco Use   Smoking Status Never   Smokeless Tobacco Never     Family History:   Family History   Problem Relation Age of Onset   • Stroke Mother      Review of previous medical records was completed  Meds/Allergies   all current active meds have been reviewed, current meds:   Current Facility-Administered Medications   Medication Dose Route Frequency   • atorvastatin (LIPITOR) tablet 40 mg  40 mg Oral QPM    and PTA meds:     Prior to Admission Medications   Prescriptions Last Dose Informant Patient Reported? Taking?    Metoprolol Tartrate 75 MG TABS No No   Sig: Take 1 tablet (75 mg total) by mouth every 12 (twelve) hours   acetaminophen (TYLENOL) 325 mg tablet   No No   Sig: Take 3 tablets (975 mg total) by mouth every 8 (eight) hours   allopurinol (ZYLOPRIM) 100 mg tablet   Yes No   Sig: Take 100 mg by mouth daily   apixaban (ELIQUIS) 5 mg   Yes No   Sig: Take 5 mg by mouth 2 (two) times a day   aspirin (ECOTRIN LOW STRENGTH) 81 mg EC tablet   Yes No   Sig: Take 81 mg by mouth daily   cholecalciferol (VITAMIN D3) 400 units tablet   Yes No   Sig: Take 400 Units by mouth daily   docusate sodium (COLACE) 100 mg capsule   No No   Sig: Take 1 capsule (100 mg total) by mouth 2 (two) times a day   metoprolol tartrate (LOPRESSOR) 50 mg tablet   Yes No   Sig: Take 50 mg by mouth every 12 (twelve) hours   metoprolol tartrate (LOPRESSOR) 50 mg tablet   Yes No   Sig: Take 50 mg by mouth 2 (two) times a day   polyethylene glycol (MIRALAX) 17 g packet   No No   Sig: Take 17 g by mouth daily as needed (constipation)   senna (SENOKOT) 8 6 mg   No No   Sig: Take 2 tablets (17 2 mg total) by mouth daily at bedtime   valsartan (DIOVAN) 40 mg tablet   Yes No   Sig: Take 10 mg by mouth daily   valsartan (DIOVAN) 40 mg tablet   Yes No   Sig: Take 40 mg by mouth   vitamin A 10,000 units capsule   Yes No   Sig: Take 10,000 Units by mouth daily      Facility-Administered Medications: None     Allergies   Allergen Reactions   • Balsam Dermatitis   • Neomycin-Bacitracin Zn-Polymyx Hives and Rash     Objective   Vitals:Blood pressure 126/71, pulse 100, temperature 97 9 °F (36 6 °C), temperature source Oral, resp  rate 18, weight 133 kg (293 lb 10 4 oz), SpO2 97 %  ,Body mass index is 44 65 kg/m²  No intake or output data in the 24 hours ending 12/04/22 1950    Invasive Devices: Invasive Devices     Peripheral Intravenous Line  Duration           Peripheral IV 12/04/22 Right;Ventral (anterior) Forearm <1 day              Physical Exam  Vitals and nursing note reviewed     HENT: Head: Normocephalic  Nose: Nose normal       Mouth/Throat:      Mouth: Mucous membranes are moist    Eyes:      Extraocular Movements: Extraocular movements intact  Pupils: Pupils are equal, round, and reactive to light  Cardiovascular:      Rate and Rhythm: Normal rate  Pulmonary:      Effort: Pulmonary effort is normal    Musculoskeletal:         General: Tenderness present  Cervical back: Normal range of motion  Right lower leg: Edema present  Left lower leg: Edema present  Skin:     General: Skin is warm  Capillary Refill: Capillary refill takes less than 2 seconds  Neurological:      Mental Status: He is alert  Coordination: Finger-Nose-Finger Test normal  Heel-to-shin test: Unable to assess, patient unable to elevate LE  Deep Tendon Reflexes:      Reflex Scores:       Tricep reflexes are 2+ on the right side and 2+ on the left side  Bicep reflexes are 2+ on the right side and 2+ on the left side  Brachioradialis reflexes are 2+ on the right side and 2+ on the left side  Patellar reflexes are 1+ on the right side and 1+ on the left side  Achilles reflexes are 1+ on the right side and 1+ on the left side  Psychiatric:         Mood and Affect: Mood normal        Neurologic Exam     Mental Status   Oriented to person  Disoriented to place  Disoriented to date  Oriented to year and month  Follows 1 step commands  Attention: decreased  Concentration: decreased  Level of consciousness: alert  Unable to name object  Unable to repeat  - Struggling to follow commands  - Speech is tangential, expressive aphasia noted  - When asked to read the words and describe the picture in the NIHSS the patient was unable to do so consistently and described the pictures incorrectly, named the cactus a "thing is going up", names the glove a "the blue thing", and called the chair a "it is going up here"        Cranial Nerves     CN III, IV, VI   Pupils are equal, round, and reactive to light  CN V   Right facial sensation deficit: none  Left facial sensation deficit: none    CN VII   Right facial weakness: none  Left facial weakness: none    CN VIII   Hearing: impaired    CN IX, X   Palate: symmetric    CN XI   Right sternocleidomastoid strength: normal  Left sternocleidomastoid strength: normal  Right trapezius strength: normal  Left trapezius strength: normal    CN XII   Tongue: not atrophic  Fasciculations: absent  Tongue deviation: none  - Visual fields, the patient was not able to state the number of fingers in each quadrant and unable to follow fingers to either left or right  Without moving the head  The patient was not blinking to threat consistently on repeat examination  Motor Exam   Muscle bulk: normal  Overall muscle tone: normal  Right arm tone: normal  Left arm tone: normal  Right leg tone: normal  Left leg tone: normal    Strength   Right neck flexion: 4/5  Left neck flexion: 4/5  Right neck extension: 4/5  Left neck extension: 4/5  Right deltoid: 4/5  Left deltoid: 4/5  Right biceps: 4/5  Left biceps: 3/5  Right triceps: 4/5  Left triceps: 3/5  Right iliopsoas: 4/5  Left iliopsoas: 2/5  Right quadriceps: 4/5  Left quadriceps: 2/5  Right anterior tibial: 4/5  Left anterior tibial: 3/5  Right posterior tibial: 4/5  Left posterior tibial: 3/5  Right peroneal: 4/5  Left peroneal: 3/5  Right gastroc: 4/5  Left gastroc: 3/5    Sensory Exam   Light touch normal    Right arm light touch: normal  Left arm light touch: normal  Right leg light touch: normal  Left leg light touch: normal    Gait, Coordination, and Reflexes     Coordination   Finger to nose coordination: normal  Heel-to-shin test: Unable to assess, patient unable to elevate LE      Tremor   Resting tremor: absent  Action tremor: absent    Reflexes   Right brachioradialis: 2+  Left brachioradialis: 2+  Right biceps: 2+  Left biceps: 2+  Right triceps: 2+  Left triceps: 2+  Right patellar: 1+  Left patellar: 1+  Right achilles: 1+  Left achilles: 1+  Right plantar: normal  Left plantar: normal  Right Stevens: absent  Left Stevens: absent  Right ankle clonus: absent  Left ankle clonus: absent  - Gait not assessed  Patient too weak in bilateral LEs  NIHSS:  1a Level of Consciousness: 0 = Alert   1b  LOC Questions: 0 = Answers both correctly   1c  LOC Commands: 0 = Obeys both correctly   2  Best Gaze: 2 = Forced Deviation   3  Visual: 0 = No visual field loss   4  Facial Palsy: 1=Minor paralysis (flattened nasolabial fold, asymmetric on smiling)   5a  Motor Right Arm: 0=No drift, limb holds 90 (or 45) degrees for full 10 seconds   5b  Motor Left Arm: 1=Drift, limb holds 90 (or 45) degrees but drifts down before full 10 seconds: does not hit bed   6a  Motor Right Le=Some effort against gravity, limb cannot get to or maintain (if cured) 90 (or 45) degrees, drifts down to bed, but has some effort against gravity   6b  Motor Left Le=Some effort against gravity, limb cannot get to or maintain (if cured) 90 (or 45) degrees, drifts down to bed, but has some effort against gravity   7  Limb Ataxia:  UN = Untestable (amputation, fused joint)   8  Sensory: 1=Mild to moderate sensory loss; patient feels pinprick is less sharp or is dull on the affected side; there is a loss of superficial pain with pinprick but patient is aware He is being touched   9  Best Language:  1=Mild to moderate aphasia; some obvious loss of fluency or facility of comprehension without significant limitation on ideas expressed or form of expression  10  Dysarthria: 1=Mild to moderate, patient slurs at least some words and at worst, can be understood with some difficulty   11  Extinction and Inattention (formerly Neglect): 0=No abnormality   Total Score: 11     Time NIHSS was completed: 1745    Modified Fan Score:  1 (No significant disability   Able to carry out all usual activities, despite some symptoms)    Lab Results: I have personally reviewed pertinent reports  Results from last 7 days   Lab Units 12/04/22  1730   WBC Thousand/uL 11 72*   RBC Million/uL 5 63*   HEMOGLOBIN g/dL 14 8   HEMATOCRIT % 48 0   MCV fL 85   PLATELETS Thousands/uL 279     Results from last 7 days   Lab Units 12/04/22  1730   SODIUM mmol/L 134*   POTASSIUM mmol/L 4 6   CHLORIDE mmol/L 96   CO2 mmol/L 34*   BUN mg/dL 26*   CREATININE mg/dL 1 15   CALCIUM mg/dL 10 0   EGFR ml/min/1 73sq m 57     Results from last 7 days   Lab Units 12/04/22  1730   INR  1 23*     Imaging Studies: I have personally reviewed pertinent reports  and I have personally reviewed pertinent films in PACS with a Radiologist      EKG, Pathology, and Other Studies: I have personally reviewed pertinent reports     and I have personally reviewed pertinent films in PACS with a Radiologist      VTE Prophylaxis: Sequential compression device Von Rylee)     Code Status: Prior  Advance Directive and Living Will:      Power of :    POLST:      Counseling / Coordination of Care  Please see attendings' attestation

## 2022-12-04 NOTE — ED PROVIDER NOTES
History  Chief Complaint   Patient presents with   • STROKE Alert     See pt chart     59-year-old male past medical history significant for AFib on Eliquis who presents ED today as a pre-hospital stroke clear  Per EMS reports patient last known normal was 1600  He started having symptoms at 4:40 p m  Of garbled speech, left-sided facial droop and left-sided weakness per EMS reports  Upon his arrival here to the ED his speech had seemed to improved however he was still having some left-sided drift  Patient was alert oriented but unable to tell me of any of the events that had just happened prior  He is currently denying any chest pain or shortness of breath  History limited secondary to acuity of condition  Prior to Admission Medications   Prescriptions Last Dose Informant Patient Reported? Taking?    Metoprolol Tartrate 75 MG TABS   No No   Sig: Take 1 tablet (75 mg total) by mouth every 12 (twelve) hours   acetaminophen (TYLENOL) 325 mg tablet   No No   Sig: Take 3 tablets (975 mg total) by mouth every 8 (eight) hours   allopurinol (ZYLOPRIM) 100 mg tablet   Yes No   Sig: Take 100 mg by mouth daily   apixaban (ELIQUIS) 5 mg   Yes No   Sig: Take 5 mg by mouth 2 (two) times a day   aspirin (ECOTRIN LOW STRENGTH) 81 mg EC tablet   Yes No   Sig: Take 81 mg by mouth daily   cholecalciferol (VITAMIN D3) 400 units tablet   Yes No   Sig: Take 400 Units by mouth daily   docusate sodium (COLACE) 100 mg capsule   No No   Sig: Take 1 capsule (100 mg total) by mouth 2 (two) times a day   metoprolol tartrate (LOPRESSOR) 50 mg tablet   Yes No   Sig: Take 50 mg by mouth every 12 (twelve) hours   metoprolol tartrate (LOPRESSOR) 50 mg tablet   Yes No   Sig: Take 50 mg by mouth 2 (two) times a day   polyethylene glycol (MIRALAX) 17 g packet   No No   Sig: Take 17 g by mouth daily as needed (constipation)   senna (SENOKOT) 8 6 mg   No No   Sig: Take 2 tablets (17 2 mg total) by mouth daily at bedtime   valsartan (DIOVAN) 40 mg tablet   Yes No   Sig: Take 10 mg by mouth daily   valsartan (DIOVAN) 40 mg tablet   Yes No   Sig: Take 40 mg by mouth   vitamin A 10,000 units capsule   Yes No   Sig: Take 10,000 Units by mouth daily      Facility-Administered Medications: None       Past Medical History:   Diagnosis Date   • Atrial fibrillation (HCC)    • Cancer ([facility] [address] )    • Hypertension        Past Surgical History:   Procedure Laterality Date   • REPLACEMENT TOTAL HIP W/  RESURFACING IMPLANTS         Family History   Problem Relation Age of Onset   • Stroke Mother      I have reviewed and agree with the history as documented  E-Cigarette/Vaping     E-Cigarette/Vaping Substances     Social History     Tobacco Use   • Smoking status: Never   • Smokeless tobacco: Never   Substance Use Topics   • Alcohol use: Not Currently   • Drug use: Not Currently        Review of Systems   Constitutional: Negative for chills and fever  HENT: Negative for hearing loss  Eyes: Negative for visual disturbance  Respiratory: Negative for shortness of breath  Cardiovascular: Negative for chest pain  Gastrointestinal: Negative for abdominal pain, constipation, diarrhea, nausea and vomiting  Genitourinary: Negative for difficulty urinating  Musculoskeletal: Negative for myalgias  Skin: Negative for rash  Neurological: Negative for dizziness  Psychiatric/Behavioral: Negative for agitation  All other systems reviewed and are negative        Physical Exam  ED Triage Vitals   Temperature Pulse Respirations Blood Pressure SpO2   12/04/22 1745 12/04/22 1730 12/04/22 1730 12/04/22 1745 12/04/22 1730   (!) 96 8 °F (36 °C) (!) 109 18 133/88 96 %      Temp Source Heart Rate Source Patient Position - Orthostatic VS BP Location FiO2 (%)   12/04/22 1745 12/04/22 1730 -- -- --   Axillary Monitor         Pain Score       --                    Orthostatic Vital Signs  Vitals:    12/04/22 1730 12/04/22 1745 12/04/22 1800   BP:  133/88 153/63   Pulse: (!) 109 102 96       Physical Exam  Vitals and nursing note reviewed  Constitutional:       General: He is not in acute distress  Appearance: Normal appearance  He is not ill-appearing  HENT:      Head: Normocephalic and atraumatic  Right Ear: External ear normal       Left Ear: External ear normal       Nose: Nose normal       Mouth/Throat:      Mouth: Mucous membranes are moist       Pharynx: Oropharynx is clear  No oropharyngeal exudate  Eyes:      Extraocular Movements: Extraocular movements intact  Conjunctiva/sclera: Conjunctivae normal       Pupils: Pupils are equal, round, and reactive to light  Cardiovascular:      Rate and Rhythm: Normal rate and regular rhythm  Pulses: Normal pulses  Heart sounds: Normal heart sounds  Pulmonary:      Effort: Pulmonary effort is normal       Breath sounds: Normal breath sounds  Abdominal:      General: Abdomen is flat  Bowel sounds are normal  There is no distension  Palpations: Abdomen is soft  Tenderness: There is no abdominal tenderness  Musculoskeletal:         General: No swelling  Normal range of motion  Cervical back: Normal range of motion  Skin:     General: Skin is warm and dry  Capillary Refill: Capillary refill takes less than 2 seconds  Neurological:      General: No focal deficit present  Mental Status: He is alert and oriented to person, place, and time  Mental status is at baseline  Comments: Left arm drift noted    Cranial nerves were intact with the exception of patient not looking to the left with his eyes however was unclear if this was secondary to mental status   Psychiatric:         Mood and Affect: Mood normal          Behavior: Behavior normal          ED Medications  Medications   iohexol (OMNIPAQUE) 350 MG/ML injection (SINGLE-DOSE) 85 mL (85 mL Intravenous Given 12/4/22 1731)       Diagnostic Studies  Results Reviewed     Procedure Component Value Units Date/Time    FLU/RSV/COVID - if FLU/RSV clinically relevant [[id number]]  (Normal) Collected: 12/04/22 1734    Lab Status: Final result Specimen: Nares from Nose Updated: 12/04/22 1822     SARS-CoV-2 Negative     INFLUENZA A PCR Negative     INFLUENZA B PCR Negative     RSV PCR Negative    Narrative:      FOR PEDIATRIC PATIENTS - copy/paste COVID Guidelines URL to browser: [email or web address]    SARS-CoV-2 assay is a Nucleic Acid Amplification assay intended for the  qualitative detection of nucleic acid from SARS-CoV-2 in nasopharyngeal  swabs  Results are for the presumptive identification of SARS-CoV-2 RNA  Positive results are indicative of infection with SARS-CoV-2, the virus  causing COVID-19, but do not rule out bacterial infection or co-infection  with other viruses  Laboratories within the [place] and its  territories are required to report all positive results to the appropriate  public health authorities  Negative results do not preclude SARS-CoV-2  infection and should not be used as the sole basis for treatment or other  patient management decisions  Negative results must be combined with  clinical observations, patient history, and epidemiological information  This test has not been FDA cleared or approved  This test has been authorized by FDA under an Emergency Use Authorization  (EUA)  This test is only authorized for the duration of time the  declaration that circumstances exist justifying the authorization of the  emergency use of an in vitro diagnostic tests for detection of SARS-CoV-2  virus and/or diagnosis of COVID-19 infection under section 564(b)(1) of  the Act, 21 U  S C  592ZWS-6(T)(7), unless the authorization is terminated  or revoked sooner  The test has been validated but independent review by FDA  and CLIA is pending  Test performed using [employer]: This RT-PCR assay targets N2,  a region unique to SARS-CoV-2   A conserved region in the E-gene was chosen  for pan-Sarbecovirus detection which includes SARS-CoV-2  According to CMS-2020-01-R, this platform meets the definition of high-throughput technology      TEG 6s Platelet Mapping [[id number]]  (Abnormal) Collected: 12/04/22 1730    Lab Status: Final result Specimen: Blood from Arm, Right Updated: 12/04/22 1806     HKHMA(MAX AMPLITUDE KAOLIN) 68 5 mm      ACTFMA(MAX AMPLITUDE ACTF) >30 mm      ADPMA(MAX AMPLITUDE ADP) 69 6 mm      AAMA (MAX AMPLITUDE AA) 69 2 mm      ADPADPINHIBITION --     AAASAINHIBITION --     ADPADPAGGREGATION --     AAASAAGGREGATION --    Basic metabolic panel [[id number]]  (Abnormal) Collected: 12/04/22 1730    Lab Status: Final result Specimen: Blood from Arm, Right Updated: 12/04/22 1803     Sodium 134 mmol/L      Potassium 4 6 mmol/L      Chloride 96 mmol/L      CO2 34 mmol/L      ANION GAP 4 mmol/L      BUN 26 mg/dL      Creatinine 1 15 mg/dL      Glucose 121 mg/dL      Calcium 10 0 mg/dL      eGFR 57 ml/min/1 73sq m     Narrative:      [place] guidelines for Chronic Kidney Disease (CKD):   •  Stage 1 with normal or high GFR (GFR > 90 mL/min/1 73 square meters)  •  Stage 2 Mild CKD (GFR = 60-89 mL/min/1 73 square meters)  •  Stage 3A Moderate CKD (GFR = 45-59 mL/min/1 73 square meters)  •  Stage 3B Moderate CKD (GFR = 30-44 mL/min/1 73 square meters)  •  Stage 4 Severe CKD (GFR = 15-29 mL/min/1 73 square meters)  •  Stage 5 End Stage CKD (GFR <15 mL/min/1 73 square meters)  Note: GFR calculation is accurate only with a steady state creatinine    Protime-INR [[id number]]  (Abnormal) Collected: 12/04/22 1730    Lab Status: Final result Specimen: Blood from Arm, Right Updated: 12/04/22 1801     Protime 15 8 seconds      INR 1 23    APTT [[id number]]  (Abnormal) Collected: 12/04/22 1730    Lab Status: Final result Specimen: Blood from Arm, Right Updated: 12/04/22 1801     PTT 38 seconds     CBC and Platelet [[id number]]  (Abnormal) Collected: 12/04/22 1730    Lab Status: Final result Specimen: Blood from Arm, Right Updated: 12/04/22 1749     WBC 11 72 Thousand/uL      RBC 5 63 Million/uL      Hemoglobin 14 8 g/dL      Hematocrit 48 0 %      MCV 85 fL      MCH 26 3 pg      MCHC 30 8 g/dL      RDW 18 6 %      Platelets 281 Thousands/uL      MPV 11 3 fL     HS Troponin 0hr (reflex protocol) [[id number]] Collected: 12/04/22 1730    Lab Status: In process Specimen: Blood from Arm, Right Updated: 12/04/22 1733                 CTA stroke alert (head/neck)   Final Result by Lico Alva DO (12/04 1877)      Unfortunately this examination is significantly limited due to patient motion  Atherosclerotic changes are seen within the carotid bifurcations and intracranial internal carotid arteries bilaterally  Suspected moderate stenosis of the right ICA origin and mild narrowing of the left carotid bifurcation  Intracranially there is    also right greater than left stenosis, moderate on the left and mild on the right      No occlusive disease or thrombosis identified  Findings were directly discussed with neurology resident at approximately 5:40 PM                            Workstation performed: [id number]         CT stroke alert brain   Final Result by Lico Alva DO (12/04 1755)      No acute intracranial abnormality  Stable mild chronic microangiopathic changes within the brain  Findings were directly discussed with neurology resident at 5:40 PM       Workstation performed: [id number]               Procedures  Procedures      ED Course  ED Course as of 12/04/22 1826   Sun Dec 04, 2022   1821 Procedure Note: EKG  Date/Time: 12/04/22 6:21 PM   Interpreted by: Scott Vanegas   Indications / Diagnosis: stroke alert  ECG reviewed by me, the ED Provider: yes   The EKG demonstrates:  Rhythm: afib  Intervals: normal intervals  Axis: normal axis  QRS/Blocks: normal QRS  ST Changes: No acute ST Changes, no STD/JARRED  Stroke Assessment     Row Name 12/04/22 1735             NIH Stroke Scale    Interval Baseline      Level of Consciousness (1a ) 0      LOC Questions (1b ) 1      LOC Commands (1c ) 0      Best Gaze (2 ) 1      Visual (3 ) 0      Facial Palsy (4 ) 0      Motor Arm, Left (5a ) 1      Motor Arm, Right (5b ) 0      Motor Leg, Left (6a ) 0      Motor Leg, Right (6b ) 0      Limb Ataxia (7 ) 0      Sensory (8 ) 0      Best Language (9 ) 0      Dysarthria (10 ) 0      Extinction and Inattention (11 ) (Formerly Neglect) 0      Total 3              Flowsheet Row Most Recent Value   Thrombolytic Decision Options    Thrombolytic Decision Patient not a candidate  Patient is not a candidate options Symptoms resolved/clearly non disabling , Bleeding risk  MDM  Number of Diagnoses or Management Options  A-fib ([facility] [address] )  Stroke-like symptoms  Diagnosis management comments: 29-year-old male presenting to the ED today for pre-hospital stroke alert  No large vessel occlusion shown on CTA  Seen in conjunction with Neurology at bedside upon patient arrival   Per their discussion we will admit to medicine on stroke pathway  Patient was admitted to internal medicine  Disposition  Final diagnoses:   Stroke-like symptoms   A-fib ([facility] [address] )     Time reflects when diagnosis was documented in both MDM as applicable and the Disposition within this note     Time User Action Codes Description Comment    12/4/2022  5:19 PM Leonel Lopez Add [R29 90] Stroke-like symptoms     12/4/2022  6:26 PM Leonel Lopez Add [I48 91] A-fib [facility])       ED Disposition     ED Disposition   Admit    Condition   Stable    Date/Time   Sun Dec 4, 2022  6:26 PM    Comment   Case was discussed with Dr Constanza Boone and the patient's admission status was agreed to be Admission Status: inpatient status to the service of Dr Constanza Boone              Follow-up Information    None         Patient's Medications   Discharge Prescriptions    No medications on file     No discharge procedures on file  PDMP Review     None           ED Provider  Attending physically available and evaluated Wang Mckoy I managed the patient along with the ED Attending      Electronically Signed by         Norma Hutson MD  12/05/22 0207

## 2022-12-04 NOTE — PROGRESS NOTES
Pastoral Care Progress Note    2022  Patient: Devon Larsen : 1935  Admission Date & Time: 2022 1726  MRN: [id number] CSN: [id number]                     Chaplaincy Interventions Utilized:      22 1700   Clinical Encounter Type   Visited With Patient   Crisis Visit Code;Critical Care     According to the ambulance personal, stroke alert was at 16:30 at home  No family members at the hospital at this moment

## 2022-12-04 NOTE — ED ATTENDING ATTESTATION
12/4/2022  IBradly DO, saw and evaluated the patient  I have discussed the patient with the resident/non-physician practitioner and agree with the resident's/non-physician practitioner's findings, Plan of Care, and MDM as documented in the resident's/non-physician practitioner's note, except where noted  All available labs and Radiology studies were reviewed  I was present for key portions of any procedure(s) performed by the resident/non-physician practitioner and I was immediately available to provide assistance  At this point I agree with the current assessment done in the Emergency Department  I have conducted an independent evaluation of this patient a history and physical is as follows:    83 yo male BIBA from home as a prehospital stroke alert  LSN [id number]  Garbled speech, L hemiparesis which was most dense on initial EMS eval  Improved by the time he arrived in the CT suite  Not consistently following commands, may have some component of L hemineglect  Able to hold LUE in air for >5s  Speech somewhat dysarthric and cannot recall what the date is today  He keeps saying seemingly random numbers "9, 14"    CT imaging reviewed in real time  CT non con: no ICH  CTA: no LVO, various areas of non critical stenosis  No dissection    Imp: R MCA territory CVA  On NOAC plan: stroke alert as above  Not a candidate for thrombolytic therapy due to taking NOAC  Will require admission for CVA pathway        ED Course         Critical Care Time  Procedures

## 2022-12-05 PROBLEM — G93.41 ACUTE METABOLIC ENCEPHALOPATHY: Status: ACTIVE | Noted: 2022-01-01

## 2022-12-05 PROBLEM — I65.23 STENOSIS OF BOTH INTERNAL CAROTID ARTERIES: Status: ACTIVE | Noted: 2022-01-01

## 2022-12-05 PROBLEM — L89.626 PRESSURE INJURY OF DEEP TISSUE OF LEFT HEEL: Status: ACTIVE | Noted: 2022-01-01

## 2022-12-05 PROBLEM — R65.10 SIRS (SYSTEMIC INFLAMMATORY RESPONSE SYNDROME) (HCC): Status: ACTIVE | Noted: 2022-01-01

## 2022-12-05 PROBLEM — R73.03 PREDIABETES: Status: ACTIVE | Noted: 2022-01-01

## 2022-12-05 PROBLEM — R13.12 OROPHARYNGEAL DYSPHAGIA: Status: ACTIVE | Noted: 2022-01-01

## 2022-12-05 NOTE — H&P
[facility]  H&P- [place] C[place] 1935, 82 y o  male MRN: [id number]  Unit/Bed#: ED 13 Encounter: [id number]  Primary Care Provider: Maik Braxton MD   Date and time admitted to hospital: 12/4/2022  5:26 PM    * Stroke-like symptoms  Assessment & Plan  · Patient was brought to the hospital with acute onset of left-sided weakness and garbled speech  Last unknown at 04:00 o'clock today  Patient and wife was having a conversation and then wife noted that patient speech was garbled  · When EMS presented patient with left-sided weakness  · Patient was a stroke alert  · Was evaluated by Neurology  · Patient is not a candidate for tPA due to bleeding risk  · Admitted under stroke pathway  · CT is negative for any acute pathology CTA is concerning for right ICA stenosis  · Continue with aspirin and statin  · MRI  · Monitor neuro status    Leukocytosis  Assessment & Plan  · Mild leukocytosis noted  · Monitor  · Follow further antibiotics  Obtain UA and chest x-ray  ·     Hyponatremia  Assessment & Plan  · Hyponatremia and elevated CO2 level noted  Hold Lasix for now  Monitor electrolytes  · Potassium is 4 6  Hold Klor-Con for today  · Recheck BMP tomorrow    Open wound of left lower extremity  Assessment & Plan  · Patient has left lower extremity wound-followed by wound care as outpatient  · Dressing change Monday Wednesday Friday  · No signs of infection    Encephalopathy  Assessment & Plan  · Patient with waxing and main nerve mental status concerning for encephalopathy  · Rule out infection  · COVID flu influenza negative  · UA pending  · Chest x-ray pending  · Family noted that the patient was more confused than usual recently and they attributed it to higher dose of Lasix due to worsening leg edema      Bilateral lower extremity edema  Assessment & Plan  · Patient in bilateral lower extremity edema which appears to be longstanding    Recently edema was worsening and was associated with weeping  Patient received high dose of Lasix recently as outpatient for worsening of edema  Will hold Lasix for today given hyponatremia  Recheck sodium tomorrow and restart back on the Lasix when able  · Patient has compression stockings on    Debility  Assessment & Plan  · Wife noted that the patient with worsening ambulatory dysfunction  · PT OT evaluation    Atrial fibrillation [facility])  Assessment & Plan  · Patient with known diagnosis of atrial fibrillation  ·   Appears to be persistent atrial fibrillation rate controlled with metoprolol  · Anticoagulated with Eliquis  Will continue with Eliquis  · Followed by Cardiology as outpatient    Benign hypertension  Assessment & Plan  · Monitor blood pressure  Permissive hypertension  · Continue with metoprolol with holding parameters    VTE Pharmacologic Prophylaxis:   eliquis  Code Status: Level 3 - DNAR and DNI   Discussion with family: Updated  (wife) at bedside  Anticipated Length of Stay: Patient will be admitted on an inpatient basis with an anticipated length of stay of greater than 2 midnights secondary to stroke like symptoms  Total Time for Visit, including Counseling / Coordination of Care: 70 minutes Greater than 50% of this total time spent on direct patient counseling and coordination of care  Chief Complaint:     History of Present Illness:  Morales Ewing is a 82 y o  male with a PMH of atrial fibrillation who presents with stroke-like symptoms  Patient was last unknown at 04:00 o'clock today  Patient wife provided most of the history  She reported that she was watching football along with the patient and they argued about the game being tied and all of a sudden patient noted to have garbled speech  Wife was not able to understand what he was talking about  Initially she thought that the patient is sleeping since he has been sleeping more and going in and out of sleep recently    Since his symptoms does not resolve his called EMS  When the EMS was present patient noted to have left-sided weakness  He patient's stepdaughter reported that patient was able to move the left upper extremity a earlier that day  Patient opens eyes to voice at try to answers questions  He reports that he is in [facility] but unable to tell me the exact date or the year  Knows that this is December  Patient is tangential during the evaluation  Wife denied any fever  Patient denies any chills or any pain  Wife reported that patient stopped reading paper approximately a week ago  Up until then he was interested in crossword puzzles and reading newspaper but for the past 1 week or so he has not reading anymore    Review of Systems:  Review of Systems   Constitutional: Positive for activity change and fatigue  Negative for appetite change and fever  HENT: Negative  Respiratory: Positive for cough  Cardiovascular: Positive for leg swelling  Gastrointestinal: Negative  Endocrine: Negative  Genitourinary: Negative  Musculoskeletal: Positive for arthralgias and joint swelling  Neurological: Positive for facial asymmetry, speech difficulty and weakness  Psychiatric/Behavioral: Positive for confusion  Past Medical and Surgical History:   Past Medical History:   Diagnosis Date   • Atrial fibrillation ([facility] )    • Cancer ([facility] )    • Hypertension        Past Surgical History:   Procedure Laterality Date   • REPLACEMENT TOTAL HIP W/  RESURFACING IMPLANTS         Meds/Allergies:  Prior to Admission medications    Medication Sig Start Date End Date Taking?  Authorizing Provider   colchicine (COLCRYS) 0 6 mg tablet Take 0 6 mg by mouth daily   Yes Historical Provider, MD   furosemide (LASIX) 40 mg tablet Take 40 mg by mouth daily   Yes Historical Provider, MD   potassium chloride (KLOR-CON) 20 mEq packet Take 20 mEq by mouth 2 (two) times a day   Yes Historical Provider, MD   acetaminophen (TYLENOL) 325 mg tablet Take 3 tablets (975 mg total) by mouth every 8 (eight) hours 12/9/19   Hollie Shaw PA-C   allopurinol (ZYLOPRIM) 100 mg tablet Take 100 mg by mouth daily    Historical Provider, MD   apixaban (ELIQUIS) 5 mg Take 5 mg by mouth 2 (two) times a day    Historical Provider, MD   aspirin (ECOTRIN LOW STRENGTH) 81 mg EC tablet Take 81 mg by mouth daily    Historical Provider, MD   cholecalciferol (VITAMIN D3) 400 units tablet Take 400 Units by mouth daily    Historical Provider, MD   docusate sodium (COLACE) 100 mg capsule Take 1 capsule (100 mg total) by mouth 2 (two) times a day 12/9/19   Hollie Shaw PA-C   metoprolol tartrate (LOPRESSOR) 50 mg tablet Take 50 mg by mouth every 12 (twelve) hours    Historical Provider, MD   vitamin A 10,000 units capsule Take 10,000 Units by mouth daily    Historical Provider, MD   metoprolol tartrate (LOPRESSOR) 50 mg tablet Take 50 mg by mouth 2 (two) times a day 1/7/20 12/4/22  Historical Provider, MD   Metoprolol Tartrate 75 MG TABS Take 1 tablet (75 mg total) by mouth every 12 (twelve) hours 12/9/19 12/4/22  Tere Shaw PA-C   polyethylene glycol (MIRALAX) 17 g packet Take 17 g by mouth daily as needed (constipation) 12/9/19 12/4/22  Tere Shaw PA-C   senna (SENOKOT) 8 6 mg Take 2 tablets (17 2 mg total) by mouth daily at bedtime 12/9/19 12/4/22  Hollie Shaw PA-C   valsartan (DIOVAN) 40 mg tablet Take 10 mg by mouth daily  12/4/22  Historical Provider, MD   valsartan (DIOVAN) 40 mg tablet Take 40 mg by mouth 1/8/20 12/4/22  Historical Provider, MD     I have reviewed home medications with a medical source (PCP, Pharmacy, other)  Allergies:    Allergies   Allergen Reactions   • Balsam Dermatitis   • Neomycin-Bacitracin Zn-Polymyx Hives and Rash       Social History:  Marital Status: /Civil Union   Occupation:  Patient Pre-hospital Living Situation: Home  Patient Pre-hospital Level of Mobility: walks with walker  Patient Pre-hospital Diet Restrictions:   Substance Use History:   Social History     Substance and Sexual Activity   Alcohol Use Not Currently     Social History     Tobacco Use   Smoking Status Never   Smokeless Tobacco Never     Social History     Substance and Sexual Activity   Drug Use Not Currently       Family History:  Family History   Problem Relation Age of Onset   • Stroke Mother        Physical Exam:     Vitals:   Blood Pressure: 126/71 (12/04/22 1930)  Pulse: 100 (12/04/22 1930)  Temperature: 97 9 °F (36 6 °C) (12/04/22 1800)  Temp Source: Oral (12/04/22 1800)  Respirations: 18 (12/04/22 1930)  Weight - Scale: 133 kg (293 lb 10 4 oz) (12/04/22 1729)  SpO2: 97 % (12/04/22 1930)    Physical Exam  Constitutional:       General: He is not in acute distress  HENT:      Head: Normocephalic and atraumatic  Nose: No congestion  Eyes:      General: No scleral icterus  Cardiovascular:      Rate and Rhythm: Normal rate  Rhythm irregular  Pulmonary:      Effort: Pulmonary effort is normal       Comments: Decreased breath sounds bilateral  Abdominal:      General: There is no distension  Palpations: Abdomen is soft  There is no mass  Musculoskeletal:      Right lower leg: Edema present  Left lower leg: Edema present  Comments: Lower extremity edema chronic venous stasis changes  Left heel wound covered in dressing  Left feet swelling noted with tenderness of the base of hallux   Skin:     General: Skin is warm  Neurological:      Mental Status: He is alert        Comments: Aphasia  Left lower extremity 3+ out of 5 left upper extremity 4+ out of 5          Additional Data:     Lab Results:  Results from last 7 days   Lab Units 12/04/22  1730   WBC Thousand/uL 11 72*   HEMOGLOBIN g/dL 14 8   HEMATOCRIT % 48 0   PLATELETS Thousands/uL 279     Results from last 7 days   Lab Units 12/04/22  1730   SODIUM mmol/L 134*   POTASSIUM mmol/L 4 6   CHLORIDE mmol/L 96   CO2 mmol/L 34*   BUN mg/dL 26*   CREATININE mg/dL 1 15   ANION GAP mmol/L 4   CALCIUM mg/dL 10 0   GLUCOSE RANDOM mg/dL 121     Results from last 7 days   Lab Units 12/04/22  1730   INR  1 23*                   Lines/Drains:  Invasive Devices     Peripheral Intravenous Line  Duration           Peripheral IV 12/04/22 Right;Ventral (anterior) Forearm <1 day                    Imaging: Reviewed radiology reports from this admission including: CT head  CTA stroke alert (head/neck)   Final Result by Chris Benavidez,  (12/04 1755)      Unfortunately this examination is significantly limited due to patient motion  Atherosclerotic changes are seen within the carotid bifurcations and intracranial internal carotid arteries bilaterally  Suspected moderate stenosis of the right ICA origin and mild narrowing of the left carotid bifurcation  Intracranially there is    also right greater than left stenosis, moderate on the left and mild on the right      No occlusive disease or thrombosis identified  Findings were directly discussed with neurology resident at approximately 5:40 PM                            Workstation performed: [id number]         CT stroke alert brain   Final Result by Lico Holloway DO (12/04 1755)      No acute intracranial abnormality  Stable mild chronic microangiopathic changes within the brain  Findings were directly discussed with neurology resident at 5:40 PM       Workstation performed: [id number]         MRI Inpatient Order    (Results Pending)   XR chest portable    (Results Pending)       EKG and Other Studies Reviewed on Admission:   · EKG: Atrial fibrillation    ** Please Note: This note has been constructed using a voice recognition system   **

## 2022-12-05 NOTE — DISCHARGE INSTR - OTHER ORDERS
Skin Care Plan:  1-Preventative hydraguard to B/L Sacro-Buttocks and Right Heel BID and PRN  2-Turn/reposition q2h or when medically stable for pressure re-distribution on skin   3-Elevate heels to offload pressure  4-Moisturize skin daily with skin nourishing cream  5-Ehob cushion in chair when out of bed  6-Left Heel: Per Podiatry Recommendations   7-Left Foot/ Posterior Leg: Paint betadine only over eschar on left posterior leg  Cover eschar and areas of light purple non-blanchable erythema with ABDS and secure with russel  Perform daily or PRN soilage or displacement      8- P-500 Specialty mattress ordered for patient

## 2022-12-05 NOTE — ASSESSMENT & PLAN NOTE
· Patient with known diagnosis of atrial fibrillation  ·   Appears to be persistent atrial fibrillation rate controlled with metoprolol  · Anticoagulated with Eliquis    Will continue with Eliquis  · Followed by Cardiology as outpatient

## 2022-12-05 NOTE — ASSESSMENT & PLAN NOTE
· Currently in atrial fibrillation with RVR with -120s   · Rate controlled with metoprolol 50 mg q12h as outpatient   · Transition to 5 mg IV q8h given inability to tolerate PO  · Anticoagulated with Eliquis   · Initiate heparin gtt given inability to tolerate PO  · Continue with telemetry monitoring

## 2022-12-05 NOTE — WOUND OSTOMY CARE
Consult Note - Wound   Ene Mckeon 82 y o  male MRN: [id number]  Unit/Bed#: [facility] 725-01 Encounter: [id number]        History and Present Illness:  Patient is a 83 yo male that was admitted to the hospital for treatment of stroke-like symptoms  Patient has a PMH of A-fib Patient is a moderate assist for turning and repositioning  Patient is incontinent of bowel and bladder managed via external urinary catheter  On assessment, patient is lying supine on regular mattress  Wound Care was consulted for lower extremity wound  Assessment Findings:   B/L Sacro-Buttocks are dry, intact, pink, and kaya with no skin loss or wounds present recommend preventative hydraguard  Right heel is dry, intact, and kaya with no wounds or skin loss noted  B/L legs are dry and scaly and intact  Spouse and daughter present at bedside  1  POA Left Heel Unstageable Pressure Injury: Area with significant skin loss  Wound bed is 80% black, moist appearing eschar, 10% yellow slough, and 10% light purple non-blanchable erythema  Samra-wound appears pale and macerated and fragile  Scant serosanguneous and foul smelling drainage noted  MD made aware  Podiatry consulted  Area was cleansed and dressed with adaptic, ABD, and russel wrap and offloaded with prevalon boot  Podiatry Saw patient after wound care assessment and are managing & following- orders placed  No induration, fluctuance, odor, warmth/temperature differences, redness, or purulence noted to the above noted wounds and skin areas assessed  New dressings applied per orders listed below  Patient tolerated well- no s/s of non-verbal pain or discomfort observed during the encounter  Bedside nurse aware of plan of care  See flow sheets for more detailed assessment findings  Orders listed below and wound care will sign off, call or tiger text with questions           Skin Care Plan:  1-Preventative hydraguard to B/L Sacro-Buttocks and Right Heel BID and PRN  2-Turn/reposition q2h or when medically stable for pressure re-distribution on skin   3-Elevate heels to offload pressure  4-Moisturize skin daily with skin nourishing cream  5-Ehob cushion in chair when out of bed  6-Left Heel: Per Podiatry Recommendations          Wounds:  Wound 12/05/22 Pressure Injury Heel Left (Active)   Wound Image   12/05/22 1151   Wound Description Black;Eschar;Light purple;Yellow;Slough 12/05/22 1151   Pressure Injury Stage U 12/05/22 1151   Samra-wound Assessment Maceration;Fragile 12/05/22 1151   Wound Length (cm) 4 5 cm 12/05/22 1151   Wound Width (cm) 6 cm 12/05/22 1151   Wound Depth (cm) 0 3 cm 12/05/22 1151   Wound Surface Area (cm^2) 27 cm^2 12/05/22 1151   Wound Volume (cm^3) 8 1 cm^3 12/05/22 1151   Calculated Wound Volume (cm^3) 8 1 cm^3 12/05/22 1151   Drainage Amount Scant 12/05/22 1151   Drainage Description Foul smelling;Serosanguineous 12/05/22 1151   Non-staged Wound Description Full thickness 12/05/22 1151   Treatments Cleansed;Site care 12/05/22 1151   Dressing Non adherent;ABD;Dry dressing 12/05/22 1151   Wound packed? No 12/05/22 1151   Dressing Changed New 12/05/22 1151   Patient Tolerance Tolerated well 12/05/22 1151   Dressing Status Clean;Dry; Intact 12/05/22 2420 G Sher RN, BSN

## 2022-12-05 NOTE — CONSULTS
[address] 1935, 82 y o  male MRN: [id number]  Unit/Bed#: [facility] 725-01 Encounter: [id number]  Primary Care Provider: June Bhat MD   Date and time admitted to hospital: 12/4/2022  5:26 PM    Inpatient consult to Neurosurgery  Consult performed by: Kerry Boas, CRNP  Consult ordered by: Leroy Valladares DO          Encephalopathy  Assessment & Plan  See above  Atrial fibrillation [facility])  Assessment & Plan  On Eliquis and ASA    * Stroke-like symptoms  Assessment & Plan  Right ICA stenosis  · Presented to ED 12/4/2022 with left side weakness and garbled speech  · Not a tPA candidate secondary to bleeding risk  · Admitted under stroke pathway  · Concern on CTA for right ICA stenosis  · Neurology consulted  · On current exam, opens eyes to noxious stimulus  Left sided weakness/plegia  Not following commands  Right side purposeful  Imaging:  · MRI brain wo, 12/4/2022: Motion degraded examination  No acute infarction, edema, or mass effect  Mild chronic microangiopathic ischemic changes  · CTA stroke alert, 12/4/2022: Unfortunately this examination is significantly limited due to patient motion  Atherosclerotic changes are seen within the carotid bifurcations and intracranial internal carotid arteries bilaterally  Suspected moderate stenosis of the right ICA origin and mild narrowing of the left carotid bifurcation  Intracranially there is also right greater than left stenosis, moderate on the left and mild on the right  No occlusive disease or thrombosis identified  Plan:  · Continue to monitor neuro exam closely  · Continue Eliquis and ASA  · Ongoing medical/stroke care per neurology  · Workup for other causes of encephalopathy including infection in setting of leukocytosis  · Consider repeat CTA in 1-2 weeks  · Mobilize with PT/OT  · DVT ppx: SCDs  Neurosurgery will sign off   Follow up per neurology team  Call with questions  History of Present Illness   HPI: Charanjit Gutierrez is a 82 y o  male with PMH including atrial fibrillation on Eliquis and aspirin, BPH, COPD, gout, hypertension, basal cell adenocarcinoma, P 80, PVD, presented on 10/04/2022 as a stroke alert  He was sitting with his wife when he suddenly developed left-sided facial droop, garbled speech and left-sided weakness  On arrival his NIH SS score was 11  CTA imaging indicated atherosclerosis bilaterally and moderate stenosis of the right ICA  Patient's exam continued to decline in he was thought to have a component of encephalopathy secondary to leukocytosis and known foot wound  Neurology was consulted and recommending ongoing further workup for etiology of symptoms  He has resumed his aspirin and Eliquis  He is not answering any questions today  Review of Systems   Unable to perform ROS: Mental status change       Historical Information   Past Medical History:   Diagnosis Date   • Atrial fibrillation ([facility] )    • Cancer ([facility] )    • Hypertension      Past Surgical History:   Procedure Laterality Date   • REPLACEMENT TOTAL HIP W/  RESURFACING IMPLANTS       Social History     Substance and Sexual Activity   Alcohol Use Not Currently     Social History     Substance and Sexual Activity   Drug Use Not Currently     Social History     Tobacco Use   Smoking Status Never   Smokeless Tobacco Never     Family History   Problem Relation Age of Onset   • Stroke Mother        Meds/Allergies   all current active meds have been reviewed  Allergies   Allergen Reactions   • Balsam Dermatitis   • Neomycin-Bacitracin Zn-Polymyx Hives and Rash       Objective   I/O       12/03 0701  12/04 0700 12/04 0701  12/05 0700 12/05 0701  12/06 0700    Urine (mL/kg/hr)  100     Total Output  100     Net  -100                  Physical Exam  Constitutional:       General: He is not in acute distress  Appearance: He is well-developed and well-nourished  He is ill-appearing   He is not diaphoretic  Eyes:      General:         Right eye: No discharge  Left eye: No discharge  Extraocular Movements: EOM normal       Conjunctiva/sclera: Conjunctivae normal       Pupils: Pupils are equal, round, and reactive to light  Pulmonary:      Effort: Pulmonary effort is normal  No respiratory distress  Abdominal:      General: Bowel sounds are normal  There is no distension  Palpations: Abdomen is soft  Tenderness: There is no abdominal tenderness  Musculoskeletal:         General: Normal range of motion  Cervical back: Normal range of motion and neck supple  Skin:     General: Skin is warm and dry  Neurological:      Mental Status: He is disoriented  Cranial Nerves: No cranial nerve deficit  Motor: Weakness present  Deep Tendon Reflexes: Reflexes normal    Psychiatric:         Mood and Affect: Mood and affect normal        Neurologic Exam     Mental Status   No verbal response  Opens eyes to noxious stimulus  Purposefully movement of RUE and following command in that limb  Plegic left side  Cranial Nerves     CN III, IV, VI   Pupils are equal, round, and reactive to light  Extraocular motions are normal        Vitals:Blood pressure 106/63, pulse 94, temperature 98 6 °F (37 °C), resp  rate 16, weight 133 kg (293 lb 10 4 oz), SpO2 95 %  ,Body mass index is 44 65 kg/m²       Lab Results:   Results from last 7 days   Lab Units 12/05/22  0420 12/04/22  1730   WBC Thousand/uL 10 22* 11 72*   HEMOGLOBIN g/dL 12 6 14 8   HEMATOCRIT % 40 1 48 0   PLATELETS Thousands/uL 216 279     Results from last 7 days   Lab Units 12/05/22  0432 12/04/22  1730   POTASSIUM mmol/L 3 8 4 6   CHLORIDE mmol/L 100 96   CO2 mmol/L 31 34*   BUN mg/dL 21 26*   CREATININE mg/dL 0 90 1 15   CALCIUM mg/dL 9 2 10 0             Results from last 7 days   Lab Units 12/04/22  1730   INR  1 23*   PTT seconds 38*     No results found for: TROPONINT  ABG:No results found for: PHART, MUS2WAO, PO2ART, EDY7DAZ, O6NHYYLU, BEART, SOURCE    Imaging Studies: I have personally reviewed pertinent reports  and I have personally reviewed pertinent films in PACS    XR chest portable    Result Date: 12/5/2022  Impression: Probable small left effusion  Slightly limited but otherwise unremarkable examination  Workstation performed: [id number]     MRI brain wo contrast    Result Date: 12/5/2022  Impression: Motion degraded examination  No acute infarction, edema, or mass effect  Mild chronic microangiopathic ischemic changes  Workstation performed: [id number]     CT stroke alert brain    Result Date: 12/4/2022  Impression: No acute intracranial abnormality  Stable mild chronic microangiopathic changes within the brain  Findings were directly discussed with neurology resident at 5:40 PM  Workstation performed: [id number]     CTA stroke alert (head/neck)    Result Date: 12/4/2022  Impression: Unfortunately this examination is significantly limited due to patient motion  Atherosclerotic changes are seen within the carotid bifurcations and intracranial internal carotid arteries bilaterally  Suspected moderate stenosis of the right ICA origin and mild narrowing of the left carotid bifurcation  Intracranially there is also right greater than left stenosis, moderate on the left and mild on the right No occlusive disease or thrombosis identified  Findings were directly discussed with neurology resident at approximately 5:40 PM  Workstation performed: [id number]       EKG, Pathology, and Other Studies: I have personally reviewed pertinent reports  and I have personally reviewed pertinent films in PACS    VTE Prophylaxis: Sequential compression device Carin Moya     Code Status: Level 3 - DNAR and DNI  Advance Directive and Living Will:      Power of :    POLST:      Counseling / Coordination of Care  I spent 20 minutes with the patient

## 2022-12-05 NOTE — ED NOTES
MRI called saying pt was moving around a lot in the machine and not staying still, messaged admitting doctor which they placed a zyprexa order and was given to pt up in MRI   MRI then brought pt back and did not finish scan because he was still moving around in the [address], DANNA  12/05/22 0030

## 2022-12-05 NOTE — PLAN OF CARE
Problem: PHYSICAL THERAPY ADULT  Goal: Performs mobility at highest level of function for planned discharge setting  See evaluation for individualized goals  Description: Treatment/Interventions: Functional transfer training, LE strengthening/ROM, Endurance training, Therapeutic exercise, Cognitive reorientation, Patient/family training, Equipment eval/education, Bed mobility, Spoke to nursing, Spoke to case management, OT  Equipment Recommended:  (TBD)       See flowsheet documentation for full assessment, interventions and recommendations  Note: Prognosis: Guarded  Problem List: Decreased strength, Decreased endurance, Impaired balance, Decreased mobility, Decreased cognition, Impaired judgement, Decreased coordination, Decreased safety awareness, Impaired tone  Assessment: Pt is a 82 y o  male admitted to [facility] on 12/4/2022 with stroke like symptoms including garbled speech, L hemiparesis  Pt received a primary medical dx of stroke like symptoms  Other active problems includine leukocytosis, hyponatremia, L LE wound, encephalopathy, B/L LE edema, debility, a-fib, benign HTN  Pt has the following comorbidities which affect their treatment:h/o a-fib, cancer, HTN  Pt has a high complexity clinical presentation due to Ongoing medical management for primary dx, Increased reliance on more restrictive AD compared to baseline, Decreased activity tolerance compared to baseline, Fall risk, Increased assistance needed from caregiver at current time, Ongoing telemetry monitoring, Cog status, Increased O2 via NC from pts baseline, Trending lab values, Diagnostic imaging pending, Continuous pulse oximetry monitoring  , and PMH  PT was consulted to evaluate pt's functional mobility and discharge needs  Upon evaluation, patient required maxAx2 for bed mobility and STS transfers    Pt's functional impairments include: impaired strength (L>R), balance, endurance, activity tolerance, and mobility   At conclusion of eval, pt remained supine in bed with bed alarm, phone, call bell, and all other personal needs within reach  Pt would benefit from skilled PT to address their functional mobility limitations  The patient's AM-PAC Basic Mobility Inpatient Short Form Raw Score is 6  A Raw score of less than or equal to 16 suggests the patient may benefit from discharge to post-acute rehabilitation services  Please also refer to the recommendation of the Physical Therapist for safe discharge planning  D/C recommendations are rehab  Barriers to Discharge: Decreased caregiver support, Inaccessible home environment     PT Discharge Recommendation: Post acute rehabilitation services    See flowsheet documentation for full assessment

## 2022-12-05 NOTE — ED NOTES
Notify SLIM holding PA that pts breathing has been sounding crackly and has not been getting better      Aaron Quintanilla RN  12/05/22 6002

## 2022-12-05 NOTE — CONSULTS
PHYSICAL MEDICINE AND REHABILITATION CONSULT NOTE  Kelvin Grewal 82 y o  male MRN: [id number]  Unit/Bed#: [facility] 725-01 Encounter: [id number]    Requested by (Physician/Service): Vicky Draper MD  Reason for Consultation:  Assessment of rehabilitation needs    Assessment:  Rehabilitation Diagnosis:   • Stroke like symptoms   • Encephalopathy   • Impaired mobility and self care  • Impaired cognition     Recommendations:  Rehabilitation Plan:  • Therapy evaluations are pending however the patient may require sub-acute inpatient rehabilitation once stable  • Covid-19 Testing: [facility] inpatient rehabilitation units require testing within 48 hours of all potential admissions at this time  *Re-testing is NOT required for patients recovering from COVID-19 infection if isolation has been discontinued per CDC criteria  Medical Co-morbidities Plan:  · Atrial fibrillation on Eliquis  · Hypertension   · COPD  · Basal cell adenocarcinoma  · PVD  · Gout  · Left LE wound  · DVT ppx: Eliquis and SCD    Thank you for this consultation  Do not hesitate to contact service with further questions  CONY Willis  PM&R    Total time spent:  1 hour with more than 50% spent counseling/coordinating care  Counseling includes extended discussion with patient (+/- family/relevant historian)  re: history, exam, function, mood, rehabilitation management plan, medical co-morbidities plan, and disposition options  Additional time spent with thorough chart review in EMR, reviewing recent medications, labs, imaging, and management plan of primary service  History of Present Illness:  Kelvin Grewal is a 82 y o  male with a PMH of atrial fibrillation, HTN, left LE wound, BPH, COPD who presented to the [address] 12/4/22 with garbled speech  Spouse reported they were arguing about a game and she noted that his speech was garbled, he was not making sense   EMS was called and noted left sided weakness  CT of the head and MRI of the brain showed no acute abnormality however imaging has been motion degraded  CTA showed right ICA stenosis  Therapy is pending  PM&R are consulted for rehabilitation recommendations  The patient was seen in his room  Per nursing he is pulling at lines and is restless  He is oriented to person only  Currently he denies any pain, weakness, numbness, tingling, double vision or blurred vision  Review of Systems: 10 point ROS negative except for what is noted in HPI    Function:  Prior level of function and living situation: Lives with his spouse  Rest of prior level of function and living situation is unknown as patient is not a reliable historian  Current level of function:  Physical Therapy: Pending   Occupational Therapy: Pending   Speech Therapy: Puree with nectar thick liquids       Physical Exam:  /63   Pulse 94   Temp 98 6 °F (37 °C)   Resp 16   Wt 133 kg (293 lb 10 4 oz)   SpO2 95%   BMI 44 65 kg/m²        Intake/Output Summary (Last 24 hours) at 12/5/2022 1010  Last data filed at 12/4/2022 2213  Gross per 24 hour   Intake --   Output 100 ml   Net -100 ml       Body mass index is 44 65 kg/m²  Physical Exam  Constitutional:       Comments: Confused and restless    HENT:      Head: Atraumatic  Right Ear: External ear normal       Left Ear: External ear normal       Nose: Nose normal       Mouth/Throat:      Mouth: Mucous membranes are moist    Pulmonary:      Effort: Pulmonary effort is normal    Abdominal:      General: There is no distension  Musculoskeletal:      Comments: Difficulty following commands on left side, appears to have full strength RUE/RLE  LUE: EE/EF 4-/5, FF 3+ to 4/5  LLE: HF 4/5     Skin:     Comments: LLE chronic wound    Neurological:      Mental Status: He is alert  He is disoriented        Comments: Left hemiparesis, dysarthria, oriented to person only    Psychiatric:         Cognition and Memory: Cognition is impaired  Memory is impaired  Judgment: Judgment is impulsive            Social History:    Social History     Socioeconomic History   • Marital status: /Civil Union     Spouse name: Not on file   • Number of children: Not on file   • Years of education: Not on file   • Highest education level: Not on file   Occupational History   • Not on file   Tobacco Use   • Smoking status: Never   • Smokeless tobacco: Never   Substance and Sexual Activity   • Alcohol use: Not Currently   • Drug use: Not Currently   • Sexual activity: Not on file   Other Topics Concern   • Not on file   Social History Narrative   • Not on file     Social Determinants of Health     Financial Resource Strain: Not on file   Food Insecurity: Not on file   Transportation Needs: Not on file   Physical Activity: Not on file   Stress: Not on file   Social Connections: Not on file   Intimate Partner Violence: Not on file   Housing Stability: Not on file        Family History:    Family History   Problem Relation Age of Onset   • Stroke Mother          Medications:     Current Facility-Administered Medications:   •  acetaminophen (TYLENOL) tablet 650 mg, 650 mg, Oral, Q6H PRN, Ranjeet Maciel MD  •  acetaminophen (TYLENOL) tablet 975 mg, 975 mg, Oral, Q8H [address], Ranjeet Maciel MD  •  allopurinol (ZYLOPRIM) tablet 100 mg, 100 mg, Oral, Daily, Ranjeet Maciel MD  •  apixaban Simon Lites) tablet 5 mg, 5 mg, Oral, BID, Ranjeet Maciel MD, 5 mg at 12/04/22 2212  •  aspirin chewable tablet 81 mg, 81 mg, Oral, Daily, Ranjeet Maciel MD  •  atorvastatin (LIPITOR) tablet 40 mg, 40 mg, Oral, QPM, Ranjeet Maciel MD, 40 mg at 12/04/22 1948  •  cefTRIAXone (ROCEPHIN) 1,000 mg in dextrose 5 % 50 mL IVPB, 1,000 mg, Intravenous, Q24H, Caty Grijalva PA-C  •  cholecalciferol (VITAMIN D3) tablet 400 Units, 400 Units, Oral, Daily, Ranjeet Maciel MD  •  docusate sodium (COLACE) capsule 100 mg, 100 mg, Oral, BID, Ranjeet Maciel MD, 100 mg at 12/04/22 2145  •  metoprolol tartrate (LOPRESSOR) tablet 50 mg, 50 mg, Oral, Q12H [address], Billie Gambino MD, 50 mg at 12/04/22 2145  •  ondansetron [facility] injection 4 mg, 4 mg, Intravenous, Q6H PRN, Billie Gambino MD  •  polyethylene glycol (MIRALAX) packet 17 g, 17 g, Oral, Daily, Billie Gambino MD    Past Medical History:     Past Medical History:   Diagnosis Date   • Atrial fibrillation ([address] )    • Cancer [facility])    • Hypertension         Past Surgical History:     Past Surgical History:   Procedure Laterality Date   • REPLACEMENT TOTAL HIP W/  RESURFACING IMPLANTS           Allergies: Allergies   Allergen Reactions   • Balsam Dermatitis   • Neomycin-Bacitracin Zn-Polymyx Hives and Rash           LABORATORY RESULTS:      Lab Results   Component Value Date    HGB 12 6 12/05/2022    HCT 40 1 12/05/2022    WBC 10 22 (H) 12/05/2022     Lab Results   Component Value Date    BUN 21 12/05/2022    K 3 8 12/05/2022     12/05/2022    GLUCOSE 118 12/05/2019    CREATININE 0 90 12/05/2022     Lab Results   Component Value Date    PROTIME 15 8 (H) 12/04/2022    INR 1 23 (H) 12/04/2022        DIAGNOSTIC STUDIES: Reviewed  XR chest portable    Result Date: 12/5/2022  Impression: Probable small left effusion  Slightly limited but otherwise unremarkable examination  Workstation performed: [id number]     MRI brain wo contrast    Result Date: 12/5/2022  Impression: Motion degraded examination  No acute infarction, edema, or mass effect  Mild chronic microangiopathic ischemic changes  Workstation performed: [id number]     CT stroke alert brain    Result Date: 12/4/2022  Impression: No acute intracranial abnormality  Stable mild chronic microangiopathic changes within the brain  Findings were directly discussed with neurology resident at 5:40 PM  Workstation performed: [id number]     CTA stroke alert (head/neck)    Result Date: 12/4/2022  Impression: Unfortunately this examination is significantly limited due to patient motion   Atherosclerotic changes are seen within the carotid bifurcations and intracranial internal carotid arteries bilaterally  Suspected moderate stenosis of the right ICA origin and mild narrowing of the left carotid bifurcation  Intracranially there is also right greater than left stenosis, moderate on the left and mild on the right No occlusive disease or thrombosis identified   Findings were directly discussed with neurology resident at approximately 5:40 PM  Workstation performed: [id number]

## 2022-12-05 NOTE — PROGRESS NOTES
Pastoral Care Progress Note    2022  Patient: Obdulia Stein : 1935  Admission Date & Time: 2022 1726  MRN: [id number] CSN: [id number]          Pt sleeping   Unable to assess

## 2022-12-05 NOTE — SPEECH THERAPY NOTE
Speech-Language Pathology Bedside Swallow Evaluation      Patient Name: Claudio Cope    WGNXE'N Date: 12/5/2022     Problem List  Principal Problem:    Stroke-like symptoms  Active Problems:    Benign hypertension    Atrial fibrillation (HCC)    Debility    Bilateral lower extremity edema    Encephalopathy    Open wound of left lower extremity    Hyponatremia    Leukocytosis      Past Medical History  Past Medical History:   Diagnosis Date   • Atrial fibrillation ([facility] [address] )    • Cancer ([facility] [address] )    • Hypertension        Past Surgical History  Past Surgical History:   Procedure Laterality Date   • REPLACEMENT TOTAL HIP W/  RESURFACING IMPLANTS         Summary   Pt presented with s/s suggestive of moderate oral and suspected moderate pharyngeal dysphagia  The patient is assessed with puree solids with nectar thick and thin liquids  He is easily distracted and restless during evaluation  Retrieval of items via tsp and straw is adequate  Transfer time is prolonged, and absent in some cases  Swallow initiation time is delayed  The patient presents with congestion following sips of thin liquids  Oral suction is used intermittently throughout evaluation for the removal of oral residue and thick, clear secretions  Patient admitted on stroke pathway with aphasia and slurred speech  Will most likely need formal evaluation, but patient is too lethargic and restless to participate this morning  Risk/s for Aspiration: mod      Recommended Diet: puree/level 1 diet and nectar thick liquids   Recommended Form of Meds: crushed with puree   Aspiration precautions and swallowing strategies: upright posture, only feed when fully alert and oral suction as needed throughout meal   Other Recommendations: Continue frequent oral care; consider VFSS    Current Medical Status  HPI: Claudio Cope is a 82 y o   (handedness not determinable) male with a PMH of aFib (on eliquis), BPH, COPD, Gout, HTN, Basal cell adenocarcinoma of salviatory gland, PAD and PVD, who presents as a stoke alert  It is reported by EMS that the patient's Teryl Hunt was 10/4/2022 1600 after which there was acute onset left sided facial droop, left UA and LE weakness, and garbled speech  The patient is on ASA and Eliquis at baseline  His presenting BP was 133/88, glucose 121  His NIHSS on initial evaluation was 11 (+2 for gaze, +2 for L arm, +2 for L leg, +2 for R leg, +1 for mild sensory loss on LUE and LLE extremity, +1 moderate aphasia, +1 mild dysarthria)  Initial CTH was not demonstrative of  acute intracranial abnormality, and showed table mild chronic microangiopathic changes within the brain  CTA HN showed atherosclerotic changes are seen within the carotid bifurcations and intracranial internal carotid arteries bilaterally  Suspected moderate stenosis of the right ICA origin and mild narrowing of the left carotid bifurcation  Intracranially there is also right greater than left stenosis, moderate on the left and mild on the right  The patient on subsequent examination was becoming much more distractible and was not able to cooperate with examination  He had difficulty following commands consistently  Current Precautions:  Fall  Aspiration    Allergies:  No known food allergies  Past medical history:  Please see H&P for details    Special Studies:  Chest xray 12/4/2022:   Probable small left effusion  Slightly limited but otherwise unremarkable examination      Social/Education/Vocational Hx:  Pt lives with family    Swallow Information   Current Risks for Dysphagia & Aspiration: CVA, dysarthria, AMS and decreased alertness  Current Symptoms/Concerns: congestion following sips of thin liquids   Current Diet: regular diet and thin liquids   Baseline Diet: regular diet and thin liquids    Baseline Assessment   Behavior/Cognition: waxing and waning arousal level  Speech/Language Status: not able to to follow commands and limited verbal output  Patient Positioning: upright in bed  Pain Status/Interventions/Response to Interventions: No report of or nonverbal indications of pain  Swallow Mechanism Exam  Facial: left facial droop  Labial: decreased ROM left side  Lingual: unable to test 2/2 limited command following  Velum: unable to visualize  Mandible: unable to test 2/2 limited command following  Dentition: edentulous  Vocal quality:clear/adequate, but dysarthric   Volitional Cough: strong/productive   Respiratory Status: on 3 L O2      Consistencies Assessed and Performance   Consistencies Administered: ice chips, thin liquids, nectar thick and puree  Materials administered included applesauce with nectar thick and thin liquids     Oral Stage: moderate  Retrieval of all via tsp and straw is adequate  Transfer time is prolonged, sometimes absent requiring oral suction to clear  Suspect decreased oral control  Pharyngeal Stage: moderate  Swallow Mechanics:  Swallowing initiation time appears delayed with all  Laryngeal rise was palpated and judged to be within functional limits  Patient observed with congestion following sips of thin liquids  Esophageal Concerns: none reported    Summary and Recommendations (see above)    Results Reviewed with: RN and PA     Treatment Recommended: dysphagia therapy      Frequency of treatment: 3-5x week, as able     Patient Stated Goal: none stated     Dysphagia LTG  -Patient will demonstrate safe and effective oral intake (without overt s/s significant oral/pharyngeal dysphagia including s/s penetration or aspiration) for the highest appropriate diet level       Short Term Goals:  -Pt will tolerate Dysphagia 1/pureed diet and nectar thick liquid with no significant s/s oral or pharyngeal dysphagia across 1-3 diagnostic session/s    -Patient will tolerate trials of upgraded food and/or liquid texture with no significant s/s of oral or pharyngeal dysphagia including aspiration across 1-3 diagnostic sessions     -Patient will comply with a Video/Modified Barium Swallow study for more complete assessment of swallowing anatomy/physiology/aspiration risk and to assess efficacy of treatment techniques so as to best guide treatment plan    Speech Therapy Prognosis   Prognosis: fair    Prognosis Considerations: medical status and cognitive status

## 2022-12-05 NOTE — ASSESSMENT & PLAN NOTE
· Patient in bilateral lower extremity edema which appears to be longstanding  Recently edema was worsening and was associated with weeping  Patient received high dose of Lasix recently as outpatient for worsening of edema  Will hold Lasix for today given hyponatremia    Recheck sodium tomorrow and restart back on the Lasix when able  · Patient has compression stockings on

## 2022-12-05 NOTE — CONSULTS
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  2  Stroke-like symptoms  3  Peripheral Arterial disease  4  UTI    PLAN:    · Low likelihood of infection of left heel  · Continue Local wound care for now  Betadine wet to dry  · F/u xray left heel  · F/u LEAD's   · Continue prevalon boots  Offload heels  · Podiatry will monitor closely  · Rest of care per primary team     Weight Bearing Status: NWB Left lower    SUBJECTIVE    History of Present Illness:    Stephen Valverde is a 82 y o  male with past medical history significant for wounds to bilateral lower extremities strokes encephalopathy prediabetes, is admitted for stroke-like symptoms  Podiatry was consulted patient has ulceration noted to the left calcaneus  Patient's wife is bedside providing history  Patient is nonresponsive to verbal stimuli is not able to provide history  She states that the wound to the left heel is proximally 33week-old  They have been treating it with saline washes and clean dressings  She would try to take him to wound care or to the hospital to get it evaluated however with multiple attempts patient fall and she would need to call 911 to get him off of ground  She states that in the past they have had lower extremity vascular work from [facility]  Largely was unsuccessful she states and she is scared there is nothing they can do to get perfusion to the limb adequate enough to heal the wound  Review of Systems:    Constitutional: Negative  HENT: Negative  Eyes: Negative  Respiratory: Negative  Cardiovascular: Negative  Gastrointestinal: Negative      Musculoskeletal: Negative   Skin:  Ulcer left heel   Neurological:  Negative   Psych: Negative  Past Medical and Surgical History:     Past Medical History:   Diagnosis Date   • Atrial fibrillation ([facility] [address] )    • Cancer ([facility] [address] )    • Hypertension        Past Surgical History:   Procedure Laterality Date   • REPLACEMENT TOTAL HIP W/  RESURFACING IMPLANTS         Meds/Allergies:    Medications Prior to Admission   Medication   • colchicine (COLCRYS) 0 6 mg tablet   • furosemide (LASIX) 40 mg tablet   • potassium chloride (KLOR-CON) 20 mEq packet   • acetaminophen (TYLENOL) 325 mg tablet   • allopurinol (ZYLOPRIM) 100 mg tablet   • apixaban (ELIQUIS) 5 mg   • aspirin (ECOTRIN LOW STRENGTH) 81 mg EC tablet   • cholecalciferol (VITAMIN D3) 400 units tablet   • docusate sodium (COLACE) 100 mg capsule   • metoprolol tartrate (LOPRESSOR) 50 mg tablet   • vitamin A 10,000 units capsule       Allergies   Allergen Reactions   • Balsam Dermatitis   • Neomycin-Bacitracin Zn-Polymyx Hives and Rash       Social History:     Marital Status: /Civil Union    Substance Use History:   Social History     Substance and Sexual Activity   Alcohol Use Not Currently     Social History     Tobacco Use   Smoking Status Never   Smokeless Tobacco Never     Social History     Substance and Sexual Activity   Drug Use Not Currently       Family History:    Family History   Problem Relation Age of Onset   • Stroke Mother          OBJECTIVE:    Vitals:   Blood Pressure: 120/76 (12/05/22 1415)  Pulse: 90 (12/05/22 1220)  Temperature: 99 1 °F (37 3 °C) (12/05/22 1115)  Temp Source: Oral (12/04/22 1800)  Respirations: 19 (12/05/22 1115)  Weight - Scale: 133 kg (293 lb 10 4 oz) (12/04/22 1729)  SpO2: 98 % (12/05/22 1220)    Physical Exam:     General Appearance: Alert, cooperative, no distress  HEENT: Head normocephalic, atraumatic, without obvious abnormality  Heart: Normal rate and rhythm  Lungs: Non-labored breathing  No respiratory distress    Abdomen: Without distension  Psychiatric: AAOx3  Lower Extremity:  Vascular:   DP/PT signals are biphasic on right lower extremity  DP/PT signals are monophasic on Left lower extremity   Minimal edema 1+ bilateral lower   Temperature is consistent bilaterally      Musculoskeletal:   Negative for gross osseous deformity    Neurological:   Epicritic, protective, sharp dull discrimination appears to be diminished      Dermatological:   Necrotic ulceration noted to the left heel  This is mostly dry  Surrounding skin is consistent coloration to the contralateral limb  No noticeable increase in temperature compared to the contralateral limb  Chronic venous stasis changes bilateral lower extremity  Wounds and Ulcerations noted as follows:    Wound #: 1  Location: left heel  Size: 3 5 x3 0 x unkown  Deepest Tissue Noted in Base: eschar  Probe to Bone: negative  Peripheral Skin Description: attached   Granulation: 0% Fibrotic Tissue: 0% Necrotic Tissue: 100%   Drainage Amount: negative  Signs of Infection: yes positive for malodor  Clinical Images 12/05/22:                  Additional Data:     Lab Results: I have personally reviewed pertinent labs including:    Results from last 7 days   Lab Units 12/05/22  0420   WBC Thousand/uL 10 22*   HEMOGLOBIN g/dL 12 6   HEMATOCRIT % 40 1   PLATELETS Thousands/uL 216     Results from last 7 days   Lab Units 12/05/22  0432   POTASSIUM mmol/L 3 8   CHLORIDE mmol/L 100   CO2 mmol/L 31   BUN mg/dL 21   CREATININE mg/dL 0 90   CALCIUM mg/dL 9 2     Results from last 7 days   Lab Units 12/04/22  1730   INR  1 23*       Cultures: I have personally reviewed pertinent cultures including:              Imaging: I have personally reviewed pertinent films in PACS  EKG, Pathology, and Other Studies: I have personally reviewed pertinent reports  ** Please Note: Portions of the record may have been created with voice recognition software   Occasional wrong word or "sound a like" substitutions may have occurred due to the inherent limitations of voice recognition software  Read the chart carefully and recognize, using context, where substitutions have occurred   **

## 2022-12-05 NOTE — PLAN OF CARE
Problem: OCCUPATIONAL THERAPY ADULT  Goal: Performs self-care activities at highest level of function for planned discharge setting  See evaluation for individualized goals  Description: Treatment Interventions: ADL retraining, Visual perceptual retraining, Functional transfer training, UE strengthening/ROM, Endurance training, Cognitive reorientation, Patient/family training, Equipment evaluation/education, Compensatory technique education, Energy conservation, Activityengagement, Continued evaluation, Neuromuscular reeducation, Fine motor coordination activities          See flowsheet documentation for full assessment, interventions and recommendations  12/5/2022 1547 by Leanna Holland OT  Note: Limitation: Decreased ADL status, Decreased Safe judgement during ADL, Decreased cognition, Decreased endurance, Decreased self-care trans, Decreased high-level ADLs, Decreased fine motor control, Decreased UE strength, Decreased UE ROM, Visual deficit  Prognosis: Fair  Assessment: Pt is a 82 y o  male who was admitted to [facility] on 12/4/2022 with left sided weakness, and garbled speech and now here with  Stroke-like symptoms  (-) tPA candidate,  CT head -Suspected moderate stenosis of the right ICA origin and mild narrowing of the left carotid bifurcation  Intracranially there is also right greater than left stenosis, moderate on the left and mild on the right  Pt's problem list also includes PMH of  has a past medical history of Atrial fibrillation ([facility] [address] ), Cancer ([facility] [address] ), and Hypertension    At baseline pt was completing continue to assess  Pt lives with spouse  Currently pt requires max/total assist for overall ADLS and  for functional mobility/transfers   Pt currently presents with impairments in the following categories -difficulty performing ADLS, difficulty performing IADLS , limited insight into deficits, compliance, flat affect, decreased initiation and engagement  and health management  activity tolerance, endurance, standing balance/tolerance, sitting balance/tolerance, UE strength, UE ROM, memory, insight, safety  and judgement   These impairments, as well as pt's fatigue and (L) hemiparesis  limit pt's ability to safely engage in all baseline areas of occupation, includingeating, grooming, bathing, dressing, toileting, functional mobility/transfers, community mobility, laundry , driving, house maintenance, medication management, meal prep, cleaning, social participation  and leisure activities  The patient's raw score on the AM-PAC Daily Activity inpatient short form low function score is 10, standardized score is Low Function Daily Activity Standardized Score: 17 31  Patients with a standardized score less than 39 4 are likely to benefit from discharge to post-acute rehab services  Please refer to the recommendation of the Occupational Therapist for safe discharge planning  From OT standpoint, recommend STR upon D/C  OT will continue to follow to address the below stated goals  OT Discharge Recommendation: Post acute rehabilitation services       12/5/2022 1547 by Alfredo Rucker OT  Note: Limitation: Decreased ADL status, Decreased Safe judgement during ADL, Decreased cognition, Decreased endurance, Decreased self-care trans, Decreased high-level ADLs, Decreased fine motor control, Decreased UE strength, Decreased UE ROM, Visual deficit  Prognosis: Fair  Assessment: Pt is a 82 y o  male who was [facility] on [facility]/4/2022 with left sided weakness, and garbled speech and now here with  Stroke-like symptoms  (-) tPA candidate,  CT head -Suspected moderate stenosis of the right ICA origin and mild narrowing of the left carotid bifurcation  Intracranially there is also right greater than left stenosis, moderate on the left and mild on the right  Pt's problem list also includes PMH of  has a past medical history of Atrial fibrillation ([facility] [address] ), Cancer ([facility] [address] ), and Hypertension    At baseline pt was completing continue to assess  Pt lives with spouse  Currently pt requires max/total assist for overall ADLS and  for functional mobility/transfers  Pt currently presents with impairments in the following categories -difficulty performing ADLS, difficulty performing IADLS , limited insight into deficits, compliance, flat affect, decreased initiation and engagement  and health management  activity tolerance, endurance, standing balance/tolerance, sitting balance/tolerance, UE strength, UE ROM, memory, insight, safety  and judgement   These impairments, as well as pt's fatigue and (L) hemiparesis  limit pt's ability to safely engage in all baseline areas of occupation, includingeating, grooming, bathing, dressing, toileting, functional mobility/transfers, community mobility, laundry , driving, house maintenance, medication management, meal prep, cleaning, social participation  and leisure activities  The patient's raw score on the -PAC Daily Activity inpatient short form low function score is 10, standardized score is Low Function Daily Activity Standardized Score: 17 31  Patients with a standardized score less than 39 4 are likely to benefit from discharge to post-acute rehab services  Please refer to the recommendation of the Occupational Therapist for safe discharge planning  From OT standpoint, recommend STR upon D/C  OT will continue to follow to address the below stated goals       OT Discharge Recommendation: Post acute rehabilitation services

## 2022-12-05 NOTE — ASSESSMENT & PLAN NOTE
Presented with stroke like symptoms as above; MRI brain negative for stroke, consideration for TIA in the setting of ICA stenosis, see plans above   · Family noted that the patient was more confused than usual recently and they attributed it to higher dose of Lasix due to worsening leg edema  · Infectious workup/management as outlined above   · Appreciate neurology inputs   · Continue with supportive cares  · Mental status continues to wax and wane   · Currently, patient is oriented to self and year but remains encephalopathic with inability to properly follow commands   · Maintain NPO until mental status improves and is able to safely tolerate oral diet

## 2022-12-05 NOTE — ASSESSMENT & PLAN NOTE
POA with mild leukocytosis and tachycardia  · COVID/flu/RSV negative   · Portable CXR: Probable small left effusion   Slightly limited but otherwise unremarkable examination     · UA with nitrites, leukocytes and bacteria concerning for UTI  · Noted with open wounds of LE on admission, however no clinical signs of cellulitis   · Check PA/lateral CXR for completeness   · Start IV ceftriaxone pending urine culture  · Procalcitonin 0 51, repeat with morning labs   · Trend temps, WBC, hemodynamics

## 2022-12-05 NOTE — PROGRESS NOTES
NEUROLOGY RESIDENCY PROGRESS NOTE     Name: Gissell Selby   Age & Sex: 82 y o  male   MRN: [id number]  Unit/Bed#: [facility] 725-01   Encounter: [id number]    Recommendations for outpatient neurological follow up have yet to be determined  Pending for discharge: Encephalopathy workup    ASSESSMENT & PLAN     * Stroke-like symptoms  Assessment & Plan  Assessment:  Gissell Selby is a 82 y o  male with a PMH of aFib (on eliquis), BPH, COPD, Gout, HTN, Basal cell adenocarcinoma of salviatory gland, PAD and PVD, who presents as a stoke alert    It is reported by EMS that the patient's Mai Blizzard was 12/4/2022 1600 after which there was acute onset left sided facial droop, left UA and LE weakness, and garbled speech  The patient is on ASA and Eliquis at baseline  His presenting BP was 133/88, glucose 121  His NIHSS on initial evaluation was 11  Initial CTH was not demonstrative of  acute intracranial abnormality, and showed table mild chronic microangiopathic changes within the brain  CTA HN showed atherosclerotic changes are seen within the carotid bifurcations and intracranial internal carotid arteries bilaterally  Suspected moderate stenosis of the right ICA origin and mild narrowing of the left carotid bifurcation  Intracranially there is also right greater than left stenosis, moderate on the left and mild on the right  The patient on subsequent examination was becoming much more distractible and was not able to cooperate with examination  He had difficulty following commands consistently  Imaging:  - CTH was not demonstrative of  acute intracranial abnormality, and showed table mild chronic microangiopathic changes within the brain   - CTA HN showed atherosclerotic changes are seen within the carotid bifurcations and intracranial internal carotid arteries bilaterally  Suspected moderate stenosis of the right ICA origin and mild narrowing of the left carotid bifurcation   Intracranially there is also right greater than left stenosis, moderate on the left and mild on the right   - MRI Brain wo: Motion degraded examination  No acute infarction, edema, or mass effect  Mild chronic microangiopathic ischemic changes   - TTE: Pending    Workup:  - A1C: 5 9  - Lipid panel: CHOL 150, , HDL 22, LDL 97  - Ammonia: 17  - 4-Plex: negative  - CXR: Probable small left effusion  Slightly limited but otherwise unremarkable examination   - UA w reflec sxope and cx: innumerable WBC, innumerable bacteria, innumerable RBCs, large leuks  - TEG Platelet mapping: Abnormal    Impression:  Considering the sudden onset of the symptoms and the imaging studies that are not suggestive of LVO, haemorrhage or infarct further workup is needed to determine the symptomatology  Imaging studies did reveal that there are atherosclerotic changes within the carotid bifurcations and intracranial internal carotid arteries bilaterally with both intracranial and extracranial ICA stenosis for which neurosurgery recommended medical management  Stroke versus TIA versus vascular origin is to be considered  Plan:  - Obtain STAT CTH wo for acute changes in mental status or neurologic examination and please notify neurology  - Stroke Pathway  - Frequent neuro checks  - Telemetry  - Fall precautions  - Goals: Normotension, normothermia, euglycaemia  - TTE pending  - Continue Aspirin 81mg QD  - Continue Eliquis 5mg BID  - Continue Atorvastatin 40mg QD  - PT/OT/ST/PMR as able  - Rest per primary team; appreciated! Acute metabolic encephalopathy  Assessment & Plan  Assessment:  During assessment after initial NIHSS and imaging studies were completed it was noted that the patient had more trouble focusing on conversations, had more tangential speech, and was not able to follow commands as before  The patient kept perseverating on the numbers 9 and 14 which is the month and day respectively of his birthday      Impression:  Considering the elevated white count, hyponatremia, increased BUN, and increased CO2 an encephalopathy picture appears  Plan:  - Correct metabolic derangements  - Infectious workup recommended: Treat underlying infection  UTI suspected  Abnormal UA and Scope  Abnormal WBC  Per primary team; appreciated! SUBJECTIVE     The patient was seen and examined this AM during visitation  The patient was difficult to arouse however he was able to follow commands to a minimal degree with being tangential, unable to cooperate consistently, and sometimes just not responding to questions and falls asleep  The patient was sleepy, oriented to person, not, place, not time, not event  The patient was unable to complete calculations, spelling, repetition due to distractibility/ not able to respond to question appropriately  Review of Systems   Constitutional: Negative for chills, diaphoresis, fatigue and fever  HENT: Negative for ear discharge, sinus pressure, sinus pain and sore throat  Eyes: Negative for pain and redness  Respiratory: Positive for cough  Negative for chest tightness and shortness of breath  Cardiovascular: Positive for leg swelling  Negative for chest pain and palpitations  Gastrointestinal: Negative for blood in stool, nausea and vomiting  Genitourinary: Negative for flank pain, hematuria and urgency  Musculoskeletal: Positive for back pain  Skin: Negative for pallor  Neurological: Positive for weakness and numbness  Negative for dizziness, tremors, seizures, syncope, facial asymmetry, speech difficulty, light-headedness and headaches  Psychiatric/Behavioral: Positive for confusion and decreased concentration  Negative for behavioral problems, dysphoric mood, hallucinations, self-injury, sleep disturbance and suicidal ideas  The patient is not nervous/anxious and is not hyperactive  OBJECTIVE     Patient ID: Kelvin Grewal is a 82 y o  male      Vitals:    12/05/22 1220 12/05/22 1415 12/05/22 1557 12/05/22 1600   BP: 126/70 120/76 148/77 148/77   BP Location:       Pulse: 90  (!) 121    Resp:   18    Temp:   99 5 °F (37 5 °C)    TempSrc:       SpO2: 98%  97%    Weight:          Temperature:   Temp (24hrs), Av 4 °F (36 9 °C), Min:96 8 °F (36 °C), Max:99 5 °F (37 5 °C)    Temperature: 99 5 °F (37 5 °C)    Physical Exam  Vitals and nursing note reviewed  HENT:      Head: Normocephalic  Nose: Nose normal       Mouth/Throat:      Mouth: Mucous membranes are moist    Eyes:      Pupils: Pupils are equal, round, and reactive to light  Cardiovascular:      Rate and Rhythm: Tachycardia present  Pulmonary:      Effort: Pulmonary effort is normal    Musculoskeletal:         General: Tenderness present  Cervical back: Normal range of motion  Right lower leg: Edema present  Left lower leg: Edema present  Skin:     General: Skin is warm  Capillary Refill: Capillary refill takes less than 2 seconds  Neurological:      Mental Status: He is alert  Coordination: Finger-Nose-Finger Test normal       Deep Tendon Reflexes:      Reflex Scores:       Tricep reflexes are 2+ on the right side and 2+ on the left side  Bicep reflexes are 2+ on the right side and 2+ on the left side  Brachioradialis reflexes are 2+ on the right side and 2+ on the left side  Patellar reflexes are 1+ on the right side and 1+ on the left side  Achilles reflexes are 1+ on the right side and 1+ on the left side  Psychiatric:         Mood and Affect: Mood normal          Speech: Speech normal         Neurologic Exam     Mental Status   Oriented to person  Disoriented to place  Disoriented to year, month and date  Follows 1 step commands  Attention: decreased  Concentration: decreased  Speech: speech is normal   Level of consciousness: arousable by verbal stimuli ,  arousable by tactile stimuli  Unable to perform simple calculations  Unable to name object  Unable to repeat       Cranial Nerves     CN III, IV, VI Pupils are equal, round, and reactive to light  CN V   Right facial sensation deficit: none  Left facial sensation deficit: none    CN VII   Right facial weakness: none  Left facial weakness: none    CN VIII   Hearing: impaired    CN IX, X   Palate: symmetric    CN XII   Tongue: not atrophic  Fasciculations: absent  Tongue deviation: none  - Patient unable to follow commands  Visual fields not examinable, EOM not examinable  Patient was able to smile and say 'yes' to facial sensation questioning  He stuck his tongue out and then retracted saying 'no' when asked to see it again  Motor Exam   Muscle bulk: normal  Overall muscle tone: normal  Right arm tone: normal  Left arm tone: normal  Right leg tone: normal  Left leg tone: normal    Strength   Right neck flexion: 4/5  Left neck flexion: 4/5  Right neck extension: 4/5  Left neck extension: 4/5  Right deltoid: 3/5  Left deltoid: 3/5  Right biceps: 4/5  Left biceps: 4/5  Right triceps: 4/5  Left triceps: 4/5  Right iliopsoas: 4/5  Left iliopsoas: 4/5  Right quadriceps: 4/5  Left quadriceps: 4/5  Right hamstrin/5  Right anterior tibial: 3/5  Left anterior tibial: 3/5  Right posterior tibial: 3/5  Left posterior tibial: 3/5  Right peroneal: 3/5  Left peroneal: 3/5    Sensory Exam   Light touch normal      Gait, Coordination, and Reflexes     Coordination   Finger to nose coordination: normal    Reflexes   Right brachioradialis: 2+  Left brachioradialis: 2+  Right biceps: 2+  Left biceps: 2+  Right triceps: 2+  Left triceps: 2+  Right patellar: 1+  Left patellar: 1+  Right achilles: 1+  Left achilles: 1+  Right plantar: equivocal  Left plantar: equivocal  Right Stevens: absent  Left Stevens: absent  Right ankle clonus: absent  Left ankle clonus: absent  - Gait not assessed  LABORATORY DATA     Labs: I have personally reviewed pertinent reports         Results from last 7 days   Lab Units 22  0420 22  1730   WBC Thousand/uL 10 22* 11 72* HEMOGLOBIN g/dL 12 6 14 8   HEMATOCRIT % 40 1 48 0   PLATELETS Thousands/uL 216 279      Results from last 7 days   Lab Units 12/05/22  0432 12/04/22  1730   SODIUM mmol/L 136 134*   POTASSIUM mmol/L 3 8 4 6   CHLORIDE mmol/L 100 96   CO2 mmol/L 31 34*   BUN mg/dL 21 26*   CREATININE mg/dL 0 90 1 15   CALCIUM mg/dL 9 2 10 0              Results from last 7 days   Lab Units 12/05/22  1540 12/04/22  1730   INR  1 57* 1 23*   PTT seconds 43* 38*               IMAGING & DIAGNOSTIC TESTING     Radiology Results: I have personally reviewed pertinent reports  MRI brain wo contrast   Final Result by Zen Burnham MD (12/05 3290)   Motion degraded examination  No acute infarction, edema, or mass effect  Mild chronic microangiopathic ischemic changes  Workstation performed: [id number]         XR chest portable   Final Result by Bambi Mitchell MD (13/43 1183)      Probable small left effusion  Slightly limited but otherwise unremarkable examination  Workstation performed: [id number]         CTA stroke alert (head/neck)   Final Result by Lico Delatorre DO (12/04 3255)      Unfortunately this examination is significantly limited due to patient motion  Atherosclerotic changes are seen within the carotid bifurcations and intracranial internal carotid arteries bilaterally  Suspected moderate stenosis of the right ICA origin and mild narrowing of the left carotid bifurcation  Intracranially there is    also right greater than left stenosis, moderate on the left and mild on the right      No occlusive disease or thrombosis identified  Findings were directly discussed with neurology resident at approximately 5:40 PM                            Workstation performed: [id number]         CT stroke alert brain   Final Result by Lico Delatorre DO (12/04 4598)      No acute intracranial abnormality    Stable mild chronic microangiopathic changes within the brain       Findings were directly discussed with neurology resident at 5:40 PM       Workstation performed: [id number]         XR chest pa & lateral    (Results Pending)   XR heel / calcaneus 2+ vw left    (Results Pending)   VAS lower limb arterial duplex, limited, unilateral    (Results Pending)     Other Diagnostic Testing: I have personally reviewed pertinent reports  ACTIVE MEDICATIONS     Current Facility-Administered Medications   Medication Dose Route Frequency   • acetaminophen (TYLENOL) tablet 975 mg  975 mg Oral Q6H PRN   • allopurinol (ZYLOPRIM) tablet 100 mg  100 mg Oral Daily   • aspirin chewable tablet 81 mg  81 mg Oral Daily   • atorvastatin (LIPITOR) tablet 40 mg  40 mg Oral QPM   • cefTRIAXone (ROCEPHIN) 1,000 mg in dextrose 5 % 50 mL IVPB  1,000 mg Intravenous Q24H   • cholecalciferol (VITAMIN D3) tablet 400 Units  400 Units Oral Daily   • docusate sodium (COLACE) capsule 100 mg  100 mg Oral BID   • heparin (porcine) 25,000 units in 0 45% NaCl 250 mL infusion (premix)  3-20 Units/kg/hr (Order-Specific) Intravenous Titrated   • heparin (porcine) injection 2,000 Units  2,000 Units Intravenous Q1H PRN   • heparin (porcine) injection 4,000 Units  4,000 Units Intravenous Q1H PRN   • metoprolol (LOPRESSOR) injection 5 mg  5 mg Intravenous Q8H   • ondansetron (ZOFRAN) injection 4 mg  4 mg Intravenous Q6H PRN   • polyethylene glycol (MIRALAX) packet 17 g  17 g Oral Daily   • sodium chloride 0 9 % infusion  75 mL/hr Intravenous Continuous     Prior to Admission medications    Medication Sig Start Date End Date Taking?  Authorizing Provider   colchicine (COLCRYS) 0 6 mg tablet Take 0 6 mg by mouth daily   Yes Historical Provider, MD   furosemide (LASIX) 40 mg tablet Take 40 mg by mouth daily   Yes Historical Provider, MD   potassium chloride (KLOR-CON) 20 mEq packet Take 20 mEq by mouth 2 (two) times a day   Yes Historical Provider, MD   acetaminophen (TYLENOL) 325 mg tablet Take 3 tablets (975 mg total) by mouth every 8 (eight) hours 12/9/19   Tere Ramires PA-C   allopurinol (ZYLOPRIM) 100 mg tablet Take 100 mg by mouth daily    Historical Provider, MD   apixaban (ELIQUIS) 5 mg Take 5 mg by mouth 2 (two) times a day    Historical Provider, MD   aspirin (ECOTRIN LOW STRENGTH) 81 mg EC tablet Take 81 mg by mouth daily    Historical Provider, MD   cholecalciferol (VITAMIN D3) 400 units tablet Take 400 Units by mouth daily    Historical Provider, MD   docusate sodium (COLACE) 100 mg capsule Take 1 capsule (100 mg total) by mouth 2 (two) times a day 12/9/19   Juwan Shaw PA-C   metoprolol tartrate (LOPRESSOR) 50 mg tablet Take 50 mg by mouth every 12 (twelve) hours    Historical Provider, MD   vitamin A 10,000 units capsule Take 10,000 Units by mouth daily    Historical Provider, MD     VTE Pharmacologic Prophylaxis: Apixaban (Eliquis)  VTE Mechanical Prophylaxis: sequential compression device    ==  DO Mayra Urbina [facility] Neurology Residency, PGY-2

## 2022-12-05 NOTE — ASSESSMENT & PLAN NOTE
· Monitor blood pressure    Permissive hypertension  · Continue with metoprolol with holding parameters

## 2022-12-05 NOTE — ASSESSMENT & PLAN NOTE
· Patient has left lower extremity wound-followed by wound care as outpatient  · Dressing change Monday Wednesday Friday  · No signs of infection

## 2022-12-05 NOTE — ASSESSMENT & PLAN NOTE
· Hyponatremia and elevated CO2 level noted  Hold Lasix for now  Monitor electrolytes  · Potassium is 4 6    Hold Klor-Con for today  · Recheck BMP tomorrow

## 2022-12-05 NOTE — ASSESSMENT & PLAN NOTE
· Patient with waxing and main nerve mental status concerning for encephalopathy  · Rule out infection  · COVID flu influenza negative    · UA pending  · Chest x-ray pending  · Family noted that the patient was more confused than usual recently and they attributed it to higher dose of Lasix due to worsening leg edema

## 2022-12-05 NOTE — ED NOTES
Pt cleaned up & bed linen changed, repositioned & placed pillows underneath      Kun Rosales RN  12/05/22 0074

## 2022-12-05 NOTE — ASSESSMENT & PLAN NOTE
Assessment:  Yuni Pittman is a 82 y o  male with a PMH of aFib (on eliquis), BPH, COPD, Gout, HTN, Basal cell adenocarcinoma of salviatory gland, PAD and PVD, who presents as a stoke alert    It is reported by EMS that the patient's Ermalene Dial was 12/4/2022 1600 after which there was acute onset left sided facial droop, left UA and LE weakness, and garbled speech  The patient is on ASA and Eliquis at baseline  His presenting BP was 133/88, glucose 121  His NIHSS on initial evaluation was 11  Initial CTH was not demonstrative of  acute intracranial abnormality, and showed table mild chronic microangiopathic changes within the brain  CTA HN showed atherosclerotic changes are seen within the carotid bifurcations and intracranial internal carotid arteries bilaterally  Suspected moderate stenosis of the right ICA origin and mild narrowing of the left carotid bifurcation  Intracranially there is also right greater than left stenosis, moderate on the left and mild on the right  The patient on subsequent examination was becoming much more distractible and was not able to cooperate with examination  He had difficulty following commands consistently  Over the last few days the patient has shown improvement in overall exam, with hospital delirium being present with the changes in mental status and behaviour in the day versus the night  Imaging:  - CTH was not demonstrative of  acute intracranial abnormality, and showed table mild chronic microangiopathic changes within the brain   - CTA HN showed atherosclerotic changes are seen within the carotid bifurcations and intracranial internal carotid arteries bilaterally  Suspected moderate stenosis of the right ICA origin and mild narrowing of the left carotid bifurcation  Intracranially there is also right greater than left stenosis, moderate on the left and mild on the right   - MRI Brain wo: Motion degraded examination  No acute infarction, edema, or mass effect   Mild chronic microangiopathic ischemic changes   - TTE:  Left Ventricle: Left ventricular cavity size is normal  Wall thickness is mildly increased  There is concentric remodeling  The left ventricular ejection fraction is 65%  Systolic function is normal  Wall motion is normal  Diastolic function is normal   IVS: Septal motion is not well visualized  Right Ventricle: Right ventricular cavity size is mildly dilated  Systolic function is normal  Right Atrium: The atrium is mildly dilated  Mitral Valve: There is mild annular calcification  There is trace regurgitation  Tricuspid Valve: There is moderate regurgitation  The right ventricular systolic pressure is moderately elevated  The estimated right ventricular systolic pressure is 32 90 mmHg  - MRI Brain wwo: 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, mesial temporal lobe, internal capsule and cerebral peduncle  No mass effect or hemorrhagic conversion  Workup:  - A1C: 5 9  - Lipid panel: CHOL 150, , HDL 22, LDL 97  - Ammonia: 17  - 4-Plex: negative  - CXR: Probable small left effusion  Slightly limited but otherwise unremarkable examination   - UA w reflec sxope and cx: innumerable WBC, innumerable bacteria, innumerable RBCs, large leuks  - TEG Platelet mapping: Abnormal  - rEEG: Slowing of the posteriorly dominant rhythm suggests mild nonspecific diffuse cerebral dysfunction  - vEEG: Generalized slowing    Impression:  Considering the sudden onset of the symptoms and the imaging studies that are not suggestive of LVO, haemorrhage or infarct  Imaging studies did reveal that there are atherosclerotic changes within the carotid bifurcations and intracranial internal carotid arteries bilaterally with both intracranial and extracranial ICA stenosis for which neurosurgery recommended medical management  MRI Brain demonstrated multiple strokes that could be embolic in nature   A rEEG and vEEG was completed to look for a seizure/postictal state contributing to his confusion, these demonstrated slowing of the PDR suggesting diffuse cerebral dysfunction  UTI was found and was treated  With fluctuation in mentation hospital delirium is suspected  Plan:  - A multidisciplinary discussion resulted in transitioning to OP hospice care and level 4 comfort measures being most appropriate that aligns with patient's wishes   - No further IP recommendations from neurology  Please contact for any questions

## 2022-12-05 NOTE — ASSESSMENT & PLAN NOTE
Patient with bilateral lower extremity edema which appears to be longstanding and related to venous stasis, managed by [facility] cardiology  Recently edema was worsening and associated with weeping  Patient received 80 mg IV Lasix x 1 in office on 11/9 and again on 11/23  Subsequently prescribed higher dose Lasix 40 mg BID    · Lasix held on admission due to hyponatremia, continue to hold pending improvement in oral intake   · Continue with compression stockings   · Encourage LE elevation   · Monitor clinically

## 2022-12-05 NOTE — CASE MANAGEMENT
Case Management Assessment & Discharge Planning Note    Patient name Samuel Summers  Location [facility] 725/[facility] 208-24 MRN [id number]  : 1935 Date 2022       Current Admission Date: 2022  Current Admission Diagnosis:Stroke-like symptoms   Patient Active Problem List    Diagnosis Date Noted   • Stenosis of both internal carotid arteries 2022   • Prediabetes 2022   • Oropharyngeal dysphagia 2022   • Stroke-like symptoms 2022   • Acute metabolic encephalopathy    • Pressure injury of deep tissue of left heel 2022   • SIRS vs Sepsis ([address] ) 2022   • Cigarette smoker 2019   • Cough 2019   • Wheezing 2019   • Bilateral lower extremity edema 2019   • Acute blood loss anemia 2019   • Recurrent falls 2019   • Other insomnia 2019   • Acute pain due to trauma 2019   • Benign hypertension 2019   • Benign prostatic hyperplasia 2019   • History of chronic obstructive airway disease 2019   • Impaired fasting glucose 2019   • Colitis 2019   • Anterior dislocation of left shoulder 2019   • Closed head injury with concussion 2019   • Vesicocolonic fistula 2019   • Postural dizziness with near syncope 2019   • Ambulatory dysfunction 2019   • Atrial fibrillation ([address] ) 2019   • Gout 2019      LOS (days): 1  Geometric Mean LOS (GMLOS) (days): 3 50  Days to GMLOS:2 6     OBJECTIVE:    Risk of Unplanned Readmission Score: 11 59         Current admission status: Inpatient       Preferred Pharmacy:   [facility] [place] [id number]  Phone: [phone] Fax: [phone]    Primary Care Provider: Ana Maria Escobedo MD    Primary Insurance: MEDICARE  Secondary Insurance: AARP    ASSESSMENT:  Kyle Wetzel Proxies    There are no active Health Care Proxies on file                   Readmission Root Cause  30 Day Readmission: No    Patient Information  Admitted from[de-identified] Home  Mental Status: Confused  During Assessment patient was accompanied by: Spouse  Assessment information provided by[de-identified] Spouse  Primary Caregiver: Spouse  Caregiver's Name[de-identified] Agata Vuong  Caregiver's Relationship to Patient[de-identified] Family Member (spouse)  Caregiver's Telephone Number[de-identified] [phone]  Support Systems: Spouse/significant other  [place] of Residence: [address] [place] [address] do you live [facility]  Select all that apply : Stairs  Number of steps to enter home : 5  Do the steps have railings?: Yes  Type of Current Residence: 2 story home  Upon entering residence, is there a bedroom on the main floor (no further steps)?: Yes  Upon entering residence, is there a bathroom on the main floor (no further steps)?: Yes  In the last 12 months, was there a time when you were not able to pay the mortgage or rent on time?: No  In the last 12 months, how many places have you lived?: 1  In the last 12 months, was there a time when you did not have a steady place to sleep or slept in a shelter (including now)?: No  Homeless/housing insecurity resource given?: N/A  Living Arrangements: Lives w/ Spouse/significant other  Is patient a ?: Yes  Is patient active with [place])?: No    Activities of Daily Living Prior to Admission  Functional Status: Assistance  Completes ADLs independently?: No  Level of ADL dependence: Total Dependent  Ambulates independently?: No  Level of ambulatory dependence:  Total Dependent  Does patient use assisted devices?: No  Does patient currently own DME?: Yes  What DME does the patient currently own?: Bedside [employer]  Does patient have a history of Outpatient Therapy (PT/OT)?: No  Does the patient have a history of Short-Term Rehab?: No  Does patient have a history of HHC?: Yes  Does patient currently have [facility]?: No         Patient Information Continued  Income Source: Pension/[facility]  Does patient have prescription coverage?: Yes  Within the past 12 months, you worried that your food would run out before you got the money to buy more : Never true  Within the past 12 months, the food you bought just didn't last and you didn't have money to get more : Never true  Food insecurity resource given?: N/A  Does patient receive dialysis treatments?: No  Does patient have a history of substance abuse?: No  Does patient have a history of Mental Health Diagnosis?: No         Means of Transportation  Means of Transport to Appts[de-identified] Family transport  In the past 12 months, has lack of transportation kept you from medical appointments or from getting medications?: No  In the past 12 months, has lack of transportation kept you from meetings, work, or from getting things needed for daily living?: No  Was application for public transport provided?: N/A        DISCHARGE DETAILS:    Discharge planning discussed with[de-identified] bedside with spouse  Freedom of Choice: Yes  Comments - Freedom of Choice: Discussed FOC  Spouse would like a STR explored in the [facility]  CM contacted family/caregiver?: No- see comments (spouse was bedside)             Contacts  Patient Contacts: Remington Vaughan  Relationship to Patient[de-identified] Family (spouse)  Contact Method: Phone  Phone Number: [phone]  Reason/Outcome: Continuity of Care, Emergency Contact, Discharge [address]         Is the patient interested in [facility] at discharge?: No    DME Referral Provided  Referral made for DME?: No    Other Referral/Resources/Interventions Provided:  Referral Comments: Spouse requested rehab search in [facility]    Additional Comments: CM spoke with spouse bedside  Spouse is not in a position where she can  for pt at this itme  His needs are too great and she is unable to assist in his ambulation (he is too heavy to move)  Spouse woud like STR explored and is considereing LTC

## 2022-12-05 NOTE — ASSESSMENT & PLAN NOTE
Patient presented via EMS as stroke alert due to concern for acute onset of left-sided weakness and garbled speech noticed by patient's wife  Patient is not a candidate for tPA due to bleeding risk    · Admitted under stroke pathway   · CTA head/neck negative for occlusion, but did note moderate R ICA and mild L ICA stenosis, see below   · MRI brain negative for acute infarct, but did note mild chronic microangiopathic ischemic changes  · Echocardiogram ordered and pending   · Lipid panel: cholesterol 150, , LDL 97  · Appreciate neurology consult and recommendations   · Continue with aspirin, statin, Eliquis   · P2Y12 checked -> 268  · Routine EEG ordered and pending   · Concern for possible TIA and/or infectious/metabolic etiology   · See below regarding infectious workup/management   · Monitor neurochecks   · PT/OT recommending rehab, PMR following

## 2022-12-05 NOTE — ASSESSMENT & PLAN NOTE
· Appreciate speech therapy evaluation   · Recommended level 1 dysphagia diet with nectar thick liquids   · Currently, patient is unsafe for oral intake - will make NPO and start gentle IVF   · Aspiration precautions

## 2022-12-05 NOTE — ASSESSMENT & PLAN NOTE
· Patient was brought to the hospital with acute onset of left-sided weakness and garbled speech  Last unknown at 04:00 o'clock today    Patient and wife was having a conversation and then wife noted that patient speech was garbled  · When EMS presented patient with left-sided weakness  · Patient was a stroke alert  · Was evaluated by Neurology  · Patient is not a candidate for tPA due to bleeding risk  · Admitted under stroke pathway  · CT is negative for any acute pathology CTA is concerning for right ICA stenosis  · Continue with aspirin and statin  · MRI  · Monitor neuro status

## 2022-12-05 NOTE — ASSESSMENT & PLAN NOTE
Assessment:  During assessment after initial NIHSS and imaging studies were completed it was noted that the patient had more trouble focusing on conversations, had more tangential speech, and was not able to follow commands as before  On initial examination the patient kept perseverating on the numbers 9 and 14 which is the month and day respectively of his birthday  On examination 12/12/2022 the patient was examined in the early AM, he was alert, oriented to only person and place, and did seem confused  As the patient does seem to improve as the day progresses and then seem to become confused and disoriented again, hospital delirium is suspected  Plan:  - A multidisciplinary discussion resulted in transitioning to OP hospice care and level 4 comfort measures being most appropriate that aligns with patient's wishes   - No further IP recommendations from neurology  Please contact for any questions

## 2022-12-05 NOTE — PLAN OF CARE
Problem: Knowledge Deficit  Goal: Patient/family/caregiver demonstrates understanding of disease process, treatment plan, medications, and discharge instructions  Description: Complete learning assessment and assess knowledge base    Interventions:  - Provide teaching at level of understanding  - Provide teaching via preferred learning methods  12/5/2022 1802 by Rommel Collado RN  Outcome: Progressing  12/5/2022 1126 by Rommel Collado RN  Outcome: Progressing     Problem: DISCHARGE PLANNING  Goal: Discharge to home or other facility with appropriate resources  Description: INTERVENTIONS:  - Identify barriers to discharge w/patient and caregiver  - Arrange for needed discharge resources and transportation as appropriate  - Identify discharge learning needs (meds, wound care, etc )  - Arrange for interpretive services to assist at discharge as needed  - Refer to Case Management Department for coordinating discharge planning if the patient needs post-hospital services based on physician/advanced practitioner order or complex needs related to functional status, cognitive ability, or social support system  12/5/2022 1802 by Rommel Collado RN  Outcome: Progressing  12/5/2022 1126 by Rommel Collado RN  Outcome: Progressing

## 2022-12-05 NOTE — ASSESSMENT & PLAN NOTE
· CTA head/neck on admission (limited due to motion): Atherosclerotic changes are seen within the carotid bifurcations and intracranial internal carotid arteries bilaterally   Suspected moderate stenosis of the right ICA origin and mild narrowing of the left carotid bifurcation   Intracranially there is also right greater than left stenosis, moderate on the left and mild on the right    · Appreciate neurosurgery consult and recommendations   · No need for surgical intervention at this time   · Outpatient follow-up with repeat CTA in 2 weeks   · Continue with aspirin, statin, Eliquis

## 2022-12-05 NOTE — ASSESSMENT & PLAN NOTE
Right ICA stenosis  · Presented to ED 12/4/2022 with left side weakness and garbled speech  · Not a tPA candidate secondary to bleeding risk  · Admitted under stroke pathway  · Concern on CTA for right ICA stenosis  · Neurology consulted  · On current exam, opens eyes to noxious stimulus  Left sided weakness/plegia  Not following commands  Right side purposeful  Imaging:  · MRI brain wo, 12/4/2022: Motion degraded examination  No acute infarction, edema, or mass effect  Mild chronic microangiopathic ischemic changes  · CTA stroke alert, 12/4/2022: Unfortunately this examination is significantly limited due to patient motion  Atherosclerotic changes are seen within the carotid bifurcations and intracranial internal carotid arteries bilaterally  Suspected moderate stenosis of the right ICA origin and mild narrowing of the left carotid bifurcation  Intracranially there is also right greater than left stenosis, moderate on the left and mild on the right  No occlusive disease or thrombosis identified  Plan:  · Continue to monitor neuro exam closely  · Continue Eliquis and ASA  · Ongoing medical/stroke care per neurology  · Workup for other causes of encephalopathy including infection in setting of leukocytosis  · Consider repeat CTA in 1-2 weeks  · Mobilize with PT/OT  · DVT ppx: SCDs  Neurosurgery will sign off  Follow up per neurology team  Call with questions

## 2022-12-05 NOTE — PROGRESS NOTES
[facility]  Progress Note - Laila Robles 1935, 82 y o  male MRN: [id number]  Unit/Bed#: [facility] 725-01 Encounter: [id number]  Primary Care Provider: Felicitas Nichole MD   Date and time admitted to hospital: 12/4/2022  5:26 PM    * Stroke-like symptoms  Assessment & Plan  Patient presented via EMS as stroke alert due to concern for acute onset of left-sided weakness and garbled speech noticed by patient's wife  Patient is not a candidate for tPA due to bleeding risk  · Admitted under stroke pathway   · CTA head/neck negative for occlusion, but did note moderate R ICA and mild L ICA stenosis, see below   · MRI brain negative for acute infarct, but did note mild chronic microangiopathic ischemic changes  · Echocardiogram ordered and pending   · Lipid panel: cholesterol 150, , LDL 97  · Appreciate neurology consult and recommendations   · Continue with aspirin, statin, Eliquis   · P2Y12 checked -> 268  · Routine EEG ordered and pending   · Concern for possible TIA and/or infectious/metabolic etiology   · See below regarding infectious workup/management   · Monitor neurochecks   · PT/OT recommending rehab, PMR following    SIRS vs Sepsis ([facility] [address] )  Assessment & Plan  POA with mild leukocytosis and tachycardia  · COVID/flu/RSV negative   · Portable CXR: Probable small left effusion   Slightly limited but otherwise unremarkable examination  · UA with nitrites, leukocytes and bacteria concerning for UTI  · Noted with open wounds of LE on admission, however no clinical signs of cellulitis   · Check PA/lateral CXR for completeness   · Start IV ceftriaxone pending urine culture  · Procalcitonin 0 51, repeat with morning labs   · Trend temps, WBC, hemodynamics     Stenosis of both internal carotid arteries  Assessment & Plan  · CTA head/neck on admission (limited due to motion):  Atherosclerotic changes are seen within the carotid bifurcations and intracranial internal carotid arteries bilaterally   Suspected moderate stenosis of the right ICA origin and mild narrowing of the left carotid bifurcation   Intracranially there is also right greater than left stenosis, moderate on the left and mild on the right  · Appreciate neurosurgery consult and recommendations   · No need for surgical intervention at this time   · Outpatient follow-up with repeat CTA in 2 weeks   · Continue with aspirin, statin, Eliquis      Bilateral lower extremity edema  Assessment & Plan  Patient with bilateral lower extremity edema which appears to be longstanding and related to venous stasis, managed by [facility] cardiology  Recently edema was worsening and associated with weeping  Patient received 80 mg IV Lasix x 1 in office on 11/9 and again on 11/23  Subsequently prescribed higher dose Lasix 40 mg BID    · Lasix held on admission due to hyponatremia, continue to hold pending improvement in oral intake   · Continue with compression stockings   · Encourage LE elevation   · Monitor clinically     Acute metabolic encephalopathy  Assessment & Plan  Presented with stroke like symptoms as above; MRI brain negative for stroke, consideration for TIA in the setting of ICA stenosis, see plans above   · Family noted that the patient was more confused than usual recently and they attributed it to higher dose of Lasix due to worsening leg edema  · Infectious workup/management as outlined above   · Appreciate neurology inputs   · Continue with supportive cares  · Mental status continues to wax and wane   · Currently, patient is oriented to self and year but remains encephalopathic with inability to properly follow commands   · Maintain NPO until mental status improves and is able to safely tolerate oral diet     Pressure injury of deep tissue of left heel  Assessment & Plan  · POA, followed by wound care as outpatient  · No clinical signs of infection at this time   · Appreciate Podiatry consult and recommendations   · Continue with local wound care, offloading     Atrial fibrillation (HCC)  Assessment & Plan  · Currently in atrial fibrillation with RVR with -120s   · Rate controlled with metoprolol 50 mg q12h as outpatient   · Transition to 5 mg IV q8h given inability to tolerate PO  · Anticoagulated with Eliquis   · Initiate heparin gtt given inability to tolerate PO  · Continue with telemetry monitoring     Benign hypertension  Assessment & Plan  · BP acceptable, monitor routinely   · Continue with metoprolol with holding parameters    Oropharyngeal dysphagia  Assessment & Plan  · Appreciate speech therapy evaluation   · Recommended level 1 dysphagia diet with nectar thick liquids   · Currently, patient is unsafe for oral intake - will make NPO and start gentle IVF   · Aspiration precautions     Prediabetes  Assessment & Plan  Evidenced by A1c of 5 9%  · Monitor accuchecks   · Encourage diet and lifestyle modifications       VTE Pharmacologic Prophylaxis: VTE Score: 6 High Risk (Score >/= 5) - Pharmacological DVT Prophylaxis Ordered: apixaban (Eliquis)  Sequential Compression Devices Ordered  Patient Centered Rounds: I performed bedside rounds with nursing staff today  Discussions with Specialists or Other Care Team Provider: primary RN, case managaement, will f/u neuro recs     Education and Discussions with Family / Patient: Updated  (wife) at bedside  Time Spent for Care: 45 minutes  More than 50% of total time spent on counseling and coordination of care as described above  Current Length of Stay: 1 day(s)  Current Patient Status: Inpatient   Certification Statement: The patient will continue to require additional inpatient hospital stay due to workup/management as outlined above   Discharge Plan: Anticipate discharge in >72 hrs to rehab facility      Code Status: Level 3 - DNAR and DNI    Subjective:   Patient very restless during my encounter, constantly pulling at telemetry leads and reaching for his penis (condom catheter out of place)  He answers some questions but does not open his eyes or follow commands  When I asked his name he first said "Jose Romina" and when I asked again he answered appropriately saying, "[place]"  When I asked location he says "mental learning" and when I asked the year he says "22"  Wife at bedside noting last night in the ED, patient did have some periods of mental clarity but so far today only with nonsensical speech/restlessness  She expressed overall frustration regarding delcine in his health over the last few months stating she cannot take him back home and would consider looking into long term care; she is not interested hospice at this time  Advised plan to continue antibiotic for UTI with hopeful improvement in mental status  Also discussed awaiting Neurology inputs on potential TIA related to carotid artery stenosis  She mentions that she would not want surgical intervention for this in the future  Objective:     Vitals:   Temp (24hrs), Av 1 °F (36 7 °C), Min:96 8 °F (36 °C), Max:99 1 °F (37 3 °C)    Temp:  [96 8 °F (36 °C)-99 1 °F (37 3 °C)] 99 1 °F (37 3 °C)  HR:  [] 90  Resp:  [16-20] 19  BP: (106-173)/(60-88) 120/76  SpO2:  [92 %-99 %] 98 %  Body mass index is 44 65 kg/m²  Input and Output Summary (last 24 hours): Intake/Output Summary (Last 24 hours) at 2022 1504  Last data filed at 2022 1438  Gross per 24 hour   Intake --   Output 450 ml   Net -450 ml       Physical Exam:   Physical Exam  Vitals reviewed  Constitutional:       General: He is sleeping  Appearance: He is morbidly obese  He is ill-appearing  Comments: Restless, pulling at tele leads and condom catheter    Cardiovascular:      Rate and Rhythm: Tachycardia present  Rhythm irregular  Comments: Afib -120s  Pulmonary:      Effort: Pulmonary effort is normal  No tachypnea, accessory muscle usage or respiratory distress        Comments: On 2 L NC with SpO2 high 90s    Auscultation limited due to restlessness and body habitus  Genitourinary:     Comments: External urinary catheter noted to be out of place   Clear yellow urine noted on catheter bag   Patient with episode of incontinence during encounter  Musculoskeletal:      Right lower leg: No edema  Left lower leg: No edema  Comments: LLE offloaded in boot   Skin:     Comments: Chronic PVD changes BLE  LLE dressing c/d/i   Neurological:      Mental Status: He is easily aroused  He is lethargic, disoriented and confused  Comments: Move right sided extremities spontaneously, but seems to have left sided neglect   Does not follow commands    Psychiatric:         Behavior: Behavior is uncooperative  Cognition and Memory: Cognition is impaired  Memory is impaired  Judgment: Judgment is impulsive and inappropriate           Additional Data:     Labs:  Results from last 7 days   Lab Units 12/05/22  0420   WBC Thousand/uL 10 22*   HEMOGLOBIN g/dL 12 6   HEMATOCRIT % 40 1   PLATELETS Thousands/uL 216     Results from last 7 days   Lab Units 12/05/22  0432   SODIUM mmol/L 136   POTASSIUM mmol/L 3 8   CHLORIDE mmol/L 100   CO2 mmol/L 31   BUN mg/dL 21   CREATININE mg/dL 0 90   ANION GAP mmol/L 5   CALCIUM mg/dL 9 2   GLUCOSE RANDOM mg/dL 111     Results from last 7 days   Lab Units 12/04/22  1730   INR  1 23*     Results from last 7 days   Lab Units 12/04/22  1728   POC GLUCOSE mg/dl 138     Results from last 7 days   Lab Units 12/04/22  1730   HEMOGLOBIN A1C % 5 9*     Results from last 7 days   Lab Units 12/05/22  0432   PROCALCITONIN ng/ml 0 15       Lines/Drains:  Invasive Devices     Peripheral Intravenous Line  Duration           Peripheral IV 12/04/22 Left;Proximal;Ventral (anterior) Forearm <1 day    Peripheral IV 12/04/22 Right;Ventral (anterior) Forearm <1 day          Drain  Duration           External Urinary Catheter Small <1 day                  Telemetry:  Telemetry Orders (From admission, onward)             48 Hour Telemetry Monitoring  Continuous x 48 hours        References:    Telemetry Guidelines   Question:  Reason for 48 Hour Telemetry  Answer:  Acute CVA (<24 hrs old, hemispheric strokes, selected brainstem strokes, cardiac arrhythmias)                 Telemetry Reviewed: Atrial fibrillation  HR averaging 110-120  Indication for Continued Telemetry Use: Arrthymias requiring medical therapy             Imaging: Reviewed radiology reports from this admission including: chest xray, CT head and MRI brain    Recent Cultures (last 7 days):         Last 24 Hours Medication List:   Current Facility-Administered Medications   Medication Dose Route Frequency Provider Last Rate   • acetaminophen  975 mg Oral Q6H PRN Caty E Held, PA-C     • allopurinol  100 mg Oral Daily Flaca Mancera MD     • apixaban  5 mg Oral BID Flaca Mancera MD     • aspirin  81 mg Oral Daily Flaca Mancera MD     • atorvastatin  40 mg Oral QPM Flaca Mancera MD     • cefTRIAXone  1,000 mg Intravenous Q24H Caty E Held, PA-C 1,000 mg (12/05/22 1225)   • cholecalciferol  400 Units Oral Daily Flaca Mancera MD     • docusate sodium  100 mg Oral BID Flaca Mancera MD     • heparin   Intravenous Once Caty E Held, PA-C     • metoprolol  5 mg Intravenous Q8H Caty E Held, PA-C     • multi-electrolyte  75 mL/hr Intravenous Continuous Earvin Heuvelton CHARLEEN Grijalva 75 mL/hr (12/05/22 1426)   • ondansetron  4 mg Intravenous Q6H PRN Flaca Mancera MD     • polyethylene glycol  17 g Oral Daily Flaca Mancera MD          Today, Patient Was Seen By: Haydee Smiley PA-C    **Please Note: This note may have been constructed using a voice recognition system  **

## 2022-12-05 NOTE — PHYSICAL THERAPY NOTE
Physical Therapy Evaluation    Patient Name: Kelvin Grewal    RQQSB'T Date: 12/5/2022     Problem List  Principal Problem:    Stroke-like symptoms  Active Problems:    Benign hypertension    Atrial fibrillation (HCC)    Bilateral lower extremity edema    Encephalopathy    Open wound of left lower extremity    SIRS vs Sepsis ([facility] )    Stenosis of both internal carotid arteries    Prediabetes    Oropharyngeal dysphagia       Past Medical History  Past Medical History:   Diagnosis Date   • Atrial fibrillation ([facility] )    • Cancer ([facility] )    • Hypertension         Past Surgical History  Past Surgical History:   Procedure Laterality Date   • REPLACEMENT TOTAL HIP W/  RESURFACING IMPLANTS           12/05/22 0846   PT Last Visit   PT Visit Date 12/05/22   Note Type   Note type Evaluation   Pain Assessment   Pain Assessment Tool FLACC   Pain Rating: FLACC (Rest) - Face 0   Pain Rating: FLACC (Rest) - Legs 0   Pain Rating: FLACC (Rest) - Activity 0   Pain Rating: FLACC (Rest) - Cry 0   Pain Rating: FLACC (Rest) - Consolability 0   Score: FLACC (Rest) 0   Pain Rating: FLACC (Activity) - Face 0   Pain Rating: FLACC (Activity) - Legs 0   Pain Rating: FLACC (Activity) - Activity 0   Pain Rating: FLACC (Activity) - Cry 0   Pain Rating: FLACC (Activity) - Consolability 0   Score: FLACC (Activity) 0   Restrictions/Precautions   Weight Bearing Precautions Per Order No   Other Precautions Cognitive; Bed Alarm;Multiple lines;Telemetry; Fall Risk  (L hemiparesis, dysarthric, aphasic)   [address]   Additional Comments pt unable to verbalize home set up-defer to CM   Prior Function   Lives With Spouse   Comments pt unable to verbalize PLOF, defer to CM   General   Family/Caregiver Present No   Cognition   Overall Cognitive Status Impaired   Arousal/Participation Lethargic   Orientation Level Oriented to person;Oriented to place; Disoriented to situation;Disoriented to time  (year only)   Memory Unable to assess   Following Commands Unable to follow one step commands   RLE Assessment   RLE Assessment   (unable to assess, not following commands)   LLE Assessment   LLE Assessment   (unable to assess, not following commands  ?increased hamstring tone noted  no observable movement)   Light Touch   RLE Light Touch   (unable to assess 2* pt cognition and arousal)   LLE Light Touch   (unable to assess 2* pt cognition and arousal)   Bed Mobility   Supine to Sit 2  Maximal assistance   Additional items Assist x 2; Increased time required;HOB elevated;Verbal cues;LE management   Sit to Supine 2  Maximal assistance   Additional items Assist x 2; Increased time required;Verbal cues;LE management   Transfers   Sit to Stand 2  Maximal assistance   Additional items Assist x 2; Increased time required;Verbal cues   Stand to Sit 2  Maximal assistance   Additional items Assist x 2; Increased time required;Verbal cues   Additional Comments transfers performed with LUE supported and L knee block +2nd therapist R HHA and knee block   Ambulation/Elevation   Gait pattern Not appropriate   Balance   Static Sitting Poor   Dynamic Sitting Poor -   Static Standing Poor -   Dynamic Standing Poor -   Ambulatory Zero   Endurance Deficit   Endurance Deficit Yes   Endurance Deficit Description fatigue, weakness   Activity Tolerance   Activity Tolerance Patient limited by fatigue   Medical Staff Made Aware OT, STEVE   Nurse Made Aware yes-cleared to mobilize   Assessment   Prognosis Guarded   Problem List Decreased strength;Decreased endurance; Impaired balance;Decreased mobility; Decreased cognition; Impaired judgement;Decreased coordination;Decreased safety awareness; Impaired tone   Assessment Pt is a 82 y o  male admitted to \[facility]"" on 12/4/2022 with stroke like symptoms including garbled speech, L hemiparesis  Pt received a primary medical dx of stroke like symptoms   Other active problems includine leukocytosis, hyponatremia, L LE wound, encephalopathy, B/L LE edema, debility, a-fib, benign HTN  Pt has the following comorbidities which affect their treatment:h/o a-fib, cancer, HTN  Pt has a high complexity clinical presentation due to Ongoing medical management for primary dx, Increased reliance on more restrictive AD compared to baseline, Decreased activity tolerance compared to baseline, Fall risk, Increased assistance needed from caregiver at current time, Ongoing telemetry monitoring, Cog status, Increased O2 via NC from pts baseline, Trending lab values, Diagnostic imaging pending, Continuous pulse oximetry monitoring  , and PMH  PT was consulted to evaluate pt's functional mobility and discharge needs  Upon evaluation, patient required maxAx2 for bed mobility and STS transfers    Pt's functional impairments include: impaired strength (L>R), balance, endurance, activity tolerance, and mobility  At conclusion of eval, pt remained supine in bed with bed alarm, phone, call bell, and all other personal needs within reach  Pt would benefit from skilled PT to address their functional mobility limitations  The patient's AM-PAC Basic Mobility Inpatient Short Form Raw Score is 6  A Raw score of less than or equal to 16 suggests the patient may benefit from discharge to post-acute rehabilitation services  Please also refer to the recommendation of the Physical Therapist for safe discharge planning  D/C recommendations are rehab  Barriers to Discharge Decreased caregiver support; Inaccessible home environment   Goals   Patient Goals none stated   [facility] Expiration Date 12/19/22   Short Term Goal #1 In 10-14 days, pt will: 1) perform bed mobility with minAx1 to promote functional independence and decrease caregiver burden  2) perform transfers to<>from all surfaces with minAx1 to promote functional independence and decrease caregiver burden  3) sit EOB with S x25 min to improve QOL and assist with ADLS   4) improve balance grades by 1/2 to promote safety with functional mobility  5) improve strength grades by 1/2 to promote safety with functional mobility  6) PT to see for further ambulation and mobility goals  PT Treatment Day 0   Plan   Treatment/Interventions Functional transfer training;LE strengthening/ROM; Endurance training; Therapeutic exercise;Cognitive reorientation;Patient/family training;Equipment eval/education; Bed mobility;Spoke to nursing;Spoke to case management;OT   PT Frequency 2-3x/wk   Recommendation   PT Discharge Recommendation Post acute rehabilitation services   Equipment Recommended   (TBD)   AM-PAC Basic Mobility Inpatient   Turning in Bed Without Bedrails 1   Lying on Back to Sitting on Edge of Flat Bed 1   Moving Bed to Chair 1   Standing Up From Chair 1   Walk in Room 1   Climb 3-5 Stairs 1   Basic Mobility Inpatient Raw Score 6   Turning Head Towards Sound 2   Follow Simple Instructions 2   Low Function Basic Mobility Raw Score 10   Low Function Basic Mobility Standardized Score 14 65   Highest Level Of Mobility   JH-HLM Goal 2: Bed activities/Dependent transfer   JH-HLM Achieved 3: Sit at edge of bed   Modified [place] Scale   Modified [place] Scale 4   Barthel Index   Feeding 0   Bathing 0   Grooming Score 0   Dressing Score 0   Bladder Score 0   Bowels Score 0   Toilet Use Score 0   Transfers (Bed/Chair) Score 5   Mobility (Level Surface) Score 0   Stairs Score 0   Barthel Index Score 5   Rickey Fields, PT, DPT

## 2022-12-05 NOTE — OCCUPATIONAL THERAPY NOTE
Occupational Therapy Evaluation     Patient Name: Jana Miranda  HMKAC'N Date: 12/5/2022  Problem List  Principal Problem:    Stroke-like symptoms  Active Problems:    Benign hypertension    Atrial fibrillation (HCC)    Bilateral lower extremity edema    Acute metabolic encephalopathy    Pressure injury of deep tissue of left heel    SIRS vs Sepsis ([facility])    Stenosis of both internal carotid arteries    Prediabetes    Oropharyngeal dysphagia    Past Medical History  Past Medical History:   Diagnosis Date    Atrial fibrillation ([facility])     Cancer ([facility])     Hypertension      Past Surgical History  Past Surgical History:   Procedure Laterality Date    REPLACEMENT TOTAL HIP W/  RESURFACING IMPLANTS        12/05/22 0831   OT Last Visit   OT Visit Date 12/05/22   Note Type   Note type Evaluation   Pain Assessment   Pain Assessment Tool FLACC   Pain Rating: FLACC (Rest) - Face 0   Pain Rating: FLACC (Rest) - Legs 0   Pain Rating: FLACC (Rest) - Activity 0   Pain Rating: FLACC (Rest) - Cry 0   Pain Rating: FLACC (Rest) - Consolability 0   Score: FLACC (Rest) 0   Pain Rating: FLACC (Activity) - Face 0   Pain Rating: FLACC (Activity) - Legs 0   Pain Rating: FLACC (Activity) - Activity 0   Pain Rating: FLACC (Activity) - Cry 0   Pain Rating: FLACC (Activity) - Consolability 0   Score: FLACC (Activity) 0   Restrictions/Precautions   Weight Bearing Precautions Per Order No   Other Precautions Cognitive; Chair Alarm; Bed Alarm;Telemetry; Fall Risk;Pain;Hard of hearing;Multiple lines  (+left UE hemiplegia, dysarthria)   Home Living   Home Equipment Walker   Additional Comments continue to asess social history/PLOF pt unable to communicate 2* To cogntive including aphasia/dyarthic speech     Prior Function   Lives With Spouse   Lifestyle   Reciprocal Relationships spouse   ADL   Eating Assistance 1  Total Assistance   Grooming Assistance 1  Total Assistance   UB Bathing Assistance 1  Total Assistance   LB Bathing Assistance 1  Total Assistance   UB Dressing Assistance 1  Total Assistance   LB Dressing Assistance 1  Total Assistance   Bed Mobility   Supine to Sit 2  Maximal assistance   Additional items Assist x 2   Sit to Supine 2  Maximal assistance   Additional items Assist x 2   Transfers   Sit to Stand 2  Maximal assistance   Additional items Assist x 2   Stand to Sit 2  Maximal assistance   Additional items Assist x 2   Additional Comments transfers with B/L: R HHA  anterior approach left and B/L  knee and feet blocked (right slipping forward)   Functional Mobility   Additional Comments unsafe to asess  Balance   Static Sitting Poor   Dynamic Sitting Poor -   Static Standing Poor -   Dynamic Standing Poor -   Ambulatory Zero   Activity Tolerance   Activity Tolerance Patient limited by fatigue; Other (Comment)  (cognition/lethargic)   Medical Staff Made Aware PT rafael   Nurse Made Aware RN cleared pt for therapy   RUE Assessment   RUE Assessment (continue to assess, spontaneous ROM appeared [facility] )   LUE Assessment   LUE Assessment X  (left hemiplegia, no muscle contraction noted 0/5 strength/hypotonic)   Hand Function   Gross Motor Coordination Impaired  (left)   Fine Motor Coordination Impaired  (left)   Vision-Basic Assessment   Current Vision Wears glasses all the time   Vision - Complex Assessment   Additional Comments continue to assess, pt decreased aletness with session   Perception   Inattention/Neglect Cues to attend to left side of body   Cognition   Overall Cognitive Status Impaired   Arousal/Participation Arousable;Lethargic;Persistent stimuli required  (pt arousable, difficulty maintaining eyes open)   Attention Difficulty attending to directions   Orientation Level Oriented to person  (self only)   Memory Decreased recall of precautions;Decreased recall of recent events;Decreased short term memory   Following Commands Follows one step commands inconsistently   Comments pt limited by decreased alertness/attention with session unable to maintain eyes open mostly unintellgible/ garbled speech/dysarthric speech, -aphasia (?),  unable to engage in functional tasks  Assessment   Limitation Decreased ADL status; Decreased Safe judgement during ADL;Decreased cognition;Decreased endurance;Decreased self-care trans;Decreased high-level ADLs; Decreased fine motor control;Decreased UE strength;Decreased UE ROM; Visual deficit   Prognosis Fair   Assessment Pt is a 82 y o  male who was admitted to [address] [place] [address] on 12/4/2022 with left sided weakness, and garbled speech and now here with  Stroke-like symptoms  (-) tPA candidate,  CT head -Suspected moderate stenosis of the right ICA origin and mild narrowing of the left carotid bifurcation  Intracranially there is also right greater than left stenosis, moderate on the left and mild on the right  Pt's problem list also includes PMH of  has a past medical history of Atrial fibrillation ([facility] ), Cancer ([facility] [facility] ), and Hypertension    At baseline pt was completing continue to assess  Pt lives with spouse  Currently pt requires max/total assist for overall ADLS and  for functional mobility/transfers  Pt currently presents with impairments in the following categories -difficulty performing ADLS, difficulty performing IADLS , limited insight into deficits, compliance, flat affect, decreased initiation and engagement  and health management  activity tolerance, endurance, standing balance/tolerance, sitting balance/tolerance, UE strength, UE ROM, memory, insight, safety  and judgement    These impairments, as well as pt's fatigue and (L) hemiparesis  limit pt's ability to safely engage in all baseline areas of occupation, includingeating, grooming, bathing, dressing, toileting, functional mobility/transfers, community mobility, laundry , driving, house maintenance, medication management, meal prep, cleaning, social participation  and leisure activities  The patient's raw score on the AM-PAC Daily Activity inpatient short form low function score is 10, standardized score is Low Function Daily Activity Standardized Score: 17 31  Patients with a standardized score less than 39 4 are likely to benefit from discharge to post-acute rehab services  Please refer to the recommendation of the Occupational Therapist for safe discharge planning  From OT standpoint, recommend STR upon D/C  OT will continue to follow to address the below stated goals  Goals   Patient Goals unable to state   LTG Time Frame 10-14   Long Term Goal #1 see goals below   Plan   Treatment Interventions ADL retraining;Visual perceptual retraining;Functional transfer training;UE strengthening/ROM; Endurance training;Cognitive reorientation;Patient/family training;Equipment evaluation/education; Compensatory technique education; Energy conservation; Activityengagement;Continued evaluation; Neuromuscular reeducation; Fine motor coordination activities   Goal Expiration Date 12/19/22   OT Frequency 3-5x/wk   Recommendation   OT Discharge Recommendation Post acute rehabilitation services   AM-PAC Daily Activity Inpatient   Lower Body Dressing 1   Bathing 1   Toileting 1   Upper Body Dressing 1   Grooming 1   Eating 1   Daily Activity Raw Score 6   Turning Head Towards Sound 2   Follow Simple Instructions 2   Low Function Daily Activity Raw Score 10   Low Function Daily Activity Standardized Score 17 31   AM-PAC Applied Cognition Inpatient   Following a Speech/Presentation 1   Understanding Ordinary Conversation 2   Taking Medications 1   Remembering Where Things Are Placed or Put Away 1   Remembering List of 4-5 Errands 1   Taking Care of Complicated Tasks 1   Applied Cognition Raw Score 7   Applied Cognition Standardized Score 15 17   Barthel Index   Feeding 0   Bathing 0   Grooming Score 0   Dressing Score 0   Bladder Score 0   Bowels Score 0   Toilet Use Score 0   Transfers (Bed/Chair) Score 5   Mobility (Level Surface) Score 0   Stairs Score 0   Barthel Index Score 5   Modified [place] Scale   Modified Fan Scale 4   End of Consult   Patient Position at End of Consult Supine   Nurse Communication Nurse aware of consult    Occupational Therapy Goals:    *Mod I with bed mobility to engage in functional tasks  *Mod I Adl's after setup with use of AE PRN  *Mod I toileting and clothing management   *Mod I functional transfers to/from all surfaces with Fair + dynamic balance and safety for participation in dynamic adls and iadl tasks   *Demonstrate good carryover with safe use of RW during functional tasks   *Assess DME needs   *Increase activity tolerance to 15-20 minutes for participation in adls and enjoyable activities  *Pt to participate in further cognitive testing with good attention and participation to assist with safe d/c recommendations  *Pt will increase attention to follow 100% simple one step verbal commands and be A/Ox4 consistently t/o use of external environmental cues w/ mod I  *Demonstrate good carryover of pt/family education and training with good tolerance for increased safety and independence with ADL's/ADl's  *Increase left UE functional use to a stabilizer to increase I with ADL's   *Assess functional mobility as appropriate      Chela Lyn MOT, OTR/L

## 2022-12-05 NOTE — ASSESSMENT & PLAN NOTE
· POA, followed by wound care as outpatient  · No clinical signs of infection at this time   · Appreciate Podiatry consult and recommendations   · Continue with local wound care, offloading

## 2022-12-05 NOTE — ED NOTES
Pt transported to MRI at this time     David Garcia, [facility]  12/04/22 1479

## 2022-12-06 PROBLEM — F17.200 NICOTINE DEPENDENCE: Status: ACTIVE | Noted: 2019-12-24

## 2022-12-06 PROBLEM — I73.9 PAD (PERIPHERAL ARTERY DISEASE) (HCC): Status: ACTIVE | Noted: 2022-01-01

## 2022-12-06 PROBLEM — N30.00 ACUTE CYSTITIS WITHOUT HEMATURIA: Status: ACTIVE | Noted: 2022-01-01

## 2022-12-06 NOTE — ASSESSMENT & PLAN NOTE
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

## 2022-12-06 NOTE — RESTORATIVE TECHNICIAN NOTE
Restorative Technician Note      Patient Name: Jimmie Olmstead     Note Type: Mobility  Patient Position Upon Consult: Supine  Activity Performed: Repositioned  Education Provided: Yes  Patient Position at End of Consult: Supine;  All needs within reach; Bed/Chair alarm activated    Ripley Felty BS, Restorative Technician, [employer]

## 2022-12-06 NOTE — PROGRESS NOTES
[address] [facility]  Progress Note - Soledad Davidson 1935, 82 y o  male MRN: [id number]  Unit/Bed#: [facility] 725-01 Encounter: [id number]  Primary Care Provider: Romi Vasquez MD   Date and time admitted to hospital: 12/4/2022  5:26 PM    * Stroke-like symptoms  Assessment & Plan  Patient presented via EMS as stroke alert due to concern for acute onset of left-sided weakness and garbled speech noticed by patient's wife  Patient is not a candidate for tPA due to bleeding risk  · Admitted under stroke pathway   · CTA head/neck negative for occlusion, but did note moderate R ICA and mild L ICA stenosis, see below   · MRI brain negative for acute infarct, but did note mild chronic microangiopathic ischemic changes  · Echocardiogram ordered and pending   · Lipid panel: cholesterol 150, , LDL 97  · Appreciate neurology consult and recommendations   · Continue with aspirin, statin, Eliquis   · P2Y12 checked -> 268  · Routine EEG ordered and pending   · Concern for possible TIA and/or infectious/metabolic etiology   · See below regarding infectious workup/management   · PT/OT recommending rehab, PMR following  · Monitor neurochecks     SIRS vs Sepsis ([facility] [address] )  Assessment & Plan  POA with mild leukocytosis and tachycardia  Procalcitonin WNL at 0 15   · COVID/flu/RSV negative   · Portable CXR: Probable small left effusion   Slightly limited but otherwise unremarkable examination  · UA with nitrites, leukocytes and bacteria concerning for UTI  · Noted with left heel pressure ulcer on admission however no clinical signs of cellulitis, see related plan  · Follow-up PA/lateral CXR for completeness   · Continue with IV ceftriaxone (day 2) pending urine culture  · Trend temps, WBC, hemodynamics     Stenosis of both internal carotid arteries  Assessment & Plan  · CTA head/neck on admission (limited due to motion):  Atherosclerotic changes are seen within the carotid bifurcations and intracranial internal carotid arteries bilaterally   Suspected moderate stenosis of the right ICA origin and mild narrowing of the left carotid bifurcation   Intracranially there is also right greater than left stenosis, moderate on the left and mild on the right  · Appreciate neurosurgery consult and recommendations   · No need for surgical intervention at this time   · Outpatient follow-up with repeat CTA in 2 weeks   · Continue with aspirin, statin, Eliquis      Acute metabolic encephalopathy  Assessment & Plan  Presented with stroke like symptoms as above; MRI brain negative for stroke, consideration for TIA in the setting of ICA stenosis, see plans above   · Family noted that the patient was more confused than usual recently and they attributed it to higher dose of Lasix due to worsening leg edema  · Infectious workup/management as outlined above   · Appreciate neurology inputs regarding stroke like symptoms as above  · Mental status continues to wax and wane; improved today, oriented to self place and year  · Dysphagia diet as per speech therapy   · Continue with supportive cares  · Discontinue restraints     Bilateral lower extremity edema  Assessment & Plan  Patient with bilateral lower extremity edema which appears to be longstanding and related to venous stasis, managed by [facility] cardiology  Recently edema was worsening and associated with weeping  Patient received 80 mg IV Lasix x 1 in office on 11/9 and again on 11/23  Subsequently prescribed higher dose Lasix 40 mg BID    · Lasix held on admission due to hyponatremia, continue to hold pending improvement in oral intake   · Continue with compression stockings   · Encourage LE elevation   · Monitor clinically     Pressure injury of deep tissue of left heel  Assessment & Plan  · POA, followed by wound care as outpatient  · No clinical signs of infection at this time   · Appreciate Podiatry consult and recommendations   · Follow-up XR   · Follow-up LEADs   · No plans for intervention at this time   · Continue with local wound care, offloading     Atrial fibrillation ([address] )  Assessment & Plan  · Hospital day 1 patient in atrial fibrillation with RVR - now improved   · Continue rate control with metoprolol 50 mg q12h   · Continue AC with Eliquis   · Follow-up echo     PAD (peripheral artery disease) (HCC)  Assessment & Plan  · Follows with [facility] outpatient  · Most recent LEADs in May consistent with 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  · Follow-up repeat LEADs as above   · Continue with ASA, statin    Oropharyngeal dysphagia  Assessment & Plan  · Appreciate speech therapy evaluation   · Recommended level 1 dysphagia diet with nectar thick liquids   · Aspiration precautions     Prediabetes  Assessment & Plan  Evidenced by A1c of 5 9%  · Monitor glucose on AM labs   · Encourage diet and lifestyle modifications     Nicotine dependence  Assessment & Plan  · Patient admits to smoking approx 1 ppd currently   · Suspect has underlying COPD   · Respiratory protocol   · Cessation counseling     Anterior dislocation of left shoulder  Assessment & Plan  · Injury occurred in December 2019, was following with  orthopedics and managed conservatively; seems to have been lost to follow-up with repeat XRs  · Consider contributing factor to LUE weakness ? will need to clarify if this is chronic      VTE Pharmacologic Prophylaxis: VTE Score: 6 High Risk (Score >/= 5) - Pharmacological DVT Prophylaxis Ordered: apixaban (Eliquis)  Sequential Compression Devices Ordered  Patient Centered Rounds: I performed bedside rounds with nursing staff today  Discussions with Specialists or Other Care Team Provider: primary RN,     Education and Discussions with Family / Patient: Attempted to update  (wife) via phone  Left voicemail  Time Spent for Care: 30 minutes   More than 50% of total time spent on counseling and coordination of care as described above  Current Length of Stay: 2 day(s)  Current Patient Status: Inpatient   Certification Statement: The patient will continue to require additional inpatient hospital stay due to ongoing workup and management of stroke like symptoms and infection as above  Discharge Plan: Anticipate discharge in >72 hrs to rehab facility  Code Status: Level 3 - DNAR and DNI    Subjective:   Patient is awake and alert this morning, sitting up in bed, NAD  He is oriented x4  Tells me he is at [facility] "because I was confused"  He offers no specific complaints at this time  Objective:     Vitals:   Temp (24hrs), Av 9 °F (37 2 °C), Min:98 2 °F (36 8 °C), Max:99 5 °F (37 5 °C)    Temp:  [98 2 °F (36 8 °C)-99 5 °F (37 5 °C)] 98 2 °F (36 8 °C)  HR:  [] 112  Resp:  [18-19] 18  BP: (107-153)/(63-95) 153/95  SpO2:  [97 %-98 %] 97 %  Body mass index is 44 55 kg/m²  Input and Output Summary (last 24 hours): Intake/Output Summary (Last 24 hours) at 2022 0939  Last data filed at 2022 1438  Gross per 24 hour   Intake --   Output 350 ml   Net -350 ml       Physical Exam:   Physical Exam  Vitals and nursing note reviewed  Exam conducted with a chaperone present  Constitutional:       General: He is not in acute distress  Appearance: He is obese  HENT:      Mouth/Throat:      Mouth: Mucous membranes are dry  Cardiovascular:      Rate and Rhythm: Normal rate  Rhythm irregular  Pulmonary:      Effort: Pulmonary effort is normal  No respiratory distress  Breath sounds: Rhonchi present  No wheezing or rales  Comments: On 2 L NC with SpO2 high 90s   Weaned to 1 L NC with SpO2 mid 90s  Course breath sounds bilaterally  Genitourinary:     Comments: External urinary catheter draining clear bharti colored urine  Musculoskeletal:      Comments: LLE offloaded in boot   Skin:     General: Skin is warm and dry        Comments: Chronic PVD changes   LLE dressing c/d/i   Neurological: Mental Status: He is alert and oriented to person, place, and time  Cranial Nerves: Dysarthria (mild) present  No facial asymmetry  Motor: Weakness (LUE strength 3/5 RUE 5/5) present  Comments: R wrist restrained currently but with 5/5  strength          Additional Data:     Labs:  Results from last 7 days   Lab Units 12/06/22  0515   WBC Thousand/uL 8 03   HEMOGLOBIN g/dL 12 4   HEMATOCRIT % 40 5   PLATELETS Thousands/uL 213     Results from last 7 days   Lab Units 12/06/22  0515   SODIUM mmol/L 141   POTASSIUM mmol/L 3 7   CHLORIDE mmol/L 104   CO2 mmol/L 31   BUN mg/dL 19   CREATININE mg/dL 0 86   ANION GAP mmol/L 6   CALCIUM mg/dL 9 4   ALBUMIN g/dL 2 0*   TOTAL BILIRUBIN mg/dL 0 52   ALK PHOS U/L 111   ALT U/L 30   AST U/L 29   GLUCOSE RANDOM mg/dL 83     Results from last 7 days   Lab Units 12/05/22  1540   INR  1 57*     Results from last 7 days   Lab Units 12/04/22  1728   POC GLUCOSE mg/dl 138     Results from last 7 days   Lab Units 12/04/22  1730   HEMOGLOBIN A1C % 5 9*     Results from last 7 days   Lab Units 12/05/22  0432   PROCALCITONIN ng/ml 0 15       Lines/Drains:  Invasive Devices     Peripheral Intravenous Line  Duration           Peripheral IV 12/04/22 Left;Proximal;Ventral (anterior) Forearm 1 day    Peripheral IV 12/04/22 Right;Ventral (anterior) Forearm 1 day          Drain  Duration           External Urinary Catheter Small 1 day                  Telemetry:  Telemetry Orders (From admission, onward)             48 Hour Telemetry Monitoring  Continuous x 48 hours        Expiring   References:    Telemetry Guidelines   Question:  Reason for 48 Hour Telemetry  Answer:  Acute CVA (<24 hrs old, hemispheric strokes, selected brainstem strokes, cardiac arrhythmias)                 Telemetry Reviewed: Atrial fibrillation  HR averaging   Indication for Continued Telemetry Use: No indication for continued use  Will discontinue                Imaging: No pertinent imaging reviewed  Recent Cultures (last 7 days):         Last 24 Hours Medication List:   Current Facility-Administered Medications   Medication Dose Route Frequency Provider Last Rate   • acetaminophen  975 mg Oral Q6H PRN Caty Grijalva PA-C     • allopurinol  100 mg Oral Daily Kim Muniz MD     • apixaban  5 mg Oral BID Caty Grijalva PA-C     • aspirin  81 mg Oral Daily Kim Muniz MD     • atorvastatin  40 mg Oral QPM Kim Muniz MD     • cefTRIAXone  1,000 mg Intravenous Q24H Caty Grijalva PA-C 1,000 mg (12/05/22 1225)   • cholecalciferol  400 Units Oral Daily Kim Muniz MD     • dextromethorphan-guaiFENesin  10 mL Oral Q4H PRN Caty Grijalva PA-C     • docusate sodium  100 mg Oral BID Kim Muniz MD     • metoprolol tartrate  50 mg Oral Q12H [address] Caty DENNIS Grijalva PA-C     • ondansetron  4 mg Intravenous Q6H PRN Kim Muniz MD     • polyethylene glycol  17 g Oral Daily Kim Muniz MD     • sodium chloride  75 mL/hr Intravenous Continuous Carollee Button CHARLEEN Grijalva 75 mL/hr (12/05/22 1615)        Today, Patient Was Seen By: Alba Thornton PA-C    **Please Note: This note may have been constructed using a voice recognition system  **

## 2022-12-06 NOTE — ASSESSMENT & PLAN NOTE
· POA, followed by wound care as outpatient  · No clinical signs of infection at this time   · Appreciate Podiatry consult and recommendations   · Follow-up XR   · Follow-up LEADs   · No plans for intervention at this time   · Continue with local wound care, offloading

## 2022-12-06 NOTE — ASSESSMENT & PLAN NOTE
· POA, followed by wound care as outpatient  · No clinical signs of infection at this time   · Appreciate Podiatry consult and recommendations   · XR heel/calcaneous:  Soft tissue swelling   No evidence of acute osteomyelitis     · LEADs noted diffuse disease in LLE, see below   · No plans for intervention at this time   · Continue with local wound care, offloading

## 2022-12-06 NOTE — ASSESSMENT & PLAN NOTE
Evidenced by A1c of 5 9%  · Monitor glucose on AM labs   · Encourage diet and lifestyle modifications

## 2022-12-06 NOTE — ASSESSMENT & PLAN NOTE
Patient with bilateral lower extremity edema which appears to be longstanding and related to venous stasis, managed by [facility] cardiology  Recently edema was worsening and associated with weeping  Patient received 80 mg IV Lasix x 1 in office on 11/9 and again on 11/23  Subsequently prescribed higher dose Lasix 40 mg BID    · Lasix held on admission due to hyponatremia, continue to hold pending improvement in oral intake   · Continue with compression stockings   · Encourage LE elevation   · Monitor clinically

## 2022-12-06 NOTE — PHYSICAL THERAPY NOTE
Physical Therapy Progress Note     12/06/22 1440   PT Last Visit   PT Visit Date 12/06/22   Note Type   Note Type Treatment   Pain Assessment   Pain Assessment Tool FLACC   Pain Rating: FLACC (Rest) - Face 0   Pain Rating: FLACC (Rest) - Legs 0   Pain Rating: FLACC (Rest) - Activity 0   Pain Rating: FLACC (Rest) - Cry 0   Pain Rating: FLACC (Rest) - Consolability 0   Score: FLACC (Rest) 0   Pain Rating: FLACC (Activity) - Face 1   Pain Rating: FLACC (Activity) - Legs 1   Pain Rating: FLACC (Activity) - Activity 0   Pain Rating: FLACC (Activity) - Cry 1   Pain Rating: FLACC (Activity) - Consolability 0   Score: FLACC (Activity) 3   Restrictions/Precautions   Other Precautions Cognitive; Chair Alarm; Bed Alarm;Pain; Fall Risk  (Alarm active post session )   Subjective   Subjective The patient was minimally verbal    Bed Mobility   Supine to Sit 2  Maximal assistance   Additional items Assist x 1;Assist x 2; Increased time required;Verbal cues;LE management   Sit to Supine 2  Maximal assistance   Additional items Assist x 1;Assist x 2; Increased time required;Verbal cues;LE management   Transfers   Sit to Stand 2  Maximal assistance   Additional items Assist x 2; Increased time required;Verbal cues   Stand to Sit 2  Maximal assistance   Additional items Assist x 2; Increased time required;Verbal cues   Balance   Static Sitting Poor   Dynamic Sitting Poor -   Static Standing Poor -   Dynamic Standing Poor -   Activity Tolerance   Activity Tolerance Patient limited by fatigue;Patient limited by pain   Exercises   Knee AROM Long Arc Quad Sitting;10 reps;Bilateral;PROM;AAROM   Assessment   Prognosis Guarded   Problem List Decreased strength;Decreased endurance; Impaired balance;Decreased mobility; Decreased cognition; Impaired judgement;Decreased coordination;Decreased safety awareness; Impaired tone   Assessment The patient continues to require near dependent assistance for mobility today   He has nonverbal responses to his left shoulder pain, and extra care was taken to secure it to reduce movement throughout the session  He was brought to sitting and then into stance briefly  He demonstrates limited initiation for tasks, and hand-over-hand technique was utilized extensively  The patient was returned to bed with his LUE supported on a pillow with all needs within reach  Barriers to Discharge Inaccessible home environment;Decreased caregiver support   Goals   Patient Goals Pt  nonverbal    STG Expiration Date 12/19/22   PT Treatment Day 1   Plan   Treatment/Interventions Functional transfer training;LE strengthening/ROM; Therapeutic exercise; Endurance training;Cognitive reorientation; Bed mobility   Progress No functional improvements   PT Frequency 3-5x/wk   Recommendation   PT Discharge Recommendation Post acute rehabilitation services   AM-PAC Basic Mobility Inpatient   Turning in Bed Without Bedrails 1   Lying on Back to Sitting on Edge of Flat Bed 1   Moving Bed to Chair 1   Standing Up From Chair 1   Walk in Room 1   Climb 3-5 Stairs 1   Basic Mobility Inpatient Raw Score 6   Turning Head Towards Sound 1   Follow Simple Instructions 1   Low Function Basic Mobility Raw Score 8   Low Function Basic Mobility Standardized Score 10 37   Highest Level Of Mobility   JH-HLM Goal 2: Bed activities/Dependent transfer   JH-HLM Achieved 3: Sit at edge of bed       An AM-PAC Basic Mobility standardized score less than 40 78 suggests the patient may benefit from discharge to post-acute rehab services      Kobi Santiago, PTA

## 2022-12-06 NOTE — PROGRESS NOTES
NEUROLOGY RESIDENCY PROGRESS NOTE     Name: Rocky Lundberg   Age & Sex: 82 y o  male   MRN: [id number]  Unit/Bed#: [facility] 725-01   Encounter: [id number]    Recommendations for outpatient neurological follow up have yet to be determined  Pending for discharge: Continued UTI Tx     ASSESSMENT & PLAN     * Stroke-like symptoms  Assessment & Plan  Assessment:  Rocky Speaker is a 82 y o  male with a PMH of aFib (on eliquis), BPH, COPD, Gout, HTN, Basal cell adenocarcinoma of salviatory gland, PAD and PVD, who presents as a stoke alert    It is reported by EMS that the patient's Teryl Hunt was 12/4/2022 1600 after which there was acute onset left sided facial droop, left UA and LE weakness, and garbled speech  The patient is on ASA and Eliquis at baseline  His presenting BP was 133/88, glucose 121  His NIHSS on initial evaluation was 11  Initial CTH was not demonstrative of  acute intracranial abnormality, and showed table mild chronic microangiopathic changes within the brain  CTA HN showed atherosclerotic changes are seen within the carotid bifurcations and intracranial internal carotid arteries bilaterally  Suspected moderate stenosis of the right ICA origin and mild narrowing of the left carotid bifurcation  Intracranially there is also right greater than left stenosis, moderate on the left and mild on the right  The patient on subsequent examination was becoming much more distractible and was not able to cooperate with examination  He had difficulty following commands consistently  Imaging:  - CTH was not demonstrative of  acute intracranial abnormality, and showed table mild chronic microangiopathic changes within the brain   - CTA HN showed atherosclerotic changes are seen within the carotid bifurcations and intracranial internal carotid arteries bilaterally  Suspected moderate stenosis of the right ICA origin and mild narrowing of the left carotid bifurcation   Intracranially there is also right greater than left stenosis, moderate on the left and mild on the right   - MRI Brain wo: Motion degraded examination  No acute infarction, edema, or mass effect  Mild chronic microangiopathic ischemic changes   - TTE: Final Read Pending    Workup:  - A1C: 5 9  - Lipid panel: CHOL 150, , HDL 22, LDL 97  - Ammonia: 17  - 4-Plex: negative  - CXR: Probable small left effusion  Slightly limited but otherwise unremarkable examination   - UA w reflec sxope and cx: innumerable WBC, innumerable bacteria, innumerable RBCs, large leuks  - TEG Platelet mapping: Abnormal    Impression:  Considering the sudden onset of the symptoms and the imaging studies that are not suggestive of LVO, haemorrhage or infarct further workup is needed to determine the symptomatology  Imaging studies did reveal that there are atherosclerotic changes within the carotid bifurcations and intracranial internal carotid arteries bilaterally with both intracranial and extracranial ICA stenosis for which neurosurgery recommended medical management  Stroke is less likely with neuro imaging not demonstrative of such  Consider seizure post-ictal stage, and metabolic encephalopathy in the setting of a UTI is a consideration also  Plan:  - Obtain STAT CTH wo for acute changes in mental status or neurologic examination and please notify neurology  - Stroke Pathway  - Frequent neuro checks  - Telemetry  - Fall precautions  - Goals: Normotension, normothermia, euglycaemia  - TTE pending  - rEEG Pending  - Continue Aspirin 81mg QD  - Continue Eliquis 5mg BID  - Continue Atorvastatin 40mg QD  - PT/OT/ST/PMR as able  - Rest per primary team; appreciated! Acute metabolic encephalopathy  Assessment & Plan  Assessment:  During assessment after initial NIHSS and imaging studies were completed it was noted that the patient had more trouble focusing on conversations, had more tangential speech, and was not able to follow commands as before   On initial examination the patient kept perseverating on the numbers 9 and 14 which is the month and day respectively of his birthday  On examination 12/06/2022 the patient was alert to name, the year, and knew he was in the hospital but not why or which one  Impression:  Considering the elevated white count, hyponatremia, increased BUN, and increased CO2 an encephalopathy picture appears  Plan:  - Correct metabolic derangements  - Infectious workup recommended:   --- Treat underlying infection: Ceftriaxone 1000mg QD  --- Abnormal WBC: 10 22; 12/06/2022  - Per primary team; appreciated! SUBJECTIVE     Mr Joe Alfonso was alert in bed on visitation this AM  He was oriented to person, not city, knew he was in the hospital, reported it was Sept 2022 (day unknown), and reasoning for being in the hospital was unknown  The patient was able to complete calculations, complete phrase repetition, able to state the correct POTUS, and unable to compare a clock and a ruler in terms of similarity  The patient denied any HA, CP, SOB, ABD-pain, N/V, new changes in sensorium  The patient reports that he is able to feed, urinate, defecate, and unable to ambulate  Review of Systems   Constitutional: Positive for fatigue  Negative for chills, diaphoresis and fever  HENT: Negative for ear discharge, sinus pressure, sinus pain and sore throat  Blood on the tongue and on the palate 12/06/2022   Eyes: Negative for pain and redness  Respiratory: Positive for cough  Negative for chest tightness and shortness of breath  Cardiovascular: Positive for leg swelling  Negative for chest pain and palpitations  Gastrointestinal: Negative for blood in stool, nausea and vomiting  Genitourinary: Negative for flank pain, hematuria and urgency  Musculoskeletal: Positive for back pain  Skin: Positive for wound (on LLE)  Negative for pallor  Neurological: Positive for weakness and numbness   Negative for dizziness, tremors, seizures, syncope, facial asymmetry, speech difficulty, light-headedness and headaches  Psychiatric/Behavioral: Positive for confusion and decreased concentration  Negative for behavioral problems, dysphoric mood, hallucinations, self-injury, sleep disturbance and suicidal ideas  The patient is not nervous/anxious and is not hyperactive  OBJECTIVE     Patient ID: Charanjit Gutierrez is a 82 y o  male  Vitals:    22 0740 22 0755 22 0814 22 1042   BP: 153/95 153/95  (!) 135/101   BP Location:       Pulse: 99 99 (!) 112 104   Resp:       Temp:   98 2 °F (36 8 °C) (!) 97 4 °F (36 3 °C)   TempSrc:       SpO2:  98% 97% 96%   Weight: 133 kg (293 lb)      Height: 5' 8" (1 727 m)         Temperature:   Temp (24hrs), Av 8 °F (36 6 °C), Min:97 4 °F (36 3 °C), Max:98 2 °F (36 8 °C)    Temperature: (!) 97 4 °F (36 3 °C)    Physical Exam  Vitals and nursing note reviewed  HENT:      Head: Normocephalic  Nose: Nose normal       Mouth/Throat:      Mouth: Mucous membranes are dry  Comments: Blood noted on tongue and palate 2022  Eyes:      Extraocular Movements: EOM normal       Pupils: Pupils are equal, round, and reactive to light  Cardiovascular:      Rate and Rhythm: Tachycardia present  Pulses: Normal pulses  Pulmonary:      Effort: Pulmonary effort is normal    Musculoskeletal:         General: Tenderness and signs of injury present  Cervical back: Normal range of motion  Right lower leg: Edema present  Left lower leg: Edema present  Skin:     General: Skin is warm  Capillary Refill: Capillary refill takes less than 2 seconds  Neurological:      Mental Status: He is alert  Cranial Nerves: No cranial nerve deficit  Sensory: Sensory deficit present  Motor: Weakness present        Coordination: Coordination abnormal  Finger-Nose-Finger Test normal       Gait: Gait abnormal       Deep Tendon Reflexes:      Reflex Scores:       Tricep reflexes are 2+ on the right side and 2+ on the left side  Bicep reflexes are 2+ on the right side and 2+ on the left side  Brachioradialis reflexes are 2+ on the right side and 2+ on the left side  Patellar reflexes are 1+ on the right side and 1+ on the left side  Achilles reflexes are 1+ on the right side and 1+ on the left side  Psychiatric:         Mood and Affect: Mood normal          Speech: Speech normal         Neurologic Exam     Mental Status   Oriented to person  Disoriented to place  Disoriented to month and date  Oriented to year  Follows 2 step commands  Attention: normal  Concentration: normal    Speech: speech is normal   Level of consciousness: alert  Able to perform simple calculations  Able to repeat  Cranial Nerves     CN III, IV, VI   Pupils are equal, round, and reactive to light    Extraocular motions are normal    CN III: no CN III palsy  CN VI: no CN VI palsy  Nystagmus: none   Upgaze: normal  Downgaze: normal  Conjugate gaze: present    CN V   Right facial sensation deficit: none  Left facial sensation deficit: none    CN VII   Right facial weakness: none  Left facial weakness: none    CN VIII   Hearing: impaired    CN IX, X   Palate: symmetric    CN XI   Right sternocleidomastoid strength: normal  Left sternocleidomastoid strength: normal  Right trapezius strength: normal  Left trapezius strength: normal    CN XII   Tongue: not atrophic  Fasciculations: absent  Tongue deviation: none  Patient was unsure of how many fingers were shown on visual examination however was able to report that he was able to see fingers wiggle all quadrants and      Motor Exam   Muscle bulk: normal  Overall muscle tone: normal  Right arm tone: normal  Left arm tone: normal  Right leg tone: normal  Left leg tone: normal    Strength   Right neck flexion: 5/5  Left neck flexion: 4/5  Right neck extension: 5/5  Left neck extension: 4/5  Right deltoid: 5/5  Left deltoid: 3/5  Right biceps: 5/5  Left biceps: 3/5  Right triceps: 5/5  Left triceps: 3/5  Right wrist flexion: 5/5  Left wrist flexion: 3/5  Right wrist extension: 5/5  Left wrist extension: 3/5  Right iliopsoas: 5/5  Right quadriceps: 5/5  Right hamstrin/5  Right anterior tibial: 5/5  Left anterior tibial: 4/5  Right posterior tibial: 5/5  Left posterior tibial: 4/5  Right peroneal: 5/5  - LLE in boot; unable to move  Sensory Exam   Light touch normal      Gait, Coordination, and Reflexes     Coordination   Finger to nose coordination: normal    Reflexes   Right brachioradialis: 2+  Left brachioradialis: 2+  Right biceps: 2+  Left biceps: 2+  Right triceps: 2+  Left triceps: 2+  Right patellar: 1+  Left patellar: 1+  Right achilles: 1+  Left achilles: 1+  Right plantar: equivocal  Left plantar: equivocal  Right Stevens: absent  Left Stevens: absent  Right ankle clonus: absent  Left ankle clonus: absent  - Gait not assessed, patient in restraints, risk of falling   - FTN wnl on L, unable to complete on R     LABORATORY DATA     Labs: I have personally reviewed pertinent reports  Results from last 7 days   Lab Units 22  0515 22  0420 22  1730   WBC Thousand/uL 8 03 10 22* 11 72*   HEMOGLOBIN g/dL 12 4 12 6 14 8   HEMATOCRIT % 40 5 40 1 48 0   PLATELETS Thousands/uL 213 216 279      Results from last 7 days   Lab Units 22  0515 22  0432 22  1730   SODIUM mmol/L 141 136 134*   POTASSIUM mmol/L 3 7 3 8 4 6   CHLORIDE mmol/L 104 100 96   CO2 mmol/L 31 31 34*   BUN mg/dL 19 21 26*   CREATININE mg/dL 0 86 0 90 1 15   CALCIUM mg/dL 9 4 9 2 10 0   ALK PHOS U/L 111  --   --    ALT U/L 30  --   --    AST U/L 29  --   --               Results from last 7 days   Lab Units 22  0515 22  2249 22  1540 22  1730   INR   --   --  1 57* 1 23*   PTT seconds 56* 46* 43* 38*               IMAGING & DIAGNOSTIC TESTING     Radiology Results: I have personally reviewed pertinent reports        VAS lower limb arterial duplex, limited/unilateral   Final Result by Hemant Neville MD (12/06 1310)      XR heel / calcaneus 2+ vw left   Final Result by Elda Francisco MD (12/06 1046)      Soft tissue swelling  No evidence of acute osteomyelitis  If osteomyelitis remains a clinical concern, consider further evaluation with MRI  Workstation performed: [id number]         MRI brain wo contrast   Final Result by Tahmina Nix MD (12/05 9384)   Motion degraded examination  No acute infarction, edema, or mass effect  Mild chronic microangiopathic ischemic changes  Workstation performed: [id number]         XR chest portable   Final Result by Sarah Beth Craft MD (04/58 0526)      Probable small left effusion  Slightly limited but otherwise unremarkable examination  Workstation performed: [id number]         CTA stroke alert (head/neck)   Final Result by Lico Stallworth DO (12/04 3916)      Unfortunately this examination is significantly limited due to patient motion  Atherosclerotic changes are seen within the carotid bifurcations and intracranial internal carotid arteries bilaterally  Suspected moderate stenosis of the right ICA origin and mild narrowing of the left carotid bifurcation  Intracranially there is    also right greater than left stenosis, moderate on the left and mild on the right      No occlusive disease or thrombosis identified  Findings were directly discussed with neurology resident at approximately 5:40 PM                            Workstation performed: [id number]         CT stroke alert brain   Final Result by Lico Stallworth DO (12/04 0495)      No acute intracranial abnormality  Stable mild chronic microangiopathic changes within the brain        Findings were directly discussed with neurology resident at 5:40 PM       Workstation performed: [id number]         XR chest pa & lateral    (Results Pending) Other Diagnostic Testing: I have personally reviewed pertinent reports  ACTIVE MEDICATIONS     Current Facility-Administered Medications   Medication Dose Route Frequency   • acetaminophen (TYLENOL) tablet 975 mg  975 mg Oral Q6H PRN   • albuterol (PROVENTIL HFA,VENTOLIN HFA) inhaler 2 puff  2 puff Inhalation Q4H PRN   • allopurinol (ZYLOPRIM) tablet 100 mg  100 mg Oral Daily   • apixaban (ELIQUIS) tablet 5 mg  5 mg Oral BID   • aspirin chewable tablet 81 mg  81 mg Oral Daily   • atorvastatin (LIPITOR) tablet 40 mg  40 mg Oral QPM   • cefTRIAXone (ROCEPHIN) 1,000 mg in dextrose 5 % 50 mL IVPB  1,000 mg Intravenous Q24H   • cholecalciferol (VITAMIN D3) tablet 400 Units  400 Units Oral Daily   • dextromethorphan-guaiFENesin (ROBITUSSIN DM) oral syrup 10 mL  10 mL Oral Q4H PRN   • docusate sodium (COLACE) capsule 100 mg  100 mg Oral BID   • metoprolol tartrate (LOPRESSOR) tablet 50 mg  50 mg Oral Q12H ROYER   • ondansetron (ZOFRAN) injection 4 mg  4 mg Intravenous Q6H PRN   • polyethylene glycol (MIRALAX) packet 17 g  17 g Oral Daily     Prior to Admission medications    Medication Sig Start Date End Date Taking?  Authorizing Provider   colchicine (COLCRYS) 0 6 mg tablet Take 0 6 mg by mouth daily   Yes Historical Provider, MD   furosemide (LASIX) 40 mg tablet Take 40 mg by mouth daily   Yes Historical Provider, MD   potassium chloride (KLOR-CON) 20 mEq packet Take 20 mEq by mouth 2 (two) times a day   Yes Historical Provider, MD   acetaminophen (TYLENOL) 325 mg tablet Take 3 tablets (975 mg total) by mouth every 8 (eight) hours 12/9/19   Tere Sahni PA-C   allopurinol (ZYLOPRIM) 100 mg tablet Take 100 mg by mouth daily    Historical Provider, MD   apixaban (ELIQUIS) 5 mg Take 5 mg by mouth 2 (two) times a day    Historical Provider, MD   aspirin (ECOTRIN LOW STRENGTH) 81 mg EC tablet Take 81 mg by mouth daily    Historical Provider, MD   cholecalciferol (VITAMIN D3) 400 units tablet Take 400 Units by mouth daily    Historical Provider, MD   docusate sodium (COLACE) 100 mg capsule Take 1 capsule (100 mg total) by mouth 2 (two) times a day 12/9/19   Rain Shaw PA-C   metoprolol tartrate (LOPRESSOR) 50 mg tablet Take 50 mg by mouth every 12 (twelve) hours    Historical Provider, MD   vitamin A 10,000 units capsule Take 10,000 Units by mouth daily    Historical Provider, MD     VTE Pharmacologic Prophylaxis: Heparin Drip  VTE Mechanical Prophylaxis: sequential compression device    ==  DO Mayra Sams 75 Neurology Residency, PGY-2

## 2022-12-06 NOTE — ASSESSMENT & PLAN NOTE
Patient presented via EMS as stroke alert due to concern for acute onset of left-sided weakness and garbled speech noticed by patient's wife  Patient is not a candidate for tPA due to bleeding risk    · Admitted under stroke pathway   · CTA head/neck negative for occlusion, but did note moderate R ICA and mild L ICA stenosis, see below   · MRI brain negative for acute infarct, but did note mild chronic microangiopathic ischemic changes  · Echocardiogram ordered and pending   · Lipid panel: cholesterol 150, , LDL 97  · Appreciate neurology consult and recommendations   · Continue with aspirin, statin, Eliquis   · P2Y12 checked -> 268  · Routine EEG ordered and pending   · Concern for possible TIA and/or infectious/metabolic etiology   · See below regarding infectious workup/management   · PT/OT recommending rehab, PMR following  · Monitor neurochecks

## 2022-12-06 NOTE — ASSESSMENT & PLAN NOTE
POA with mild leukocytosis and tachycardia  Procalcitonin WNL at 0 15   · COVID/flu/RSV negative   · Portable CXR: Probable small left effusion   Slightly limited but otherwise unremarkable examination     · UA with nitrites, leukocytes and bacteria concerning for UTI  · Noted with left heel pressure ulcer on admission however no clinical signs of cellulitis, see related plan  · Follow-up PA/lateral CXR for completeness   · Continue with IV ceftriaxone (day 2) pending urine culture  · Trend temps, WBC, hemodynamics

## 2022-12-06 NOTE — ASSESSMENT & PLAN NOTE
POA with mild leukocytosis and tachycardia  Procalcitonin WNL at 0 15   · COVID/flu/RSV negative   · Portable CXR: Probable small left effusion   Slightly limited but otherwise unremarkable examination     · UA with nitrites, leukocytes and bacteria concerning for UTI  · Noted with left heel pressure ulcer on admission however no clinical signs of cellulitis, see related plan  · PA/lateral CXR: No acute cardiopulmonary disease   · Continue with IV ceftriaxone (day 3) pending final urine culture / sensitivities   · Trend temps, WBC, hemodynamics

## 2022-12-06 NOTE — ASSESSMENT & PLAN NOTE
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

## 2022-12-06 NOTE — RESPIRATORY THERAPY NOTE
RT Protocol Note  Escobar Peralta 82 y o  male MRN: [id number]  Unit/Bed#: [facility] 725-01 Encounter: [id number]    Assessment    Principal Problem:    Stroke-like symptoms  Active Problems:    Anterior dislocation of left shoulder    Atrial fibrillation (HCC)    Bilateral lower extremity edema    Nicotine dependence    Acute metabolic encephalopathy    Pressure injury of deep tissue of left heel    SIRS vs Sepsis (HCC)    Stenosis of both internal carotid arteries    Prediabetes    Oropharyngeal dysphagia    PAD (peripheral artery disease) ([facility])    Acute cystitis without hematuria      Home Pulmonary Medications:  NONE       Past Medical History:   Diagnosis Date    Atrial fibrillation ([facility] )     Cancer ([facility] )     Hypertension      Social History     Socioeconomic History    Marital status: /Civil Union     Spouse name: Not on file    Number of children: Not on file    Years of education: Not on file    Highest education level: Not on file   Occupational History    Not on file   Tobacco Use    Smoking status: Never    Smokeless tobacco: Never   Substance and Sexual Activity    Alcohol use: Not Currently    Drug use: Not Currently    Sexual activity: Not on file   Other Topics Concern    Not on file   Social History Narrative    Not on file     Social Determinants of Health     Financial Resource Strain: Not on file   Food Insecurity: No Food Insecurity    Worried About Running Out of Food in the Last Year: Never true    Ran Out of Food in the Last Year: Never true   Transportation Needs: No Transportation Needs    Lack of Transportation (Medical): No    Lack of Transportation (Non-Medical):  No   Physical Activity: Not on file   Stress: Not on file   Social Connections: Not on file   Intimate Partner Violence: Not on file   Housing Stability: Low Risk     Unable to Pay for Housing in the Last Year: No    Number of Places Lived in the Last Year: 1    Unstable Housing in the Last Year: No       Subjective Objective    Physical Exam:   Assessment Type: Assess only  General Appearance: (P) Drowsy  Respiratory Pattern: (P) Normal, Shallow  Chest Assessment: (P) Chest expansion symmetrical  Bilateral Breath Sounds: (P) Diminished  Cough: (P) Non-productive  O2 Device: NC    Vitals:  Blood pressure (!) 135/101, pulse 104, temperature (!) 97 4 °F (36 3 °C), resp  rate 18, height 5' 8" (1 727 m), weight 133 kg (293 lb), SpO2 96 %  Imaging and other studies: I have personally reviewed pertinent reports  Probable small left effusion  Left base poorly evaluated  Atelectasis/scarring and lateral right lung base  12-6-22 results pending    O2 Device: NC     Plan    Respiratory Plan: (P) No distress/Pulmonary history        Resp Comments: 82 y o  male with a PMH of aFib (on eliquis), BPH, COPD, Gout, HTN, Basal cell adenocarcinoma of salviatory gland, PAD and PVD, who presents as a stoke alert   No home usage of any pulm meds or dx hs of same  SpO2 on 3 lpm is 96%  No a strong historian / communicator currently  Will order Ventolin for PRN needs otherwise continue to follow via P  X ray of 12-4 revealed small L pleural effusion  Probable small left effusion

## 2022-12-06 NOTE — ASSESSMENT & PLAN NOTE
· Hospital day 1 patient in atrial fibrillation with RVR - now improved   · Continue rate control with metoprolol 50 mg q12h   · Continue AC with Eliquis   · Follow-up echo

## 2022-12-06 NOTE — ASSESSMENT & PLAN NOTE
· Hospital day 1 patient in atrial fibrillation with RVR - now improved   · Continue rate control with metoprolol 50 mg q12h   · Continue AC with Eliquis   · Echocardiogram 12/6 with LVEF 55%, normal wall motion, normal systolic/diastolic function, mild-moderately increased RV pressure

## 2022-12-06 NOTE — PLAN OF CARE
Problem: PHYSICAL THERAPY ADULT  Goal: Performs mobility at highest level of function for planned discharge setting  See evaluation for individualized goals  Description: Treatment/Interventions: Functional transfer training, LE strengthening/ROM, Endurance training, Therapeutic exercise, Cognitive reorientation, Patient/family training, Equipment eval/education, Bed mobility, Spoke to nursing, Spoke to case management, OT  Equipment Recommended:  (TBD)       See flowsheet documentation for full assessment, interventions and recommendations  Outcome: Not Progressing  Note: Prognosis: Guarded  Problem List: Decreased strength, Decreased endurance, Impaired balance, Decreased mobility, Decreased cognition, Impaired judgement, Decreased coordination, Decreased safety awareness, Impaired tone  Assessment: The patient continues to require near dependent assistance for mobility today  He has nonverbal responses to his left shoulder pain, and extra care was taken to secure it to reduce movement throughout the session  He was brought to sitting and then into stance briefly  He demonstrates limited initiation for tasks, and hand-over-hand technique was utilized extensively  The patient was returned to bed with his LUE supported on a pillow with all needs within reach  Barriers to Discharge: Inaccessible home environment, Decreased caregiver support     PT Discharge Recommendation: Post acute rehabilitation services    See flowsheet documentation for full assessment

## 2022-12-06 NOTE — SPEECH THERAPY NOTE
Speech Language/Pathology    Speech/Language Pathology Progress Note    Patient Name: Allie Lloyd  YJQEL'G Date: 12/6/2022     Problem List  Principal Problem:    Stroke-like symptoms  Active Problems:    Anterior dislocation of left shoulder    Atrial fibrillation (HCC)    Bilateral lower extremity edema    Nicotine dependence    Acute metabolic encephalopathy    Pressure injury of deep tissue of left heel    S[facility] vs Sepsis ([facility] )    Stenosis of both internal carotid arteries    Prediabetes    Oropharyngeal dysphagia    PAD (peripheral artery disease) ([facility] )       Past Medical History  Past Medical History:   Diagnosis Date   • Atrial fibrillation ([facility] )    • Cancer ([facility] )    • Hypertension         Past Surgical History  Past Surgical History:   Procedure Laterality Date   • REPLACEMENT TOTAL HIP W/  RESURFACING IMPLANTS           Subjective:  "I need to stand  Get someone who can help me" Patient is awake and alert today  Objective: The patient is seen for dysphagia therapy at lunch meal  He is more awake and alert, but is restless and becomes easily agitated as meal progresses  SLP feeds patient puree solids  Oral closure for tsp is adequate  Transfer is timely  A minimal amount of oral residue is observed, but cleared with liquid wash  The patient takes nectar thick liquids via straw  He takes large, consecutive sips  No coughing observed  The patient is trialed with thin liquids and has immediate cough response  Throughout meal the patient continues with intermittent congestion  O2 remains stable throughout at 96%  The patient is on 3 L NC, but NC is not in nose for therapy session  The patient eats ~ 50% of meal before becoming agitated  Patient will most likely need VFSS to instrumentally assess swallow function, but not appropriate at this time due to cognitive status       Assessment:  The patient has coughing with thin liquids, with intermittent congestion across other items throughout meal  Plan/Recommendations:  Continue puree diet with nectar thick liquids  Continue ST to further assess  Consider VFSS as able

## 2022-12-06 NOTE — ASSESSMENT & PLAN NOTE
· Patient admits to smoking approx 1 ppd currently   · Suspect has underlying COPD   · Respiratory protocol   · Cessation counseling

## 2022-12-06 NOTE — ASSESSMENT & PLAN NOTE
· Appreciate speech therapy evaluation   · Recommended level 1 dysphagia diet with nectar thick liquids   · Aspiration precautions

## 2022-12-06 NOTE — ASSESSMENT & PLAN NOTE
Presented with stroke like symptoms as above; MRI brain negative for stroke, consideration for TIA in the setting of ICA stenosis, see plans above   · Family noted that the patient was more confused than usual recently and they attributed it to higher dose of Lasix due to worsening leg edema  · Infectious workup/management as outlined above   · Appreciate neurology inputs regarding stroke like symptoms as above  · Mental status continues to wax and wane; improved today, oriented to self place and year  · Dysphagia diet as per speech therapy   · Continue with supportive cares  · Discontinue restraints

## 2022-12-06 NOTE — ASSESSMENT & PLAN NOTE
Patient presented via EMS as stroke alert due to concern for acute onset of left-sided weakness and garbled speech noticed by patient's wife  Patient is not a candidate for tPA due to bleeding risk    · Admitted under stroke pathway   · CTA head/neck negative for occlusion, but did note moderate R ICA and mild L ICA stenosis, see below   · MRI brain negative for acute infarct, but did note mild chronic microangiopathic ischemic changes  · Echocardiogram essentially unremarkable   · Lipid panel: cholesterol 150, , LDL 97  · Appreciate neurology consult and recommendations   · Continue with aspirin, statin, Eliquis   · P2Y12 checked -> 268  · Routine EEG without evidence of seizure   · Concern for possible TIA vs seizure and/or infectious/metabolic etiology   · See below regarding infectious workup/management   · PT/OT recommending rehab, PMR following  · Monitor neurochecks

## 2022-12-06 NOTE — ASSESSMENT & PLAN NOTE
· Presenting with AMS and meeting sepsis criteria as above   · Urine culture preliminarily growing Enterococcus faecalis, E coli and Morganella morganii    · Continue with Ceftriaxone (day 3) pending sensitivities   · Monitor with urinary retention protocol

## 2022-12-06 NOTE — ASSESSMENT & PLAN NOTE
· Injury occurred in December 2019, was following with [facility] and managed conservatively; seems to have been lost to follow-up with repeat XRs  · Consider contributing factor to LUE weakness ? will need to clarify if this is chronic

## 2022-12-07 NOTE — ASSESSMENT & PLAN NOTE
· Injury occurred in December 2019, was following with [facility] and managed conservatively; seems to have been lost to follow-up with repeat XRs  · 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 per discussion with patient and wife

## 2022-12-07 NOTE — PROGRESS NOTES
[facility]  Progress Note - Samuel Summers 1935, 82 y o  male MRN: [id number]  Unit/Bed#: [facility] 730-01 Encounter: [id number]  Primary Care Provider: Ana Maria Escobedo MD   Date and time admitted to hospital: 12/4/2022  5:26 PM    Addendeum: Urine culture sensitivities resulted and reviewed  E coli and Morganella susceptible to ceftriaxone of which has completed total 3 day course today, so will discontinue  Start IV ampicillin for Enterococcus  * Stroke-like symptoms  Assessment & Plan  Patient presented via EMS as stroke alert due to concern for acute onset of left-sided weakness and garbled speech noticed by patient's wife  Patient is not a candidate for tPA due to bleeding risk  · Admitted under stroke pathway   · CTA head/neck negative for occlusion, but did note moderate R ICA and mild L ICA stenosis, see below   · MRI brain negative for acute infarct, but did note mild chronic microangiopathic ischemic changes  · Echocardiogram essentially unremarkable   · Lipid panel: cholesterol 150, , LDL 97  · Appreciate neurology consult and recommendations   · Continue with aspirin, statin, Eliquis   · P2Y12 checked -> 268  · Routine EEG without evidence of seizure   · Concern for possible TIA vs seizure and/or infectious/metabolic etiology   · See below regarding infectious workup/management   · PT/OT recommending rehab, PMR following  · Monitor neurochecks     SIRS vs Sepsis ([facility] )  Assessment & Plan  POA with mild leukocytosis and tachycardia  Procalcitonin WNL at 0 15   · COVID/flu/RSV negative   · Portable CXR: Probable small left effusion   Slightly limited but otherwise unremarkable examination     · UA with nitrites, leukocytes and bacteria concerning for UTI  · Noted with left heel pressure ulcer on admission however no clinical signs of cellulitis, see related plan  · PA/lateral CXR: No acute cardiopulmonary disease   · Continue with IV ceftriaxone (day 3) pending final urine culture / sensitivities   · Trend temps, WBC, hemodynamics     Stenosis of both internal carotid arteries  Assessment & Plan  · CTA head/neck on admission (limited due to motion): Atherosclerotic changes are seen within the carotid bifurcations and intracranial internal carotid arteries bilaterally   Suspected moderate stenosis of the right ICA origin and mild narrowing of the left carotid bifurcation   Intracranially there is also right greater than left stenosis, moderate on the left and mild on the right  · Appreciate neurosurgery consult and recommendations   · No need for surgical intervention at this time   · Outpatient follow-up with repeat CTA in 2 weeks   · Continue with aspirin, statin, Eliquis      Acute metabolic encephalopathy  Assessment & Plan  Presented with stroke like symptoms as above; MRI brain negative for stroke, consideration for TIA in the setting of ICA stenosis, see plans above   · Family noted that the patient was more confused than usual recently and they attributed it to higher dose of Lasix due to worsening leg edema  · Infectious workup/management as outlined above   · Appreciate neurology inputs regarding stroke like symptoms as above  · Mental status continues to wax and wane  · Dysphagia diet as per speech therapy   · Continue with supportive cares  · Discontinued restraints as of 12/6   · Today, patient is oriented to self and year, says he is in [place]; disorieted to place and situation     Bilateral lower extremity edema  Assessment & Plan  Patient with bilateral lower extremity edema which appears to be longstanding and related to venous stasis, managed by [facility] cardiology  Recently edema was worsening and associated with weeping  Patient received 80 mg IV Lasix x 1 in office on 11/9 and again on 11/23  Subsequently prescribed higher dose Lasix 40 mg BID    · Lasix held on admission due to hyponatremia, continue to hold pending improvement in oral intake · Continue with compression stockings   · Encourage LE elevation   · Monitor clinically     Pressure injury of deep tissue of left heel  Assessment & Plan  · POA, followed by wound care as outpatient  · No clinical signs of infection at this time   · Appreciate Podiatry consult and recommendations   · XR heel/calcaneous:  Soft tissue swelling   No evidence of acute osteomyelitis  · LEADs noted diffuse disease in LLE, see below   · No plans for intervention at this time   · Continue with local wound care, offloading     Atrial fibrillation ([facility] )  Assessment & Plan  · Hospital day 1 patient in atrial fibrillation with RVR - now improved   · Continue rate control with metoprolol 50 mg q12h   · Continue AC with Eliquis   · Echocardiogram 12/6 with LVEF 55%, normal wall motion, normal systolic/diastolic function, mild-moderately increased RV pressure     Acute cystitis without hematuria  Assessment & Plan  · Presenting with AMS and meeting sepsis criteria as above   · Urine culture preliminarily growing Enterococcus faecalis, E coli and Morganella morganii    · Continue with Ceftriaxone (day 3) pending sensitivities   · Monitor with urinary retention protocol     [facility]per[facility]  Assessment & Plan  · Follows with [facility]  · Most recent LEADs in May consistent with 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  · Repeat LEADs 12/6 significant for LLE > 75% stenosis of distal superficial femoral artery and diffuse disease throughout the remaining femoral-popliteal  · vessels    · Continue with ASA, statin    Oropharyngeal dysphagia  Assessment & Plan  · Appreciate speech therapy evaluation   · Recommended level 1 dysphagia diet with nectar thick liquids   · Aspiration precautions     Prediabetes  Assessment & Plan  Evidenced by A1c of 5 9%  · Monitor glucose on AM labs   · Encourage diet and lifestyle modifications     Nicotine dependence  Assessment & Plan  · Patient admits to smoking approx 1 ppd currently   · Suspect has underlying COPD   · Respiratory protocol   · Cessation counseling     Anterior dislocation of left shoulder  Assessment & Plan  · Injury occurred in 2019, was following with  orthopedics and managed conservatively; seems to have been lost to follow-up with repeat XRs  · 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 per discussion with patient and wife       VTE Pharmacologic Prophylaxis: VTE Score: 6 High Risk (Score >/= 5) - Pharmacological DVT Prophylaxis Ordered: apixaban (Eliquis)  Sequential Compression Devices Ordered  Patient Centered Rounds: I performed bedside rounds with nursing staff today  Discussions with Specialists or Other Care Team Provider: primary RN, case management, will f/u with Neurology     Education and Discussions with Family / Patient: Updated  (wife) via phone  Time Spent for Care: 20 minutes  More than 50% of total time spent on counseling and coordination of care as described above  Current Length of Stay: 3 day(s)  Current Patient Status: Inpatient   Certification Statement: The patient will continue to require additional inpatient hospital stay due to pending final cultures, pending neurology clearance, dispo planning  Discharge Plan: Anticipate discharge in 24-48 hrs to rehab facility  Code Status: Level 3 - DNAR and DNI    Subjective:   Patient resting comfortably upon entering the room  Easily arouses to voice  He offers no complaints this morning  He is oriented to self and year this morning  He tells me he is in [place] but not able to specify [facility] or the hospital  He is able to tell me his birth date       Objective:     Vitals:   Temp (24hrs), Av 4 °F (36 3 °C), Min:97 3 °F (36 3 °C), Max:97 5 °F (36 4 °C)    Temp:  [97 3 °F (36 3 °C)-97 5 °F (36 4 °C)] 97 5 °F (36 4 °C)  HR:  [] 98  Resp:  [18] 18  BP: (112-147)/() 112/70  SpO2:  [93 %-96 %] 96 %  Body mass index is 44 55 kg/m²  Input and Output Summary (last 24 hours): Intake/Output Summary (Last 24 hours) at 12/7/2022 0936  Last data filed at 12/6/2022 1300  Gross per 24 hour   Intake 120 ml   Output --   Net 120 ml     Physical Exam:   Physical Exam  Vitals reviewed  Cardiovascular:      Rate and Rhythm: Normal rate  Rhythm irregular  Comments: HR 90-100s on Masimo  Pulmonary:      Effort: Pulmonary effort is normal  No respiratory distress  Breath sounds: Decreased breath sounds present  No wheezing, rhonchi or rales  Comments: On room air  Musculoskeletal:      Right lower leg: No edema  Left lower leg: No edema  Skin:     Comments: Chronic PVD changes bilaterally   LLE off loaded in boot    Neurological:      General: No focal deficit present  Mental Status: He is alert  He is disoriented  Cranial Nerves: No dysarthria or facial asymmetry  Motor: Weakness (LUE) present  Comments: Disoriented to location and situation   Psychiatric:         Speech: Speech is delayed  Cognition and Memory: Cognition is impaired  Memory is impaired  Judgment: Judgment is impulsive and inappropriate         Additional Data:     Labs:  Results from last 7 days   Lab Units 12/07/22  0520   WBC Thousand/uL 9 71   HEMOGLOBIN g/dL 13 7   HEMATOCRIT % 44 7   PLATELETS Thousands/uL 243     Results from last 7 days   Lab Units 12/07/22  0520 12/06/22  0515   SODIUM mmol/L 139 141   POTASSIUM mmol/L 3 9 3 7   CHLORIDE mmol/L 106 104   CO2 mmol/L 26 31   BUN mg/dL 18 19   CREATININE mg/dL 0 75 0 86   ANION GAP mmol/L 7 6   CALCIUM mg/dL 10 1 9 4   ALBUMIN g/dL  --  2 0*   TOTAL BILIRUBIN mg/dL  --  0 52   ALK PHOS U/L  --  111   ALT U/L  --  30   AST U/L  --  29   GLUCOSE RANDOM mg/dL 105 83     Results from last 7 days   Lab Units 12/05/22  1540   INR  1 57*     Results from last 7 days   Lab Units 12/04/22  1728   POC GLUCOSE mg/dl 138     Results from last 7 days   Lab Units 12/04/22  1730   HEMOGLOBIN A1C % 5 9*     Results from last 7 days   Lab Units 12/05/22  0432   PROCALCITONIN ng/ml 0 15       Lines/Drains:  Invasive Devices     Peripheral Intravenous Line  Duration           Peripheral IV 12/04/22 Left;Proximal;Ventral (anterior) Forearm 2 days    Peripheral IV 12/04/22 Right;Ventral (anterior) Forearm 2 days          Drain  Duration           External Urinary Catheter Small 2 days                Imaging: No pertinent imaging reviewed  Recent Cultures (last 7 days):   Results from last 7 days   Lab Units 12/04/22  2214   URINE CULTURE  >100,000 cfu/ml Enterococcus faecalis*  >100,000 cfu/ml Escherichia coli*  70,000-79,000 cfu/ml Morganella morganii*       Last 24 Hours Medication List:   Current Facility-Administered Medications   Medication Dose Route Frequency Provider Last Rate   • acetaminophen  975 mg Oral Q6H PRN Caty E TRACEY GrijalvaC     • albuterol  2 puff Inhalation Q4H PRN Marc Veras MD     • allopurinol  100 mg Oral Daily Jeanna Stewart MD     • apixaban  5 mg Oral BID Caty E Rudi PAAmandaC     • aspirin  81 mg Oral Daily Jeanna Stewart MD     • atorvastatin  40 mg Oral QPM Jeanna Stewart MD     • cefTRIAXone  1,000 mg Intravenous Q24H Caty Grijalva PA-C 1,000 mg (12/06/22 9010)   • cholecalciferol  400 Units Oral Daily Jeanna Stewart MD     • dextromethorphan-guaiFENesin  10 mL Oral Q4H PRN Caty E Rudi PAAmandaC     • docusate sodium  100 mg Oral BID Jeanna Stewart MD     • metoprolol tartrate  50 mg Oral Q12H [address] Caty E Rudi PAAmandaC     • nicotine  14 mg Transdermal Daily Caty E Rudi PAAmandaC     • ondansetron  4 mg Intravenous Q6H PRN Jeanna Stewart MD     • polyethylene glycol  17 g Oral Daily Jeanna Stewart MD          Today, Patient Was Seen By: Bailey Carlos PA-C    **Please Note: This note may have been constructed using a voice recognition system  **

## 2022-12-07 NOTE — PLAN OF CARE
Problem: OCCUPATIONAL THERAPY ADULT  Goal: Performs self-care activities at highest level of function for planned discharge setting  See evaluation for individualized goals  Description: Treatment Interventions: ADL retraining, Visual perceptual retraining, Functional transfer training, UE strengthening/ROM, Endurance training, Cognitive reorientation, Patient/family training, Equipment evaluation/education, Compensatory technique education, Energy conservation, Activityengagement, Continued evaluation, Neuromuscular reeducation, Fine motor coordination activities          See flowsheet documentation for full assessment, interventions and recommendations  Note: Limitation: Decreased ADL status, Decreased Safe judgement during ADL, Decreased cognition, Decreased endurance, Decreased self-care trans, Decreased high-level ADLs, Decreased fine motor control, Decreased UE strength, Decreased UE ROM, Visual deficit  Prognosis: Fair  Assessment: Pt participated in skilled OT treatment session with treatment focus on ADL retraining, sitting balance, and activity tolerance  Pt initially supine in bed and required Max A x2 for supine to sit transfer  Pt performed ADL's EOB with Mod A for static and dynamic sitting balance for ~ 30 minutes  Pt required Mod A for self feeding EOB; pt provided with universal built up handle and required repetition for carryover  Pt left supine in bed with alarm on and all needs within reach  Overall, patient required setup, verbal cues, and increased time to engage in tasks  Patient demonstrated improvement in postural stability, activity tolerance, endurance, and balance throughout session    Pt is progressing however is still limited secondary to cognitive deficits, difficulty with following commands, decreased activity tolerance, decreased endurance, impaired functional mobility, impaired balance, decreased independence with ADL's/IADL's, difficulty attending to task, impaired memory and impaired fine motor skills  The patient's raw score on the AM-PAC Daily Activity inpatient short form low function score is 11, standardized score is Low Function Daily Activity Standardized Score: 19 45  Patients with a standardized score less than 39 4 are likely to benefit from discharge to post-acute rehab services  Please refer to the recommendation of the Occupational Therapist for safe discharge planning  Pt recommended to d/c to rehab when medically stable  Pt would benefit from continued skilled acute care OT services during hospital stay to maximize independence with daily activities       OT Discharge Recommendation: Post acute rehabilitation services

## 2022-12-07 NOTE — PLAN OF CARE
Problem: SAFETY ADULT  Goal: Patient will remain free of falls  Description: INTERVENTIONS:  - Educate patient/family on patient safety including physical limitations  - Instruct patient to call for assistance with activity   - Consult OT/PT to assist with strengthening/mobility   - Keep Call bell within reach  - Keep bed low and locked with side rails adjusted as appropriate  - Keep care items and personal belongings within reach  - Initiate and maintain comfort rounds  - Make Fall Risk Sign visible to staff  - Offer Toileting every Hours, in advance of need  - Initiate/Maintain alarm  - Obtain necessary fall risk management equipment:   - Apply yellow socks and bracelet for high fall risk patients  - Consider moving patient to room near nurses station  Outcome: Progressing     Problem: DISCHARGE PLANNING  Goal: Discharge to home or other facility with appropriate resources  Description: INTERVENTIONS:  - Identify barriers to discharge w/patient and caregiver  - Arrange for needed discharge resources and transportation as appropriate  - Identify discharge learning needs (meds, wound care, etc )  - Arrange for interpretive services to assist at discharge as needed  - Refer to Case Management Department for coordinating discharge planning if the patient needs post-hospital services based on physician/advanced practitioner order or complex needs related to functional status, cognitive ability, or social support system  Outcome: Progressing     Problem: Knowledge Deficit  Goal: Patient/family/caregiver demonstrates understanding of disease process, treatment plan, medications, and discharge instructions  Description: Complete learning assessment and assess knowledge base    Interventions:  - Provide teaching at level of understanding  - Provide teaching via preferred learning methods  Outcome: Progressing     Problem: Neurological Deficit  Goal: Neurological status is stable or improving  Description: Interventions:  - Monitor and assess patient's level of consciousness, motor function, sensory function, and level of assistance needed for ADLs  - Monitor and report changes from baseline  Collaborate with interdisciplinary team to initiate plan and implement interventions as ordered  - Provide and maintain a safe environment  - Consider seizure precautions  - Consider fall precautions  - Consider aspiration precautions  - Consider bleeding precautions  Outcome: Progressing     Problem: Activity Intolerance/Impaired Mobility  Goal: Mobility/activity is maintained at optimum level for patient  Description: Interventions:  - Assess and monitor patient  barriers to mobility and need for assistive/adaptive devices  - Assess patient's emotional response to limitations  - Collaborate with interdisciplinary team and initiate plans and interventions as ordered  - Encourage independent activity per ability   - Maintain proper body alignment  - Perform active/passive rom as tolerated/ordered  - Plan activities to conserve energy   - Turn patient as appropriate  Outcome: Progressing     Problem: Communication Impairment  Goal: Ability to express needs and understand communication  Description: Assess patient's communication skills and ability to understand information  Patient will demonstrate use of effective communication techniques, alternative methods of communication and understanding even if not able to speak  - Encourage communication and provide alternate methods of communication as needed  - Collaborate with case management/ for discharge needs  - Include patient/family/caregiver in decisions related to communication  Outcome: Progressing     Problem: Potential for Aspiration  Goal: Non-ventilated patient's risk of aspiration is minimized  Description: Assess and monitor vital signs, respiratory status, and labs (WBC)    Monitor for signs of aspiration (tachypnea, cough, rales, wheezing, cyanosis, fever)  - Assess and monitor patient's ability to swallow  - Place patient up in chair to eat if possible  - HOB up at 90 degrees to eat if unable to get patient up into chair   - Supervise patient during oral intake  - Instruct patient/ family to take small bites  - Instruct patient/ family to take small single sips when taking liquids  - Follow patient-specific strategies generated by speech pathologist   Outcome: Progressing     Problem: MOBILITY - ADULT  Goal: Maintain or return to baseline ADL function  Description: INTERVENTIONS:  -  Assess patient's ability to carry out ADLs; assess patient's baseline for ADL function and identify physical deficits which impact ability to perform ADLs (bathing, care of mouth/teeth, toileting, grooming, dressing, etc )  - Assess/evaluate cause of self-care deficits   - Assess range of motion  - Assess patient's mobility; develop plan if impaired  - Assess patient's need for assistive devices and provide as appropriate  - Encourage maximum independence but intervene and supervise when necessary  - Involve family in performance of ADLs  - Assess for home care needs following discharge   - Consider OT consult to assist with ADL evaluation and planning for discharge  - Provide patient education as appropriate  Outcome: Progressing  Goal: Maintains/Returns to pre admission functional level  Description: INTERVENTIONS:  - Perform BMAT or MOVE assessment daily    - Set and communicate daily mobility goal to care team and patient/family/caregiver  - Collaborate with rehabilitation services on mobility goals if consulted  - Perform Range of Motion  times a day  - Reposition patient every  hours    - Dangle patient  times a day  - Stand patient  times a day  - Ambulate patient  times a day  - Out of bed to chair times a day   - Out of bed for meal times a day  - Out of bed for toileting  - Record patient progress and toleration of activity level   Outcome: Progressing     Problem: Prexisting or High Potential for Compromised Skin Integrity  Goal: Skin integrity is maintained or improved  Description: INTERVENTIONS:  - Identify patients at risk for skin breakdown  - Assess and monitor skin integrity  - Assess and monitor nutrition and hydration status  - Monitor labs   - Assess for incontinence   - Turn and reposition patient  - Assist with mobility/ambulation  - Relieve pressure over bony prominences  - Avoid friction and shearing  - Provide appropriate hygiene as needed including keeping skin clean and dry  - Evaluate need for skin moisturizer/barrier cream  - Collaborate with interdisciplinary team   - Patient/family teaching  - Consider wound care consult   Outcome: Progressing     Problem: Potential for Falls  Goal: Patient will remain free of falls  Description: INTERVENTIONS:  - Educate patient/family on patient safety including physical limitations  - Instruct patient to call for assistance with activity   - Consult OT/PT to assist with strengthening/mobility   - Keep Call bell within reach  - Keep bed low and locked with side rails adjusted as appropriate  - Keep care items and personal belongings within reach  - Initiate and maintain comfort rounds  - Make Fall Risk Sign visible to staff  - Offer Toileting every  Hours, in advance of need  - Initiate/Maintain alarm  - Obtain necessary fall risk management equipment:   - Apply yellow socks and bracelet for high fall risk patients  - Consider moving patient to room near nurses station  Outcome: Progressing     Problem: Nutrition/Hydration-ADULT  Goal: Nutrient/Hydration intake appropriate for improving, restoring or maintaining nutritional needs  Description: Monitor and assess patient's nutrition/hydration status for malnutrition  Collaborate with interdisciplinary team and initiate plan and interventions as ordered  Monitor patient's weight and dietary intake as ordered or per policy   Utilize nutrition screening tool and intervene as necessary  Determine patient's food preferences and provide high-protein, high-caloric foods as appropriate       INTERVENTIONS:  - Monitor oral intake, urinary output, labs, and treatment plans  - Assess nutrition and hydration status and recommend course of action  - Evaluate amount of meals eaten  - Assist patient with eating if necessary   - Allow adequate time for meals  - Recommend/ encourage appropriate diets, oral nutritional supplements, and vitamin/mineral supplements  - Order, calculate, and assess calorie counts as needed  - Recommend, monitor, and adjust tube feedings and TPN/PPN based on assessed needs  - Assess need for intravenous fluids  - Provide specific nutrition/hydration education as appropriate  - Include patient/family/caregiver in decisions related to nutrition  Outcome: Progressing

## 2022-12-07 NOTE — PROGRESS NOTES
NEUROLOGY RESIDENCY PROGRESS NOTE     Name: Aimee King   Age & Sex: 82 y o  male   MRN: [id number]  Unit/Bed#: [facility] 730-01   Encounter: [id number]    Recommendations for outpatient neurological follow up have yet to be determined  Pending for discharge: Continued Encephalopathy      ASSESSMENT & PLAN     * Stroke-like symptoms  Assessment & Plan  Assessment:  Aimee King is a 82 y o  male with a PMH of aFib (on eliquis), BPH, COPD, Gout, HTN, Basal cell adenocarcinoma of salviatory gland, PAD and PVD, who presents as a stoke alert    It is reported by EMS that the patient's Amalia Kathy was 12/4/2022 1600 after which there was acute onset left sided facial droop, left UA and LE weakness, and garbled speech  The patient is on ASA and Eliquis at baseline  His presenting BP was 133/88, glucose 121  His NIHSS on initial evaluation was 11  Initial CTH was not demonstrative of  acute intracranial abnormality, and showed table mild chronic microangiopathic changes within the brain  CTA HN showed atherosclerotic changes are seen within the carotid bifurcations and intracranial internal carotid arteries bilaterally  Suspected moderate stenosis of the right ICA origin and mild narrowing of the left carotid bifurcation  Intracranially there is also right greater than left stenosis, moderate on the left and mild on the right  The patient on subsequent examination was becoming much more distractible and was not able to cooperate with examination  He had difficulty following commands consistently  Imaging:  - CTH was not demonstrative of  acute intracranial abnormality, and showed table mild chronic microangiopathic changes within the brain   - CTA HN showed atherosclerotic changes are seen within the carotid bifurcations and intracranial internal carotid arteries bilaterally  Suspected moderate stenosis of the right ICA origin and mild narrowing of the left carotid bifurcation   Intracranially there is also right greater than left stenosis, moderate on the left and mild on the right   - MRI Brain wo: Motion degraded examination  No acute infarction, edema, or mass effect  Mild chronic microangiopathic ischemic changes   - TTE:  Left Ventricle: Left ventricular cavity size is normal  Wall thickness is mildly increased  There is concentric remodeling  The left ventricular ejection fraction is 65%  Systolic function is normal  Wall motion is normal  Diastolic function is normal   IVS: Septal motion is not well visualized  Right Ventricle: Right ventricular cavity size is mildly dilated  Systolic function is normal  Right Atrium: The atrium is mildly dilated  Mitral Valve: There is mild annular calcification  There is trace regurgitation  Tricuspid Valve: There is moderate regurgitation  The right ventricular systolic pressure is moderately elevated  The estimated right ventricular systolic pressure is 33 52 mmHg  Workup:  - A1C: 5 9  - Lipid panel: CHOL 150, , HDL 22, LDL 97  - Ammonia: 17  - 4-Plex: negative  - CXR: Probable small left effusion  Slightly limited but otherwise unremarkable examination   - UA w reflec sxope and cx: innumerable WBC, innumerable bacteria, innumerable RBCs, large leuks  - TEG Platelet mapping: Abnormal  - rEEG: Slowing of the posteriorly dominant rhythm suggests mild nonspecific diffuse cerebral dysfunction  Impression:  Considering the sudden onset of the symptoms and the imaging studies that are not suggestive of LVO, haemorrhage or infarct further workup is needed to determine the symptomatology  Imaging studies did reveal that there are atherosclerotic changes within the carotid bifurcations and intracranial internal carotid arteries bilaterally with both intracranial and extracranial ICA stenosis for which neurosurgery recommended medical management  Stroke is less likely with neuro imaging not demonstrative of such   Consider seizure post-ictal stage, and metabolic encephalopathy in the setting of a UTI is a consideration also  With interview with spouse, we know now that the patient has episodes at home where he would have changes in his DONALDO where he would 'space out' and fall asleep frequently throughout the day  Plan:  - Obtain STAT CTH wo for acute changes in mental status or neurologic examination and please notify neurology  - Consider having ID see patient in light of recent urine cultures  - MRI Brain wwo - Zyprexa to be given 30 minutes before MRI  - After MRI is completed, we can consider vEEG - Likely on 12/08/2022  - Stroke Pathway  - Frequent neuro checks  - Telemetry  - Fall precautions  - Goals: Normotension, normothermia, euglycaemia  - Continue Aspirin 81mg QD  - Continue Eliquis 5mg BID  - Continue Atorvastatin 40mg QD  - PT/OT/ST/PMR as able  - Rest per primary team; appreciated! Acute metabolic encephalopathy  Assessment & Plan  Assessment:  During assessment after initial NIHSS and imaging studies were completed it was noted that the patient had more trouble focusing on conversations, had more tangential speech, and was not able to follow commands as before  On initial examination the patient kept perseverating on the numbers 9 and 14 which is the month and day respectively of his birthday  On examination 12/06/2022 the patient was alert to name, the year, and knew he was in the hospital but not why or which one  Impression:  Considering the elevated white count, hyponatremia, increased BUN, and increased CO2 an encephalopathy picture appears  A UTI with three organisms has been identified  Plan:  - Correct metabolic derangements  - Infectious workup recommended:   - UTI  --- >100K cfu/ml E  Faecalis  --- >100K cfu/ml E  Coli  --- 70K-79K cfu/ml M  morganii  --- Treat underlying infection: Consider ID consultation  - Per primary team; appreciated! SUBJECTIVE     Mr Rafaela Cruz was resting in bed this AM and reported that he was feeling 'OK'   He denied HA, CP, SOB, ABD-pain, N/V, or new changes in sensorium  The patient states that he is able to feed however not ambulate  The patient was alert  He was oriented to person, states that he was [place] and 'in a place, this place'  He stated that the year was 1222 and was unable to give the month or STEW even with giving choices  The patient also did not know as to why he was being hospitalized stating that "I am here to have lunch"  The patient stated that his birthday was June 4th 1936  The patient was not able to complete calculations and unable to consistently repeat phrases  Review of Systems   Constitutional: Positive for fatigue  Negative for chills, diaphoresis and fever  HENT: Negative for ear discharge, sinus pressure, sinus pain and sore throat  Eyes: Negative for pain and redness  Respiratory: Positive for cough  Negative for chest tightness and shortness of breath  Cardiovascular: Positive for leg swelling  Negative for chest pain and palpitations  Gastrointestinal: Negative for blood in stool, nausea and vomiting  Genitourinary: Negative for flank pain, hematuria and urgency  Musculoskeletal: Positive for back pain  Skin: Positive for wound (on LLE, foot in boot  )  Negative for pallor  Neurological: Positive for weakness and numbness (Inconsistent on examination)  Negative for dizziness, tremors, seizures, syncope, facial asymmetry, speech difficulty, light-headedness and headaches  Psychiatric/Behavioral: Positive for confusion and decreased concentration  Negative for behavioral problems, dysphoric mood, hallucinations, self-injury, sleep disturbance and suicidal ideas  The patient is not nervous/anxious and is not hyperactive  OBJECTIVE     Patient ID: Soledad Davidson is a 82 y o  male      Vitals:    12/06/22 2039 12/07/22 0734 12/07/22 0905 12/07/22 0905   BP: 147/78 120/76 112/70 112/70   Pulse: (!) 106 100 98 98   Resp:  18     Temp: (!) 97 3 °F (36 3 °C) 97 5 °F (36 4 °C) TempSrc:       SpO2: 93% 95%  96%   Weight:       Height:          Temperature:   Temp (24hrs), Av 4 °F (36 3 °C), Min:97 3 °F (36 3 °C), Max:97 5 °F (36 4 °C)    Temperature: 97 5 °F (36 4 °C)    Physical Exam  Vitals and nursing note reviewed  HENT:      Head: Normocephalic  Nose: Nose normal       Mouth/Throat:      Mouth: Mucous membranes are moist       Pharynx: Oropharynx is clear  Eyes:      Extraocular Movements: EOM normal       Pupils: Pupils are equal, round, and reactive to light  Cardiovascular:      Rate and Rhythm: Normal rate  Pulses: Normal pulses  Pulmonary:      Effort: Pulmonary effort is normal    Musculoskeletal:         General: Tenderness (LUE and LLE) and signs of injury present  Cervical back: Normal range of motion  Right lower leg: Edema present  Left lower leg: Edema present  Skin:     General: Skin is warm  Capillary Refill: Capillary refill takes less than 2 seconds  Neurological:      Mental Status: He is alert  Cranial Nerves: No cranial nerve deficit  Sensory: Sensory deficit present  Motor: Weakness present  Coordination: Coordination abnormal  Finger-Nose-Finger Test normal       Gait: Gait abnormal       Deep Tendon Reflexes:      Reflex Scores:       Tricep reflexes are 2+ on the right side and 2+ on the left side  Bicep reflexes are 2+ on the right side and 2+ on the left side  Brachioradialis reflexes are 2+ on the right side and 2+ on the left side  Patellar reflexes are 1+ on the right side and 1+ on the left side  Achilles reflexes are 1+ on the right side and 1+ on the left side  Psychiatric:         Mood and Affect: Mood normal          Speech: Speech normal         Neurologic Exam     Mental Status   Oriented to person  Disoriented to place  Disoriented to year, month and date  Follows 1 step commands  Attention: decreased  Concentration: decreased     Speech: speech is normal   Level of consciousness: alert  Unable to perform simple calculations  Unable to repeat  Cranial Nerves     CN III, IV, VI   Pupils are equal, round, and reactive to light  Extraocular motions are normal    CN III: no CN III palsy  CN VI: no CN VI palsy  Nystagmus: none   Upgaze: normal  Downgaze: normal  Conjugate gaze: present    CN V   Right facial sensation deficit: none  Left facial sensation deficit: none    CN VII   Right facial weakness: none  Left facial weakness: none    CN VIII   Hearing: impaired    CN IX, X   Palate: symmetric    CN XI   Right sternocleidomastoid strength: normal  Left sternocleidomastoid strength: normal  Right trapezius strength: normal  Left trapezius strength: normal    CN XII   Tongue: not atrophic  Fasciculations: absent  Tongue deviation: none  Patient was unsure of how many fingers were shown on visual examination however was able to report that he was able to see fingers wiggle all quadrants  Motor Exam   Muscle bulk: normal  Overall muscle tone: normal  Right arm tone: normal  Left arm tone: normal  Right leg tone: normal  Left leg tone: normal    Strength   Right neck flexion: 5/5  Left neck flexion: 4/5  Right neck extension: 5/5  Left neck extension: 4/5  Right deltoid: 5/5  Left deltoid: 4/5  Right biceps: 5/5  Left biceps: 4/5  Right triceps: 5/5  Left triceps: 4/5  Right wrist flexion: 5/5  Left wrist flexion: 4/5  Right wrist extension: 5/5  Left wrist extension: 4/5  Right iliopsoas: 5/5  Left iliopsoas: 3/5  Right quadriceps: 5/5  Left quadriceps: 3/5  Right hamstrin/5  Left hamstring: 3/5  Right anterior tibial: 5/5  Left anterior tibial: 2/5  Right posterior tibial: 5/5  Left posterior tibial: 2/5  - LLE in boot; unable to move       Sensory Exam   Light touch normal      Gait, Coordination, and Reflexes     Coordination   Finger to nose coordination: normal    Reflexes   Right brachioradialis: 2+  Left brachioradialis: 2+  Right biceps: 2+  Left biceps: 2+  Right triceps: 2+  Left triceps: 2+  Right patellar: 1+  Left patellar: 1+  Right achilles: 1+  Left achilles: 1+  Right plantar: equivocal  Left plantar: equivocal  Right Stevens: absent  Left Stevens: absent  Right ankle clonus: absent  Left ankle clonus: absent  - Gait not assessed, risk of falling   - FTN wnl with R, unable to complete with L   - HTS wnl with R, unable to complete with L       LABORATORY DATA     Labs: I have personally reviewed pertinent reports  Results from last 7 days   Lab Units 12/07/22  0520 12/06/22  0515 12/05/22  0420   WBC Thousand/uL 9 71 8 03 10 22*   HEMOGLOBIN g/dL 13 7 12 4 12 6   HEMATOCRIT % 44 7 40 5 40 1   PLATELETS Thousands/uL 243 213 216      Results from last 7 days   Lab Units 12/07/22  0520 12/06/22  0515 12/05/22  0432   SODIUM mmol/L 139 141 136   POTASSIUM mmol/L 3 9 3 7 3 8   CHLORIDE mmol/L 106 104 100   CO2 mmol/L 26 31 31   BUN mg/dL 18 19 21   CREATININE mg/dL 0 75 0 86 0 90   CALCIUM mg/dL 10 1 9 4 9 2   ALK PHOS U/L  --  111  --    ALT U/L  --  30  --    AST U/L  --  29  --               Results from last 7 days   Lab Units 12/06/22  0515 12/05/22  2249 12/05/22  1540 12/04/22  1730   INR   --   --  1 57* 1 23*   PTT seconds 56* 46* 43* 38*               IMAGING & DIAGNOSTIC TESTING     Radiology Results: I have personally reviewed pertinent reports  XR chest pa & lateral   Final Result by John Hinds MD (12/07 0830)      No acute cardiopulmonary disease  Workstation performed: [id number]         VAS lower limb arterial duplex, limited/unilateral   Final Result by Elizabeth Serrano MD (12/06 1310)      XR heel / calcaneus 2+ vw left   Final Result by Yocasta Kaplan MD (12/06 1046)      Soft tissue swelling  No evidence of acute osteomyelitis  If osteomyelitis remains a clinical concern, consider further evaluation with MRI              Workstation performed: [id number]         MRI brain wo contrast   Final Result by Claudio Salcedo MD (12/05 3618)   Motion degraded examination  No acute infarction, edema, or mass effect  Mild chronic microangiopathic ischemic changes  Workstation performed: [id number]         XR chest portable   Final Result by Nilsa Valladares MD (92/04 0722)      Probable small left effusion  Slightly limited but otherwise unremarkable examination  Workstation performed: [id number]         CTA stroke alert (head/neck)   Final Result by Lico Haley DO (12/04 9446)      Unfortunately this examination is significantly limited due to patient motion  Atherosclerotic changes are seen within the carotid bifurcations and intracranial internal carotid arteries bilaterally  Suspected moderate stenosis of the right ICA origin and mild narrowing of the left carotid bifurcation  Intracranially there is    also right greater than left stenosis, moderate on the left and mild on the right      No occlusive disease or thrombosis identified  Findings were directly discussed with neurology resident at approximately 5:40 PM                            Workstation performed: [id number]         CT stroke alert brain   Final Result by Lico Haley DO (12/04 3755)      No acute intracranial abnormality  Stable mild chronic microangiopathic changes within the brain  Findings were directly discussed with neurology resident at 5:40 PM       Workstation performed: [id number]         MRI inpatient order    (Results Pending)   FL barium swallow video w speech    (Results Pending)     Other Diagnostic Testing: I have personally reviewed pertinent reports        ACTIVE MEDICATIONS     Current Facility-Administered Medications   Medication Dose Route Frequency   • acetaminophen (TYLENOL) tablet 975 mg  975 mg Oral Q6H PRN   • albuterol (PROVENTIL HFA,VENTOLIN HFA) inhaler 2 puff  2 puff Inhalation Q4H PRN   • allopurinol (ZYLOPRIM) tablet 100 mg  100 mg Oral Daily   • apixaban (ELIQUIS) tablet 5 mg  5 mg Oral BID   • aspirin chewable tablet 81 mg  81 mg Oral Daily   • atorvastatin (LIPITOR) tablet 40 mg  40 mg Oral QPM   • cefTRIAXone (ROCEPHIN) 1,000 mg in dextrose 5 % 50 mL IVPB  1,000 mg Intravenous Q24H   • cholecalciferol (VITAMIN D3) tablet 400 Units  400 Units Oral Daily   • dextromethorphan-guaiFENesin (ROBITUSSIN DM) oral syrup 10 mL  10 mL Oral Q4H PRN   • docusate sodium (COLACE) capsule 100 mg  100 mg Oral BID   • metoprolol tartrate (LOPRESSOR) tablet 50 mg  50 mg Oral Q12H ROYER   • nicotine (NICODERM CQ) 14 mg/24hr TD 24 hr patch 14 mg  14 mg Transdermal Daily   • OLANZapine (ZyPREXA) IM injection 2 5 mg  2 5 mg Intramuscular Once PRN   • ondansetron (ZOFRAN) injection 4 mg  4 mg Intravenous Q6H PRN   • polyethylene glycol (MIRALAX) packet 17 g  17 g Oral Daily     Prior to Admission medications    Medication Sig Start Date End Date Taking?  Authorizing Provider   colchicine (COLCRYS) 0 6 mg tablet Take 0 6 mg by mouth daily   Yes Historical Provider, MD   furosemide (LASIX) 40 mg tablet Take 40 mg by mouth daily   Yes Historical Provider, MD   potassium chloride (KLOR-CON) 20 mEq packet Take 20 mEq by mouth 2 (two) times a day   Yes Historical Provider, MD   acetaminophen (TYLENOL) 325 mg tablet Take 3 tablets (975 mg total) by mouth every 8 (eight) hours 12/9/19   Tere Montes De Oca PA-C   allopurinol (ZYLOPRIM) 100 mg tablet Take 100 mg by mouth daily    Historical Provider, MD   apixaban (ELIQUIS) 5 mg Take 5 mg by mouth 2 (two) times a day    Historical Provider, MD   aspirin (ECOTRIN LOW STRENGTH) 81 mg EC tablet Take 81 mg by mouth daily    Historical Provider, MD   cholecalciferol (VITAMIN D3) 400 units tablet Take 400 Units by mouth daily    Historical Provider, MD   docusate sodium (COLACE) 100 mg capsule Take 1 capsule (100 mg total) by mouth 2 (two) times a day 12/9/19   Romana Finn, PA-C metoprolol tartrate (LOPRESSOR) 50 mg tablet Take 50 mg by mouth every 12 (twelve) hours    Historical Provider, MD   vitamin A 10,000 units capsule Take 10,000 Units by mouth daily    Historical Provider, MD     VTE Pharmacologic Prophylaxis: Apixaban (Eliquis)  VTE Mechanical Prophylaxis: sequential compression device    ==  DO Mayra Nava [facility] Neurology Residency, PGY-2

## 2022-12-07 NOTE — ASSESSMENT & PLAN NOTE
Presented with stroke like symptoms as above; MRI brain negative for stroke, consideration for TIA in the setting of ICA stenosis, see plans above   · Family noted that the patient was more confused than usual recently and they attributed it to higher dose of Lasix due to worsening leg edema  · Infectious workup/management as outlined above   · Appreciate neurology inputs regarding stroke like symptoms as above  · Mental status continues to wax and wane  · Dysphagia diet as per speech therapy   · Continue with supportive cares  · Discontinued restraints as of 12/6   · Today, patient is oriented to self and year, says he is in [place]; disorieted to place and situation

## 2022-12-07 NOTE — CASE MANAGEMENT
Case Management Progress Note    Patient name Anita Butterfield  Location [facility]- MRN [id number]  : 1935 Date 2022       LOS (days): 3  Geometric Mean LOS (GMLOS) (days): 3 40  Days to GMLOS:0 8        OBJECTIVE:        Current admission status: Inpatient  Preferred Pharmacy:   [facility], [address]  Phone: [phone] Fax: [phone]    Primary Care Provider: Cindy Carmona MD    Primary Insurance: MEDICARE  [facility]    PROGRESS NOTE: Pt will be cleared for d/c in 24-48 hours  CM advised AP that pt needs to be off restraints for 24-48 hours before a facility will review   Restraints were discontinued last night

## 2022-12-07 NOTE — OCCUPATIONAL THERAPY NOTE
Occupational Therapy Progress Note     Patient Name: Lazaro Lucas  LIXVG'T Date: 12/7/2022  Problem List  Principal Problem:    Stroke-like symptoms  Active Problems:    Anterior dislocation of left shoulder    Atrial fibrillation (HCC)    Bilateral lower extremity edema    Nicotine dependence    Acute metabolic encephalopathy    Pressure injury of deep tissue of left heel    SIRS vs Sepsis ([facility] [address] )    Stenosis of both internal carotid arteries    Prediabetes    Oropharyngeal dysphagia    PAD (peripheral artery disease) (HCC)    Acute cystitis without hematuria        12/07/22 1107   OT Last Visit   OT Visit Date 12/07/22   Note Type   Note Type Treatment   Pain Assessment   Pain Assessment Tool 0-10   Pain Score No Pain   Restrictions/Precautions   Weight Bearing Precautions Per Order Yes   LLE Weight Bearing Per Order (S)  NWB  (Per podiatry for heal ulcer)   Other Precautions Cognitive; Chair Alarm; Bed Alarm;WBS; Telemetry; Fall Risk   Lifestyle   Autonomy Per CM note pt required A for ADLs, IADLs, and functional mobility  Prior to admission pt was living with spouse in [address] [place] [address] with 5 JARRED  Reciprocal Relationships Spouse   Service to Others Retired   Intrinsic Gratification continue assessing   ADL   Eating Assistance 3  Moderate Assistance   Eating Deficit Supervision/safety;Verbal cueing; Adaptive utensils (Comment)  (Pt provided with built up handle  Pt able to self feed however requires assistance with fatigue  Pt requires assistance and verbal cues to attend past midline on tray  Pt requires scoop assist however able to bring utensil to mouth with setup )   Eating Comments Pt requires A for trunk stability during feeding task in order to correct L lateral lean  LB Dressing Assistance 2  Maximal Assistance   LB Dressing Deficit Don/doff R sock   Bed Mobility   Supine to Sit 2  Maximal assistance   Additional items Assist x 2; Increased time required;Verbal cues;LE management   Sit to Supine 2  Maximal assistance Additional items Assist x 2; Increased time required;Verbal cues;LE management   Additional Comments Pt able to tolerate 30 minutes of upright sitting at EOB with Mod A in order to correct L lateral lean  Transfers   Sit to Stand Unable to assess   Stand to Sit Unable to assess   Additional Comments Functional transfers limited secondary to WBS in LLE and pt fatigue  Therapeutic Exercise - ROM   UE-ROM Yes   ROM- Right Upper Extremities   R Shoulder AROM  (Shoulder shrugs)   R Elbow Elbow flexion;Elbow extension;AROM   R Hand AROM; Thumb; Index finger; Long finger;Ring finger;Little finger   R Position Seated;Against gravity  (Seated EOB)   R Weight/Reps/Sets 5x   ROM - Left Upper Extremities    L Shoulder PROM  (Shoulder shrugs with scapular mobilization)   L Elbow Elbow flexion;Elbow extension;AAROM   L Hand AAROM; Thumb; Index finger; Long finger;Ring finger;Little finger   L Position Seated;Against gravity  (Seated EOB)   L Weight/Reps/Sets 5x   LUE ROM Comment Pt presents with active motion in LUE however >full ROM  Pt presents with tone in elbow with passive range  Coordination   Fine Motor Pt presents with decreased FM in RUE  Pt issued universal built up handle to increase independence with self feeding  Cognition   Overall Cognitive Status Impaired   Arousal/Participation Arousable;Lethargic;Persistent stimuli required  (Difficulty maintaining eyes open in begiging of session  Pt attention and arousal increased when seated EOB )   Attention Difficulty attending to directions   Orientation Level Oriented to person;Oriented to time   Memory Decreased recall of precautions;Decreased recall of recent events;Decreased short term memory   Following Commands Follows one step commands with increased time or repetition   Comments Pt was agreeable to session  Pt limited by decreased attention, aphasia (?), and decreased attention to L side of environment   Pt is slow to respond and requires stimuli for increased arousal     Perception   Perception Yes  (Pt demonstrated neglect to L environment  Pt asked to locate items on lunch tray and is limited past midline )   Activity Tolerance   Medical Staff Made Aware OT karlene; JENNY Zelaya, student [facility]ara   Assessment   Assessment Pt participated in skilled OT treatment session with treatment focus on ADL retraining, sitting balance, and activity tolerance  Pt initially supine in bed and required Max A x2 for supine to sit transfer  Pt performed ADL's EOB with Mod A for static and dynamic sitting balance for ~ 30 minutes  Pt required Mod A for self feeding EOB; pt provided with universal built up handle and required repetition for carryover  Pt left supine in bed with alarm on and all needs within reach  Overall, patient required setup, verbal cues, and increased time to engage in tasks  Patient demonstrated improvement in postural stability, activity tolerance, endurance, and balance throughout session  Pt is progressing however is still limited secondary to cognitive deficits, difficulty with following commands, decreased activity tolerance, decreased endurance, impaired functional mobility, impaired balance, decreased independence with ADL's/IADL's, difficulty attending to task, impaired memory and impaired fine motor skills  The patient's raw score on the -PAC Daily Activity inpatient short form low function score is 11, standardized score is Low Function Daily Activity Standardized Score: 19 45  Patients with a standardized score less than 39 4 are likely to benefit from discharge to post-acute rehab services  Please refer to the recommendation of the Occupational Therapist for safe discharge planning  Pt recommended to d/c to rehab when medically stable  Pt would benefit from continued skilled acute care OT services during hospital stay to maximize independence with daily activities     Plan   Treatment Interventions ADL retraining;Visual perceptual retraining;UE strengthening/ROM; Endurance training;Equipment evaluation/education; Fine motor coordination activities; Compensatory technique education;Continued evaluation; Energy conservation; Activityengagement   Goal Expiration Date 12/19/22   OT Treatment Day 1   OT Frequency 3-5x/wk   Recommendation   OT Discharge Recommendation Post acute rehabilitation services   AM-PAC Daily Activity Inpatient   Lower Body Dressing 1   Bathing 1   Toileting 1   Upper Body Dressing 1   Grooming 1   Eating 2   Daily Activity Raw Score 7   Turning Head Towards Sound 2   Follow Simple Instructions 2   Low Function Daily Activity Raw Score 11   Low Function Daily Activity Standardized Score 19 45   AM-PAC Applied Cognition Inpatient   Following a Speech/Presentation 1   Understanding Ordinary Conversation 2   Taking Medications 1   Remembering Where Things Are Placed or Put Away 1   Remembering List of 4-5 Errands 1   Taking Care of Complicated Tasks 1   Applied Cognition Raw Score 7   Applied Cognition Standardized Score 15 17   Barthel Index   Feeding 5   Bathing 0   Grooming Score 0   Dressing Score 0   Bladder Score 5   Bowels Score 5   Toilet Use Score 0   Transfers (Bed/Chair) Score 5   Mobility (Level Surface) Score 0   Stairs Score 0   Barthel Index Score 20   Modified Milford Scale   Modified Milford Scale 4   End of Consult   Education Provided Yes  (Pt educated on use of universal built up handle- pt demonstrated understanding with repetition )   Patient Position at End of Consult Supine; All needs within reach;Bed/Chair alarm activated   Nurse Communication Nurse aware of consult       ROGER Orr

## 2022-12-07 NOTE — PROCEDURES
Speech Pathology Videofluoroscopic Swallow Study (VFSS/VBSS/MBSS)      Patient Name: Fortunato Andrade    FOZXE'V Date: 12/7/2022     General Information;  From H&P 12/4/2022: History of Present Illness:  Fortunato Andrade is a 82 y o  male with a PMH of atrial fibrillation who presents with stroke-like symptoms  Patient was last unknown at 04:00 o'clock today  Patient wife provided most of the history  She reported that she was watching football along with the patient and they argued about the game being tied and all of a sudden patient noted to have garbled speech  Wife was not able to understand what he was talking about  Initially she thought that the patient is sleeping since he has been sleeping more and going in and out of sleep recently  Since his symptoms does not resolve his called EMS  When the EMS was present patient noted to have left-sided weakness  He patient's stepdaughter reported that patient was able to move the left upper extremity a earlier that day  Patient opens eyes to voice at try to answers questions  He reports that he is in [facility] but unable to tell me the exact date or the year  Knows that this is December  Patient is tangential during the evaluation  Wife denied any fever  Patient denies any chills or any pain  Wife reported that patient stopped reading paper approximately a week ago  Up until then he was interested in crossword puzzles and reading newspaper but for the past 1 week or so he has not reading anymore     Current concerns for dysphagia include congestion and SOB with PO at bedside  A VFSS was recommended to assess oropharyngeal stage swallowing skills at this time  Pt was viewed sitting upright in the lateral position  Due to concerns for patient safety, trials provided deviated from the MBSImP Validated Protocol   Patient was given 5-mL thin liquid x2, 20-mL cup sip thin, 5-mL nectar thick, 5-mL Honey thick, 5-mL pudding, bite bessie cracker coated with 3-mL pudding, straw sips of nectar thick and thin liquids  Oral stage:  Pt presented with moderate oral stage dysphagia  Retrieval of all items via tsp, straw and cup was adequate  Mastication time was prolonged with bessie cracker  Bolus formation was reduced, with prolonged transfer time  The patient has decreased oral control with spillage over BOT  Pharyngeal stage:  Pt presented with moderate pharyngeal dysphagia  Velar elevation noted  Tongue base retraction is reduced and swallow initiation time is delayed with spillage to the valleculae and pyriforms with all  Transient aspiration x1 observed with initial tsp thin liquids  Intermittent epiglottic undercoating is observed throughout  The patient has penetration to the cords of undercoat x1  A mild amount of pharyngeal residue is observed  The patient has aspiration with consecutive straw sips of thin liquids  Patient has strong cough response  He is unable to follow commands to attempt strategies  PES opening was adequate  Management of food/liquid/barium tablet follows: The patient has a mild amount of valleculae and pyriform retention with aspiration on straw sips of thin liquids  Strategies and Efficacy: Unable to follow commands     Aspiration Response and Efficacy:  Strong cough response observed     Esophageal stage:  Esophagus was not viewed in today's study     Assessment Summary:    Pt presents with moderate oropharyngeal dysphagia characterized by prolonged oral transfer, decreased oral control, delayed swallow initiation time, pharyngeal retention and aspiration with thin liquids  Cough response is elicited  Note: Images are available for review in PACS as desired      Recommendations:   Recommended Diet:  puree/level 1 diet and nectar thick liquids   Recommended Form of Medications: crushed with puree   Aspiration precautions and compensatory swallowing strategies: upright posture and small bites/sips  Consider referral to:  None   SLP Dysphagia therapy recommended: f/u 1-2x in acute care     Patient Stated Goal: none stated     Dysphagia Goals per SLP: Patient will tolerate trials of mechanical soft solids with adequate mastication and no oral residue with no s/s aspiration  Consider repeat VFSS in the future to re-assess

## 2022-12-07 NOTE — PHYSICAL THERAPY NOTE
Physical therapy noted    Pt off floor for medical test will resume next schedule session and monitor SxS

## 2022-12-07 NOTE — CONSULTS
Consult: Vascular Surgery  Onofre Keller 82 y o  male MRN: [id number]  Unit/Bed#: [facility] 730-01 Encounter: [id number]        Assessment/Plan     Assessment:  Patient is a 82 y o  male admitted for encephalopathy with concerns for CVA w/ a PMHx of PAD for which he follows as an outpatient a [facility] and a left heal pressure ulcer for which he follows with wound care as an outpatient  Evaluated by podiatry for this ulcer for this heal ulcer; recommending local wound care  LEADs were obtained and are grossly unchanged from his last study on in May of 2022 showing occlusion of the distal L SFA w/ ABIs of 0 43  Vascular surgery consulted for the nonhealing L calcaneal pressure ulcer in the setting of PAD  Discussion was had wit the family who voiced a strong opinion tot to pursue further intervention given failed attempts at endovascular revascularization at the OSH; the were counseled that this wound is very unlikely to heal and on the risk that this will likely progress to limb loss or sepsis and death if gone untreated  Given concern for stroke at presentation and poor quality of his CTA neck, which did reveal atherosclerotic disease at the carotid bifurcation, would recommend carotid duplex but given family's general disposition towards further intervention would not currently recommend further imaging  Afebrile, VSS  WBC: 9 7; Hb 7    22 LEADs:    Plan:  - No acute vascular surgery intervention  - Given the families preference of nonintervention would recommend no further vascular follow up  - Local wound care per podiatry  - Rest of care per primary team    History of Present Illness     HPI:  Onofre Keller is a 82 y o  male aFib (on eliquis), BPH, COPD, Gout, HTN, Basal cell adenocarcinoma of salviatory gland, PAD and PVD, who presents as a stoke alert   It is reported by EMS that the patient's Garold Topaz was 2022 1600 after which there was acute onset left sided facial droop, left UA and LE weakness, and biancad speech  Stroke work-up has been unremarkable with negative CT and MRI imaging; tentative diagnosis for encephalopathy are being worked up by the neurology team   Vascular perspective, patient's family reports history of left lower extremity wounds which which healed with local wound care  He has a history of known PAD for which he follows at [facility]  Per family and chart review he had undergone multiple endovascular attempts at revascularization of the left lower extremity given his severe disease of the distal SFA which were unsuccessful  Family reports that he was ambulatory until prior to admission  Per chart review he had no history of claudication or ischemic rest pain  He had been following outpatient with the wound clinic given his history of leg wounds and it was noted about 2 to 3 weeks ago that he had developed a left calcaneal pressure injury  This had been being managed with local wound care  He was scheduled to follow-up in vascular clinic on 11/30 but due to a recent fall he was unable to make the appointment  His wife reports that 3 years ago he fell down the stairs after which he lost much of his independence and had been reliant upon her care for many of his ADLs  Given multiple failed interventions in the past and his general quality of life his wife and daughter are currently not interested in any further intervention for his peripheral arterial disease  In regards to his carotid disease in the setting of potential stroke, his last carotid duplex was in 2019 which revealed less than 50% stenosis bilaterally  Imaging on this admission was of poor quality but does reveal atherosclerotic disease at the carotid stenosis bilaterally      Review of Systems   Limited secondary to patient's mental status  Inpatient consult to Vascular Surgery  Consult performed by: Soraida Joiner MD  Consult ordered by: Wilfredo Reddy PA-C          Historical Information   Past Medical History: Diagnosis Date   • Atrial fibrillation ([facility] [address] )    • Cancer ([facility] [address] )    • Hypertension      Past Surgical History:   Procedure Laterality Date   • REPLACEMENT TOTAL HIP W/  RESURFACING IMPLANTS       Social History   Social History     Substance and Sexual Activity   Alcohol Use Not Currently     Social History     Substance and Sexual Activity   Drug Use Not Currently     Social History     Tobacco Use   Smoking Status Never   Smokeless Tobacco Never     Family History: non-contributory    Meds/Allergies   all medications and allergies reviewed  Allergies   Allergen Reactions   • Balsam Dermatitis   • Neomycin-Bacitracin Zn-Polymyx Hives and Rash       Objective   First Vitals:   Blood Pressure: 133/88 (12/04/22 1745)  Pulse: (!) 109 (12/04/22 1730)  Temperature: (!) 96 8 °F (36 °C) (12/04/22 1745)  Temp Source: Axillary (12/04/22 1745)  Respirations: 18 (12/04/22 1730)  Height: 5' 8" (172 7 cm) (12/06/22 0740)  Weight - Scale: 133 kg (293 lb 10 4 oz) (12/04/22 1729)  SpO2: 96 % (12/04/22 1730)    Current Vitals:   Blood Pressure: 112/70 (12/07/22 0905)  Pulse: 98 (12/07/22 0905)  Temperature: 97 5 °F (36 4 °C) (12/07/22 0734)  Temp Source: Oral (12/04/22 1800)  Respirations: 18 (12/07/22 0734)  Height: 5' 8" (172 7 cm) (12/06/22 0740)  Weight - Scale: 133 kg (293 lb) (12/06/22 0740)  SpO2: 96 % (12/07/22 0905)      Intake/Output Summary (Last 24 hours) at 12/7/2022 1518  Last data filed at 12/7/2022 0905  Gross per 24 hour   Intake 1118 ml   Output --   Net 1118 ml       Invasive Devices     Peripheral Intravenous Line  Duration           Peripheral IV 12/04/22 Left;Proximal;Ventral (anterior) Forearm 2 days    Peripheral IV 12/04/22 Right;Ventral (anterior) Forearm 2 days          Drain  Duration           External Urinary Catheter Small 2 days                Physical Exam:  General: No acute distress, alert and oriented  CV: Well perfused, irregular rhythm  Lungs: Normal work of breathing, no increased respiratory effort  Abdomen: Soft, non-tender, non-distended  Extremities: Necrotic ulceration of the left heel approximately 3 5 cm x 3 cm with overlying eschar; mostly dry without purulent drainage, or increased warmth compared to the contralateral foot  Chronic venous stasis changes bilateral lower extremity  Pulse exam:  Femoral: Right: palpable  Left: palpable  Popliteal: Right: doppler signal Left: doppler signal  DP: Right: doppler signal Left: doppler signal  PT: Right: doppler signal Left: doppler signal     Lab Results: I have personally reviewed pertinent lab results  Imaging: I have personally reviewed pertinent reports  EKG, Pathology, and Other Studies: I have personally reviewed pertinent reports        Code Status: Level 3 - DNAR and DNI  Advance Directive and Living Will: Yes    Power of : Yes  POLST:

## 2022-12-07 NOTE — SPEECH THERAPY NOTE
Speech Language/Pathology    Speech/Language Pathology Progress Note    Patient Name: Yuni Pittman  OMKVG'O Date: 12/7/2022     Problem List  Principal Problem:    Stroke-like symptoms  Active Problems:    Anterior dislocation of left shoulder    Atrial fibrillation (HCC)    Bilateral lower extremity edema    Nicotine dependence    Acute metabolic encephalopathy    Pressure injury of deep tissue of left heel    [facility] )    Stenosis of both internal carotid arteries    Prediabetes    Oropharyngeal dysphagia    PAD (peripheral artery disease) ([facility] )    Acute cystitis without hematuria       Past Medical History  Past Medical History:   Diagnosis Date   • Atrial fibrillation ([facility] )    • Cancer ([facility] )    • Hypertension         Past Surgical History  Past Surgical History:   Procedure Laterality Date   • REPLACEMENT TOTAL HIP W/  RESURFACING IMPLANTS           Subjective:  Patient is awake and alert  Seen with OT who help patient sit on edge of bed and feed himself  Objective: The patient is seen at lunch meal for dysphagia therapy  He is assessed with puree solids and nectar thick liquids  The patient has good oral closure for tsp, and strong draw for nectar thick liquids via straw  Transfer appears min prolonged, but no oral residue observed  The patient takes large, consecutive sips of liquids  Patient continues with intermittent congestion and SOB throughout meal  Intake is improved today from previous sessions  Assessment:  The patient continues with intermittent congestion and SOB with puree solids and nectar thick liquids  Plan/Recommendations:  Continue current diet and recommend VFSS to instrumentally assess swallow function   Continue ST

## 2022-12-08 NOTE — PLAN OF CARE
Problem: SAFETY ADULT  Goal: Patient will remain free of falls  Description: INTERVENTIONS:  - Educate patient/family on patient safety including physical limitations  - Instruct patient to call for assistance with activity   - Consult OT/PT to assist with strengthening/mobility   - Keep Call bell within reach  - Keep bed low and locked with side rails adjusted as appropriate  - Keep care items and personal belongings within reach  - Initiate and maintain comfort rounds  - Make Fall Risk Sign visible to staff  Problem: DISCHARGE PLANNING  Goal: Discharge to home or other facility with appropriate resources  Description: INTERVENTIONS:  - Identify barriers to discharge w/patient and caregiver  - Arrange for needed discharge resources and transportation as appropriate  - Identify discharge learning needs (meds, wound care, etc )  - Arrange for interpretive services to assist at discharge as needed  - Refer to Case Management Department for coordinating discharge planning if the patient needs post-hospital services based on physician/advanced practitioner order or complex needs related to functional status, cognitive ability, or social support system  Outcome: Progressing     Problem: Knowledge Deficit  Goal: Patient/family/caregiver demonstrates understanding of disease process, treatment plan, medications, and discharge instructions  Description: Complete learning assessment and assess knowledge base    Interventions:  - Provide teaching at level of understanding  - Provide teaching via preferred learning methods  Outcome: Progressing     Problem: Potential for Falls  Goal: Patient will remain free of falls  Description: INTERVENTIONS:  - Educate patient/family on patient safety including physical limitations  - Instruct patient to call for assistance with activity   - Consult OT/PT to assist with strengthening/mobility   - Keep Call bell within reach  - Keep bed low and locked with side rails adjusted as appropriate  - Keep care items and personal belongings within reach  - Initiate and maintain comfort rounds  - Make Fall Risk Sign visible to staff  - Apply yellow socks and bracelet for high fall risk patients  - Consider moving patient to room near nurses station  Outcome: Progressing     - Apply yellow socks and bracelet for high fall risk patients  - Consider moving patient to room near nurses station  Outcome: Progressing

## 2022-12-08 NOTE — ASSESSMENT & PLAN NOTE
· Hospital day 1 patient in atrial fibrillation with RVR - now improved   · Continue rate control with metoprolol 50 mg q12h   · NPO currently - will need heparin - SLIM to start ACS heparin gtt given high chadsvasc score  But reaching out to neurology first to see if patient needs acute stroke heparin in which case neurology should order and manage the gtt - awaiting response     · Echocardiogram 12/6 with LVEF 55%, normal wall motion, normal systolic/diastolic function, mild-moderately increased RV pressure

## 2022-12-08 NOTE — ASSESSMENT & PLAN NOTE
· Presenting with AMS and meeting sepsis criteria as above   · Urine culture preliminarily growing Enterococcus faecalis, E coli and Morganella morganii    · Got rocpehin 3 days   · Now on ampicillin for enterococcus

## 2022-12-08 NOTE — ASSESSMENT & PLAN NOTE
· CTA head/neck on admission (limited due to motion): Atherosclerotic changes are seen within the carotid bifurcations and intracranial internal carotid arteries bilaterally   Suspected moderate stenosis of the right ICA origin and mild narrowing of the left carotid bifurcation   Intracranially there is also right greater than left stenosis, moderate on the left and mild on the right    · Appreciate neurosurgery consult and recommendations   · No need for surgical intervention at this time   · Outpatient follow-up with repeat CTA in 2 weeks   · Continue asa rectal, statin when tolerating PO and heparin need neuro input regarding preferred heparin

## 2022-12-08 NOTE — ASSESSMENT & PLAN NOTE
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

## 2022-12-08 NOTE — SPEECH THERAPY NOTE
Speech Language/Pathology    Speech/Language Pathology Progress Note    Patient Name: Kelvin BOLAÑOS Date: 12/8/2022     Problem List  Principal Problem:    Stroke-like symptoms  Active Problems:    Anterior dislocation of left shoulder    Atrial fibrillation (HCC)    Bilateral lower extremity edema    Nicotine dependence    Acute metabolic encephalopathy    Pressure injury of deep tissue of left heel    SIRS vs Sepsis ([facility] )    Stenosis of both internal carotid arteries    Prediabetes    Oropharyngeal dysphagia    PAD (peripheral artery disease) ([facility] )    Acute cystitis without hematuria       Past Medical History  Past Medical History:   Diagnosis Date   • Atrial fibrillation ([facility] )    • Cancer ([facility] )    • Hypertension         Past Surgical History  Past Surgical History:   Procedure Laterality Date   • REPLACEMENT TOTAL HIP W/  RESURFACING IMPLANTS           Subjective:  "I'm fine" Patient lethargic, but wakes to verbal cues  Objective:  RN texted SLP this am with concerns of swallowing  PCA was feeding patient and patient became wet and gurgly sounding  Later RN was administering medications and did not feel patient was swallowing safety  Oral care is provided with oral swabs and suction kit  Oral cavity is dry, and patient appears to have white lingual coating  A moderate amount of thick secretions/chocolate pudding is removed from BOT with suction  Patient assessed with puree peaches  He has good oral closure  Transfer time is prolonged  Swallow initiation time is absent at times and other times the patient needs verbal and tactile cues to swallow  Laryngeal rise is decreased upon palpation  The patient has intermittent cough and wet vocal quality  Oral suction is provided throughout session with the removal of a moderate amount of oral residue, especially near BOT  Swallow function appears to have declined since VBS completed yesterday afternoon  Patient is now undergoing EEG       Assessment:  Patient has delayed or absent swallow initiation with a moderate amount of oral residue  Patient appears to have increased risk of aspiration  Plan/Recommendations:  Recommend NPO status for today  Patient most likely will not get sufficient medications as evidenced by oral residue  ST will f/u and further assess  Discussed with RN

## 2022-12-08 NOTE — PROGRESS NOTES
[facility]  Progress Note - Charanjit Faster 1935, 82 y o  male MRN: [id number]  Unit/Bed#: [facility] 018-64 Encounter: [id number]  Primary Care Provider: Tiago Nolasco MD   Date and time admitted to hospital: 12/4/2022  5:26 PM    Acute cystitis without hematuria  Assessment & Plan  · Presenting with AMS and meeting sepsis criteria as above   · Urine culture preliminarily growing Enterococcus faecalis, E coli and Morganella morganii    · Got rocpehin 3 days   · Now on ampicillin for enterococcus  PAD (peripheral artery disease) (HCC)  Assessment & Plan  · Follows with [facility] outpatient  · Most recent LEADs in May consistent with 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  · Repeat LEADs 12/6 significant for LLE > 75% stenosis of distal superficial femoral artery and diffuse disease throughout the remaining femoral-popliteal  · vessels  · Continue with ASA, statin    Oropharyngeal dysphagia  Assessment & Plan  · Appreciate speech therapy evaluation   · Recommended level 1 dysphagia diet with nectar thick liquids   · Aspiration precautions     Prediabetes  Assessment & Plan  Evidenced by A1c of 5 9%  · Monitor glucose on AM labs   · Encourage diet and lifestyle modifications once DC    Stenosis of both internal carotid arteries  Assessment & Plan  · CTA head/neck on admission (limited due to motion): Atherosclerotic changes are seen within the carotid bifurcations and intracranial internal carotid arteries bilaterally   Suspected moderate stenosis of the right ICA origin and mild narrowing of the left carotid bifurcation   Intracranially there is also right greater than left stenosis, moderate on the left and mild on the right    · Appreciate neurosurgery consult and recommendations   · No need for surgical intervention at this time   · Outpatient follow-up with repeat CTA in 2 weeks   · Continue asa rectal, statin when tolerating PO and heparin need neuro input regarding preferred heparin       SIRS vs Sepsis ([facility] )  Assessment & Plan  POA with mild leukocytosis and tachycardia  Procalcitonin WNL at 0 15   · COVID/flu/RSV negative   · Portable CXR: Probable small left effusion   Slightly limited but otherwise unremarkable examination  · UA with nitrites, leukocytes and bacteria concerning for UTI  · Noted with left heel pressure ulcer on admission however no clinical signs of cellulitis, see related plan  · PA/lateral CXR: No acute cardiopulmonary disease   · Continue with IV ceftriaxone (day 4) pending final urine culture / sensitivities   · Trend temps, WBC, hemodynamics   · If concern for ongoing untreated infection - would add rocephin to cover morganella  Pressure injury of deep tissue of left heel  Assessment & Plan  · POA, followed by wound care as outpatient  · No clinical signs of infection at this time   · Appreciate Podiatry consult and recommendations   · XR heel/calcaneous:  Soft tissue swelling   No evidence of acute osteomyelitis  · LEADs noted diffuse disease in LLE, see below   · No plans for intervention at this time   · Continue with local wound care, offloading     Acute metabolic encephalopathy  Assessment & Plan  Presented with stroke like symptoms as above; MRI brain negative for stroke, consideration for TIA in the setting of ICA stenosis, see plans above   · Family noted that the patient was more confused than usual recently and they attributed it to higher dose of Lasix due to worsening leg edema  · Infectious workup/management as outlined above   · Appreciate neurology inputs regarding stroke like symptoms as above  · Mental status continues to wax and wane  · Dysphagia diet as per speech therapy   · Continue with supportive cares  · Discontinued restraints as of 12/6 - replaced on 12/8 but mostly because vEEG leads are bothersome and he pulls them off, not combative         Nicotine dependence  Assessment & Plan  · Patient admits to smoking approx 1 ppd currently   · Suspect has underlying COPD   · Respiratory protocol   · Cessation counseling     Bilateral lower extremity edema  Assessment & Plan  Patient with bilateral lower extremity edema which appears to be longstanding and related to venous stasis, managed by [facility] cardiology  Recently edema was worsening and associated with weeping  Patient received 80 mg IV Lasix x 1 in office on 11/9 and again on 11/23  Subsequently prescribed higher dose Lasix 40 mg BID  · Lasix held on admission due to hyponatremia, continue to hold pending improvement in oral intake   · Continue with compression stockings   · Encourage LE elevation   · Monitor clinically     Atrial fibrillation [facility])  Assessment & Plan  · Hospital day 1 patient in atrial fibrillation with RVR - now improved   · Continue rate control with metoprolol 50 mg q12h   · NPO currently - will need heparin - SLIM to start ACS heparin gtt given high chadsvasc score  But reaching out to neurology first to see if patient needs acute stroke heparin in which case neurology should order and manage the gtt - awaiting response  · Echocardiogram 12/6 with LVEF 55%, normal wall motion, normal systolic/diastolic function, mild-moderately increased RV pressure     Anterior dislocation of left shoulder  Assessment & Plan  · Injury occurred in December 2019, was following with  orthopedics and managed conservatively; seems to have been lost to follow-up with repeat XRs  · 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     * Stroke-like symptoms  Assessment & Plan  Patient presented via EMS as stroke alert due to concern for acute onset of left-sided weakness and garbled speech noticed by patient's wife  Patient is not a candidate for tPA due to bleeding risk    · Admitted under stroke pathway   · CTA head/neck negative for occlusion, but did note moderate R ICA and mild L ICA stenosis, see below   · MRI brain negative for acute infarct, but did note mild chronic microangiopathic ischemic changes  · Echocardiogram essentially unremarkable   · Lipid panel: cholesterol 150, , LDL 97  · Appreciate neurology consult and recommendations   · Continue with aspirin, statin, Eliquis   · P2Y12 checked -> 268  · Routine EEG without evidence of seizure   · Concern for possible TIA vs seizure and/or infectious/metabolic etiology   · See below regarding infectious workup/management   · PT/OT recommending rehab, PMR following  · Monitor neurochecks             VTE Pharmacologic Prophylaxis: VTE Score: 6 Moderate Risk (Score 3-4) - Pharmacological DVT Prophylaxis Ordered: heparin  Patient Centered Rounds: I performed bedside rounds with nursing staff today  Discussions with Specialists or Other Care Team Provider: Discussed with neurology today  Slim to start ACS (low) heparin and neurology will adjust to stroke if needed  Education and Discussions with Family / Patient: called Carlos Yuen - left voicemail   Time Spent for Care: 30 minutes  More than 50% of total time spent on counseling and coordination of care as described above  Current Length of Stay: 4 day(s)  Current Patient Status: Inpatient   Certification Statement: The patient will continue to require additional inpatient hospital stay due to vEEG, confused, NPO, heparin gtt   Discharge Plan: Anticipate discharge in 48-72 hrs to to be determined    Code Status: Level 3 - DNAR and DNI    Subjective:   Patient seen and examined  Feeling "okay"  Able to follow commands well  Objective:     Vitals:   Temp (24hrs), Av 8 °F (36 6 °C), Min:97 3 °F (36 3 °C), Max:98 2 °F (36 8 °C)    Temp:  [97 3 °F (36 3 °C)-98 2 °F (36 8 °C)] 97 3 °F (36 3 °C)  HR:  [] 115  Resp:  [18] 18  BP: (136-149)/(74-91) 149/91  SpO2:  [93 %-96 %] 93 %  Body mass index is 44 55 kg/m²       Input and Output Summary (last 24 hours):   No intake or output data in the 24 hours ending 22 1431    Physical Exam:   Physical Exam  Constitutional:       General: He is not in acute distress  Appearance: He is not ill-appearing, toxic-appearing or diaphoretic  HENT:      Head: Normocephalic  Eyes:      Pupils: Pupils are equal, round, and reactive to light  Cardiovascular:      Rate and Rhythm: Normal rate  Pulmonary:      Effort: Pulmonary effort is normal  No respiratory distress  Breath sounds: No stridor  No wheezing, rhonchi or rales  Chest:      Chest wall: No tenderness  Abdominal:      General: Abdomen is flat  There is no distension  Palpations: There is no mass  Tenderness: There is no abdominal tenderness  There is no right CVA tenderness, guarding or rebound  Hernia: No hernia is present  Musculoskeletal:         General: No swelling, tenderness or signs of injury  Right lower leg: No edema  Skin:     Capillary Refill: Capillary refill takes less than 2 seconds  Coloration: Skin is pale  Skin is not jaundiced  Findings: No bruising, erythema or lesion  Neurological:      General: No focal deficit present  Mental Status: He is alert  Cranial Nerves: No cranial nerve deficit  Sensory: No sensory deficit  Motor: No weakness        Coordination: Coordination normal       Gait: Gait normal       Deep Tendon Reflexes: Reflexes normal    Psychiatric:         Mood and Affect: Mood normal           Additional Data:     Labs:  Results from last 7 days   Lab Units 12/07/22  0520   WBC Thousand/uL 9 71   HEMOGLOBIN g/dL 13 7   HEMATOCRIT % 44 7   PLATELETS Thousands/uL 243     Results from last 7 days   Lab Units 12/07/22  0520 12/06/22  0515   SODIUM mmol/L 139 141   POTASSIUM mmol/L 3 9 3 7   CHLORIDE mmol/L 106 104   CO2 mmol/L 26 31   BUN mg/dL 18 19   CREATININE mg/dL 0 75 0 86   ANION GAP mmol/L 7 6   CALCIUM mg/dL 10 1 9 4   ALBUMIN g/dL  --  2 0*   TOTAL BILIRUBIN mg/dL  --  0 52   ALK PHOS U/L  --  111   ALT U/L  --  30 AST U/L  --  29   GLUCOSE RANDOM mg/dL 105 83     Results from last 7 days   Lab Units 12/05/22  1540   INR  1 57*     Results from last 7 days   Lab Units 12/04/22  1728   POC GLUCOSE mg/dl 138     Results from last 7 days   Lab Units 12/04/22  1730   HEMOGLOBIN A1C % 5 9*     Results from last 7 days   Lab Units 12/05/22  0432   PROCALCITONIN ng/ml 0 15       Lines/Drains:  Invasive Devices     Peripheral Intravenous Line  Duration           Peripheral IV 12/04/22 Left;Proximal;Ventral (anterior) Forearm 3 days    Peripheral IV 12/04/22 Right;Ventral (anterior) Forearm 3 days          Drain  Duration           External Urinary Catheter Small 3 days                      Imaging: No pertinent imaging reviewed      Recent Cultures (last 7 days):   Results from last 7 days   Lab Units 12/04/22  2214   URINE CULTURE  >100,000 cfu/ml Enterococcus faecalis*  >100,000 cfu/ml Escherichia coli*  70,000-79,000 cfu/ml Morganella morganii*  20,000-29,000 cfu/ml Proteus species*       Last 24 Hours Medication List:   Current Facility-Administered Medications   Medication Dose Route Frequency Provider Last Rate   • acetaminophen  975 mg Oral Q6H PRN Caty Grijalva PA-C     • albuterol  2 puff Inhalation Q4H PRN Delvin Collet, MD     • allopurinol  100 mg Oral Daily Billie Gambino MD     • ampicillin  2,000 mg Intravenous Q6H Caty Grijalva PA-C Stopped (12/08/22 1047)   • apixaban  5 mg Oral BID Caty Grijalva PA-C     • aspirin  81 mg Oral Daily Billie Gambino MD     • atorvastatin  40 mg Oral QPM Billie Gambino MD     • cholecalciferol  400 Units Oral Daily Billie Gambino MD     • dextromethorphan-guaiFENesin  10 mL Oral Q4H PRN Caty Grijalva PA-C     • docusate sodium  100 mg Oral BID Billie Gambino MD     • metoprolol  5 mg Intravenous [facility] Delvin Collet, MD     • nicotine  14 mg Transdermal Daily Caty Grijalva PA-C     • ondansetron  4 mg Intravenous Q6H PRN Billie Gambino MD     • polyethylene glycol  17 g Oral Daily Adrian Kim MD          Today, Patient Was Seen By: Justina Patricia MD    **Please Note: This note may have been constructed using a voice recognition system  **

## 2022-12-08 NOTE — ASSESSMENT & PLAN NOTE
Patient with bilateral lower extremity edema which appears to be longstanding and related to venous stasis, managed by [facility] cardiology  Recently edema was worsening and associated with weeping  Patient received 80 mg IV Lasix x 1 in office on 11/9 and again on 11/23  Subsequently prescribed higher dose Lasix 40 mg BID    · Lasix held on admission due to hyponatremia, continue to hold pending improvement in oral intake   · Continue with compression stockings   · Encourage LE elevation   · Monitor clinically

## 2022-12-08 NOTE — PLAN OF CARE
Problem: DISCHARGE PLANNING  Goal: Discharge to home or other facility with appropriate resources  Description: INTERVENTIONS:  - Identify barriers to discharge w/patient and caregiver  - Arrange for needed discharge resources and transportation as appropriate  - Identify discharge learning needs (meds, wound care, etc )  - Arrange for interpretive services to assist at discharge as needed  - Refer to Case Management Department for coordinating discharge planning if the patient needs post-hospital services based on physician/advanced practitioner order or complex needs related to functional status, cognitive ability, or social support system  Outcome: Progressing     Problem: Knowledge Deficit  Goal: Patient/family/caregiver demonstrates understanding of disease process, treatment plan, medications, and discharge instructions  Description: Complete learning assessment and assess knowledge base  Interventions:  - Provide teaching at level of understanding  - Provide teaching via preferred learning methods  Outcome: Progressing     Problem: Neurological Deficit  Goal: Neurological status is stable or improving  Description: Interventions:  - Monitor and assess patient's level of consciousness, motor function, sensory function, and level of assistance needed for ADLs  - Monitor and report changes from baseline  Collaborate with interdisciplinary team to initiate plan and implement interventions as ordered  - Provide and maintain a safe environment  - Consider seizure precautions  - Consider fall precautions  - Consider aspiration precautions  - Consider bleeding precautions  Outcome: Progressing     Problem: Activity Intolerance/Impaired Mobility  Goal: Mobility/activity is maintained at optimum level for patient  Description: Interventions:  - Assess and monitor patient  barriers to mobility and need for assistive/adaptive devices  - Assess patient's emotional response to limitations    - Collaborate with interdisciplinary team and initiate plans and interventions as ordered  - Encourage independent activity per ability   - Maintain proper body alignment  - Perform active/passive rom as tolerated/ordered  - Plan activities to conserve energy   - Turn patient as appropriate  Outcome: Progressing     Problem: Communication Impairment  Goal: Ability to express needs and understand communication  Description: Assess patient's communication skills and ability to understand information  Patient will demonstrate use of effective communication techniques, alternative methods of communication and understanding even if not able to speak  - Encourage communication and provide alternate methods of communication as needed  - Collaborate with case management/ for discharge needs  - Include patient/family/caregiver in decisions related to communication  Outcome: Progressing     Problem: Potential for Aspiration  Goal: Non-ventilated patient's risk of aspiration is minimized  Description: Assess and monitor vital signs, respiratory status, and labs (WBC)  Monitor for signs of aspiration (tachypnea, cough, rales, wheezing, cyanosis, fever)  - Assess and monitor patient's ability to swallow  - Place patient up in chair to eat if possible  - HOB up at 90 degrees to eat if unable to get patient up into chair   - Supervise patient during oral intake  - Instruct patient/ family to take small bites  - Instruct patient/ family to take small single sips when taking liquids    - Follow patient-specific strategies generated by speech pathologist   Outcome: Progressing     Problem: MOBILITY - ADULT  Goal: Maintain or return to baseline ADL function  Description: INTERVENTIONS:  -  Assess patient's ability to carry out ADLs; assess patient's baseline for ADL function and identify physical deficits which impact ability to perform ADLs (bathing, care of mouth/teeth, toileting, grooming, dressing, etc )  - Assess/evaluate cause of self-care deficits   - Assess range of motion  - Assess patient's mobility; develop plan if impaired  - Assess patient's need for assistive devices and provide as appropriate  - Encourage maximum independence but intervene and supervise when necessary  - Involve family in performance of ADLs  - Assess for home care needs following discharge   - Consider OT consult to assist with ADL evaluation and planning for discharge  - Provide patient education as appropriate  Outcome: Progressing  Goal: Maintains/Returns to pre admission functional level  Description: INTERVENTIONS:  - Perform BMAT or MOVE assessment daily    - Set and communicate daily mobility goal to care team and patient/family/caregiver  - Collaborate with rehabilitation services on mobility goals if consulted  - Perform Range of Motion 4 times a day  - Reposition patient every 2 hours    - Dangle patient 1 times a day  - Stand patient 1 times a day  - Ambulate patient 1 times a day  - Out of bed to chair 1 times a day   - Record patient progress and toleration of activity level   Outcome: Progressing     Problem: Prexisting or High Potential for Compromised Skin Integrity  Goal: Skin integrity is maintained or improved  Description: INTERVENTIONS:  - Identify patients at risk for skin breakdown  - Assess and monitor skin integrity  - Assess and monitor nutrition and hydration status  - Monitor labs   - Assess for incontinence   - Turn and reposition patient  - Assist with mobility/ambulation  - Relieve pressure over bony prominences  - Avoid friction and shearing  - Provide appropriate hygiene as needed including keeping skin clean and dry  - Evaluate need for skin moisturizer/barrier cream  - Collaborate with interdisciplinary team   - Patient/family teaching  - Consider wound care consult   Outcome: Progressing     Problem: Nutrition/Hydration-ADULT  Goal: Nutrient/Hydration intake appropriate for improving, restoring or maintaining nutritional needs  Description: Monitor and assess patient's nutrition/hydration status for malnutrition  Collaborate with interdisciplinary team and initiate plan and interventions as ordered  Monitor patient's weight and dietary intake as ordered or per policy  Utilize nutrition screening tool and intervene as necessary  Determine patient's food preferences and provide high-protein, high-caloric foods as appropriate       INTERVENTIONS:  - Monitor oral intake, urinary output, labs, and treatment plans  - Assess nutrition and hydration status and recommend course of action  - Evaluate amount of meals eaten  - Assist patient with eating if necessary   - Allow adequate time for meals  - Recommend/ encourage appropriate diets, oral nutritional supplements, and vitamin/mineral supplements  - Order, calculate, and assess calorie counts as needed  - Recommend, monitor, and adjust tube feedings and TPN/PPN based on assessed needs  - Assess need for intravenous fluids  - Provide specific nutrition/hydration education as appropriate  - Include patient/family/caregiver in decisions related to nutrition  Outcome: Progressing     Problem: PAIN - ADULT  Goal: Verbalizes/displays adequate comfort level or baseline comfort level  Description: Interventions:  - Encourage patient to monitor pain and request assistance  - Assess pain using appropriate pain scale  - Administer analgesics based on type and severity of pain and evaluate response  - Implement non-pharmacological measures as appropriate and evaluate response  - Consider cultural and social influences on pain and pain management  - Notify physician/advanced practitioner if interventions unsuccessful or patient reports new pain  Outcome: Progressing     Problem: INFECTION - ADULT  Goal: Absence or prevention of progression during hospitalization  Description: INTERVENTIONS:  - Assess and monitor for signs and symptoms of infection  - Monitor lab/diagnostic results  - Monitor all insertion sites, i e  indwelling lines, tubes, and drains  - Monitor endotracheal if appropriate and nasal secretions for changes in amount and color  - Perry appropriate cooling/warming therapies per order  - Administer medications as ordered  - Instruct and encourage patient and family to use good hand hygiene technique  - Identify and instruct in appropriate isolation precautions for identified infection/condition  Outcome: Progressing     Problem: Potential for Falls  Goal: Patient will remain free of falls  Description: INTERVENTIONS:  - Educate patient/family on patient safety including physical limitations  - Instruct patient to call for assistance with activity   - Consult OT/PT to assist with strengthening/mobility   - Keep Call bell within reach  - Keep bed low and locked with side rails adjusted as appropriate  - Keep care items and personal belongings within reach  - Initiate and maintain comfort rounds  - Make Fall Risk Sign visible to staff  - Offer Toileting every 2 Hours, in advance of need  - Initiate/Maintain bed alarm  - Obtain necessary fall risk management equipment: alarm  - Apply yellow socks and bracelet for high fall risk patients  - Consider moving patient to room near nurses station  Outcome: Progressing     Problem: SKIN/TISSUE INTEGRITY - ADULT  Goal: Pressure injury heals and does not worsen  Description: Interventions:  - Implement low air loss mattress or specialty surface (Criteria met)  - Apply silicone foam dressing  - Apply fecal or urinary incontinence containment device   - Perform passive or active ROM every 6 hrs  - Turn and reposition patient & offload bony prominences every 2 hours   - Utilize friction reducing device or surface for transfers   - Consider consults to  interdisciplinary teams such as wound  - Use incontinent care products after each incontinent episode such as foam cleanser  - Consider nutrition services referral as needed  Outcome: Progressing

## 2022-12-08 NOTE — ASSESSMENT & PLAN NOTE
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

## 2022-12-08 NOTE — ASSESSMENT & PLAN NOTE
POA with mild leukocytosis and tachycardia  Procalcitonin WNL at 0 15   · COVID/flu/RSV negative   · Portable CXR: Probable small left effusion   Slightly limited but otherwise unremarkable examination  · UA with nitrites, leukocytes and bacteria concerning for UTI  · Noted with left heel pressure ulcer on admission however no clinical signs of cellulitis, see related plan  · PA/lateral CXR: No acute cardiopulmonary disease   · Continue with IV ceftriaxone (day 4) pending final urine culture / sensitivities   · Trend temps, WBC, hemodynamics   · If concern for ongoing untreated infection - would add rocephin to cover morganella

## 2022-12-08 NOTE — ASSESSMENT & PLAN NOTE
Evidenced by A1c of 5 9%  · Monitor glucose on AM labs   · Encourage diet and lifestyle modifications once DC

## 2022-12-08 NOTE — PROGRESS NOTES
NEUROLOGY RESIDENCY PROGRESS NOTE     Name: Ene Mckeon   Age & Sex: 82 y o  male   MRN: [id number]  Unit/Bed#: [facility] 719-01   Encounter: [id number]    Recommendations for outpatient neurological follow up have yet to be determined  Pending for discharge: Continued Metabolic Encephalopathy    ASSESSMENT & PLAN     * Stroke-like symptoms  Assessment & Plan  Assessment:  Ene Mckeon is a 82 y o  male with a PMH of aFib (on eliquis), BPH, COPD, Gout, HTN, Basal cell adenocarcinoma of salviatory gland, PAD and PVD, who presents as a stoke alert    It is reported by EMS that the patient's Dyann Darrell was 12/4/2022 1600 after which there was acute onset left sided facial droop, left UA and LE weakness, and garbled speech  The patient is on ASA and Eliquis at baseline  His presenting BP was 133/88, glucose 121  His NIHSS on initial evaluation was 11  Initial CTH was not demonstrative of  acute intracranial abnormality, and showed table mild chronic microangiopathic changes within the brain  CTA HN showed atherosclerotic changes are seen within the carotid bifurcations and intracranial internal carotid arteries bilaterally  Suspected moderate stenosis of the right ICA origin and mild narrowing of the left carotid bifurcation  Intracranially there is also right greater than left stenosis, moderate on the left and mild on the right  The patient on subsequent examination was becoming much more distractible and was not able to cooperate with examination  He had difficulty following commands consistently  Imaging:  - CTH was not demonstrative of  acute intracranial abnormality, and showed table mild chronic microangiopathic changes within the brain   - CTA HN showed atherosclerotic changes are seen within the carotid bifurcations and intracranial internal carotid arteries bilaterally  Suspected moderate stenosis of the right ICA origin and mild narrowing of the left carotid bifurcation   Intracranially there is also right greater than left stenosis, moderate on the left and mild on the right   - MRI Brain wo: Motion degraded examination  No acute infarction, edema, or mass effect  Mild chronic microangiopathic ischemic changes   - TTE:  Left Ventricle: Left ventricular cavity size is normal  Wall thickness is mildly increased  There is concentric remodeling  The left ventricular ejection fraction is 65%  Systolic function is normal  Wall motion is normal  Diastolic function is normal   IVS: Septal motion is not well visualized  Right Ventricle: Right ventricular cavity size is mildly dilated  Systolic function is normal  Right Atrium: The atrium is mildly dilated  Mitral Valve: There is mild annular calcification  There is trace regurgitation  Tricuspid Valve: There is moderate regurgitation  The right ventricular systolic pressure is moderately elevated  The estimated right ventricular systolic pressure is 11 37 mmHg  - MRI Brain wwo: 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, mesial temporal lobe, internal capsule and cerebral peduncle  No mass effect or hemorrhagic conversion  Workup:  - A1C: 5 9  - Lipid panel: CHOL 150, , HDL 22, LDL 97  - Ammonia: 17  - 4-Plex: negative  - CXR: Probable small left effusion  Slightly limited but otherwise unremarkable examination   - UA w reflec sxope and cx: innumerable WBC, innumerable bacteria, innumerable RBCs, large leuks  - TEG Platelet mapping: Abnormal  - rEEG: Slowing of the posteriorly dominant rhythm suggests mild nonspecific diffuse cerebral dysfunction  Impression:  Considering the sudden onset of the symptoms and the imaging studies that are not suggestive of LVO, haemorrhage or infarct further workup is needed to determine the symptomatology   Imaging studies did reveal that there are atherosclerotic changes within the carotid bifurcations and intracranial internal carotid arteries bilaterally with both intracranial and extracranial ICA stenosis for which neurosurgery recommended medical management  Stroke is less likely with neuro imaging not demonstrative of such  Consider seizure post-ictal stage, and metabolic encephalopathy in the setting of a UTI is a consideration also  With interview with spouse, we know now that the patient has episodes at home where he would have changes in his DONALDO where he would 'space out' and fall asleep frequently throughout the day  Discussions with family on patient compliance with Eliquis recommended  Plan:  - Obtain STAT CTH wo for acute changes in mental status or neurologic examination and please notify neurology  - vEEG to be started when able  - Stroke Pathway  --- ASA 81 QD  --- Atorvastatin 40mg QD  --- Eliquis 5mg BID  - Frequent neuro checks  - Telemetry  - Fall precautions  - Goals: Normotension, normothermia, euglycaemia  - PT/OT/ST/PMR as able  - Rest per primary team; appreciated! Acute metabolic encephalopathy  Assessment & Plan  Assessment:  During assessment after initial NIHSS and imaging studies were completed it was noted that the patient had more trouble focusing on conversations, had more tangential speech, and was not able to follow commands as before  On initial examination the patient kept perseverating on the numbers 9 and 14 which is the month and day respectively of his birthday  On examination 12/08/2022 the patient was alert to name, however not place, time, and reported he is in the hospital because of "the weather girl is cute"    Impression:  Considering the elevated white count, hyponatremia, increased BUN, and increased CO2 an encephalopathy picture appears  A UTI with three organisms has been identified  Plan:  - Correct metabolic derangements  - Infectious workup recommended:   - UTI  --- >100K cfu/ml E  Faecalis  --- >100K cfu/ml E   Coli  --- 70K-79K cfu/ml M  morganii  - Treat underlying infection:  --- Currently on: Omnicef 2000mg IV Q6H  - Per primary team; appreciated! SUBJECTIVE     Patient was seen and examined this AM during visitation  He reported that he was in no pain, and denied, HA, CP, SOB, ABD-pain, N/V or new changes in his sensorium  The patient was alert, oriented to name however not place, not time, and reported the reasoning for being hospitalized as "the weather girl is cute"  He was able to complete calculations, he was able to repeat phrases and appears to be dysarthric d/t left sided face musculature being weak  He was able to spell anterograde however not retrograde  Review of Systems   Constitutional: Positive for fatigue  Negative for chills, diaphoresis and fever  HENT: Negative for ear discharge, sinus pressure, sinus pain and sore throat  Eyes: Negative for pain and redness  Respiratory: Positive for cough  Negative for chest tightness and shortness of breath  Cardiovascular: Positive for leg swelling  Negative for chest pain and palpitations  Gastrointestinal: Negative for blood in stool, nausea and vomiting  Genitourinary: Negative for flank pain, hematuria and urgency  Musculoskeletal: Positive for back pain  Skin: Positive for wound (on LLE, foot in boot  )  Negative for pallor  Neurological: Positive for weakness and numbness (Inconsistent on examination)  Negative for dizziness, tremors, seizures, syncope, facial asymmetry, speech difficulty, light-headedness and headaches  Psychiatric/Behavioral: Positive for confusion and decreased concentration  Negative for behavioral problems, dysphoric mood, hallucinations, self-injury, sleep disturbance and suicidal ideas  The patient is not nervous/anxious and is not hyperactive  OBJECTIVE     Patient ID: Jose F Benitez is a 82 y o  male      Vitals:    12/07/22 0905 12/07/22 1624 12/07/22 2207 12/08/22 0725   BP: 112/70 136/74 141/76 149/91   Pulse: 98 90 94 (!) 115   Resp:    18   Temp:   98 2 °F (36 8 °C) (!) 97 3 °F (36 3 °C)   TempSrc: SpO2: 96% 96% 96% 93%   Weight:       Height:          Temperature:   Temp (24hrs), Av 8 °F (36 6 °C), Min:97 3 °F (36 3 °C), Max:98 2 °F (36 8 °C)    Temperature: (!) 97 3 °F (36 3 °C)    Physical Exam  Vitals and nursing note reviewed  HENT:      Head: Normocephalic  Nose: Nose normal       Mouth/Throat:      Mouth: Mucous membranes are moist       Pharynx: Oropharynx is clear  Eyes:      Extraocular Movements: EOM normal       Pupils: Pupils are equal, round, and reactive to light  Cardiovascular:      Rate and Rhythm: Normal rate  Pulses: Normal pulses  Pulmonary:      Effort: Pulmonary effort is normal    Musculoskeletal:         General: Tenderness (LUE and LLE) and signs of injury present  Cervical back: Normal range of motion  Right lower leg: Edema present  Left lower leg: Edema present  Skin:     General: Skin is warm  Capillary Refill: Capillary refill takes less than 2 seconds  Neurological:      Mental Status: He is alert  Cranial Nerves: No cranial nerve deficit  Sensory: Sensory deficit present  Motor: Weakness present  Coordination: Coordination abnormal  Finger-Nose-Finger Test normal       Gait: Gait abnormal       Deep Tendon Reflexes:      Reflex Scores:       Tricep reflexes are 2+ on the right side and 2+ on the left side  Bicep reflexes are 2+ on the right side and 2+ on the left side  Brachioradialis reflexes are 2+ on the right side and 2+ on the left side  Patellar reflexes are 1+ on the right side and 1+ on the left side  Achilles reflexes are 1+ on the right side and 1+ on the left side  Psychiatric:         Mood and Affect: Mood normal         Neurologic Exam     Mental Status   Oriented to person  Disoriented to place  Disoriented to year, month and date  Follows 1 step commands  Attention: decreased  Concentration: decreased     Level of consciousness: alert  Unable to perform simple calculations  Unable to repeat  - Speech is dysarthric     Cranial Nerves     CN III, IV, VI   Pupils are equal, round, and reactive to light  Extraocular motions are normal    Right pupil: Size: 2 mm  Reactivity: sluggish  Left pupil: Size: 3 mm  Reactivity: sluggish  CN III: no CN III palsy  CN VI: no CN VI palsy  Nystagmus: none   Upgaze: normal  Downgaze: normal  Conjugate gaze: present    CN V   Right facial sensation deficit: none  Left facial sensation deficit: none    CN VII   Right facial weakness: none  Left facial weakness: central    CN VIII   Hearing: impaired    CN IX, X   Palate: symmetric    CN XI   Right sternocleidomastoid strength: normal  Left sternocleidomastoid strength: normal  Right trapezius strength: normal  Left trapezius strength: normal    CN XII   Tongue: not atrophic  Fasciculations: absent  Tongue deviation: none  Patient was able to see fingers in all quadrants on examination today 2022  The examination was repeated multiple times with inconsistent results in regards to the number of fingers recognized  Motor Exam   Muscle bulk: normal  Overall muscle tone: normal  Right arm tone: normal  Left arm tone: normal  Right leg tone: normal  Left leg tone: normal    Strength   Right deltoid: 4/5  Left deltoid: 3/5  Right biceps: 4/5  Left biceps: 3/5  Right triceps: 4/5  Left triceps: 3/5  Right wrist flexion: 5/5  Left wrist flexion: 3/5  Right wrist extension: 5/5  Left wrist extension: 3/5  Right interossei: 4/5  Left interossei: 3/5  Right iliopsoas: 4/5  Left iliopsoas: 3/5  Right quadriceps: 4/5  Left quadriceps: 3/5  Right hamstrin/5  Left hamstring: 3/5  Right anterior tibial: 5/5  Right posterior tibial: 5/5  Right gastroc: 5/5  - LLE in boot  - LLE, very little movement noted on examination in foot and toes 2/2 pain and boot       Sensory Exam   Light touch normal      Gait, Coordination, and Reflexes     Coordination   Finger to nose coordination: normal    Reflexes   Right brachioradialis: 2+  Left brachioradialis: 2+  Right biceps: 2+  Left biceps: 2+  Right triceps: 2+  Left triceps: 2+  Right patellar: 1+  Left patellar: 1+  Right achilles: 1+  Left achilles: 1+  Right plantar: equivocal  Left plantar: equivocal  Right Stevens: absent  Left Stevens: absent  Right ankle clonus: absent  Left ankle clonus: absent  - Gait not assessed, risk of falling   - FTN wnl with R, unable to complete with L   - HTS wnl with R, unable to complete with L       LABORATORY DATA     Labs: I have personally reviewed pertinent reports  Results from last 7 days   Lab Units 12/07/22  0520 12/06/22  0515 12/05/22  0420   WBC Thousand/uL 9 71 8 03 10 22*   HEMOGLOBIN g/dL 13 7 12 4 12 6   HEMATOCRIT % 44 7 40 5 40 1   PLATELETS Thousands/uL 243 213 216      Results from last 7 days   Lab Units 12/07/22  0520 12/06/22  0515 12/05/22  0432   SODIUM mmol/L 139 141 136   POTASSIUM mmol/L 3 9 3 7 3 8   CHLORIDE mmol/L 106 104 100   CO2 mmol/L 26 31 31   BUN mg/dL 18 19 21   CREATININE mg/dL 0 75 0 86 0 90   CALCIUM mg/dL 10 1 9 4 9 2   ALK PHOS U/L  --  111  --    ALT U/L  --  30  --    AST U/L  --  29  --               Results from last 7 days   Lab Units 12/06/22  0515 12/05/22  2249 12/05/22  1540 12/04/22  1730   INR   --   --  1 57* 1 23*   PTT seconds 56* 46* 43* 38*               IMAGING & DIAGNOSTIC TESTING     Radiology Results: I have personally reviewed pertinent reports  MRI brain wo contrast   Final Result by Aubrie De Los Santos MD (12/08 0108)      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, mesial temporal lobe, internal capsule and cerebral peduncle  No mass effect or hemorrhagic conversion  The study was marked in [facility] for immediate notification        Workstation performed: [id number]         FL barium swallow video w speech   Final Result by SYSTEMGENERATED, DOCUMENTATION (12/07 1414)      XR chest pa & lateral   Final Result by Marbella Escobedo MD (12/07 0830)      No acute cardiopulmonary disease  Workstation performed: [id number]         VAS lower limb arterial duplex, limited/unilateral   Final Result by Vic Ray MD (12/06 1310)      XR heel / calcaneus 2+ vw left   Final Result by Roberto Carlos Hawk MD (12/06 1046)      Soft tissue swelling  No evidence of acute osteomyelitis  If osteomyelitis remains a clinical concern, consider further evaluation with MRI  Workstation performed: [id number]         MRI brain wo contrast   Final Result by Clay Maddox MD (12/05 9397)   Motion degraded examination  No acute infarction, edema, or mass effect  Mild chronic microangiopathic ischemic changes  Workstation performed: [id number]         XR chest portable   Final Result by Jennifer Carranza MD (70/53 1850)      Probable small left effusion  Slightly limited but otherwise unremarkable examination  Workstation performed: [id number]         CTA stroke alert (head/neck)   Final Result by Lico Bolton DO (12/04 3018)      Unfortunately this examination is significantly limited due to patient motion  Atherosclerotic changes are seen within the carotid bifurcations and intracranial internal carotid arteries bilaterally  Suspected moderate stenosis of the right ICA origin and mild narrowing of the left carotid bifurcation  Intracranially there is    also right greater than left stenosis, moderate on the left and mild on the right      No occlusive disease or thrombosis identified  Findings were directly discussed with neurology resident at approximately 5:40 PM                            Workstation performed: [id number]         CT stroke alert brain   Final Result by Lico Bolton DO (12/04 5515)      No acute intracranial abnormality    Stable mild chronic microangiopathic changes within the brain  Findings were directly discussed with neurology resident at 5:40 PM       Workstation performed: [id number]           Other Diagnostic Testing: I have personally reviewed pertinent reports  ACTIVE MEDICATIONS     Current Facility-Administered Medications   Medication Dose Route Frequency   • acetaminophen (TYLENOL) tablet 975 mg  975 mg Oral Q6H PRN   • albuterol (PROVENTIL HFA,VENTOLIN HFA) inhaler 2 puff  2 puff Inhalation Q4H PRN   • allopurinol (ZYLOPRIM) tablet 100 mg  100 mg Oral Daily   • ampicillin (OMNIPEN) 2,000 mg in sodium chloride 0 9 % 100 mL IVPB  2,000 mg Intravenous Q6H   • apixaban (ELIQUIS) tablet 5 mg  5 mg Oral BID   • aspirin chewable tablet 81 mg  81 mg Oral Daily   • atorvastatin (LIPITOR) tablet 40 mg  40 mg Oral QPM   • cholecalciferol (VITAMIN D3) tablet 400 Units  400 Units Oral Daily   • dextromethorphan-guaiFENesin (ROBITUSSIN DM) oral syrup 10 mL  10 mL Oral Q4H PRN   • docusate sodium (COLACE) capsule 100 mg  100 mg Oral BID   • metoprolol tartrate (LOPRESSOR) tablet 50 mg  50 mg Oral Q12H ROYER   • nicotine (NICODERM CQ) 14 mg/24hr TD 24 hr patch 14 mg  14 mg Transdermal Daily   • ondansetron (ZOFRAN) injection 4 mg  4 mg Intravenous Q6H PRN   • polyethylene glycol (MIRALAX) packet 17 g  17 g Oral Daily     Prior to Admission medications    Medication Sig Start Date End Date Taking?  Authorizing Provider   colchicine (COLCRYS) 0 6 mg tablet Take 0 6 mg by mouth daily   Yes Historical Provider, MD   furosemide (LASIX) 40 mg tablet Take 40 mg by mouth daily   Yes Historical Provider, MD   potassium chloride (KLOR-CON) 20 mEq packet Take 20 mEq by mouth 2 (two) times a day   Yes Historical Provider, MD   acetaminophen (TYLENOL) 325 mg tablet Take 3 tablets (975 mg total) by mouth every 8 (eight) hours 12/9/19   Tere Sánchez PA-C   allopurinol (ZYLOPRIM) 100 mg tablet Take 100 mg by mouth daily    Historical Provider, MD   apixaban (ELIQUIS) 5 mg Take 5 mg by mouth 2 (two) times a day    Historical Provider, MD   aspirin (ECOTRIN LOW STRENGTH) 81 mg EC tablet Take 81 mg by mouth daily    Historical Provider, MD   cholecalciferol (VITAMIN D3) 400 units tablet Take 400 Units by mouth daily    Historical Provider, MD   docusate sodium (COLACE) 100 mg capsule Take 1 capsule (100 mg total) by mouth 2 (two) times a day 12/9/19   Margarita Shaw PA-C   metoprolol tartrate (LOPRESSOR) 50 mg tablet Take 50 mg by mouth every 12 (twelve) hours    Historical Provider, MD   vitamin A 10,000 units capsule Take 10,000 Units by mouth daily    Historical Provider, MD     VTE Pharmacologic Prophylaxis: Apixaban (Eliquis)  VTE Mechanical Prophylaxis: sequential compression device    ==  DO Dakota Tanner Neurology Residency, PGY-2

## 2022-12-08 NOTE — ASSESSMENT & PLAN NOTE
Presented with stroke like symptoms as above; MRI brain negative for stroke, consideration for TIA in the setting of ICA stenosis, see plans above   · Family noted that the patient was more confused than usual recently and they attributed it to higher dose of Lasix due to worsening leg edema  · Infectious workup/management as outlined above   · Appreciate neurology inputs regarding stroke like symptoms as above  · Mental status continues to wax and wane  · Dysphagia diet as per speech therapy   · Continue with supportive cares  · Discontinued restraints as of 12/6 - replaced on 12/8 but mostly because vEEG leads are bothersome and he pulls them off, not combative

## 2022-12-08 NOTE — RESTORATIVE TECHNICIAN NOTE
Restorative Technician Note      Patient Name: Fortunato Andrade     Note Type: Mobility (Attempted to see pt, pt is currently being hooked up to EEG )    Lance Castillo BS, Restorative Technician, [employer]

## 2022-12-08 NOTE — CASE MANAGEMENT
Case Management Discharge Planning Note    Patient name Charanjit Gutierrez  Location [facility] 623-12 MRN [id number]  : 1935 Date 2022       Current Admission Date: 2022  Current Admission Diagnosis:Stroke-like symptoms   Patient Active Problem List    Diagnosis Date Noted   • PAD (peripheral artery disease) ([facility] [address] ) 2022   • Acute cystitis without hematuria 2022   • Stenosis of both internal carotid arteries 2022   • Prediabetes 2022   • Oropharyngeal dysphagia 2022   • Stroke-like symptoms 2022   • Acute metabolic encephalopathy    • Pressure injury of deep tissue of left heel 2022   • S[facility]eps[facility] ) 2022   • Nicotine dependence 2019   • Bilateral lower extremity edema 2019   • Recurrent falls 2019   • Other insomnia 2019   • Benign hypertension 2019   • Benign prostatic hyperplasia 2019   • Anterior dislocation of left shoulder 2019   • Closed head injury with concussion 2019   • Vesicocolonic fistula 2019   • Postural dizziness with near syncope 2019   • Ambulatory dysfunction 2019   • Atrial fibrillation ([facility] ) 2019   • Gout 2019      LOS (days): 4  Geometric Mean LOS (GMLOS) (days): 5 00  Days to GMLOS:1 3     OBJECTIVE:  Risk of Unplanned Readmission Score: 12 6         Current admission status: Inpatient   Preferred Pharmacy:   [place] [address]  Phone: [phone] Fax: [phone]    Primary Care Provider: Tiago Nolasco MD    Primary Insurance: MEDICARE  Secondary Insurance: [facility]    DISCHARGE DETAILS:    Family notified[de-identified] Phone call to spouse Carlee Rivers [phone] advsied of available facilities  Carlee Silvestre cannot drive to [facility], her vision is not good  She is in agreeement with Derrick Infante and would like  to look at [facility]   ANDREAS ruiz reach out to  as they have no yet responded to referral  Spouse would like an update from the doctor

## 2022-12-09 NOTE — ASSESSMENT & PLAN NOTE
· Presenting with AMS and meeting sepsis criteria as above   · Urine culture preliminarily growing Enterococcus faecalis, E coli and Morganella morganii    · Got rocpehin 3 days   · Now on ampicillin for enterococcus     · May need ID input

## 2022-12-09 NOTE — ASSESSMENT & PLAN NOTE
Presented with stroke like symptoms as above; MRI brain negative for stroke, consideration for TIA in the setting of ICA stenosis, see plans above   · Family noted that the patient was more confused than usual recently and they attributed it to higher dose of Lasix due to worsening leg edema  · Infectious workup/management as outlined above   · Appreciate neurology inputs regarding stroke like symptoms as above  · Mental status continues to wax and wane  · Dysphagia diet as per speech therapy   · Continue with supportive cares  · Discontinued restraints as of 12/6 - replaced on 12/8 but mostly because vEEG leads are bothersome and he pulls them off, not combative     · Replaced restraints as tried again 12/9  Will add rocephin to treat morganella - will consult ID  Unclear why patient more encephalopathic

## 2022-12-09 NOTE — ASSESSMENT & PLAN NOTE
POA with mild leukocytosis and tachycardia  Procalcitonin WNL at 0 15   · COVID/flu/RSV negative   · Portable CXR: Probable small left effusion   Slightly limited but otherwise unremarkable examination     · UA with nitrites, leukocytes and bacteria concerning for UTI  · Noted with left heel pressure ulcer on admission however no clinical signs of cellulitis, see related plan  · PA/lateral CXR: No acute cardiopulmonary disease   · Continue with IV ceftriaxone (day 4) pending final urine culture / sensitivities   · Trend temps, WBC, hemodynamics   · If concern for ongoing untreated infection - added rocphin

## 2022-12-09 NOTE — PROGRESS NOTES
22 1700   Clinical Encounter Type   Visited With Patient and family together   Yarsani Encounters   Yarsani Needs Prayer    Pastoral Care Progress Note    2022  Patient: Lissett Aburto : 1935  Admission Date & Time: 2022 1726  MRN: [id number] CSN: [id number]        Fr Rolando Saint offered prayers and blessing

## 2022-12-09 NOTE — PROGRESS NOTES
NEUROLOGY RESIDENCY PROGRESS NOTE     Name: Wang Mckoy   Age & Sex: 82 y o  male   MRN: [id number]  Unit/Bed#: [facility] 719-01   Encounter: [id number]    Recommendations for outpatient neurological follow up: have yet to be determined  Pending for discharge: Continued vEEG Monitoring    ASSESSMENT & PLAN     * Stroke-like symptoms  Assessment & Plan  Assessment:  Wang Mckoy is a 82 y o  male with a PMH of aFib (on eliquis), BPH, COPD, Gout, HTN, Basal cell adenocarcinoma of salviatory gland, PAD and PVD, who presents as a stoke alert    It is reported by EMS that the patient's Kolleen Bis was 12/4/2022 1600 after which there was acute onset left sided facial droop, left UA and LE weakness, and garbled speech  The patient is on ASA and Eliquis at baseline  His presenting BP was 133/88, glucose 121  His NIHSS on initial evaluation was 11  Initial CTH was not demonstrative of  acute intracranial abnormality, and showed table mild chronic microangiopathic changes within the brain  CTA HN showed atherosclerotic changes are seen within the carotid bifurcations and intracranial internal carotid arteries bilaterally  Suspected moderate stenosis of the right ICA origin and mild narrowing of the left carotid bifurcation  Intracranially there is also right greater than left stenosis, moderate on the left and mild on the right  The patient on subsequent examination was becoming much more distractible and was not able to cooperate with examination  He had difficulty following commands consistently  Imaging:  - CTH was not demonstrative of  acute intracranial abnormality, and showed table mild chronic microangiopathic changes within the brain   - CTA HN showed atherosclerotic changes are seen within the carotid bifurcations and intracranial internal carotid arteries bilaterally  Suspected moderate stenosis of the right ICA origin and mild narrowing of the left carotid bifurcation   Intracranially there is also right greater than left stenosis, moderate on the left and mild on the right   - MRI Brain wo: Motion degraded examination  No acute infarction, edema, or mass effect  Mild chronic microangiopathic ischemic changes   - TTE:  Left Ventricle: Left ventricular cavity size is normal  Wall thickness is mildly increased  There is concentric remodeling  The left ventricular ejection fraction is 65%  Systolic function is normal  Wall motion is normal  Diastolic function is normal   IVS: Septal motion is not well visualized  Right Ventricle: Right ventricular cavity size is mildly dilated  Systolic function is normal  Right Atrium: The atrium is mildly dilated  Mitral Valve: There is mild annular calcification  There is trace regurgitation  Tricuspid Valve: There is moderate regurgitation  The right ventricular systolic pressure is moderately elevated  The estimated right ventricular systolic pressure is 53 00 mmHg  - MRI Brain wwo: 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, mesial temporal lobe, internal capsule and cerebral peduncle  No mass effect or hemorrhagic conversion  Workup:  - A1C: 5 9  - Lipid panel: CHOL 150, , HDL 22, LDL 97  - Ammonia: 17  - 4-Plex: negative  - CXR: Probable small left effusion  Slightly limited but otherwise unremarkable examination   - UA w reflec sxope and cx: innumerable WBC, innumerable bacteria, innumerable RBCs, large leuks  - TEG Platelet mapping: Abnormal  - rEEG: Slowing of the posteriorly dominant rhythm suggests mild nonspecific diffuse cerebral dysfunction  - vEEG: Generalized slowing    Impression:  Considering the sudden onset of the symptoms and the imaging studies that are not suggestive of LVO, haemorrhage or infarct further workup is needed to determine the symptomatology   Imaging studies did reveal that there are atherosclerotic changes within the carotid bifurcations and intracranial internal carotid arteries bilaterally with both intracranial and extracranial ICA stenosis for which neurosurgery recommended medical management  Stroke is less likely with neuro imaging not demonstrative of such  Consider seizure post-ictal stage, and metabolic encephalopathy in the setting of a UTI is a consideration also  With interview with spouse, we know now that the patient has episodes at home where he would have changes in his DONALDO where he would 'space out' and fall asleep frequently throughout the day  Plan:  - Obtain STAT CTH wo for acute changes in mental status or neurologic examination and please notify neurology  - Discontinue vEEG, EMU Aware  - Stroke Pathway  --- ASA 81 QD  --- Atorvastatin 40mg QD  --- Hold Eliquis 5mg BID (To be switched to Xarelto 20mg QHS) on 12/10/2022)  --- Currently on Heparin GGT  To be stopped 12/10/2022)  - Frequent neuro checks  - Telemetry  - Fall precautions  - Goals: Normotension, normothermia, euglycaemia  - PT/OT/ST/PMR as able  - Rest per primary team; appreciated! Acute metabolic encephalopathy  Assessment & Plan  Assessment:  During assessment after initial NIHSS and imaging studies were completed it was noted that the patient had more trouble focusing on conversations, had more tangential speech, and was not able to follow commands as before  On initial examination the patient kept perseverating on the numbers 9 and 14 which is the month and day respectively of his birthday  On examination 12/09/2022 the patient was alert to name, however not place, time, nor reasoning for being in the hospital      Impression:  Considering the elevated white count, hyponatremia, increased BUN, and increased CO2 an encephalopathy picture appears  A UTI with three organisms has been identified  Plan:  - Correct metabolic derangements  - Infectious workup recommended:   - UTI  --- >100K cfu/ml E  Faecalis  --- >100K cfu/ml E   Coli  --- 70K-79K cfu/ml M  morganii  - Treat underlying infection:  --- Currently on: Omnicef 2000mg IV Q6H  --- Currently on: Ceftriaxone 1000mg IV QD  - Low dose Quetiapine 12 5mg QHS started, with 12 5mg PRN for agitation  - Per primary team; appreciated! SUBJECTIVE     Mr Chivo Driver was seen and evaluated this AM during visitation  The patient reported that he was feeling okay and did not note any other complaints even on prompting for questions such as CP, SOB, ABD-pain, N/V/D/C/BBI, or changes in sensorium  The patient was alert, and only oriented to name  He was able to complete calculations for me however not able to spell world and interestingly enough when asked to repeat "[place]" the patient accurately spelled each word before saying it aloud  Examination was limited by pain  Review of Systems   Constitutional: Positive for fatigue  Negative for chills, diaphoresis and fever  HENT: Negative for ear discharge, sinus pressure, sinus pain and sore throat  Eyes: Negative for pain and redness  Respiratory: Positive for cough  Negative for chest tightness and shortness of breath  Cardiovascular: Positive for leg swelling  Negative for chest pain and palpitations  Gastrointestinal: Negative for blood in stool, nausea and vomiting  Genitourinary: Negative for flank pain, hematuria and urgency  Musculoskeletal: Positive for back pain  Skin: Positive for wound (on LLE, foot in boot  )  Negative for pallor  Neurological: Positive for weakness and numbness (Inconsistent on examination)  Negative for dizziness, tremors, seizures, syncope, facial asymmetry, speech difficulty, light-headedness and headaches  Psychiatric/Behavioral: Positive for confusion and decreased concentration  Negative for behavioral problems, dysphoric mood, hallucinations, self-injury, sleep disturbance and suicidal ideas  The patient is not nervous/anxious and is not hyperactive  OBJECTIVE     Patient ID: Obdulia Stein is a 82 y o  male      Vitals:    12/08/22 2148 12/09/22 0547 22 0719 22 1412   BP: 150/84 153/82 148/84 144/84   Pulse: 101 (!) 107 90 (!) 114   Resp:   17    Temp:   98 3 °F (36 8 °C) 98 1 °F (36 7 °C)   TempSrc:       SpO2: 95% 96% 97% 96%   Weight:       Height:          Temperature:   Temp (24hrs), Av 2 °F (36 8 °C), Min:98 1 °F (36 7 °C), Max:98 3 °F (36 8 °C)    Temperature: 98 1 °F (36 7 °C)    Physical Exam  Vitals and nursing note reviewed  HENT:      Head: Normocephalic  Mouth/Throat:      Mouth: Mucous membranes are moist    Eyes:      Extraocular Movements: Extraocular movements intact and EOM normal       Pupils: Pupils are equal, round, and reactive to light  Cardiovascular:      Rate and Rhythm: Tachycardia present  Pulmonary:      Effort: Pulmonary effort is normal    Musculoskeletal:         General: Tenderness and signs of injury present  Cervical back: Normal range of motion  Skin:     General: Skin is warm  Neurological:      Mental Status: He is alert  He is disoriented  Sensory: No sensory deficit  Motor: Weakness present  Coordination: Finger-Nose-Finger Test normal       Gait: Gait abnormal       Deep Tendon Reflexes:      Reflex Scores:       Tricep reflexes are 2+ on the right side and 2+ on the left side  Bicep reflexes are 2+ on the right side and 2+ on the left side  Brachioradialis reflexes are 2+ on the right side and 2+ on the left side  Patellar reflexes are 1+ on the right side and 1+ on the left side  Achilles reflexes are 1+ on the right side and 1+ on the left side  Neurologic Exam     Mental Status   Oriented to person  Disoriented to place  Disoriented to year, month and date  Follows 1 step commands  Attention: decreased  Concentration: decreased  Level of consciousness: alert  Unable to perform simple calculations  Able to repeat       - Speech is dysarthric     Cranial Nerves     CN II   Right visual field deficit: upper nasal and lower nasal quadrant(s)  Left visual field deficit: upper temporal and lower temporal quadrant(s)    CN III, IV, VI   Pupils are equal, round, and reactive to light  Extraocular motions are normal    Right pupil: Size: 2 mm  Reactivity: sluggish  Left pupil: Size: 3 mm  Reactivity: sluggish  CN III: no CN III palsy  CN VI: no CN VI palsy  Nystagmus: none   Upgaze: normal  Downgaze: normal  Conjugate gaze: present    CN V   Right facial sensation deficit: none  Left facial sensation deficit: none    CN VII   Right facial weakness: none  Left facial weakness: central    CN VIII   Hearing: impaired    CN IX, X   Palate: symmetric    CN XI   Right sternocleidomastoid strength: normal  Left sternocleidomastoid strength: normal  Right trapezius strength: normal  Left trapezius strength: normal    CN XII   Tongue: not atrophic  Fasciculations: absent  Tongue deviation: none  Repeat examination by attending revealed a left visual field cut  L Homonymous hemianopsia     Motor Exam   Muscle bulk: normal  Overall muscle tone: normal  Right arm tone: normal  Left arm tone: normal  Right leg tone: normal  Left leg tone: normal    Strength   Right deltoid: 5/5  Left deltoid: 4/5  Right biceps: 5/5  Left biceps: 4/5  Right triceps: 5/5  Left triceps: 4/5  Right wrist flexion: 5/5  Left wrist flexion: 4/5  Right wrist extension: 5/5  Left wrist extension: 4/5  Right interossei: 5/5  Left interossei: 4/5  Right iliopsoas: 5/5  Left iliopsoas: 3/5  Right quadriceps: 5/5  Left quadriceps: 3/5  Right hamstrin/5  Left hamstring: 3/5  Right anterior tibial: 5/5  Right posterior tibial: 5/5  Right gastroc: 5/5  - LLE in boot  - LLE, very little movement noted on examination in foot and toes 2/2 pain and boot       Sensory Exam   Light touch normal      Gait, Coordination, and Reflexes     Coordination   Finger to nose coordination: normal    Reflexes   Right brachioradialis: 2+  Left brachioradialis: 2+  Right biceps: 2+  Left biceps: 2+  Right triceps: 2+  Left triceps: 2+  Right patellar: 1+  Left patellar: 1+  Right achilles: 1+  Left achilles: 1+  Right plantar: equivocal  Left plantar: equivocal  Right Stevens: absent  Left Stevens: absent  Right ankle clonus: absent  Left ankle clonus: absent  - Gait not assessed, risk of falling  LABORATORY DATA     Labs: I have personally reviewed pertinent reports  Results from last 7 days   Lab Units 12/09/22  0404 12/08/22  1736 12/07/22  0520   WBC Thousand/uL 8 21 8 53 9 71   HEMOGLOBIN g/dL 13 8 13 0 13 7   HEMATOCRIT % 45 8 41 3 44 7   PLATELETS Thousands/uL 245 245 243   NEUTROS PCT % 79*  --   --    MONOS PCT % 9  --   --       Results from last 7 days   Lab Units 12/09/22  0503 12/09/22  0431 12/07/22  0520 12/06/22  0515   SODIUM mmol/L  --  142 139 141   POTASSIUM mmol/L  --  4 0 3 9 3 7   CHLORIDE mmol/L  --  110* 106 104   CO2 mmol/L  --  28 26 31   BUN mg/dL  --  15 18 19   CREATININE mg/dL  --  0 71 0 75 0 86   CALCIUM mg/dL  --  9 3 10 1 9 4   ALK PHOS U/L 111  --   --  111   ALT U/L 33  --   --  30   AST U/L 40  --   --  29              Results from last 7 days   Lab Units 12/09/22  1636 12/09/22  0957 12/09/22  0351 12/09/22  0209 12/08/22  1736 12/05/22  2249 12/05/22  1540 12/04/22  1730   INR   --   --   --   --  1 21*  --  1 57* 1 23*   PTT seconds 78* 74* 42*   < > 35   < > 43* 38*    < > = values in this interval not displayed  IMAGING & DIAGNOSTIC TESTING     Radiology Results: I have personally reviewed pertinent reports  MRI brain wo contrast   Final Result by Ricardo Fontanez MD (12/08 0108)      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, mesial temporal lobe, internal capsule and cerebral peduncle  No mass effect or hemorrhagic conversion  The study was marked in [facility] for immediate notification        Workstation performed: [id number]         FL barium swallow video w speech   Final Result by DAVID ALCALA (12/07 1414)      XR chest pa & lateral   Final Result by Dakota Bolton MD (12/07 0830)      No acute cardiopulmonary disease  Workstation performed: [id number]         VAS lower limb arterial duplex, limited/unilateral   Final Result by Dedra Benoit MD (12/06 1310)      XR heel / calcaneus 2+ vw left   Final Result by Tyson Santos MD (12/06 1046)      Soft tissue swelling  No evidence of acute osteomyelitis  If osteomyelitis remains a clinical concern, consider further evaluation with MRI  Workstation performed: [id number]         MRI brain wo contrast   Final Result by Ellen Heredia MD (12/05 3859)   Motion degraded examination  No acute infarction, edema, or mass effect  Mild chronic microangiopathic ischemic changes  Workstation performed: [id number]         XR chest portable   Final Result by Glenn Wang MD (70/59 8188)      Probable small left effusion  Slightly limited but otherwise unremarkable examination  Workstation performed: [id number]         CTA stroke alert (head/neck)   Final Result by Lico Camargo DO (12/04 0288)      Unfortunately this examination is significantly limited due to patient motion  Atherosclerotic changes are seen within the carotid bifurcations and intracranial internal carotid arteries bilaterally  Suspected moderate stenosis of the right ICA origin and mild narrowing of the left carotid bifurcation  Intracranially there is    also right greater than left stenosis, moderate on the left and mild on the right      No occlusive disease or thrombosis identified              Findings were directly discussed with neurology resident at approximately 5:40 PM                            Workstation performed: [id number]         CT stroke alert brain   Final Result by Lico Camargo DO (12/04 1985)      No acute intracranial abnormality  Stable mild chronic microangiopathic changes within the brain  Findings were directly discussed with neurology resident at 5:40 PM       Workstation performed: [id number]           Other Diagnostic Testing: I have personally reviewed pertinent reports  ACTIVE MEDICATIONS     Current Facility-Administered Medications   Medication Dose Route Frequency   • acetaminophen (TYLENOL) tablet 975 mg  975 mg Oral Q6H PRN   • albuterol (PROVENTIL HFA,VENTOLIN HFA) inhaler 2 puff  2 puff Inhalation Q4H PRN   • allopurinol (ZYLOPRIM) tablet 100 mg  100 mg Oral Daily   • ampicillin (OMNIPEN) 2,000 mg in sodium chloride 0 9 % 100 mL IVPB  2,000 mg Intravenous Q6H   • aspirin chewable tablet 81 mg  81 mg Oral Daily   • atorvastatin (LIPITOR) tablet 40 mg  40 mg Oral QPM   • cefTRIAXone (ROCEPHIN) 1,000 mg in dextrose 5 % 50 mL IVPB  1,000 mg Intravenous Q24H   • cholecalciferol (VITAMIN D3) tablet 400 Units  400 Units Oral Daily   • dextromethorphan-guaiFENesin (ROBITUSSIN DM) oral syrup 10 mL  10 mL Oral Q4H PRN   • docusate sodium (COLACE) capsule 100 mg  100 mg Oral BID   • heparin (porcine) 25,000 units in 0 45% NaCl 250 mL infusion (premix)  3-24 Units/kg/hr (Order-Specific) Intravenous Titrated   • metoprolol (LOPRESSOR) injection 5 mg  5 mg Intravenous Q8H [facility]   • nicotine (NICODERM CQ) 14 mg/24hr TD 24 hr patch 14 mg  14 mg Transdermal Daily   • ondansetron (ZOFRAN) injection 4 mg  4 mg Intravenous Q6H PRN   • polyethylene glycol (MIRALAX) packet 17 g  17 g Oral Daily   • QUEtiapine (SEROquel) tablet 12 5 mg  12 5 mg Oral HS   • QUEtiapine (SEROquel) tablet 12 5 mg  12 5 mg Oral Demand   • [START ON 12/10/2022] rivaroxaban (XARELTO) tablet 20 mg  20 mg Oral Daily With Dinner     Prior to Admission medications    Medication Sig Start Date End Date Taking?  Authorizing Provider   colchicine (COLCRYS) 0 6 mg tablet Take 0 6 mg by mouth daily   Yes Historical Provider, MD   furosemide (LASIX) 40 mg tablet Take 40 mg by mouth daily   Yes Historical Provider, MD   potassium chloride (KLOR-CON) 20 mEq packet Take 20 mEq by mouth 2 (two) times a day   Yes Historical Provider, MD   acetaminophen (TYLENOL) 325 mg tablet Take 3 tablets (975 mg total) by mouth every 8 (eight) hours 12/9/19   Tere Jarquin PA-C   allopurinol (ZYLOPRIM) 100 mg tablet Take 100 mg by mouth daily    Historical Provider, MD   apixaban (ELIQUIS) 5 mg Take 5 mg by mouth 2 (two) times a day    Historical Provider, MD   aspirin (ECOTRIN LOW STRENGTH) 81 mg EC tablet Take 81 mg by mouth daily    Historical Provider, MD   cholecalciferol (VITAMIN D3) 400 units tablet Take 400 Units by mouth daily    Historical Provider, MD   docusate sodium (COLACE) 100 mg capsule Take 1 capsule (100 mg total) by mouth 2 (two) times a day 12/9/19   Roseann Shaw PA-C   metoprolol tartrate (LOPRESSOR) 50 mg tablet Take 50 mg by mouth every 12 (twelve) hours    Historical Provider, MD   vitamin A 10,000 units capsule Take 10,000 Units by mouth daily    Historical Provider, MD     VTE Pharmacologic Prophylaxis: Heparin Drip  VTE Mechanical Prophylaxis: sequential compression device    ==  DO Mayra Lovell 75 Neurology Residency, PGY-2

## 2022-12-09 NOTE — ASSESSMENT & PLAN NOTE
Patient with bilateral lower extremity edema which appears to be longstanding and related to venous stasis, managed by [facility] cardiology  Recently edema was worsening and associated with weeping  Patient received 80 mg IV Lasix x 1 in office on 11/9 and again on 11/23  Subsequently prescribed higher dose Lasix 40 mg BID    · Lasix held on admission due to hyponatremia, continue to hold pending improvement in oral intake   · Continue with compression stockings   · Encourage LE elevation   · Monitor clinically

## 2022-12-09 NOTE — PROGRESS NOTES
Podiatry - Progress Note  Patient: Ene Mckeon 82 y o  male   MRN: [id number]  PCP: Apolonia Monk MD  Unit/Bed#: [facility] 516-28 Encounter: [id number]  Date Of Visit: 22    ASSESSMENT:    Ene Mckeon is a 82 y o  male with:    1  Pressure ulceration left heel  Stage 4 - POA  2  Lateral left leg DTI  3  Stroke-like symptoms  4  Peripheral Arterial disease  5  UTI    PLAN:    · Plan continued local wound care to pressure ulcer of left heel and DTI on lateral lower leg  Low clinical suspicion for infection at this time, no acute signs of infection present  · Radiographs of left heel reviewed: Soft tissue swelling, however no evidence of acute osteomyelitis  No cortical erosions or irregularities present  · LEADs reviewed: Left YESY 0 41, Met pressure 29mmHg, great toe pressure 26mmHg  YESY within ischemic range, and pressures below healing range  · Continue local wound care, Betadine DSD and 4x4 Allevyn, appreciate nursing assistance with dressing changes  · Elevation and offloading on green foam wedges or pillows when non-ambulatory  · Recommend continued use of Prevalon boot for offloading  · Rest of care per primary team      Weightbearing status: Non-weightbearing left  foot    SUBJECTIVE:     The patient was seen, evaluated, and assessed at bedside today  The patient was awake, alert, and in no acute distress  No acute events overnight  The patient reports that he is having minor pain in the left heel  States that he he has had Prevalon boot on at all times  Patient denies N/V/F/chills/SOB/CP  OBJECTIVE:     Vitals:   /84   Pulse 90   Temp 98 3 °F (36 8 °C)   Resp 17   Ht 5' 8" (1 727 m)   Wt 101 kg (223 lb 1 7 oz)   SpO2 97%   BMI 33 92 kg/m²     Temp (24hrs), Av 9 °F (36 6 °C), Min:97 5 °F (36 4 °C), Max:98 3 °F (36 8 °C)      Physical Exam:     Lungs: Non labored breathing  Abdomen: Soft, non-tender  Lower Extremity:  Cardiovascular status at baseline from admission  Neurological status at baseline from admission  Musculoskeletal status at baseline from admission  No calf tenderness noted  Dermatological:    - Lateral left lower leg DTI, with purple discoloration of skin  Non-blanchable, and no open wound present    - Skin is dry and flaky throughout bilateral lower extremities  Wound #: 1  Location: Left Heel  Length 3 5cm: Width 3 0cm: Depth unable to determine  Deepest Tissue Noted in Base: Eschar   Probe to Bone: No  Peripheral Skin Description: Attached  Granulation: 0% Fibrotic Tissue: 0% Necrotic Tissue: 100%   Drainage Amount: none  Signs of Infection: No    Clinical Images 12/09/22:          Additional Data:     Labs:    Results from last 7 days   Lab Units 12/09/22  0404   WBC Thousand/uL 8 21   HEMOGLOBIN g/dL 13 8   HEMATOCRIT % 45 8   PLATELETS Thousands/uL 245   NEUTROS PCT % 79*   LYMPHS PCT % 9*   MONOS PCT % 9   EOS PCT % 1     Results from last 7 days   Lab Units 12/09/22  0503 12/09/22  0431   POTASSIUM mmol/L  --  4 0   CHLORIDE mmol/L  --  110*   CO2 mmol/L  --  28   BUN mg/dL  --  15   CREATININE mg/dL  --  0 71   CALCIUM mg/dL  --  9 3   ALK PHOS U/L 111  --    ALT U/L 33  --    AST U/L 40  --      Results from last 7 days   Lab Units 12/08/22  1736   INR  1 21*       * I Have Reviewed All Lab Data Listed Above  Recent Cultures (last 7 days):     Results from last 7 days   Lab Units 12/04/22  2214   URINE CULTURE  >100,000 cfu/ml Enterococcus faecalis*  >100,000 cfu/ml Escherichia coli*  70,000-79,000 cfu/ml Morganella morganii*  20,000-29,000 cfu/ml Proteus species*           Imaging: I have personally reviewed pertinent films in PACS  EKG, Pathology, and Other Studies: I have personally reviewed pertinent reports  ** Please Note: Portions of the record may have been created with voice recognition software   Occasional wrong word or "sound a like" substitutions may have occurred due to the inherent limitations of voice recognition software  Read the chart carefully and recognize, using context, where substitutions have occurred   **

## 2022-12-09 NOTE — ASSESSMENT & PLAN NOTE
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

## 2022-12-09 NOTE — SPEECH THERAPY NOTE
Speech Language/Pathology    Speech/Language Pathology Progress Note    Patient Name: Charanjit MEAD'S Date: 12/9/2022     Problem List  Principal Problem:    Stroke-like symptoms  Active Problems:    Anterior dislocation of left shoulder    Atrial fibrillation (HCC)    Bilateral lower extremity edema    Nicotine dependence    Acute metabolic encephalopathy    Pressure injury of deep tissue of left heel    SIRS vs Sepsis ([facility] )    Stenosis of both internal carotid arteries    Prediabetes    Oropharyngeal dysphagia    PAD (peripheral artery disease) ([facility] )    Acute cystitis without hematuria       Past Medical History  Past Medical History:   Diagnosis Date   • Atrial fibrillation ([facility] )    • Cancer ([facility] )    • Hypertension         Past Surgical History  Past Surgical History:   Procedure Laterality Date   • REPLACEMENT TOTAL HIP W/  RESURFACING IMPLANTS           Subjective:  "I need some liquids  Lets go" Patient is awake and alert this afternoon  Objective:  Patient's wife is at bedside  She explained history of patient's dysphagia following hospital admission and surgery a few years ago  Educated wife on results of VBS from the other day and on reason for NPO status from yesterday  Patient's wife reports understanding and wants to put patient's safety first  Oral care is provided with oral swabs and suction kit  Oral cavity is dry, but clear  Patient tolerated well  He is assessed with pudding and honey thick liquids-all via tsp  Oral closure is adequate  Initially, the patient has good retrieval of bolus, but as session progresses he has difficulty motor planning tongue movement and placement around tsp  Transfer is timely-min prolonged  No oral residue observed today  Swallow initiation time is much improved from yesterday, and multiple swallows are not observed  The patient does not present with any overt s/s aspiration across trials  He tolerated 4 oz pudding and approx 2 oz honey thick liquids well  Assessment:  The patient tolerated trials well today  Pharyngeal stage of swallowing appears much improved from yesterdays session  Plan/Recommendations:  Patient appears safe to resume conservative diet of puree and honey thick liquids  ST will continue to further assess tolerance

## 2022-12-09 NOTE — PROGRESS NOTES
[facility]  Progress Note - Stephen Abram 1935, 82 y o  male MRN: [id number]  Unit/Bed#: [facility] 942-53 Encounter: [id number]  Primary Care Provider: Barbara Donis MD   Date and time admitted to hospital: 12/4/2022  5:26 PM    Acute cystitis without hematuria  Assessment & Plan  · Presenting with AMS and meeting sepsis criteria as above   · Urine culture preliminarily growing Enterococcus faecalis, E coli and Morganella morganii    · Got rocpehin 3 days   · Now on ampicillin for enterococcus  · May need ID input     PAD (peripheral artery disease) ([facility] )  Assessment & Plan  · Follows with [facility]  · Most recent LEADs in May consistent with 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  · Repeat LEADs 12/6 significant for LLE > 75% stenosis of distal superficial femoral artery and diffuse disease throughout the remaining femoral-popliteal  · vessels  · Continue with ASA, statin    Oropharyngeal dysphagia  Assessment & Plan  · Recommended NPO yesterday   · IV heparin and metoprolol     Prediabetes  Assessment & Plan  Evidenced by A1c of 5 9%  · Monitor glucose on AM labs   · Encourage diet and lifestyle modifications once DC    Stenosis of both internal carotid arteries  Assessment & Plan  · CTA head/neck on admission (limited due to motion): Atherosclerotic changes are seen within the carotid bifurcations and intracranial internal carotid arteries bilaterally   Suspected moderate stenosis of the right ICA origin and mild narrowing of the left carotid bifurcation   Intracranially there is also right greater than left stenosis, moderate on the left and mild on the right    · Appreciate neurosurgery consult and recommendations   · No need for surgical intervention at this time   · Outpatient follow-up with repeat CTA in 2 weeks   · Continue asa rectal, statin when tolerating PO and heparin need neuro input regarding preferred heparin       SIRS vs Sepsis ([facility] )  Assessment & Plan  POA with mild leukocytosis and tachycardia  Procalcitonin WNL at 0 15   · COVID/flu/RSV negative   · Portable CXR: Probable small left effusion   Slightly limited but otherwise unremarkable examination  · UA with nitrites, leukocytes and bacteria concerning for UTI  · Noted with left heel pressure ulcer on admission however no clinical signs of cellulitis, see related plan  · PA/lateral CXR: No acute cardiopulmonary disease   · Continue with IV ceftriaxone (day 4) pending final urine culture / sensitivities   · Trend temps, WBC, hemodynamics   · If concern for ongoing untreated infection - added rocphin     Pressure injury of deep tissue of left heel  Assessment & Plan  · POA, followed by wound care as outpatient  · No clinical signs of infection at this time   · Appreciate Podiatry consult and recommendations   · XR heel/calcaneous:  Soft tissue swelling   No evidence of acute osteomyelitis  · LEADs noted diffuse disease in LLE, see below   · No plans for intervention at this time   · Continue with local wound care, offloading     Acute metabolic encephalopathy  Assessment & Plan  Presented with stroke like symptoms as above; MRI brain negative for stroke, consideration for TIA in the setting of ICA stenosis, see plans above   · Family noted that the patient was more confused than usual recently and they attributed it to higher dose of Lasix due to worsening leg edema  · Infectious workup/management as outlined above   · Appreciate neurology inputs regarding stroke like symptoms as above  · Mental status continues to wax and wane  · Dysphagia diet as per speech therapy   · Continue with supportive cares  · Discontinued restraints as of 12/6 - replaced on 12/8 but mostly because vEEG leads are bothersome and he pulls them off, not combative     · Replaced restraints as tried again 12/9  Will add rocephin to treat morganella - will consult ID  Unclear why patient more encephalopathic       Nicotine dependence  Assessment & Plan  · Patient admits to smoking approx 1 ppd currently   · Suspect has underlying COPD   · Respiratory protocol   · Cessation counseling     Bilateral lower extremity edema  Assessment & Plan  Patient with bilateral lower extremity edema which appears to be longstanding and related to venous stasis, managed by [facility] cardiology  Recently edema was worsening and associated with weeping  Patient received 80 mg IV Lasix x 1 in office on 11/9 and again on 11/23  Subsequently prescribed higher dose Lasix 40 mg BID  · Lasix held on admission due to hyponatremia, continue to hold pending improvement in oral intake   · Continue with compression stockings   · Encourage LE elevation   · Monitor clinically     Atrial fibrillation [facility])  Assessment & Plan  · Hospital day 1 patient in atrial fibrillation with RVR - now improved   · Continue rate control with metoprolol 50 mg q12h   · NPO currently - will need heparin - SLIM to start ACS heparin gtt given high chadsvasc score  But reaching out to neurology first to see if patient needs acute stroke heparin in which case neurology should order and manage the gtt - awaiting response  · Echocardiogram 12/6 with LVEF 55%, normal wall motion, normal systolic/diastolic function, mild-moderately increased RV pressure     Anterior dislocation of left shoulder  Assessment & Plan  · Injury occurred in December 2019, was following with  orthopedics and managed conservatively; seems to have been lost to follow-up with repeat XRs  · 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     * Stroke-like symptoms  Assessment & Plan  Patient presented via EMS as stroke alert due to concern for acute onset of left-sided weakness and garbled speech noticed by patient's wife  Patient is not a candidate for tPA due to bleeding risk    · Admitted under stroke pathway   · CTA head/neck negative for occlusion, but did note moderate R ICA and mild L ICA stenosis, see below   · MRI brain negative for acute infarct, but did note mild chronic microangiopathic ischemic changes  · Echocardiogram essentially unremarkable   · Lipid panel: cholesterol 150, , LDL 97  · Appreciate neurology consult and recommendations   · Continue with aspirin, statin, Eliquis   · P2Y12 checked -> 268  · Routine EEG without evidence of seizure   · Concern for possible TIA vs seizure and/or infectious/metabolic etiology   · See below regarding infectious workup/management   · PT/OT recommending rehab, PMR following  · Monitor neurochecks           VTE Pharmacologic Prophylaxis: VTE Score: 6 Moderate Risk (Score 3-4) - Pharmacological DVT Prophylaxis Ordered: heparin  Patient Centered Rounds: I performed bedside rounds with nursing staff today  Discussions with Specialists or Other Care Team Provider: discussed with neurology   Education and Discussions with Family / Patient: Updated  (wife) via phone  Time Spent for Care: 30 minutes  More than 50% of total time spent on counseling and coordination of care as described above  Current Length of Stay: 5 day(s)  Current Patient Status: Inpatient   Certification Statement: The patient will continue to require additional inpatient hospital stay due to not improving AMS wise   Discharge Plan: Anticipate discharge in 48-72 hrs to tbd    Code Status: Level 3 - DNAR and DNI    Subjective:   Patient seen and examined   Feeling "fine"    Objective:     Vitals:   Temp (24hrs), Av 2 °F (36 8 °C), Min:98 1 °F (36 7 °C), Max:98 3 °F (36 8 °C)    Temp:  [98 1 °F (36 7 °C)-98 3 °F (36 8 °C)] 98 1 °F (36 7 °C)  HR:  [] 114  Resp:  [17] 17  BP: (144-153)/(82-84) 144/84  SpO2:  [95 %-97 %] 96 %  Body mass index is 33 92 kg/m²       Input and Output Summary (last 24 hours):   No intake or output data in the 24 hours ending 22 1615    Physical Exam:   Physical Exam  Constitutional:       General: He is not in acute distress  Appearance: He is not ill-appearing, toxic-appearing or diaphoretic  HENT:      Head: Normocephalic  Nose: Nose normal       Mouth/Throat:      Mouth: Mucous membranes are moist    Eyes:      General: No scleral icterus  Right eye: No discharge  Left eye: No discharge  Pupils: Pupils are equal, round, and reactive to light  Cardiovascular:      Rate and Rhythm: Normal rate  Pulmonary:      Effort: Pulmonary effort is normal  No respiratory distress  Breath sounds: No stridor  No wheezing, rhonchi or rales  Chest:      Chest wall: No tenderness  Abdominal:      General: There is no distension  Palpations: There is no mass  Tenderness: There is no abdominal tenderness  There is no right CVA tenderness, left CVA tenderness, guarding or rebound  Hernia: No hernia is present  Skin:     Coloration: Skin is not jaundiced or pale  Findings: No bruising, erythema, lesion or rash  Neurological:      General: No focal deficit present  Mental Status: He is alert  Cranial Nerves: No cranial nerve deficit  Sensory: No sensory deficit  Motor: Weakness present        Coordination: Coordination normal       Gait: Gait normal       Comments: Some word finding difficulty           Additional Data:     Labs:  Results from last 7 days   Lab Units 12/09/22  0404   WBC Thousand/uL 8 21   HEMOGLOBIN g/dL 13 8   HEMATOCRIT % 45 8   PLATELETS Thousands/uL 245   NEUTROS PCT % 79*   LYMPHS PCT % 9*   MONOS PCT % 9   EOS PCT % 1     Results from last 7 days   Lab Units 12/09/22  0503 12/09/22  0431   SODIUM mmol/L  --  142   POTASSIUM mmol/L  --  4 0   CHLORIDE mmol/L  --  110*   CO2 mmol/L  --  28   BUN mg/dL  --  15   CREATININE mg/dL  --  0 71   ANION GAP mmol/L  --  4   CALCIUM mg/dL  --  9 3   ALBUMIN g/dL 2 1*  --    TOTAL BILIRUBIN mg/dL 0 45  --    ALK PHOS U/L 111  --    ALT U/L 33  --    AST U/L 40  --    GLUCOSE RANDOM mg/dL  -- 95     Results from last 7 days   Lab Units 12/08/22  1736   INR  1 21*     Results from last 7 days   Lab Units 12/04/22  1728   POC GLUCOSE mg/dl 138     Results from last 7 days   Lab Units 12/04/22  1730   HEMOGLOBIN A1C % 5 9*     Results from last 7 days   Lab Units 12/05/22  0432   PROCALCITONIN ng/ml 0 15       Lines/Drains:  Invasive Devices     Peripheral Intravenous Line  Duration           Peripheral IV 12/09/22 Distal;Dorsal (posterior); Left Forearm <1 day    Peripheral IV 12/09/22 Right Antecubital <1 day          Drain  Duration           External Urinary Catheter Small 4 days                      Imaging: No pertinent imaging reviewed      Recent Cultures (last 7 days):   Results from last 7 days   Lab Units 12/04/22  2214   URINE CULTURE  >100,000 cfu/ml Enterococcus faecalis*  >100,000 cfu/ml Escherichia coli*  70,000-79,000 cfu/ml Morganella morganii*  20,000-29,000 cfu/ml Proteus species*       Last 24 Hours Medication List:   Current Facility-Administered Medications   Medication Dose Route Frequency Provider Last Rate   • acetaminophen  975 mg Oral Q6H PRN Caty Grijalva PA-C     • albuterol  2 puff Inhalation Q4H PRN Salo Mazraiegos MD     • allopurinol  100 mg Oral Daily Heidy Mendoza MD     • ampicillin  2,000 mg Intravenous Q6H Caty Grijalva PA-C 2,000 mg (12/09/22 1415)   • aspirin  81 mg Oral Daily Heidy Mendoza MD     • atorvastatin  40 mg Oral QPM Heidy Mendoza MD     • cefTRIAXone  1,000 mg Intravenous Q24H Salo Mazariegos MD     • cholecalciferol  400 Units Oral Daily Heidy Mendoza MD     • dextromethorphan-guaiFENesin  10 mL Oral Q4H PRN Caty Grijalva PA-C     • docusate sodium  100 mg Oral BID Heidy Mendoza MD     • heparin (porcine)  3-24 Units/kg/hr (Order-Specific) Intravenous Titrated Libertad Ayala MD 16 Units/kg/hr (12/09/22 1135)   • metoprolol  5 mg Intravenous [facility] Salo Mazariegos MD     • nicotine  14 mg Transdermal Daily Caty Grijalva PA-C     • ondansetron  4 mg Intravenous Q6H PRN Stephen Levine MD     • polyethylene glycol  17 g Oral Daily Stephen Levine MD     • QUEtiapine  12 5 mg Oral HS Tori Smith DO     • QUEtiapine  12 5 mg Oral Demand Tori Smith DO          Today, Patient Was Seen By: Porsha Hankins MD    **Please Note: This note may have been constructed using a voice recognition system  **

## 2022-12-09 NOTE — PLAN OF CARE
Problem: DISCHARGE PLANNING  Goal: Discharge to home or other facility with appropriate resources  Description: INTERVENTIONS:  - Identify barriers to discharge w/patient and caregiver  - Arrange for needed discharge resources and transportation as appropriate  - Identify discharge learning needs (meds, wound care, etc )  - Arrange for interpretive services to assist at discharge as needed  - Refer to Case Management Department for coordinating discharge planning if the patient needs post-hospital services based on physician/advanced practitioner order or complex needs related to functional status, cognitive ability, or social support system  12/9/2022 1046 by Brooke Michaud RN  Outcome: Progressing  12/9/2022 1045 by Brooke Michaud RN  Outcome: Progressing  12/9/2022 1044 by Brooke Michaud RN  Outcome: Progressing     Problem: Knowledge Deficit  Goal: Patient/family/caregiver demonstrates understanding of disease process, treatment plan, medications, and discharge instructions  Description: Complete learning assessment and assess knowledge base  Interventions:  - Provide teaching at level of understanding  - Provide teaching via preferred learning methods  12/9/2022 1046 by Brooke Michaud RN  Outcome: Progressing  12/9/2022 1045 by Brooke Michaud RN  Outcome: Progressing  12/9/2022 1044 by Brooke Michaud RN  Outcome: Progressing     Problem: Neurological Deficit  Goal: Neurological status is stable or improving  Description: Interventions:  - Monitor and assess patient's level of consciousness, motor function, sensory function, and level of assistance needed for ADLs  - Monitor and report changes from baseline  Collaborate with interdisciplinary team to initiate plan and implement interventions as ordered  - Provide and maintain a safe environment  - Consider seizure precautions  - Consider fall precautions  - Consider aspiration precautions  - Consider bleeding precautions    12/9/2022 1046 by Raj Machuca RN  Outcome: Progressing  12/9/2022 1045 by Raj Machuca RN  Outcome: Progressing  12/9/2022 1044 by Raj Machuca RN  Outcome: Progressing     Problem: Activity Intolerance/Impaired Mobility  Goal: Mobility/activity is maintained at optimum level for patient  Description: Interventions:  - Assess and monitor patient  barriers to mobility and need for assistive/adaptive devices  - Assess patient's emotional response to limitations  - Collaborate with interdisciplinary team and initiate plans and interventions as ordered  - Encourage independent activity per ability   - Maintain proper body alignment  - Perform active/passive rom as tolerated/ordered  - Plan activities to conserve energy   - Turn patient as appropriate  12/9/2022 1046 by Raj Machuca RN  Outcome: Progressing  12/9/2022 1045 by Raj Machuca RN  Outcome: Progressing  12/9/2022 1044 by Raj Machuca RN  Outcome: Progressing     Problem: Communication Impairment  Goal: Ability to express needs and understand communication  Description: Assess patient's communication skills and ability to understand information  Patient will demonstrate use of effective communication techniques, alternative methods of communication and understanding even if not able to speak  - Encourage communication and provide alternate methods of communication as needed  - Collaborate with case management/ for discharge needs  - Include patient/family/caregiver in decisions related to communication  12/9/2022 1046 by Raj Machuca RN  Outcome: Progressing  12/9/2022 1045 by Raj Machuca RN  Outcome: Progressing  12/9/2022 1044 by Raj Machuca RN  Outcome: Progressing     Problem: Potential for Aspiration  Goal: Non-ventilated patient's risk of aspiration is minimized  Description: Assess and monitor vital signs, respiratory status, and labs (WBC)    Monitor for signs of aspiration (tachypnea, cough, rales, wheezing, cyanosis, fever)  - Assess and monitor patient's ability to swallow  - Place patient up in chair to eat if possible  - HOB up at 90 degrees to eat if unable to get patient up into chair   - Supervise patient during oral intake  - Instruct patient/ family to take small bites  - Instruct patient/ family to take small single sips when taking liquids  - Follow patient-specific strategies generated by speech pathologist   12/9/2022 1046 by Grayson Alvarez RN  Outcome: Progressing  12/9/2022 1045 by Grayson Alvarez RN  Outcome: Progressing  12/9/2022 1044 by Grayson Alvarez RN  Outcome: Progressing     Problem: MOBILITY - ADULT  Goal: Maintain or return to baseline ADL function  Description: INTERVENTIONS:  -  Assess patient's ability to carry out ADLs; assess patient's baseline for ADL function and identify physical deficits which impact ability to perform ADLs (bathing, care of mouth/teeth, toileting, grooming, dressing, etc )  - Assess/evaluate cause of self-care deficits   - Assess range of motion  - Assess patient's mobility; develop plan if impaired  - Assess patient's need for assistive devices and provide as appropriate  - Encourage maximum independence but intervene and supervise when necessary  - Involve family in performance of ADLs  - Assess for home care needs following discharge   - Consider OT consult to assist with ADL evaluation and planning for discharge  - Provide patient education as appropriate  12/9/2022 1046 by Grayson Alvarez RN  Outcome: Progressing  12/9/2022 1045 by Grayson Alvarez RN  Outcome: Progressing  12/9/2022 1044 by Grayson Alvarez RN  Outcome: Progressing  Goal: Maintains/Returns to pre admission functional level  Description: INTERVENTIONS:  - Perform BMAT or MOVE assessment daily    - Set and communicate daily mobility goal to care team and patient/family/caregiver     - Collaborate with rehabilitation services on mobility goals if consulted  - Perform Range of Motion  times a day  - Reposition patient every  hours  - Dangle patient  times a day  - Stand patient  times a day  - Ambulate patient  times a day  - Out of bed to chair  times a day   - Out of bed for meals times a day  - Out of bed for toileting  - Record patient progress and toleration of activity level   12/9/2022 1046 by Mesha Bar RN  Outcome: Progressing  12/9/2022 1045 by Mesha Bar RN  Outcome: Progressing  12/9/2022 1044 by Mesha Bar RN  Outcome: Progressing     Problem: Prexisting or High Potential for Compromised Skin Integrity  Goal: Skin integrity is maintained or improved  Description: INTERVENTIONS:  - Identify patients at risk for skin breakdown  - Assess and monitor skin integrity  - Assess and monitor nutrition and hydration status  - Monitor labs   - Assess for incontinence   - Turn and reposition patient  - Assist with mobility/ambulation  - Relieve pressure over bony prominences  - Avoid friction and shearing  - Provide appropriate hygiene as needed including keeping skin clean and dry  - Evaluate need for skin moisturizer/barrier cream  - Collaborate with interdisciplinary team   - Patient/family teaching  - Consider wound care consult   12/9/2022 1046 by Mesha Bar RN  Outcome: Progressing  12/9/2022 1045 by Mesha Bar RN  Outcome: Progressing  12/9/2022 1044 by Mesha Bar RN  Outcome: Progressing     Problem: Nutrition/Hydration-ADULT  Goal: Nutrient/Hydration intake appropriate for improving, restoring or maintaining nutritional needs  Description: Monitor and assess patient's nutrition/hydration status for malnutrition  Collaborate with interdisciplinary team and initiate plan and interventions as ordered  Monitor patient's weight and dietary intake as ordered or per policy  Utilize nutrition screening tool and intervene as necessary   Determine patient's food preferences and provide high-protein, high-caloric foods as appropriate       INTERVENTIONS:  - Monitor oral intake, urinary output, labs, and treatment plans  - Assess nutrition and hydration status and recommend course of action  - Evaluate amount of meals eaten  - Assist patient with eating if necessary   - Allow adequate time for meals  - Recommend/ encourage appropriate diets, oral nutritional supplements, and vitamin/mineral supplements  - Order, calculate, and assess calorie counts as needed  - Recommend, monitor, and adjust tube feedings and TPN/PPN based on assessed needs  - Assess need for intravenous fluids  - Provide specific nutrition/hydration education as appropriate  - Include patient/family/caregiver in decisions related to nutrition  12/9/2022 1046 by Jocelyn Simons RN  Outcome: Progressing  12/9/2022 1045 by Jocelyn Simons RN  Outcome: Progressing  12/9/2022 1044 by Jocelyn Simons RN  Outcome: Progressing     Problem: PAIN - ADULT  Goal: Verbalizes/displays adequate comfort level or baseline comfort level  Description: Interventions:  - Encourage patient to monitor pain and request assistance  - Assess pain using appropriate pain scale  - Administer analgesics based on type and severity of pain and evaluate response  - Implement non-pharmacological measures as appropriate and evaluate response  - Consider cultural and social influences on pain and pain management  - Notify physician/advanced practitioner if interventions unsuccessful or patient reports new pain  12/9/2022 1046 by Jocelyn Simons RN  Outcome: Progressing  12/9/2022 1045 by Jocelyn Simons RN  Outcome: Progressing  12/9/2022 1044 by Jocelyn Simons RN  Outcome: Progressing     Problem: INFECTION - ADULT  Goal: Absence or prevention of progression during hospitalization  Description: INTERVENTIONS:  - Assess and monitor for signs and symptoms of infection  - Monitor lab/diagnostic results  - Monitor all insertion sites, i e  indwelling lines, tubes, and drains  - Monitor endotracheal if appropriate and nasal secretions for changes in amount and color  - [place] appropriate cooling/warming therapies per order  - Administer medications as ordered  - Instruct and encourage patient and family to use good hand hygiene technique  - Identify and instruct in appropriate isolation precautions for identified infection/condition  12/9/2022 1046 by Erin Joe RN  Outcome: Progressing  12/9/2022 1045 by Erin Joe RN  Outcome: Progressing  12/9/2022 1044 by Erin Joe RN  Outcome: Progressing     Problem: Potential for Falls  Goal: Patient will remain free of falls  Description: INTERVENTIONS:  - Educate patient/family on patient safety including physical limitations  - Instruct patient to call for assistance with activity   - Consult OT/PT to assist with strengthening/mobility   - Keep Call bell within reach  - Keep bed low and locked with side rails adjusted as appropriate  - Keep care items and personal belongings within reach  - Initiate and maintain comfort rounds  - Make Fall Risk Sign visible to staff  - Offer Toileting every  Hours, in advance of need  - Initiate/Maintain alarm  - Obtain necessary fall risk management equipment:   - Apply yellow socks and bracelet for high fall risk patients  - Consider moving patient to room near nurses station  12/9/2022 1046 by Erni Joe RN  Outcome: Progressing  12/9/2022 1045 by Erin Joe RN  Outcome: Progressing  12/9/2022 1044 by Erin Joe RN  Outcome: Progressing     Problem: SKIN/TISSUE INTEGRITY - ADULT  Goal: Pressure injury heals and does not worsen  Description: Interventions:  - Implement low air loss mattress or specialty surface (Criteria met)  - Apply silicone foam dressing  - Instruct/assist with weight shifting every  minutes when in chair   - Limit chair time to  hour intervals  - Use special pressure reducing interventions such as  when in chair   - Apply fecal or urinary incontinence containment device   - Perform passive or active ROM brenda  - Turn and reposition patient & offload bony prominences every  hours   - Utilize friction reducing device or surface for transfers   - Consider consults to  interdisciplinary teams such as   - Use incontinent care products after each incontinent episode such as  - Consider nutrition services referral as needed  12/9/2022 1046 by Nadege Hawk RN  Outcome: Progressing  12/9/2022 1045 by Nadege Hawk RN  Outcome: Progressing  12/9/2022 1044 by Nadege Hawk RN  Outcome: Progressing     Problem: SAFETY,RESTRAINT: NV/NON-SELF DESTRUCTIVE BEHAVIOR  Goal: Remains free of harm/injury (restraint for non violent/non self-detsructive behavior)  Description: INTERVENTIONS:  - Instruct patient/family regarding restraint use   - Assess and monitor physiologic and psychological status   - Provide interventions and comfort measures to meet assessed patient needs   - Identify and implement measures to help patient regain control  - Assess readiness for release of restraint   Outcome: Progressing  Goal: Returns to optimal restraint-free functioning  Description: INTERVENTIONS:  - Assess the patient's behavior and symptoms that indicate continued need for restraint  - Identify and implement measures to help patient regain control  - Assess readiness for release of restraint   Outcome: Progressing     Problem: COPING  Goal: Pt/Family able to verbalize concerns and demonstrate effective coping strategies  Description: INTERVENTIONS:  - Assist patient/family to identify coping skills, available support systems and cultural and spiritual values  - Provide emotional support, including active listening and acknowledgement of concerns of patient and caregivers  - Reduce environmental stimuli, as able  - Provide patient education  - Assess for spiritual pain/suffering and initiate spiritual care, including notification of Pastoral Care or [facility] based community as needed  - Assess effectiveness of coping strategies  Outcome: Progressing  Goal: Will report anxiety at manageable levels  Description: INTERVENTIONS:  - Administer medication as ordered  - Teach and encourage coping skills  - Provide emotional support  - Assess patient/family for anxiety and ability to cope  Outcome: Progressing     Problem: CONFUSION/THOUGHT DISTURBANCE  Goal: Thought disturbances (confusion, delirium, depression, dementia or psychosis) are managed to maintain or return to baseline mental status and functional level  Description: INTERVENTIONS:  - Assess for possible contributors to  thought disturbance, including but not limited to medications, infection, impaired vision or hearing, underlying metabolic abnormalities, dehydration, respiratory compromise,  psychiatric diagnoses and notify attending PHYSICAN/AP  - Monitor and intervene to maintain adequate nutrition, hydration, elimination, sleep and activity  - Decrease environmental stimuli, including noise as appropriate  - Provide frequent contacts to provide refocusing, direction and reassurance as needed  Approach patient calmly with eye contact and at their level  - [place] high risk fall precautions, aspiration precautions and other safety measures, as indicated  - If delirium suspected, notify physician/AP of change in condition and request immediate in-person evaluation  - Pursue consults as appropriate including Geriatric (campus dependent), OT for cognitive evaluation/activity planning, psychiatric, pastoral care, etc   Outcome: Progressing     Problem: BEHAVIOR  Goal: Pt/Family maintain appropriate behavior and adhere to behavioral management agreement, if implemented  Description: INTERVENTIONS:  - Assess the family dynamic   - Encourage verbalization of thoughts and concerns in a socially appropriate manner  - Assess patient/family's coping skills and non-compliant behavior (including use of illegal substances)    - Utilize positive, consistent limit setting strategies supporting safety of patient, staff and others  - Initiate consult with Case Management, Spiritual Care or other ancillary services as appropriate  - If a patient's/visitor's behavior jeopardizes the safety of the patient, staff, or others, refer to organization procedure     - Notify Security of behavior or suspected illegal substances which indicate the need for search of the patient and/or belongings  - Encourage participation in the decision making process about a behavioral management agreement; implement if patient meets criteria  Outcome: Progressing

## 2022-12-09 NOTE — PLAN OF CARE
Problem: DISCHARGE PLANNING  Goal: Discharge to home or other facility with appropriate resources  Description: INTERVENTIONS:  - Identify barriers to discharge w/patient and caregiver  - Arrange for needed discharge resources and transportation as appropriate  - Identify discharge learning needs (meds, wound care, etc )  - Arrange for interpretive services to assist at discharge as needed  - Refer to Case Management Department for coordinating discharge planning if the patient needs post-hospital services based on physician/advanced practitioner order or complex needs related to functional status, cognitive ability, or social support system  Outcome: Progressing     Problem: Knowledge Deficit  Goal: Patient/family/caregiver demonstrates understanding of disease process, treatment plan, medications, and discharge instructions  Description: Complete learning assessment and assess knowledge base  Interventions:  - Provide teaching at level of understanding  - Provide teaching via preferred learning methods  Outcome: Progressing     Problem: Neurological Deficit  Goal: Neurological status is stable or improving  Description: Interventions:  - Monitor and assess patient's level of consciousness, motor function, sensory function, and level of assistance needed for ADLs  - Monitor and report changes from baseline  Collaborate with interdisciplinary team to initiate plan and implement interventions as ordered  - Provide and maintain a safe environment  - Consider seizure precautions  - Consider fall precautions  - Consider aspiration precautions  - Consider bleeding precautions  Outcome: Progressing     Problem: Activity Intolerance/Impaired Mobility  Goal: Mobility/activity is maintained at optimum level for patient  Description: Interventions:  - Assess and monitor patient  barriers to mobility and need for assistive/adaptive devices  - Assess patient's emotional response to limitations    - Collaborate with interdisciplinary team and initiate plans and interventions as ordered  - Encourage independent activity per ability   - Maintain proper body alignment  - Perform active/passive rom as tolerated/ordered  - Plan activities to conserve energy   - Turn patient as appropriate  Outcome: Progressing     Problem: Communication Impairment  Goal: Ability to express needs and understand communication  Description: Assess patient's communication skills and ability to understand information  Patient will demonstrate use of effective communication techniques, alternative methods of communication and understanding even if not able to speak  - Encourage communication and provide alternate methods of communication as needed  - Collaborate with case management/ for discharge needs  - Include patient/family/caregiver in decisions related to communication  Outcome: Progressing     Problem: Potential for Aspiration  Goal: Non-ventilated patient's risk of aspiration is minimized  Description: Assess and monitor vital signs, respiratory status, and labs (WBC)  Monitor for signs of aspiration (tachypnea, cough, rales, wheezing, cyanosis, fever)  - Assess and monitor patient's ability to swallow  - Place patient up in chair to eat if possible  - HOB up at 90 degrees to eat if unable to get patient up into chair   - Supervise patient during oral intake  - Instruct patient/ family to take small bites  - Instruct patient/ family to take small single sips when taking liquids    - Follow patient-specific strategies generated by speech pathologist   Outcome: Progressing     Problem: MOBILITY - ADULT  Goal: Maintain or return to baseline ADL function  Description: INTERVENTIONS:  -  Assess patient's ability to carry out ADLs; assess patient's baseline for ADL function and identify physical deficits which impact ability to perform ADLs (bathing, care of mouth/teeth, toileting, grooming, dressing, etc )  - Assess/evaluate cause of self-care deficits   - Assess range of motion  - Assess patient's mobility; develop plan if impaired  - Assess patient's need for assistive devices and provide as appropriate  - Encourage maximum independence but intervene and supervise when necessary  - Involve family in performance of ADLs  - Assess for home care needs following discharge   - Consider OT consult to assist with ADL evaluation and planning for discharge  - Provide patient education as appropriate  Outcome: Progressing  Goal: Maintains/Returns to pre admission functional level  Description: INTERVENTIONS:  - Perform BMAT or MOVE assessment daily    - Set and communicate daily mobility goal to care team and patient/family/caregiver  - Collaborate with rehabilitation services on mobility goals if consulted  - Perform Range of Motion  times a day  - Reposition patient every  hours    - Dangle patient  times a day  - Stand patient  times a day  - Ambulate patient  times a day  - Out of bed to chair  times a day   - Out of bed for meals times a day  - Out of bed for toileting  - Record patient progress and toleration of activity level   Outcome: Progressing     Problem: Prexisting or High Potential for Compromised Skin Integrity  Goal: Skin integrity is maintained or improved  Description: INTERVENTIONS:  - Identify patients at risk for skin breakdown  - Assess and monitor skin integrity  - Assess and monitor nutrition and hydration status  - Monitor labs   - Assess for incontinence   - Turn and reposition patient  - Assist with mobility/ambulation  - Relieve pressure over bony prominences  - Avoid friction and shearing  - Provide appropriate hygiene as needed including keeping skin clean and dry  - Evaluate need for skin moisturizer/barrier cream  - Collaborate with interdisciplinary team   - Patient/family teaching  - Consider wound care consult   Outcome: Progressing     Problem: Nutrition/Hydration-ADULT  Goal: Nutrient/Hydration intake appropriate for improving, restoring or maintaining nutritional needs  Description: Monitor and assess patient's nutrition/hydration status for malnutrition  Collaborate with interdisciplinary team and initiate plan and interventions as ordered  Monitor patient's weight and dietary intake as ordered or per policy  Utilize nutrition screening tool and intervene as necessary  Determine patient's food preferences and provide high-protein, high-caloric foods as appropriate       INTERVENTIONS:  - Monitor oral intake, urinary output, labs, and treatment plans  - Assess nutrition and hydration status and recommend course of action  - Evaluate amount of meals eaten  - Assist patient with eating if necessary   - Allow adequate time for meals  - Recommend/ encourage appropriate diets, oral nutritional supplements, and vitamin/mineral supplements  - Order, calculate, and assess calorie counts as needed  - Recommend, monitor, and adjust tube feedings and TPN/PPN based on assessed needs  - Assess need for intravenous fluids  - Provide specific nutrition/hydration education as appropriate  - Include patient/family/caregiver in decisions related to nutrition  Outcome: Progressing     Problem: PAIN - ADULT  Goal: Verbalizes/displays adequate comfort level or baseline comfort level  Description: Interventions:  - Encourage patient to monitor pain and request assistance  - Assess pain using appropriate pain scale  - Administer analgesics based on type and severity of pain and evaluate response  - Implement non-pharmacological measures as appropriate and evaluate response  - Consider cultural and social influences on pain and pain management  - Notify physician/advanced practitioner if interventions unsuccessful or patient reports new pain  Outcome: Progressing     Problem: INFECTION - ADULT  Goal: Absence or prevention of progression during hospitalization  Description: INTERVENTIONS:  - Assess and monitor for signs and symptoms of infection  - Monitor lab/diagnostic results  - Monitor all insertion sites, i e  indwelling lines, tubes, and drains  - Monitor endotracheal if appropriate and nasal secretions for changes in amount and color  - Spurger appropriate cooling/warming therapies per order  - Administer medications as ordered  - Instruct and encourage patient and family to use good hand hygiene technique  - Identify and instruct in appropriate isolation precautions for identified infection/condition  Outcome: Progressing     Problem: Potential for Falls  Goal: Patient will remain free of falls  Description: INTERVENTIONS:  - Educate patient/family on patient safety including physical limitations  - Instruct patient to call for assistance with activity   - Consult OT/PT to assist with strengthening/mobility   - Keep Call bell within reach  - Keep bed low and locked with side rails adjusted as appropriate  - Keep care items and personal belongings within reach  - Initiate and maintain comfort rounds  - Make Fall Risk Sign visible to staff  - Offer Toileting every  Hours, in advance of need  - Initiate/Maintain alarm  - Obtain necessary fall risk management equipment  - Apply yellow socks and bracelet for high fall risk patients  - Consider moving patient to room near nurses station  Outcome: Progressing     Problem: SKIN/TISSUE INTEGRITY - ADULT  Goal: Pressure injury heals and does not worsen  Description: Interventions:  - Implement low air loss mattress or specialty surface (Criteria met)  - Apply silicone foam dressing  - Instruct/assist with weight shifting every  minutes when in chair   - Limit chair time to  hour intervals  - Use special pressure reducing interventions such as  when in chair   - Apply fecal or urinary incontinence containment device   - Perform passive or active ROM every   - Turn and reposition patient & offload bony prominences every  hours   - Utilize friction reducing device or surface for transfers   - Consider consults to  interdisciplinary teams such as   - Use incontinent care products after each incontinent episode such as   - Consider nutrition services referral as needed  Outcome: Progressing

## 2022-12-10 NOTE — ASSESSMENT & PLAN NOTE
· Hospital day 1 patient in atrial fibrillation with RVR - now improved   · Continue rate control with metoprolol 50 mg q12h   · Speech changed diet to purreed from NPO yesterday - ?  Swallow safe  · Echocardiogram 12/6 with LVEF 55%, normal wall motion, normal systolic/diastolic function, mild-moderately increased RV pressure

## 2022-12-10 NOTE — ASSESSMENT & PLAN NOTE
Presented with stroke like symptoms as above; MRI brain negative for stroke, consideration for TIA in the setting of ICA stenosis, see plans above   · Family noted that the patient was more confused than usual recently and they attributed it to higher dose of Lasix due to worsening leg edema  · Infectious workup/management as outlined above   · Appreciate neurology inputs regarding stroke like symptoms as above  · Mental status continues to wax and wane  · Dysphagia diet as per speech therapy   · Continue with supportive cares  · Discontinued restraints as of 12/6 - replaced on 12/8 but mostly because vEEG leads are bothersome and he pulls them off, not combative     · Replaced restraints as tried again 12/9  Will add rocephin to treat morganella - will consult ID  Unclear why patient more encephalopathic  - discussed again with neuro again - ID consult

## 2022-12-10 NOTE — PROGRESS NOTES
NEUROLOGY RESIDENCY PROGRESS NOTE     Name: Fortunato Andrade   Age & Sex: 82 y o  male   MRN: [id number]  Unit/Bed#: [facility] 719-01   Encounter: [id number]    Recommendations for outpatient neurological follow up have yet to be determined  Pending for discharge: Continued Metabolic Encephalopathy    ASSESSMENT & PLAN     * Stroke-like symptoms  Assessment & Plan  Assessment:  Fortunato Andrade is a 82 y o  male with a PMH of aFib (on eliquis), BPH, COPD, Gout, HTN, Basal cell adenocarcinoma of salviatory gland, PAD and PVD, who presents as a stoke alert    It is reported by EMS that the patient's Claressa Croissant was 12/4/2022 1600 after which there was acute onset left sided facial droop, left UA and LE weakness, and garbled speech  The patient is on ASA and Eliquis at baseline  His presenting BP was 133/88, glucose 121  His NIHSS on initial evaluation was 11  Initial CTH was not demonstrative of  acute intracranial abnormality, and showed table mild chronic microangiopathic changes within the brain  CTA HN showed atherosclerotic changes are seen within the carotid bifurcations and intracranial internal carotid arteries bilaterally  Suspected moderate stenosis of the right ICA origin and mild narrowing of the left carotid bifurcation  Intracranially there is also right greater than left stenosis, moderate on the left and mild on the right  The patient on subsequent examination was becoming much more distractible and was not able to cooperate with examination  He had difficulty following commands consistently  Imaging:  - CTH was not demonstrative of  acute intracranial abnormality, and showed table mild chronic microangiopathic changes within the brain   - CTA HN showed atherosclerotic changes are seen within the carotid bifurcations and intracranial internal carotid arteries bilaterally  Suspected moderate stenosis of the right ICA origin and mild narrowing of the left carotid bifurcation   Intracranially there is also right greater than left stenosis, moderate on the left and mild on the right   - MRI Brain wo: Motion degraded examination  No acute infarction, edema, or mass effect  Mild chronic microangiopathic ischemic changes   - TTE:  Left Ventricle: Left ventricular cavity size is normal  Wall thickness is mildly increased  There is concentric remodeling  The left ventricular ejection fraction is 65%  Systolic function is normal  Wall motion is normal  Diastolic function is normal   IVS: Septal motion is not well visualized  Right Ventricle: Right ventricular cavity size is mildly dilated  Systolic function is normal  Right Atrium: The atrium is mildly dilated  Mitral Valve: There is mild annular calcification  There is trace regurgitation  Tricuspid Valve: There is moderate regurgitation  The right ventricular systolic pressure is moderately elevated  The estimated right ventricular systolic pressure is 44 25 mmHg  - MRI Brain wwo: 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, mesial temporal lobe, internal capsule and cerebral peduncle  No mass effect or hemorrhagic conversion  Workup:  - A1C: 5 9  - Lipid panel: CHOL 150, , HDL 22, LDL 97  - Ammonia: 17  - 4-Plex: negative  - CXR: Probable small left effusion  Slightly limited but otherwise unremarkable examination   - UA w reflec sxope and cx: innumerable WBC, innumerable bacteria, innumerable RBCs, large leuks  - TEG Platelet mapping: Abnormal  - rEEG: Slowing of the posteriorly dominant rhythm suggests mild nonspecific diffuse cerebral dysfunction  - vEEG: Generalized slowing    Impression:  Considering the sudden onset of the symptoms and the imaging studies that are not suggestive of LVO, haemorrhage or infarct further workup is needed to determine the symptomatology   Imaging studies did reveal that there are atherosclerotic changes within the carotid bifurcations and intracranial internal carotid arteries bilaterally with both intracranial and extracranial ICA stenosis for which neurosurgery recommended medical management  Stroke is less likely with neuro imaging not demonstrative of such  Consider seizure post-ictal stage, and metabolic encephalopathy in the setting of a UTI is a consideration also  With interview with spouse, we know now that the patient has episodes at home where he would have changes in his DONALDO where he would 'space out' and fall asleep frequently throughout the day  Plan:  - Obtain STAT CTH wo for acute changes in mental status or neurologic examination and please notify neurology  - Stroke Pathway  --- ASA 81 QD  --- Atorvastatin 40mg QD  --- Continue Xarelto 20mg QHS  - Frequent neuro checks  - Telemetry  - Fall precautions  - Goals: Normotension, normothermia, euglycaemia  - PT/OT/ST/PMR as able  - Rest per primary team; appreciated! Acute metabolic encephalopathy  Assessment & Plan  Assessment:  During assessment after initial NIHSS and imaging studies were completed it was noted that the patient had more trouble focusing on conversations, had more tangential speech, and was not able to follow commands as before  On initial examination the patient kept perseverating on the numbers 9 and 14 which is the month and day respectively of his birthday  On examination 12/10/2022 the patient was too lethargic to cooperate and requested to be left alone to sleep  Impression:  Considering the elevated white count, hyponatremia, increased BUN, and increased CO2 an encephalopathy picture appears  A UTI with three organisms has been identified  Plan:  - Correct metabolic derangements  - Infectious workup recommended:   - UTI  --- >100K cfu/ml E  Faecalis  --- >100K cfu/ml E  Coli  --- 70K-79K cfu/ml M  morganii  - Currently monitoring off antimicrobials  - Low dose Quetiapine 12 5mg QHS, with 12 5mg PRN for agitation  - Per primary team; appreciated!     SUBJECTIVE     Patient was very sleepy on examination this morning  Multiple attempts were made with loud voice, clapping of hands, and sternal rub which the last only gave momentary wakefulness during which the patient stated "please let me sleep"  Further ROS questions were not replied to  It is reported that the patient had a great deal of trouble sleeping in the evening and early AM     Review of Systems   Unable to perform ROS: Other   Constitutional: Negative for fever  HENT: Negative for drooling, nosebleeds and sneezing  Eyes: Negative for discharge and redness  Respiratory: Negative for cough and wheezing  Cardiovascular: Negative for leg swelling  Gastrointestinal: Negative for anal bleeding, diarrhea and vomiting  Genitourinary: Negative for hematuria  Skin: Negative for pallor  Neurological: Negative for tremors and facial asymmetry  Psychiatric/Behavioral: Positive for sleep disturbance  OBJECTIVE     Patient ID: Soledad Davidson is a 82 y o  male  Vitals:    12/10/22 1335 12/10/22 1337 12/10/22 1517 12/10/22 2158   BP: 138/87 138/87 136/83 132/81   Pulse: (!) 113 (!) 113 88 (!) 117   Resp:       Temp:  98 7 °F (37 1 °C) 98 6 °F (37 °C) 97 9 °F (36 6 °C)   TempSrc:       SpO2: 95% 96% 94% 94%   Weight:       Height:          Temperature:   Temp (24hrs), Av 4 °F (36 9 °C), Min:97 9 °F (36 6 °C), Max:98 7 °F (37 1 °C)    Temperature: 97 9 °F (36 6 °C)      Physical Exam  Vitals and nursing note reviewed  Constitutional:       Comments: Very sleepy   HENT:      Head: Normocephalic and atraumatic  Nose: Nose normal    Eyes:      Extraocular Movements: EOM normal       Pupils: Pupils are unequal    Cardiovascular:      Rate and Rhythm: Normal rate  Pulses: Normal pulses  Skin:     General: Skin is warm  Neurological:      Deep Tendon Reflexes:      Reflex Scores:       Tricep reflexes are 2+ on the right side and 2+ on the left side         Bicep reflexes are 2+ on the right side and 2+ on the left side        Brachioradialis reflexes are 2+ on the right side and 2+ on the left side  Patellar reflexes are 1+ on the right side and 1+ on the left side  Achilles reflexes are 1+ on the right side and 1+ on the left side  Neurologic Exam     Mental Status   Oriented to person  Disoriented to place  Disoriented to year, month and date  Follows 1 step commands  Attention: decreased  Concentration: decreased  Level of consciousness: responsive to painful stimuli ,  drowsy  Unable to perform simple calculations  Able to repeat  Patient was very sleepy on examination this morning  Multiple attempts were made with loud voice, clapping of hands, and sternal rub which the last only gave momentary wakefulness during which the patient stated "please let me sleep" Rest of MS examination unable to be completed  Cranial Nerves     CN II   Right visual field deficit: upper nasal and lower nasal quadrant(s)  Left visual field deficit: upper temporal and lower temporal quadrant(s)    CN III, IV, VI   Extraocular motions are normal    Pupils: unequal  Right pupil: Size: 2 mm  Reactivity: sluggish  Left pupil: Size: 3 mm  Reactivity: sluggish  CN III: no CN III palsy  CN VI: no CN VI palsy  Nystagmus: none   Upgaze: normal  Downgaze: normal  Conjugate gaze: present    CN V   Right facial sensation deficit: none  Left facial sensation deficit: none    CN VII   Right facial weakness: none  Left facial weakness: central    CN VIII   Hearing: impaired    CN IX, X   Palate: symmetric    CN XI   Right sternocleidomastoid strength: normal  Left sternocleidomastoid strength: normal  Right trapezius strength: normal  Left trapezius strength: normal    CN XII   Tongue: not atrophic  Fasciculations: absent  Tongue deviation: none  Patient was very sleepy on examination this morning   Multiple attempts were made with loud voice, clapping of hands, and sternal rub which the last only gave momentary wakefulness during which the patient stated "please let me sleep" Rest of CN  examination unable to be completed  2022 Repeat examination by attending revealed a left visual field cut  L Homonymous hemianopsia     Motor Exam   Muscle bulk: normal  Overall muscle tone: normal  Right arm tone: normal  Left arm tone: normal  Right leg tone: normal  Left leg tone: normal    Strength   Right deltoid: 5/5  Left deltoid: 4/5  Right biceps: 5/5  Left biceps: 4/5  Right triceps: 5/5  Left triceps: 4/5  Right wrist flexion: 5/5  Left wrist flexion: 4/5  Right wrist extension: 5/5  Left wrist extension: 4/5  Right interossei: 5/5  Left interossei: 4/5  Right iliopsoas: 5/5  Left iliopsoas: 3/5  Right quadriceps: 5/5  Left quadriceps: 3/5  Right hamstrin/5  Left hamstring: 3/5  Right anterior tibial: 5/5  Right posterior tibial: 5/5  Right gastroc: 5/5  Patient was very sleepy on examination this morning  Multiple attempts were made with loud voice, clapping of hands, and sternal rub which the last only gave momentary wakefulness during which the patient stated "please let me sleep" Rest of Motor examination unable to be completed other than patient was able to withdraw from nox stimuli well in bilateral upper extremities (4/5) and less so in the lower extremities LLE (2/5), RLE (3/5)     Sensory Exam   Light touch normal    Patient was very sleepy on examination this morning   Multiple attempts were made with loud voice, clapping of hands, and sternal rub which the last only gave momentary wakefulness during which the patient stated "please let me sleep" Rest of Motor examination unable to be completed other than patient was able to withdraw from nox stimuli well in bilateral upper extremities (4/5) and less so in the lower extremities LLE (2/5), RLE (3/5)     Gait, Coordination, and Reflexes     Reflexes   Right brachioradialis: 2+  Left brachioradialis: 2+  Right biceps: 2+  Left biceps: 2+  Right triceps: 2+  Left triceps: 2+  Right patellar: 1+  Left patellar: 1+  Right achilles: 1+  Left achilles: 1+  Right plantar: equivocal  Left plantar: equivocal  Right ankle clonus: absent  Left ankle clonus: absent  - Gait not assessed, risk of falling  LABORATORY DATA     Labs: I have personally reviewed pertinent reports  Results from last 7 days   Lab Units 12/10/22  1310 12/09/22  0404 12/08/22  1736   WBC Thousand/uL 8 94 8 21 8 53   HEMOGLOBIN g/dL 13 2 13 8 13 0   HEMATOCRIT % 42 8 45 8 41 3   PLATELETS Thousands/uL 267 245 245   NEUTROS PCT % 78* 79*  --    MONOS PCT % 9 9  --       Results from last 7 days   Lab Units 12/10/22  1310 12/09/22  0503 12/09/22  0431 12/07/22  0520 12/06/22  0515   SODIUM mmol/L 146  --  142 139 141   POTASSIUM mmol/L 3 8  --  4 0 3 9 3 7   CHLORIDE mmol/L 113*  --  110* 106 104   CO2 mmol/L 27  --  28 26 31   BUN mg/dL 16  --  15 18 19   CREATININE mg/dL 0 84  --  0 71 0 75 0 86   CALCIUM mg/dL 9 5  --  9 3 10 1 9 4   ALK PHOS U/L  --  111  --   --  111   ALT U/L  --  33  --   --  30   AST U/L  --  40  --   --  29              Results from last 7 days   Lab Units 12/10/22  1310 12/10/22  0122 12/09/22  2117 12/09/22  0209 12/08/22  1736 12/05/22  2249 12/05/22  1540 12/04/22  1730   INR   --   --   --   --  1 21*  --  1 57* 1 23*   PTT seconds 56* 67* 59*   < > 35   < > 43* 38*    < > = values in this interval not displayed  Results from last 7 days   Lab Units 12/10/22  1310   LACTIC ACID mmol/L 1 4           IMAGING & DIAGNOSTIC TESTING     Radiology Results: I have personally reviewed pertinent reports  MRI brain wo contrast   Final Result by Jerald Coombs MD (12/08 0108)      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, mesial temporal lobe, internal capsule and cerebral peduncle  No mass effect or hemorrhagic conversion  The study was marked in [facility] for immediate notification        Workstation performed: [id number]         FL barium swallow video w speech   Final Result by DAVID ALCALA (12/07 1414)      XR chest pa & lateral   Final Result by Vicki Duarte MD (12/07 0830)      No acute cardiopulmonary disease  Workstation performed: [id number]         VAS lower limb arterial duplex, limited/unilateral   Final Result by Prudencio Bruner MD (12/06 1310)      XR heel / calcaneus 2+ vw left   Final Result by Kerry Aldrich MD (12/06 1046)      Soft tissue swelling  No evidence of acute osteomyelitis  If osteomyelitis remains a clinical concern, consider further evaluation with MRI  Workstation performed: [id number]         MRI brain wo contrast   Final Result by Avani Rodriguez MD (12/05 8497)   Motion degraded examination  No acute infarction, edema, or mass effect  Mild chronic microangiopathic ischemic changes  Workstation performed: [id number]         XR chest portable   Final Result by Marisa Richmond MD (42/33 9239)      Probable small left effusion  Slightly limited but otherwise unremarkable examination  Workstation performed: [id number]         CTA stroke alert (head/neck)   Final Result by Lico Alva DO (12/04 3618)      Unfortunately this examination is significantly limited due to patient motion  Atherosclerotic changes are seen within the carotid bifurcations and intracranial internal carotid arteries bilaterally  Suspected moderate stenosis of the right ICA origin and mild narrowing of the left carotid bifurcation  Intracranially there is    also right greater than left stenosis, moderate on the left and mild on the right      No occlusive disease or thrombosis identified              Findings were directly discussed with neurology resident at approximately 5:40 PM                            Workstation performed: [id number]         CT stroke alert brain   Final Result by Cici Colmenares DO (12/04 4155)      No acute intracranial abnormality  Stable mild chronic microangiopathic changes within the brain  Findings were directly discussed with neurology resident at 5:40 PM       Workstation performed: [id number]           Other Diagnostic Testing: I have personally reviewed pertinent reports  ACTIVE MEDICATIONS     Current Facility-Administered Medications   Medication Dose Route Frequency   • acetaminophen (TYLENOL) tablet 975 mg  975 mg Oral Q6H PRN   • albuterol (PROVENTIL HFA,VENTOLIN HFA) inhaler 2 puff  2 puff Inhalation Q4H PRN   • allopurinol (ZYLOPRIM) tablet 100 mg  100 mg Oral Daily   • aspirin chewable tablet 81 mg  81 mg Oral Daily   • atorvastatin (LIPITOR) tablet 40 mg  40 mg Oral QPM   • cholecalciferol (VITAMIN D3) tablet 400 Units  400 Units Oral Daily   • dextromethorphan-guaiFENesin (ROBITUSSIN DM) oral syrup 10 mL  10 mL Oral Q4H PRN   • docusate sodium (COLACE) capsule 100 mg  100 mg Oral BID   • lactulose oral solution 10 g  10 g Oral BID   • metoprolol (LOPRESSOR) injection 5 mg  5 mg Intravenous Q8H ROYER   • nicotine (NICODERM CQ) 14 mg/24hr TD 24 hr patch 14 mg  14 mg Transdermal Daily   • ondansetron (ZOFRAN) injection 4 mg  4 mg Intravenous Q6H PRN   • polyethylene glycol (MIRALAX) packet 17 g  17 g Oral Daily   • QUEtiapine (SEROquel) tablet 12 5 mg  12 5 mg Oral HS   • QUEtiapine (SEROquel) tablet 12 5 mg  12 5 mg Oral HS PRN   • rivaroxaban (XARELTO) tablet 20 mg  20 mg Oral Daily With Dinner     Prior to Admission medications    Medication Sig Start Date End Date Taking?  Authorizing Provider   colchicine (COLCRYS) 0 6 mg tablet Take 0 6 mg by mouth daily   Yes Historical Provider, MD   furosemide (LASIX) 40 mg tablet Take 40 mg by mouth daily   Yes Historical Provider, MD   potassium chloride (KLOR-CON) 20 mEq packet Take 20 mEq by mouth 2 (two) times a day   Yes Historical Provider, MD   acetaminophen (TYLENOL) 325 mg tablet Take 3 tablets (975 mg total) by mouth every 8 (eight) hours 12/9/19   Tere Medina PA-C   allopurinol (ZYLOPRIM) 100 mg tablet Take 100 mg by mouth daily    Historical Provider, MD   apixaban (ELIQUIS) 5 mg Take 5 mg by mouth 2 (two) times a day    Historical Provider, MD   aspirin (ECOTRIN LOW STRENGTH) 81 mg EC tablet Take 81 mg by mouth daily    Historical Provider, MD   cholecalciferol (VITAMIN D3) 400 units tablet Take 400 Units by mouth daily    Historical Provider, MD   docusate sodium (COLACE) 100 mg capsule Take 1 capsule (100 mg total) by mouth 2 (two) times a day 12/9/19   Jessenia Shaw PA-C   metoprolol tartrate (LOPRESSOR) 50 mg tablet Take 50 mg by mouth every 12 (twelve) hours    Historical Provider, MD   vitamin A 10,000 units capsule Take 10,000 Units by mouth daily    Historical Provider, MD     VTE Pharmacologic Prophylaxis: Rivaroxaban (Xarelto)  VTE Mechanical Prophylaxis: sequential compression device    ==  DO Mayra Perez 75 Neurology Residency, PGY-2

## 2022-12-10 NOTE — CONSULTS
Consultation - Infectious Disease   Yuni Pittman 82 y o  male MRN: [id number]  Unit/Bed#: [facility] 714-78 Encounter: [id number]      IMPRESSION & RECOMMENDATIONS:   Impression/Recommendations:  1  Bacteriuria  Urine culture grew multiple organisms including Enterococcus, E coli, Morganella, Proteus  These organisms likely reflect chronic bladder colonization in the setting of known diagnosis of BPH  Patient remains without fevers or other signs of sepsis  WBC count was 10 2 initially and quickly normalized  Patient has received total of 6 days of IV antibiotics thus far, which are covering organisms isolated in urine culture  If UTI was the etiology of persistent encephalopathy, we would have seen improvement in mentation by now  Will observe off anymore antibiotics     -discontinue antibiotics  -check PVRs  -follow-up temperatures and CBC    2  Pressure ulceration left heel, POA  X-ray negative for osteomyelitis  Podiatry input noted with ongoing low suspicion for acute infection     -no antibiotic indicated  -daily local wound care, frequent offloading  -serial exams    3  Lateral left leg DTI  Follows with outpatient wound care  Podiatry/vascular surgery input noted with ongoing low suspicion for acute infection     -no antibiotic indicated  -daily local wound care  -serial exams    4  Acute encephalopathy, with stroke-like symptoms  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  Infectious workup has been unremarkable, as above  Low clinical suspicion for primary CNS infection  Patient has remained afebrile and clinically stable from ID standpoint     -no further antibiotic, as above  -neurology follow-up ongoing  -follow mentation  -serial neuro exams      Antibiotics:  Antibiotic 6  Ampicillin 4  Ceftriaxone 5    I discussed above plan with Dr Nona Leon from Internal Medicine Service  I personally reviewed prior outpatient medical records  Thank you for this consultation    We will follow along with you  HISTORY OF PRESENT ILLNESS:  Reason for Consult:  Bacteriuria    HPI: Francis Hightower is a 82 y o  male with atrial fibrillation,PAD, chronic venous stasis with chronic left leg wound managed by outpatient wound care, BPH who presented on  with stroke-like symptoms including gargles speech, left-sided weakness  Patient has been evaluated by Neurology and Neurosurgery  MRI brain showed 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  Patient was also evaluated by Podiatry and vascular surgery with low clinical suspicion for wound infection  Patient has been receiving antibiotics due to abnormal urinalysis and positive urine culture which isolated multiple organisms including Enterococcus, E coli, Morganella and Proteus  Patient is incontinent to urine  He denies dysuria, pelvic or flank pain  No fevers, chills or other focal complaints  REVIEW OF SYSTEMS:  Limited due to confusion but does answer basic questions regarding ROS appropriately  PAST MEDICAL HISTORY:  Past Medical History:   Diagnosis Date   • [facility] ([facility] )    • Cancer ([facility] )    • Hypertension      Past Surgical History:   Procedure Laterality Date   • REPLACEMENT TOTAL HIP W/  RESURFACING IMPLANTS         FAMILY HISTORY:  Non-contributory    SOCIAL HISTORY:  Social History     Substance and Sexual Activity   Alcohol Use Not Currently     Social History     Substance and Sexual Activity   Drug Use Not Currently     Social History     Tobacco Use   Smoking Status Never   Smokeless Tobacco Never       ALLERGIES:  Allergies   Allergen Reactions   • Balsam Dermatitis   • Neomycin-Bacitracin Zn-Polymyx Hives and Rash       MEDICATIONS:  All current active medications have been reviewed      PHYSICAL EXAM:  Vitals:  Temp:  [97 8 °F (36 6 °C)-98 2 °F (36 8 °C)] 98 2 °F (36 8 °C)  HR:  [] 105  Resp:  [19] 19  BP: (124-144)/(80-93) 143/89  SpO2:  [93 %-98 %] 98 %  Temp (24hrs), Av °F (36 7 °C), Min:97 8 °F (36 6 °C), Max:98 2 °F (36 8 °C)  Current: Temperature: 98 2 °F (36 8 °C)     Physical Exam:  General:  Awake and alert but confused, does answer basic questions  Eyes:  Conjunctive clear with no hemorrhages or effusions  Oropharynx:  No ulcers, no lesions  Neck:  Supple, no lymphadenopathy  Lungs:  Nonlabored respirations  Abdomen:  Soft, non-tender, non-distended  Extremities:  No peripheral cyanosis, clubbing, or edema  Skin:  Media images of wounds were personally reviewed  Bilateral lower extremity venous stasis dermatitis  Neurological:  Moves all four extremities spontaneously, no meningismus    LABS, IMAGING, & OTHER STUDIES:  Lab Results:  I have personally reviewed pertinent labs  Results from last 7 days   Lab Units 12/09/22  0503 12/09/22  0431 12/07/22  0520 12/06/22  0515   POTASSIUM mmol/L  --  4 0 3 9 3 7   CHLORIDE mmol/L  --  110* 106 104   CO2 mmol/L  --  28 26 31   BUN mg/dL  --  15 18 19   CREATININE mg/dL  --  0 71 0 75 0 86   EGFR ml/min/1 73sq m  --  85 83 79   CALCIUM mg/dL  --  9 3 10 1 9 4   AST U/L 40  --   --  29   ALT U/L 33  --   --  30   ALK PHOS U/L 111  --   --  111     Results from last 7 days   Lab Units 12/09/22  0404 12/08/22  1736 12/07/22  0520   WBC Thousand/uL 8 21 8 53 9 71   HEMOGLOBIN g/dL 13 8 13 0 13 7   PLATELETS Thousands/uL 245 245 243     Results from last 7 days   Lab Units 12/04/22  2214   URINE CULTURE  >100,000 cfu/ml Enterococcus faecalis*  >100,000 cfu/ml Escherichia coli*  70,000-79,000 cfu/ml Morganella morganii*  20,000-29,000 cfu/ml Proteus species*       Imaging Studies:   I have personally reviewed pertinent imaging study reports and images in PACS  Chest x-ray shows probable small effusion  Otherwise unremarkable  Brain MRI shows 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 and right parietal temporal periventricular white matter, mesial temporal lobe, internal capsule and cerebral peduncle      Left heel x-ray shows soft tissue swelling with no osteomyelitis  EKG, Pathology, and Other Studies:   I have personally reviewed pertinent reports

## 2022-12-10 NOTE — PLAN OF CARE
Problem: DISCHARGE PLANNING  Goal: Discharge to home or other facility with appropriate resources  Description: INTERVENTIONS:  - Identify barriers to discharge w/patient and caregiver  - Arrange for needed discharge resources and transportation as appropriate  - Identify discharge learning needs (meds, wound care, etc )  - Arrange for interpretive services to assist at discharge as needed  - Refer to Case Management Department for coordinating discharge planning if the patient needs post-hospital services based on physician/advanced practitioner order or complex needs related to functional status, cognitive ability, or social support system  Outcome: Progressing     Problem: Knowledge Deficit  Goal: Patient/family/caregiver demonstrates understanding of disease process, treatment plan, medications, and discharge instructions  Description: Complete learning assessment and assess knowledge base  Interventions:  - Provide teaching at level of understanding  - Provide teaching via preferred learning methods  Outcome: Progressing     Problem: Neurological Deficit  Goal: Neurological status is stable or improving  Description: Interventions:  - Monitor and assess patient's level of consciousness, motor function, sensory function, and level of assistance needed for ADLs  - Monitor and report changes from baseline  Collaborate with interdisciplinary team to initiate plan and implement interventions as ordered  - Provide and maintain a safe environment  - Consider seizure precautions  - Consider fall precautions  - Consider aspiration precautions  - Consider bleeding precautions  Outcome: Progressing     Problem: Activity Intolerance/Impaired Mobility  Goal: Mobility/activity is maintained at optimum level for patient  Description: Interventions:  - Assess and monitor patient  barriers to mobility and need for assistive/adaptive devices  - Assess patient's emotional response to limitations    - Collaborate with interdisciplinary team and initiate plans and interventions as ordered  - Encourage independent activity per ability   - Maintain proper body alignment  - Perform active/passive rom as tolerated/ordered  - Plan activities to conserve energy   - Turn patient as appropriate  Outcome: Progressing     Problem: Communication Impairment  Goal: Ability to express needs and understand communication  Description: Assess patient's communication skills and ability to understand information  Patient will demonstrate use of effective communication techniques, alternative methods of communication and understanding even if not able to speak  - Encourage communication and provide alternate methods of communication as needed  - Collaborate with case management/ for discharge needs  - Include patient/family/caregiver in decisions related to communication  Outcome: Progressing     Problem: Potential for Aspiration  Goal: Non-ventilated patient's risk of aspiration is minimized  Description: Assess and monitor vital signs, respiratory status, and labs (WBC)  Monitor for signs of aspiration (tachypnea, cough, rales, wheezing, cyanosis, fever)  - Assess and monitor patient's ability to swallow  - Place patient up in chair to eat if possible  - HOB up at 90 degrees to eat if unable to get patient up into chair   - Supervise patient during oral intake  - Instruct patient/ family to take small bites  - Instruct patient/ family to take small single sips when taking liquids    - Follow patient-specific strategies generated by speech pathologist   Outcome: Progressing     Problem: MOBILITY - ADULT  Goal: Maintain or return to baseline ADL function  Description: INTERVENTIONS:  -  Assess patient's ability to carry out ADLs; assess patient's baseline for ADL function and identify physical deficits which impact ability to perform ADLs (bathing, care of mouth/teeth, toileting, grooming, dressing, etc )  - Assess/evaluate cause of self-care deficits   - Assess range of motion  - Assess patient's mobility; develop plan if impaired  - Assess patient's need for assistive devices and provide as appropriate  - Encourage maximum independence but intervene and supervise when necessary  - Involve family in performance of ADLs  - Assess for home care needs following discharge   - Consider OT consult to assist with ADL evaluation and planning for discharge  - Provide patient education as appropriate  Outcome: Progressing  Goal: Maintains/Returns to pre admission functional level  Description: INTERVENTIONS:  - Perform BMAT or MOVE assessment daily    - Set and communicate daily mobility goal to care team and patient/family/caregiver  - Collaborate with rehabilitation services on mobility goals if consulted  - Perform Range of Motion times a day  - Reposition patient every  hours    - Dangle patient  times a day  - Stand patient  times a day  - Ambulate patient  times a day  - Out of bed to chair  times a day   - Out of bed for meals  times a day  - Out of bed for toileting  - Record patient progress and toleration of activity level   Outcome: Progressing     Problem: Prexisting or High Potential for Compromised Skin Integrity  Goal: Skin integrity is maintained or improved  Description: INTERVENTIONS:  - Identify patients at risk for skin breakdown  - Assess and monitor skin integrity  - Assess and monitor nutrition and hydration status  - Monitor labs   - Assess for incontinence   - Turn and reposition patient  - Assist with mobility/ambulation  - Relieve pressure over bony prominences  - Avoid friction and shearing  - Provide appropriate hygiene as needed including keeping skin clean and dry  - Evaluate need for skin moisturizer/barrier cream  - Collaborate with interdisciplinary team   - Patient/family teaching  - Consider wound care consult   Outcome: Progressing     Problem: Nutrition/Hydration-ADULT  Goal: Nutrient/Hydration intake appropriate for improving, restoring or maintaining nutritional needs  Description: Monitor and assess patient's nutrition/hydration status for malnutrition  Collaborate with interdisciplinary team and initiate plan and interventions as ordered  Monitor patient's weight and dietary intake as ordered or per policy  Utilize nutrition screening tool and intervene as necessary  Determine patient's food preferences and provide high-protein, high-caloric foods as appropriate       INTERVENTIONS:  - Monitor oral intake, urinary output, labs, and treatment plans  - Assess nutrition and hydration status and recommend course of action  - Evaluate amount of meals eaten  - Assist patient with eating if necessary   - Allow adequate time for meals  - Recommend/ encourage appropriate diets, oral nutritional supplements, and vitamin/mineral supplements  - Order, calculate, and assess calorie counts as needed  - Recommend, monitor, and adjust tube feedings and TPN/PPN based on assessed needs  - Assess need for intravenous fluids  - Provide specific nutrition/hydration education as appropriate  - Include patient/family/caregiver in decisions related to nutrition  Outcome: Progressing     Problem: PAIN - ADULT  Goal: Verbalizes/displays adequate comfort level or baseline comfort level  Description: Interventions:  - Encourage patient to monitor pain and request assistance  - Assess pain using appropriate pain scale  - Administer analgesics based on type and severity of pain and evaluate response  - Implement non-pharmacological measures as appropriate and evaluate response  - Consider cultural and social influences on pain and pain management  - Notify physician/advanced practitioner if interventions unsuccessful or patient reports new pain  Outcome: Progressing     Problem: INFECTION - ADULT  Goal: Absence or prevention of progression during hospitalization  Description: INTERVENTIONS:  - Assess and monitor for signs and symptoms of infection  - Monitor lab/diagnostic results  - Monitor all insertion sites, i e  indwelling lines, tubes, and drains  - Monitor endotracheal if appropriate and nasal secretions for changes in amount and color  - [place] appropriate cooling/warming therapies per order  - Administer medications as ordered  - Instruct and encourage patient and family to use good hand hygiene technique  - Identify and instruct in appropriate isolation precautions for identified infection/condition  Outcome: Progressing     Problem: Potential for Falls  Goal: Patient will remain free of falls  Description: INTERVENTIONS:  - Educate patient/family on patient safety including physical limitations  - Instruct patient to call for assistance with activity   - Consult OT/PT to assist with strengthening/mobility   - Keep Call bell within reach  - Keep bed low and locked with side rails adjusted as appropriate  - Keep care items and personal belongings within reach  - Initiate and maintain comfort rounds  - Make Fall Risk Sign visible to staff  - Offer Toileting every  Hours, in advance of need  - Initiate/Maintain alarm  - Obtain necessary fall risk management equipment:   - Apply yellow socks and bracelet for high fall risk patients  - Consider moving patient to room near nurses station  Outcome: Progressing     Problem: SKIN/TISSUE INTEGRITY - ADULT  Goal: Pressure injury heals and does not worsen  Description: Interventions:  - Implement low air loss mattress or specialty surface (Criteria met)  - Apply silicone foam dressing  - Instruct/assist with weight shifting every  minutes when in chair   - Limit chair time to  hour intervals  - Use special pressure reducing interventions such as  when in chair   - Apply fecal or urinary incontinence containment device   - Perform passive or active ROM every   - Turn and reposition patient & offload bony prominences every  hours   - Utilize friction reducing device or surface for transfers   - Consider consults to  interdisciplinary teams such as   - Use incontinent care products after each incontinent episode such as  - Consider nutrition services referral as needed  Outcome: Progressing     Problem: SAFETY,RESTRAINT: NV/NON-SELF DESTRUCTIVE BEHAVIOR  Goal: Remains free of harm/injury (restraint for non violent/non self-detsructive behavior)  Description: INTERVENTIONS:  - Instruct patient/family regarding restraint use   - Assess and monitor physiologic and psychological status   - Provide interventions and comfort measures to meet assessed patient needs   - Identify and implement measures to help patient regain control  - Assess readiness for release of restraint   Outcome: Progressing  Goal: Returns to optimal restraint-free functioning  Description: INTERVENTIONS:  - Assess the patient's behavior and symptoms that indicate continued need for restraint  - Identify and implement measures to help patient regain control  - Assess readiness for release of restraint   Outcome: Progressing     Problem: COPING  Goal: Pt/Family able to verbalize concerns and demonstrate effective coping strategies  Description: INTERVENTIONS:  - Assist patient/family to identify coping skills, available support systems and cultural and spiritual values  - Provide emotional support, including active listening and acknowledgement of concerns of patient and caregivers  - Reduce environmental stimuli, as able  - Provide patient education  - Assess for spiritual pain/suffering and initiate spiritual care, including notification of Pastoral Care or beau based community as needed  - Assess effectiveness of coping strategies  Outcome: Progressing  Goal: Will report anxiety at manageable levels  Description: INTERVENTIONS:  - Administer medication as ordered  - Teach and encourage coping skills  - Provide emotional support  - Assess patient/family for anxiety and ability to cope  Outcome: Progressing Problem: CONFUSION/THOUGHT DISTURBANCE  Goal: Thought disturbances (confusion, delirium, depression, dementia or psychosis) are managed to maintain or return to baseline mental status and functional level  Description: INTERVENTIONS:  - Assess for possible contributors to  thought disturbance, including but not limited to medications, infection, impaired vision or hearing, underlying metabolic abnormalities, dehydration, respiratory compromise,  psychiatric diagnoses and notify attending PHYSICAN/AP  - Monitor and intervene to maintain adequate nutrition, hydration, elimination, sleep and activity  - Decrease environmental stimuli, including noise as appropriate  - Provide frequent contacts to provide refocusing, direction and reassurance as needed  Approach patient calmly with eye contact and at their level  - [place] high risk fall precautions, aspiration precautions and other safety measures, as indicated  - If delirium suspected, notify physician/AP of change in condition and request immediate in-person evaluation  - Pursue consults as appropriate including Geriatric (campus dependent), OT for cognitive evaluation/activity planning, psychiatric, pastoral care, etc   Outcome: Progressing     Problem: BEHAVIOR  Goal: Pt/Family maintain appropriate behavior and adhere to behavioral management agreement, if implemented  Description: INTERVENTIONS:  - Assess the family dynamic   - Encourage verbalization of thoughts and concerns in a socially appropriate manner  - Assess patient/family's coping skills and non-compliant behavior (including use of illegal substances)  - Utilize positive, consistent limit setting strategies supporting safety of patient, staff and others  - Initiate consult with Case Management, Spiritual Care or other ancillary services as appropriate  - If a patient's/visitor's behavior jeopardizes the safety of the patient, staff, or others, refer to organization procedure     - Notify Security of behavior or suspected illegal substances which indicate the need for search of the patient and/or belongings  - Encourage participation in the decision making process about a behavioral management agreement; implement if patient meets criteria  Outcome: Progressing

## 2022-12-10 NOTE — ASSESSMENT & PLAN NOTE
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

## 2022-12-10 NOTE — PROGRESS NOTES
[address] [facility]  Progress Note - Laila Robles 1935, 82 y o  male MRN: [id number]  Unit/Bed#: [facility] 435-73 Encounter: [id number]  Primary Care Provider: Felicitas Nichole MD   Date and time admitted to hospital: 12/4/2022  5:26 PM    Acute cystitis without hematuria  Assessment & Plan  · Presenting with AMS and meeting sepsis criteria as above   · Urine culture preliminarily growing Enterococcus faecalis, E coli and Morganella morganii    · Got rocpehin 3 days   · Now on ampicillin for enterococcus  · May need ID input     PAD (peripheral artery disease) ([facility] [address] )  Assessment & Plan  · Follows with [facility]  · Most recent LEADs in May consistent with 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  · Repeat LEADs 12/6 significant for LLE > 75% stenosis of distal superficial femoral artery and diffuse disease throughout the remaining femoral-popliteal  · vessels  · Continue with ASA, statin    Oropharyngeal dysphagia  Assessment & Plan  · Recommended NPO yesterday   · IV heparin and metoprolol     Prediabetes  Assessment & Plan  Evidenced by A1c of 5 9%  · Monitor glucose on AM labs   · Encourage diet and lifestyle modifications once DC    Stenosis of both internal carotid arteries  Assessment & Plan  · CTA head/neck on admission (limited due to motion): Atherosclerotic changes are seen within the carotid bifurcations and intracranial internal carotid arteries bilaterally   Suspected moderate stenosis of the right ICA origin and mild narrowing of the left carotid bifurcation   Intracranially there is also right greater than left stenosis, moderate on the left and mild on the right    · Appreciate neurosurgery consult and recommendations   · No need for surgical intervention at this time   · Outpatient follow-up with repeat CTA in 2 weeks   · Continue asa rectal, statin when tolerating PO and heparin need neuro input regarding preferred heparin       SIRS vs Sepsis ([facility] [address] )  Assessment & Plan  POA with mild leukocytosis and tachycardia  Procalcitonin WNL at 0 15   · COVID/flu/RSV negative   · Portable CXR: Probable small left effusion   Slightly limited but otherwise unremarkable examination  · UA with nitrites, leukocytes and bacteria concerning for UTI  · Noted with left heel pressure ulcer on admission however no clinical signs of cellulitis, see related plan  · PA/lateral CXR: No acute cardiopulmonary disease   · Continue with IV ceftriaxone (day 4) pending final urine culture / sensitivities   · Trend temps, WBC, hemodynamics   · If concern for ongoing untreated infection - added rocphin     Pressure injury of deep tissue of left heel  Assessment & Plan  · POA, followed by wound care as outpatient  · No clinical signs of infection at this time   · Appreciate Podiatry consult and recommendations   · XR heel/calcaneous:  Soft tissue swelling   No evidence of acute osteomyelitis  · LEADs noted diffuse disease in LLE, see below   · No plans for intervention at this time   · Continue with local wound care, offloading     Acute metabolic encephalopathy  Assessment & Plan  Presented with stroke like symptoms as above; MRI brain negative for stroke, consideration for TIA in the setting of ICA stenosis, see plans above   · Family noted that the patient was more confused than usual recently and they attributed it to higher dose of Lasix due to worsening leg edema  · Infectious workup/management as outlined above   · Appreciate neurology inputs regarding stroke like symptoms as above  · Mental status continues to wax and wane  · Dysphagia diet as per speech therapy   · Continue with supportive cares  · Discontinued restraints as of 12/6 - replaced on 12/8 but mostly because vEEG leads are bothersome and he pulls them off, not combative     · Replaced restraints as tried again 12/9  Will add rocephin to treat morganella - will consult ID  Unclear why patient more encephalopathic  - discussed again with neuro again - ID consult         Nicotine dependence  Assessment & Plan  · Patient admits to smoking approx 1 ppd currently   · Suspect has underlying COPD   · Respiratory protocol   · Cessation counseling     Bilateral lower extremity edema  Assessment & Plan  Patient with bilateral lower extremity edema which appears to be longstanding and related to venous stasis, managed by [facility] cardiology  Recently edema was worsening and associated with weeping  Patient received 80 mg IV Lasix x 1 in office on 11/9 and again on 11/23  Subsequently prescribed higher dose Lasix 40 mg BID  · Lasix held on admission due to hyponatremia, continue to hold pending improvement in oral intake   · Continue with compression stockings   · Encourage LE elevation   · Monitor clinically     Atrial fibrillation [facility])  Assessment & Plan  · Hospital day 1 patient in atrial fibrillation with RVR - now improved   · Continue rate control with metoprolol 50 mg q12h   · Speech changed diet to purreed from NPO yesterday - ? Swallow safe  · Echocardiogram 12/6 with LVEF 55%, normal wall motion, normal systolic/diastolic function, mild-moderately increased RV pressure     Anterior dislocation of left shoulder  Assessment & Plan  · Injury occurred in December 2019, was following with  orthopedics and managed conservatively; seems to have been lost to follow-up with repeat XRs  · 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     * Stroke-like symptoms  Assessment & Plan  Patient presented via EMS as stroke alert due to concern for acute onset of left-sided weakness and garbled speech noticed by patient's wife  Patient is not a candidate for tPA due to bleeding risk    · Admitted under stroke pathway   · CTA head/neck negative for occlusion, but did note moderate R ICA and mild L ICA stenosis, see below   · MRI brain negative for acute infarct, but did note mild chronic microangiopathic ischemic changes  · Echocardiogram essentially unremarkable   · Lipid panel: cholesterol 150, , LDL 97  · Appreciate neurology consult and recommendations   · Continue with aspirin, statin, Eliquis   · P2Y12 checked -> 268  · Routine EEG without evidence of seizure   · Concern for possible TIA vs seizure and/or infectious/metabolic etiology   · See below regarding infectious workup/management   · PT/OT recommending rehab, PMR following  · Monitor neurochecks             VTE Pharmacologic Prophylaxis: VTE Score: 6 Moderate Risk (Score 3-4) - Pharmacological DVT Prophylaxis Ordered: heparin drip  Patient Centered Rounds: I performed bedside rounds with nursing staff today  Discussions with Specialists or Other Care Team Provider: discussed with neuro-     Education and Discussions with Family / Patient: Updated  (wife) via phone  Time Spent for Care: 30 minutes  More than 50% of total time spent on counseling and coordination of care as described above  Current Length of Stay: 6 day(s)  Current Patient Status: Inpatient   Certification Statement: The patient will continue to require additional inpatient hospital stay due to worsesning mental status   Discharge Plan: tbd     Code Status: Level 3 - DNAR and DNI    Subjective:   Patient seen and examined   Responding more slurred  Objective:     Vitals:   Temp (24hrs), Av °F (36 7 °C), Min:97 8 °F (36 6 °C), Max:98 2 °F (36 8 °C)    Temp:  [97 8 °F (36 6 °C)-98 2 °F (36 8 °C)] 98 2 °F (36 8 °C)  HR:  [] 105  Resp:  [19] 19  BP: (124-144)/(80-93) 143/89  SpO2:  [93 %-98 %] 98 %  Body mass index is 33 92 kg/m²  Input and Output Summary (last 24 hours):   No intake or output data in the 24 hours ending 12/10/22 1254    Physical Exam:   Physical Exam  Constitutional:       General: He is not in acute distress  Appearance: He is ill-appearing  He is not toxic-appearing     Eyes:      Pupils: Pupils are equal, round, and reactive to light  Cardiovascular:      Rate and Rhythm: Normal rate  Abdominal:      General: Abdomen is flat  Musculoskeletal:         General: No swelling, tenderness or deformity  Right lower leg: No edema  Left lower leg: No edema  Skin:     Capillary Refill: Capillary refill takes less than 2 seconds  Coloration: Skin is pale  Skin is not jaundiced  Findings: No bruising, erythema, lesion or rash  Neurological:      General: No focal deficit present  Mental Status: He is alert  Cranial Nerves: Cranial nerve deficit present  Sensory: No sensory deficit  Motor: Weakness present  Gait: Gait normal       Deep Tendon Reflexes: Reflexes normal           Additional Data:     Labs:  Results from last 7 days   Lab Units 12/09/22  0404   WBC Thousand/uL 8 21   HEMOGLOBIN g/dL 13 8   HEMATOCRIT % 45 8   PLATELETS Thousands/uL 245   NEUTROS PCT % 79*   LYMPHS PCT % 9*   MONOS PCT % 9   EOS PCT % 1     Results from last 7 days   Lab Units 12/09/22  0503 12/09/22  0431   SODIUM mmol/L  --  142   POTASSIUM mmol/L  --  4 0   CHLORIDE mmol/L  --  110*   CO2 mmol/L  --  28   BUN mg/dL  --  15   CREATININE mg/dL  --  0 71   ANION GAP mmol/L  --  4   CALCIUM mg/dL  --  9 3   ALBUMIN g/dL 2 1*  --    TOTAL BILIRUBIN mg/dL 0 45  --    ALK PHOS U/L 111  --    ALT U/L 33  --    AST U/L 40  --    GLUCOSE RANDOM mg/dL  --  95     Results from last 7 days   Lab Units 12/08/22  1736   INR  1 21*     Results from last 7 days   Lab Units 12/04/22  1728   POC GLUCOSE mg/dl 138     Results from last 7 days   Lab Units 12/04/22  1730   HEMOGLOBIN A1C % 5 9*     Results from last 7 days   Lab Units 12/05/22  0432   PROCALCITONIN ng/ml 0 15       Lines/Drains:  Invasive Devices     Peripheral Intravenous Line  Duration           Peripheral IV 12/09/22 Distal;Dorsal (posterior); Left Forearm 1 day    Peripheral IV 12/09/22 Right Antecubital 1 day          Drain  Duration           External Urinary Catheter Small 5 days                      Imaging: No pertinent imaging reviewed  Recent Cultures (last 7 days):   Results from last 7 days   Lab Units 12/04/22  2214   URINE CULTURE  >100,000 cfu/ml Enterococcus faecalis*  >100,000 cfu/ml Escherichia coli*  70,000-79,000 cfu/ml Morganella morganii*  20,000-29,000 cfu/ml Proteus species*       Last 24 Hours Medication List:   Current Facility-Administered Medications   Medication Dose Route Frequency Provider Last Rate   • acetaminophen  975 mg Oral Q6H PRN Caty Grijalva PA-C     • albuterol  2 puff Inhalation Q4H PRN Umair Govea MD     • allopurinol  100 mg Oral Daily Ania Denney MD     • ampicillin  2,000 mg Intravenous Q6H Caty Grijalva PA-C 2,000 mg (12/10/22 0810)   • aspirin  81 mg Oral Daily Ania Denney MD     • atorvastatin  40 mg Oral QPM Ania Denney MD     • cefTRIAXone  1,000 mg Intravenous Q24H Umair Govea MD 1,000 mg (12/09/22 1732)   • cholecalciferol  400 Units Oral Daily Ania Denney MD     • dextromethorphan-guaiFENesin  10 mL Oral Q4H PRN Caty Grijalva PA-C     • docusate sodium  100 mg Oral BID Ania Denney MD     • heparin (porcine)  3-24 Units/kg/hr (Order-Specific) Intravenous Titrated Kim Douglas DO 14 Units/kg/hr (12/10/22 0522)   • metoprolol  5 mg Intravenous [facility] Umair Govea MD     • nicotine  14 mg Transdermal Daily Caty Grijalva PA-C     • ondansetron  4 mg Intravenous Q6H PRN Ania Denney MD     • polyethylene glycol  17 g Oral Daily Ania Denney MD     • QUEtiapine  12 5 mg Oral HS Emilio Sykes MD     • QUEtiapine  12 5 mg Oral HS PRN Emilio Sykes MD     • rivaroxaban  20 mg Oral Daily With Dinner Kim Douglas DO          Today, Patient Was Seen By: Umair Govea MD    **Please Note: This note may have been constructed using a voice recognition system  **

## 2022-12-10 NOTE — SPEECH THERAPY NOTE
Speech/Language Pathology Progress Note    Patient Name: Fortunato Andrade  HMDRO'A Date: 12/10/2022    Subjective:  Pt asleep initially, able to wake for participation in ST      Objective:  Pt seen for dysphagia therapy at request of RN/physician due to concerns for worsening PO tolerance  Current diet is puree with honey thick liquids  Pt was seen at bedside with items from his lunch tray (HTL juice, pudding, apple sauce)  HTLs were given via spoon  Cueing was needed approx 50% of trials for pt to transfer and swallow there bolus rather than hold it in his mouth, but he was responsive to cueing  Swallows were delayed/weak  No overt s/s aspiration immediately following swallows, however pt did have an inconsistent moist cough which RN reported has been pt's baseline  Cough was non productive despite attempted suctioning  Bolus holding worsened with progression of trials - ? D/t satiety  Assessment:  Pt noted to have prolonged bolus holding and intermittent coughing, however cough was not consistent with swallowing  Pt did have MBS 12/7, not expected to benefit from repeat instrumentation at this time  Plan/Recommendations:  Discussed with RN  Current diet is judged to be appropriate, however may need to provide meals as tolerated (I e  hold trays if pt is unable to wake up or is not swallowing when cues are provided)  ST will continue to follow  Please reach out with any changes or concerns

## 2022-12-11 NOTE — PROGRESS NOTES
NEUROLOGY RESIDENCY PROGRESS NOTE     Name: Laila Robles   Age & Sex: 82 y o  male   MRN: [id number]  Unit/Bed#: [facility] 719-01   Encounter: [id number]    Laila Robles will need follow up in 4-6 weeks  with neurovascular Attending/resident or AP  He will not require outpatient neurological testing  Pending for discharge: Medical optimization    ASSESSMENT & PLAN     Acute metabolic encephalopathy  Assessment & Plan  Assessment:  During assessment after initial NIHSS and imaging studies were completed it was noted that the patient had more trouble focusing on conversations, had more tangential speech, and was not able to follow commands as before  On initial examination the patient kept perseverating on the numbers 9 and 14 which is the month and day respectively of his birthday  On examination 12/10/2022 the patient was too lethargic to cooperate and requested to be left alone to sleep  Impression:  Considering the elevated white count, hyponatremia, increased BUN, and increased CO2 an encephalopathy picture appears  A UTI with three organisms has been identified  Plan:  - Correct metabolic derangements  - Infectious workup recommended:   - UTI  --- >100K cfu/ml E  Faecalis  --- >100K cfu/ml E  Coli  --- 70K-79K cfu/ml M  morganii  - Currently monitoring off antimicrobials  - Low dose Quetiapine 12 5mg QHS, with 12 5mg PRN for agitation  - Per primary team; appreciated! * Stroke-like symptoms  Assessment & Plan  Assessment:  Laila Robles is a 82 y o  male with a PMH of aFib (on eliquis), BPH, COPD, Gout, HTN, Basal cell adenocarcinoma of salviatory gland, PAD and PVD, who presents as a stoke alert    It is reported by EMS that the patient's [place] Boyers was 12/4/2022 1600 after which there was acute onset left sided facial droop, left UA and LE weakness, and garbled speech  The patient is on ASA and Eliquis at baseline  His presenting BP was 133/88, glucose 121  His NIHSS on initial evaluation was 11   Initial CTH was not demonstrative of  acute intracranial abnormality, and showed table mild chronic microangiopathic changes within the brain  CTA HN showed atherosclerotic changes are seen within the carotid bifurcations and intracranial internal carotid arteries bilaterally  Suspected moderate stenosis of the right ICA origin and mild narrowing of the left carotid bifurcation  Intracranially there is also right greater than left stenosis, moderate on the left and mild on the right  The patient on subsequent examination was becoming much more distractible and was not able to cooperate with examination  He had difficulty following commands consistently  Imaging:  - CTH was not demonstrative of  acute intracranial abnormality, and showed table mild chronic microangiopathic changes within the brain   - CTA HN showed atherosclerotic changes are seen within the carotid bifurcations and intracranial internal carotid arteries bilaterally  Suspected moderate stenosis of the right ICA origin and mild narrowing of the left carotid bifurcation  Intracranially there is also right greater than left stenosis, moderate on the left and mild on the right   - MRI Brain wo: Motion degraded examination  No acute infarction, edema, or mass effect  Mild chronic microangiopathic ischemic changes   - TTE:  Left Ventricle: Left ventricular cavity size is normal  Wall thickness is mildly increased  There is concentric remodeling  The left ventricular ejection fraction is 65%  Systolic function is normal  Wall motion is normal  Diastolic function is normal   IVS: Septal motion is not well visualized  Right Ventricle: Right ventricular cavity size is mildly dilated  Systolic function is normal  Right Atrium: The atrium is mildly dilated  Mitral Valve: There is mild annular calcification  There is trace regurgitation  Tricuspid Valve: There is moderate regurgitation  The right ventricular systolic pressure is moderately elevated   The estimated right ventricular systolic pressure is 69 73 mmHg  - MRI Brain wwo: 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, mesial temporal lobe, internal capsule and cerebral peduncle  No mass effect or hemorrhagic conversion  Workup:  - A1C: 5 9  - Lipid panel: CHOL 150, , HDL 22, LDL 97  - Ammonia: 17  - 4-Plex: negative  - CXR: Probable small left effusion  Slightly limited but otherwise unremarkable examination   - UA w reflec sxope and cx: innumerable WBC, innumerable bacteria, innumerable RBCs, large leuks  - TEG Platelet mapping: Abnormal  - rEEG: Slowing of the posteriorly dominant rhythm suggests mild nonspecific diffuse cerebral dysfunction  - vEEG: Generalized slowing    Impression:  Considering the sudden onset of the symptoms and the imaging studies that are not suggestive of LVO, haemorrhage or infarct further workup is needed to determine the symptomatology  Imaging studies did reveal that there are atherosclerotic changes within the carotid bifurcations and intracranial internal carotid arteries bilaterally with both intracranial and extracranial ICA stenosis for which neurosurgery recommended medical management  Stroke is less likely with neuro imaging not demonstrative of such  Consider seizure post-ictal stage, and metabolic encephalopathy in the setting of a UTI is a consideration also  With interview with spouse, we know now that the patient has episodes at home where he would have changes in his DONALDO where he would 'space out' and fall asleep frequently throughout the day  Plan:  Primary team requested neurology to see and assess patient due to consistent worsening mentation  However, patient's mentation was overall improved per nursing and patient was alert and oriented x4  And appropriately followed commands and answered questions   Patient was quite sleepy however was arousable to verbal stimuli   -Will not get CT head stat at this time  -No new medical management  Same plan as below    - Obtain STAT [facility] wo for acute changes in mental status or neurologic examination and please notify neurology  - Stroke Pathway  --- ASA 81 QD  --- Atorvastatin 40mg QD  --- Continue Xarelto 20mg QHS  - Frequent neuro checks  - Telemetry  - Fall precautions  - Goals: Normotension, normothermia, euglycaemia  - PT/OT/ST/PMR as able  - Rest per primary team; appreciated! SUBJECTIVE     Patient was seen and examined  No acute events overnight  Patient slept well overnight  Patient was still sleepy this morning  However patient was alert and oriented x4  He followed commands and answers questions appropriately  Review of Systems   Constitutional: Negative for chills and fever  HENT: Negative for ear pain and sore throat  Eyes: Negative for pain and visual disturbance  Respiratory: Negative for cough and shortness of breath  Cardiovascular: Negative for chest pain and palpitations  Gastrointestinal: Negative for abdominal pain and vomiting  Genitourinary: Negative for dysuria and hematuria  Musculoskeletal: Negative for arthralgias and back pain  Skin: Negative for color change and rash  Neurological: Positive for weakness  Negative for seizures and syncope  All other systems reviewed and are negative  OBJECTIVE     Patient ID: Keyla Burroughs is a 82 y o  male  Vitals:    12/10/22 2158 22 0614 22 0752 22 1450   BP: 132/81 146/87 142/88 141/76   Pulse: (!) 117 101 96 (!) 106   Resp:   16    Temp: 97 9 °F (36 6 °C)  97 6 °F (36 4 °C) (!) 97 2 °F (36 2 °C)   TempSrc:       SpO2: 94% 94% 96% 95%   Weight:       Height:          Temperature:   Temp (24hrs), Av 6 °F (36 4 °C), Min:97 2 °F (36 2 °C), Max:97 9 °F (36 6 °C)    Temperature: (!) 97 2 °F (36 2 °C)      Physical Exam  Vitals and nursing note reviewed  Constitutional:       General: He is not in acute distress  Appearance: He is well-developed  Comments: Sleepy   HENT:      Head: Normocephalic and atraumatic  Nose: Nose normal    Eyes:      Extraocular Movements: Extraocular movements intact  Conjunctiva/sclera: Conjunctivae normal       Pupils: Pupils are equal, round, and reactive to light  Cardiovascular:      Rate and Rhythm: Normal rate  Pulmonary:      Effort: Pulmonary effort is normal  No respiratory distress  Skin:     General: Skin is warm and dry  Neurological:      Mental Status: He is oriented to person, place, and time  Psychiatric:         Speech: Speech is slurred  Neurologic Exam     Mental Status   Oriented to person, place, and time  Speech: slurred   Level of consciousness: arousable by verbal stimuli    Cranial Nerves     CN III, IV, VI   Pupils are equal, round, and reactive to light  CN V   Facial sensation intact  CN VII   Right facial weakness: none  Left facial weakness: central    CN VIII   CN VIII normal      CN IX, X   CN IX normal    CN X normal      CN XI   Right sternocleidomastoid strength: normal  Left sternocleidomastoid strength: weak    CN XII   CN XII normal    Could not assess cranial nerve fully due to patient being very sleepy  Motor Exam   Muscle bulk: normal  Overall muscle tone: normalLeft upper extremity 4/5  Right upper extremity 5/5  Left lower extremity cannot move  Right lower extremity 2/5     Sensory Exam   Light touch normal      Gait, Coordination, and Reflexes     Reflexes   Reflexes 2+ except as noted  Right plantar: equivocal  Left plantar: equivocalGait not assessed  Patient could not  with left hand  Right  normal         LABORATORY DATA     Labs: I have personally reviewed pertinent reports      Results from last 7 days   Lab Units 12/11/22  0532 12/10/22  1310 12/09/22  0404   WBC Thousand/uL 9 80 8 94 8 21   HEMOGLOBIN g/dL 13 3 13 2 13 8   HEMATOCRIT % 45 9 42 8 45 8   PLATELETS Thousands/uL 266 267 245   NEUTROS PCT %  --  78* 79*   MONOS PCT %  --  9 9      Results from last 7 days   Lab Units 12/10/22  1310 12/09/22  0503 12/09/22  0431 12/07/22  0520 12/06/22  0515   SODIUM mmol/L 146  --  142 139 141   POTASSIUM mmol/L 3 8  --  4 0 3 9 3 7   CHLORIDE mmol/L 113*  --  110* 106 104   CO2 mmol/L 27  --  28 26 31   BUN mg/dL 16  --  15 18 19   CREATININE mg/dL 0 84  --  0 71 0 75 0 86   CALCIUM mg/dL 9 5  --  9 3 10 1 9 4   ALK PHOS U/L  --  111  --   --  111   ALT U/L  --  33  --   --  30   AST U/L  --  40  --   --  29              Results from last 7 days   Lab Units 12/10/22  1310 12/10/22  0122 12/09/22  2117 12/09/22  0209 12/08/22  1736 12/05/22  2249 12/05/22  1540 12/04/22  1730   INR   --   --   --   --  1 21*  --  1 57* 1 23*   PTT seconds 56* 67* 59*   < > 35   < > 43* 38*    < > = values in this interval not displayed  Results from last 7 days   Lab Units 12/10/22  1310   LACTIC ACID mmol/L 1 4           IMAGING & DIAGNOSTIC TESTING     Radiology Results: I have personally reviewed pertinent reports  MRI brain wo contrast   Final Result by Frank Marsh MD (12/08 0108)      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, mesial temporal lobe, internal capsule and cerebral peduncle  No mass effect or hemorrhagic conversion  The study was marked in [facility] for immediate notification  Workstation performed: [id number]         FL barium swallow video w speech   Final Result by SYSTEMGENERATED, DOCUMENTATION (12/07 1414)      XR chest pa & lateral   Final Result by Chapis Gomes MD (12/07 0830)      No acute cardiopulmonary disease  Workstation performed: [id number]         VAS lower limb arterial duplex, limited/unilateral   Final Result by Genoveva Frankel MD (12/06 1310)      XR heel / calcaneus 2+ vw left   Final Result by Sarah Jones MD (12/06 1046)      Soft tissue swelling  No evidence of acute osteomyelitis        If osteomyelitis remains a clinical concern, consider further evaluation with MRI  Workstation performed: [id number]         MRI brain wo contrast   Final Result by Bismark Ortiz MD (12/05 1842)   Motion degraded examination  No acute infarction, edema, or mass effect  Mild chronic microangiopathic ischemic changes  Workstation performed: [id number]         XR chest portable   Final Result by Harvey Baugh MD (79/25 2648)      Probable small left effusion  Slightly limited but otherwise unremarkable examination  Workstation performed: [id number]         CTA stroke alert (head/neck)   Final Result by Lico Vergara DO (12/04 7432)      Unfortunately this examination is significantly limited due to patient motion  Atherosclerotic changes are seen within the carotid bifurcations and intracranial internal carotid arteries bilaterally  Suspected moderate stenosis of the right ICA origin and mild narrowing of the left carotid bifurcation  Intracranially there is    also right greater than left stenosis, moderate on the left and mild on the right      No occlusive disease or thrombosis identified  Findings were directly discussed with neurology resident at approximately 5:40 PM                            Workstation performed: [id number]         CT stroke alert brain   Final Result by Lico Vergara DO (12/04 2795)      No acute intracranial abnormality  Stable mild chronic microangiopathic changes within the brain  Findings were directly discussed with neurology resident at 5:40 PM       Workstation performed: [id number]             Other Diagnostic Testing: I have personally reviewed pertinent reports        ACTIVE MEDICATIONS     Current Facility-Administered Medications   Medication Dose Route Frequency   • acetaminophen (TYLENOL) tablet 975 mg  975 mg Oral Q6H PRN   • albuterol (PROVENTIL HFA,VENTOLIN HFA) inhaler 2 puff  2 puff Inhalation Q4H PRN   • allopurinol (ZYLOPRIM) tablet 100 mg  100 mg Oral Daily   • aspirin chewable tablet 81 mg  81 mg Oral Daily   • atorvastatin (LIPITOR) tablet 40 mg  40 mg Oral QPM   • cholecalciferol (VITAMIN D3) tablet 400 Units  400 Units Oral Daily   • dextromethorphan-guaiFENesin (ROBITUSSIN DM) oral syrup 10 mL  10 mL Oral Q4H PRN   • docusate sodium (COLACE) capsule 100 mg  100 mg Oral BID   • lactulose oral solution 10 g  10 g Oral BID   • metoprolol (LOPRESSOR) injection 5 mg  5 mg Intravenous Q8H [address]   • nicotine (NICODERM CQ) 14 mg/24hr TD 24 hr patch 14 mg  14 mg Transdermal Daily   • ondansetron (ZOFRAN) injection 4 mg  4 mg Intravenous Q6H PRN   • polyethylene glycol (MIRALAX) packet 17 g  17 g Oral Daily   • QUEtiapine (SEROquel) tablet 12 5 mg  12 5 mg Oral HS   • QUEtiapine (SEROquel) tablet 12 5 mg  12 5 mg Oral HS PRN   • rivaroxaban (XARELTO) tablet 20 mg  20 mg Oral Daily With Dinner       Prior to Admission medications    Medication Sig Start Date End Date Taking?  Authorizing Provider   colchicine (COLCRYS) 0 6 mg tablet Take 0 6 mg by mouth daily   Yes Historical Provider, MD   furosemide (LASIX) 40 mg tablet Take 40 mg by mouth daily   Yes Historical Provider, MD   potassium chloride (KLOR-CON) 20 mEq packet Take 20 mEq by mouth 2 (two) times a day   Yes Historical Provider, MD   acetaminophen (TYLENOL) 325 mg tablet Take 3 tablets (975 mg total) by mouth every 8 (eight) hours 12/9/19   Tere Jarquin PA-C   allopurinol (ZYLOPRIM) 100 mg tablet Take 100 mg by mouth daily    Historical Provider, MD   apixaban (ELIQUIS) 5 mg Take 5 mg by mouth 2 (two) times a day    Historical Provider, MD   aspirin (ECOTRIN LOW STRENGTH) 81 mg EC tablet Take 81 mg by mouth daily    Historical Provider, MD   cholecalciferol (VITAMIN D3) 400 units tablet Take 400 Units by mouth daily    Historical Provider, MD   docusate sodium (COLACE) 100 mg capsule Take 1 capsule (100 mg total) by mouth 2 (two) times a day 12/9/19   Margarita Shaw PA-C   metoprolol tartrate (LOPRESSOR) 50 mg tablet Take 50 mg by mouth every 12 (twelve) hours    Historical Provider, MD   vitamin A 10,000 units capsule Take 10,000 Units by mouth daily    Historical Provider, MD         VTE Pharmacologic Prophylaxis: Rivaroxaban (Xarelto)  VTE Mechanical Prophylaxis: sequential compression device    ==  Ma MD [facility], PGY-2

## 2022-12-11 NOTE — ASSESSMENT & PLAN NOTE
· Presenting with AMS and meeting sepsis criteria as above   · Urine culture preliminarily growing Enterococcus faecalis, E coli and Morganella morganii    · All abx stopped by neurology

## 2022-12-11 NOTE — PLAN OF CARE
Problem: Neurological Deficit  Goal: Neurological status is stable or improving  Description: Interventions:  - Monitor and assess patient's level of consciousness, motor function, sensory function, and level of assistance needed for ADLs  - Monitor and report changes from baseline  Collaborate with interdisciplinary team to initiate plan and implement interventions as ordered  - Provide and maintain a safe environment  - Consider seizure precautions  - Consider fall precautions  - Consider aspiration precautions  - Consider bleeding precautions  Outcome: Progressing     Problem: MOBILITY - ADULT  Goal: Maintain or return to baseline ADL function  Description: INTERVENTIONS:  -  Assess patient's ability to carry out ADLs; assess patient's baseline for ADL function and identify physical deficits which impact ability to perform ADLs (bathing, care of mouth/teeth, toileting, grooming, dressing, etc )  - Assess/evaluate cause of self-care deficits   - Assess range of motion  - Assess patient's mobility; develop plan if impaired  - Assess patient's need for assistive devices and provide as appropriate  - Encourage maximum independence but intervene and supervise when necessary  - Involve family in performance of ADLs  - Assess for home care needs following discharge   - Consider OT consult to assist with ADL evaluation and planning for discharge  - Provide patient education as appropriate  Outcome: Progressing  Goal: Maintains/Returns to pre admission functional level  Description: INTERVENTIONS:  - Perform BMAT or MOVE assessment daily    - Set and communicate daily mobility goal to care team and patient/family/caregiver     - Collaborate with rehabilitation services on mobility goals if consulted  - Out of bed for toileting  - Record patient progress and toleration of activity level   Outcome: Progressing

## 2022-12-11 NOTE — PROGRESS NOTES
12/11/22 1200   Clinical Encounter Type   Visited With Patient   Referral From Family   Amish Encounters   Amish Needs Prayer   Patient Spiritual Encounters   Spiritual Assessment   (n/a pt sleeping)     Referral from nurse via pt family for visit  Pt Latter day had a recent visit with a  and staff   Pt sleeping at the time of this 's visit   Pt remains on [address] visit list

## 2022-12-11 NOTE — ASSESSMENT & PLAN NOTE
· Follows with 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

## 2022-12-11 NOTE — PROGRESS NOTES
[address] [facility]  Progress Note - Kelvin Coad 1935, 82 y o  male MRN: [id number]  Unit/Bed#: [facility] 290-43 Encounter: [id number]  Primary Care Provider: Maik Braxton MD   Date and time admitted to hospital: 12/4/2022  5:26 PM    Acute cystitis without hematuria  Assessment & Plan  · Presenting with AMS and meeting sepsis criteria as above   · Urine culture preliminarily growing Enterococcus faecalis, E coli and Morganella morganii    · All abx stopped by ID    [facility]per[facility] artery [facility]) ([facility] )  Assessment & Plan  · Follows with [facility] outpatient  · Most recent LEADs in May consistent with 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  · Repeat LEADs 12/6 significant for LLE > 75% stenosis of distal superficial femoral artery and diffuse disease throughout the remaining femoral-popliteal  · vessels  · Continue with ASA, statin    Oropharyngeal dysphagia  Assessment & Plan  · Recommended NPO yesterday   · IV heparin and metoprolol     Prediabetes  Assessment & Plan  Evidenced by A1c of 5 9%  · Monitor glucose on AM labs   · Encourage diet and lifestyle modifications once DC    Stenosis of both internal carotid arteries  Assessment & Plan  · CTA head/neck on admission (limited due to motion): Atherosclerotic changes are seen within the carotid bifurcations and intracranial internal carotid arteries bilaterally   Suspected moderate stenosis of the right ICA origin and mild narrowing of the left carotid bifurcation   Intracranially there is also right greater than left stenosis, moderate on the left and mild on the right    · Appreciate neurosurgery consult and recommendations   · No need for surgical intervention at this time   · Outpatient follow-up with repeat CTA in 2 weeks   · Continue asa rectal, statin when tolerating PO and heparin need neuro input regarding preferred heparin       SIRS vs Sepsis ([facility] )  Assessment & Plan  POA with mild leukocytosis and tachycardia  Procalcitonin WNL at 0 15   · COVID/flu/RSV negative   · Portable CXR: Probable small left effusion   Slightly limited but otherwise unremarkable examination  · UA with nitrites, leukocytes and bacteria concerning for UTI  · Noted with left heel pressure ulcer on admission however no clinical signs of cellulitis, see related plan  · PA/lateral CXR: No acute cardiopulmonary disease   · Continue with IV ceftriaxone (day 4) pending final urine culture / sensitivities   · Trend temps, WBC, hemodynamics   Appreciate ID input yesterday - off all abx      Pressure injury of deep tissue of left heel  Assessment & Plan  · POA, followed by wound care as outpatient  · No clinical signs of infection at this time   · Appreciate Podiatry consult and recommendations   · XR heel/calcaneous:  Soft tissue swelling   No evidence of acute osteomyelitis  · LEADs noted diffuse disease in LLE, see below   · No plans for intervention at this time   · Continue with local wound care, offloading     Acute metabolic encephalopathy  Assessment & Plan  ID input noted  No metabolic derangement noted   Discussed with neurology not further imaging   Today patient is improving         Nicotine dependence  Assessment & Plan  · Patient admits to smoking approx 1 ppd currently   · Suspect has underlying COPD   · Respiratory protocol   · Cessation counseling     Bilateral lower extremity edema  Assessment & Plan  Patient with bilateral lower extremity edema which appears to be longstanding and related to venous stasis, managed by [facility] cardiology  Recently edema was worsening and associated with weeping  Patient received 80 mg IV Lasix x 1 in office on 11/9 and again on 11/23  Subsequently prescribed higher dose Lasix 40 mg BID    · Lasix held on admission due to hyponatremia, continue to hold pending improvement in oral intake   · Continue with compression stockings   · Encourage LE elevation   · Monitor clinically     Atrial fibrillation [facility])  Assessment & Plan  · Hospital day 1 patient in atrial fibrillation with RVR - now improved   · Continue rate control with metoprolol 50 mg q12h   · Speech changed diet to purreed from NPO yesterday - ? Swallow safe  · Echocardiogram 12/6 with LVEF 55%, normal wall motion, normal systolic/diastolic function, mild-moderately increased RV pressure  · Now on Xarelto     Anterior dislocation of left shoulder  Assessment & Plan  · Injury occurred in December 2019, was following with  orthopedics and managed conservatively; seems to have been lost to follow-up with repeat XRs  · 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     * Stroke-like symptoms  Assessment & Plan  Patient presented via EMS as stroke alert due to concern for acute onset of left-sided weakness and garbled speech noticed by patient's wife  Patient is not a candidate for tPA due to bleeding risk  · Admitted under stroke pathway   · CTA head/neck negative for occlusion, but did note moderate R ICA and mild L ICA stenosis, see below   · MRI brain negative for acute infarct, but did note mild chronic microangiopathic ischemic changes  · Echocardiogram essentially unremarkable   · Lipid panel: cholesterol 150, , LDL 97  · Appreciate neurology consult and recommendations   · Continue with aspirin, statin, Eliquis   · P2Y12 checked -> 268  · Routine EEG without evidence of seizure   · Concern for possible TIA vs seizure and/or infectious/metabolic etiology   · See below regarding infectious workup/management   · PT/OT recommending rehab, PMR following  · Monitor neurochecks   Discussed with neurology again today given ID not in favor of further abx  Regarding change in mental status- today infact improved so no further imaging at this time          VTE Pharmacologic Prophylaxis: VTE Score: 6 Moderate Risk (Score 3-4) - Pharmacological DVT Prophylaxis Ordered: heparin      Patient Centered Rounds: I performed bedside rounds with nursing staff today  Discussions with Specialists or Other Care Team Provider: discussed with neurology - no CT scan warranted at this time  Education and Discussions with Family / Patient: called wife - updated her  Time Spent for Care: 30 minutes  More than 50% of total time spent on counseling and coordination of care as described above  Current Length of Stay: 7 day(s)  Current Patient Status: Inpatient   Certification Statement: The patient will continue to require additional inpatient hospital stay due to continue to monitor ? DC planning if continued improvement   Discharge Plan: Anticipate discharge in 24-48 hrs to as per CM and PT    Code Status: Level 3 - DNAR and DNI    Subjective:   Patient seen and examined   No complaints  Feeling "okay"    Objective:     Vitals:   Temp (24hrs), Av 6 °F (36 4 °C), Min:97 2 °F (36 2 °C), Max:97 9 °F (36 6 °C)    Temp:  [97 2 °F (36 2 °C)-97 9 °F (36 6 °C)] 97 2 °F (36 2 °C)  HR:  [] 106  Resp:  [16] 16  BP: (132-146)/(76-88) 141/76  SpO2:  [94 %-96 %] 95 %  Body mass index is 33 92 kg/m²  Input and Output Summary (last 24 hours): Intake/Output Summary (Last 24 hours) at 2022 1532  Last data filed at 2022 0825  Gross per 24 hour   Intake 476 ml   Output 207 ml   Net 269 ml       Physical Exam:   Physical Exam  Constitutional:       General: He is not in acute distress  Appearance: He is ill-appearing (chronically )  He is not toxic-appearing  HENT:      Head: Normocephalic  Eyes:      Pupils: Pupils are equal, round, and reactive to light  Cardiovascular:      Rate and Rhythm: Normal rate  Pulmonary:      Effort: Pulmonary effort is normal    Abdominal:      General: There is no distension  Palpations: There is no mass  Tenderness: There is no abdominal tenderness  There is no right CVA tenderness, left CVA tenderness or rebound  Hernia: No hernia is present     Skin:     Coloration: Skin is pale  Skin is not jaundiced  Neurological:      Mental Status: He is alert  Comments: Left sided weakness, neglect     Psychiatric:         Mood and Affect: Mood normal           Additional Data:     Labs:  Results from last 7 days   Lab Units 12/11/22  0532 12/10/22  1310   WBC Thousand/uL 9 80 8 94   HEMOGLOBIN g/dL 13 3 13 2   HEMATOCRIT % 45 9 42 8   PLATELETS Thousands/uL 266 267   NEUTROS PCT %  --  78*   LYMPHS PCT %  --  9*   MONOS PCT %  --  9   EOS PCT %  --  1     Results from last 7 days   Lab Units 12/10/22  1310 12/09/22  0503   SODIUM mmol/L 146  --    POTASSIUM mmol/L 3 8  --    CHLORIDE mmol/L 113*  --    CO2 mmol/L 27  --    BUN mg/dL 16  --    CREATININE mg/dL 0 84  --    ANION GAP mmol/L 6  --    CALCIUM mg/dL 9 5  --    ALBUMIN g/dL  --  2 1*   TOTAL BILIRUBIN mg/dL  --  0 45   ALK PHOS U/L  --  111   ALT U/L  --  33   AST U/L  --  40   GLUCOSE RANDOM mg/dL 135  --      Results from last 7 days   Lab Units 12/08/22  1736   INR  1 21*     Results from last 7 days   Lab Units 12/04/22  1728   POC GLUCOSE mg/dl 138     Results from last 7 days   Lab Units 12/04/22  1730   HEMOGLOBIN A1C % 5 9*     Results from last 7 days   Lab Units 12/10/22  1310 12/05/22  0432   LACTIC ACID mmol/L 1 4  --    PROCALCITONIN ng/ml  --  0 15       Lines/Drains:  Invasive Devices     Peripheral Intravenous Line  Duration           Peripheral IV 12/09/22 Distal;Dorsal (posterior); Left Forearm 2 days          Drain  Duration           External Urinary Catheter Small 6 days                      Imaging: No pertinent imaging reviewed      Recent Cultures (last 7 days):   Results from last 7 days   Lab Units 12/04/22  2214   URINE CULTURE  >100,000 cfu/ml Enterococcus faecalis*  >100,000 cfu/ml Escherichia coli*  70,000-79,000 cfu/ml Morganella morganii*  20,000-29,000 cfu/ml Proteus species*       Last 24 Hours Medication List:   Current Facility-Administered Medications   Medication Dose Route Frequency Provider Last Rate   • acetaminophen  975 mg Oral Q6H PRN Caty Grijalva PA-C     • albuterol  2 puff Inhalation Q4H PRN Salo Mazariegos MD     • allopurinol  100 mg Oral Daily Heidy Mendoza MD     • aspirin  81 mg Oral Daily Heidy Mendoza MD     • atorvastatin  40 mg Oral QPM Heidy Mendoza MD     • cholecalciferol  400 Units Oral Daily Heidy Mendoza MD     • dextromethorphan-guaiFENesin  10 mL Oral Q4H PRN Caty Grijalva PA-C     • docusate sodium  100 mg Oral BID Heidy Mendoza MD     • lactulose  10 g Oral BID Salo Mazariegos MD     • metoprolol  5 mg Intravenous [facility] Salo Mazariegos MD     • nicotine  14 mg Transdermal Daily Caty Grijalva PA-C     • ondansetron  4 mg Intravenous Q6H PRN Heidy Mendoza MD     • polyethylene glycol  17 g Oral Daily Heidy Mendoza MD     • QUEtiapine  12 5 mg Oral HS Josselyn London MD     • QUEtiapine  12 5 mg Oral HS PRN Josselyn London MD     • rivaroxaban  20 mg Oral Daily With Dinner Lugenia Leyden, DO          Today, Patient Was Seen By: Salo Mazariegos MD    **Please Note: This note may have been constructed using a voice recognition system  **

## 2022-12-11 NOTE — ASSESSMENT & PLAN NOTE
Patient presented via EMS as stroke alert due to concern for acute onset of left-sided weakness and garbled speech noticed by patient's wife  Patient is not a candidate for tPA due to bleeding risk    · Admitted under stroke pathway   · CTA head/neck negative for occlusion, but did note moderate R ICA and mild L ICA stenosis, see below   · MRI brain negative for acute infarct, but did note mild chronic microangiopathic ischemic changes  · Echocardiogram essentially unremarkable   · Lipid panel: cholesterol 150, , LDL 97  · Appreciate neurology consult and recommendations   · Continue with aspirin, statin, Eliquis   · P2Y12 checked -> 268  · Routine EEG without evidence of seizure   · Concern for possible TIA vs seizure and/or infectious/metabolic etiology   · See below regarding infectious workup/management   · PT/OT recommending rehab, PMR following  · Monitor neurochecks   Discussed with neurology again today given ID not in favor of further abx  Regarding change in mental status- today infact improved so no further imaging at this time

## 2022-12-11 NOTE — ASSESSMENT & PLAN NOTE
· Hospital day 1 patient in atrial fibrillation with RVR - now improved   · Continue rate control with metoprolol 50 mg q12h   · Speech changed diet to purreed from NPO yesterday - ?  Swallow safe  · Echocardiogram 12/6 with LVEF 55%, normal wall motion, normal systolic/diastolic function, mild-moderately increased RV pressure  · Now on Xarelto

## 2022-12-11 NOTE — PLAN OF CARE
Problem: DISCHARGE PLANNING  Goal: Discharge to home or other facility with appropriate resources  Description: INTERVENTIONS:  - Identify barriers to discharge w/patient and caregiver  - Arrange for needed discharge resources and transportation as appropriate  - Identify discharge learning needs (meds, wound care, etc )  - Arrange for interpretive services to assist at discharge as needed  - Refer to Case Management Department for coordinating discharge planning if the patient needs post-hospital services based on physician/advanced practitioner order or complex needs related to functional status, cognitive ability, or social support system  Outcome: Progressing     Problem: Knowledge Deficit  Goal: Patient/family/caregiver demonstrates understanding of disease process, treatment plan, medications, and discharge instructions  Description: Complete learning assessment and assess knowledge base  Interventions:  - Provide teaching at level of understanding  - Provide teaching via preferred learning methods  Outcome: Progressing     Problem: Neurological Deficit  Goal: Neurological status is stable or improving  Description: Interventions:  - Monitor and assess patient's level of consciousness, motor function, sensory function, and level of assistance needed for ADLs  - Monitor and report changes from baseline  Collaborate with interdisciplinary team to initiate plan and implement interventions as ordered  - Provide and maintain a safe environment  - Consider seizure precautions  - Consider fall precautions  - Consider aspiration precautions  - Consider bleeding precautions  Outcome: Progressing     Problem: Activity Intolerance/Impaired Mobility  Goal: Mobility/activity is maintained at optimum level for patient  Description: Interventions:  - Assess and monitor patient  barriers to mobility and need for assistive/adaptive devices  - Assess patient's emotional response to limitations    - Collaborate with interdisciplinary team and initiate plans and interventions as ordered  - Encourage independent activity per ability   - Maintain proper body alignment  - Perform active/passive rom as tolerated/ordered  - Plan activities to conserve energy   - Turn patient as appropriate  Outcome: Progressing     Problem: Communication Impairment  Goal: Ability to express needs and understand communication  Description: Assess patient's communication skills and ability to understand information  Patient will demonstrate use of effective communication techniques, alternative methods of communication and understanding even if not able to speak  - Encourage communication and provide alternate methods of communication as needed  - Collaborate with case management/ for discharge needs  - Include patient/family/caregiver in decisions related to communication  Outcome: Progressing     Problem: Potential for Aspiration  Goal: Non-ventilated patient's risk of aspiration is minimized  Description: Assess and monitor vital signs, respiratory status, and labs (WBC)  Monitor for signs of aspiration (tachypnea, cough, rales, wheezing, cyanosis, fever)  - Assess and monitor patient's ability to swallow  - Place patient up in chair to eat if possible  - HOB up at 90 degrees to eat if unable to get patient up into chair   - Supervise patient during oral intake  - Instruct patient/ family to take small bites  - Instruct patient/ family to take small single sips when taking liquids    - Follow patient-specific strategies generated by speech pathologist   Outcome: Progressing     Problem: MOBILITY - ADULT  Goal: Maintain or return to baseline ADL function  Description: INTERVENTIONS:  -  Assess patient's ability to carry out ADLs; assess patient's baseline for ADL function and identify physical deficits which impact ability to perform ADLs (bathing, care of mouth/teeth, toileting, grooming, dressing, etc )  - Assess/evaluate cause of self-care deficits   - Assess range of motion  - Assess patient's mobility; develop plan if impaired  - Assess patient's need for assistive devices and provide as appropriate  - Encourage maximum independence but intervene and supervise when necessary  - Involve family in performance of ADLs  - Assess for home care needs following discharge   - Consider OT consult to assist with ADL evaluation and planning for discharge  - Provide patient education as appropriate  Outcome: Progressing  Goal: Maintains/Returns to pre admission functional level  Description: INTERVENTIONS:  - Perform BMAT or MOVE assessment daily    - Set and communicate daily mobility goal to care team and patient/family/caregiver  - Collaborate with rehabilitation services on mobility goals if consulted  - Perform Range of Motion times a day  - Reposition patient every  hours    - Dangle patient  times a day  - Stand patient  times a day  - Ambulate patient  times a day  - Out of bed to chair times a day   - Out of bed for meat times a day  - Out of bed for toileting  - Record patient progress and toleration of activity level   Outcome: Progressing     Problem: Prexisting or High Potential for Compromised Skin Integrity  Goal: Skin integrity is maintained or improved  Description: INTERVENTIONS:  - Identify patients at risk for skin breakdown  - Assess and monitor skin integrity  - Assess and monitor nutrition and hydration status  - Monitor labs   - Assess for incontinence   - Turn and reposition patient  - Assist with mobility/ambulation  - Relieve pressure over bony prominences  - Avoid friction and shearing  - Provide appropriate hygiene as needed including keeping skin clean and dry  - Evaluate need for skin moisturizer/barrier cream  - Collaborate with interdisciplinary team   - Patient/family teaching  - Consider wound care consult   Outcome: Progressing     Problem: Nutrition/Hydration-ADULT  Goal: Nutrient/Hydration intake appropriate for improving, restoring or maintaining nutritional needs  Description: Monitor and assess patient's nutrition/hydration status for malnutrition  Collaborate with interdisciplinary team and initiate plan and interventions as ordered  Monitor patient's weight and dietary intake as ordered or per policy  Utilize nutrition screening tool and intervene as necessary  Determine patient's food preferences and provide high-protein, high-caloric foods as appropriate       INTERVENTIONS:  - Monitor oral intake, urinary output, labs, and treatment plans  - Assess nutrition and hydration status and recommend course of action  - Evaluate amount of meals eaten  - Assist patient with eating if necessary   - Allow adequate time for meals  - Recommend/ encourage appropriate diets, oral nutritional supplements, and vitamin/mineral supplements  - Order, calculate, and assess calorie counts as needed  - Recommend, monitor, and adjust tube feedings and TPN/PPN based on assessed needs  - Assess need for intravenous fluids  - Provide specific nutrition/hydration education as appropriate  - Include patient/family/caregiver in decisions related to nutrition  Outcome: Progressing     Problem: PAIN - ADULT  Goal: Verbalizes/displays adequate comfort level or baseline comfort level  Description: Interventions:  - Encourage patient to monitor pain and request assistance  - Assess pain using appropriate pain scale  - Administer analgesics based on type and severity of pain and evaluate response  - Implement non-pharmacological measures as appropriate and evaluate response  - Consider cultural and social influences on pain and pain management  - Notify physician/advanced practitioner if interventions unsuccessful or patient reports new pain  Outcome: Progressing     Problem: INFECTION - ADULT  Goal: Absence or prevention of progression during hospitalization  Description: INTERVENTIONS:  - Assess and monitor for signs and symptoms of infection  - Monitor lab/diagnostic results  - Monitor all insertion sites, i e  indwelling lines, tubes, and drains  - Monitor endotracheal if appropriate and nasal secretions for changes in amount and color  - [place] appropriate cooling/warming therapies per order  - Administer medications as ordered  - Instruct and encourage patient and family to use good hand hygiene technique  - Identify and instruct in appropriate isolation precautions for identified infection/condition  Outcome: Progressing     Problem: Potential for Falls  Goal: Patient will remain free of falls  Description: INTERVENTIONS:  - Educate patient/family on patient safety including physical limitations  - Instruct patient to call for assistance with activity   - Consult OT/PT to assist with strengthening/mobility   - Keep Call bell within reach  - Keep bed low and locked with side rails adjusted as appropriate  - Keep care items and personal belongings within reach  - Initiate and maintain comfort rounds  - Make Fall Risk Sign visible to staff  - Offer Toileting every  Hours, in advance of need  - Initiate/Maintain alarm  - Obtain necessary fall risk management equipment:   - Apply yellow socks and bracelet for high fall risk patients  - Consider moving patient to room near nurses station  Outcome: Progressing     Problem: SKIN/TISSUE INTEGRITY - ADULT  Goal: Pressure injury heals and does not worsen  Description: Interventions:  - Implement low air loss mattress or specialty surface (Criteria met)  - Apply silicone foam dressing  - Instruct/assist with weight shifting every  minutes when in chair   - Limit chair time to  hour intervals  - Use special pressure reducing interventions such as  when in chair   - Apply fecal or urinary incontinence containment device   - Perform passive or active ROM every   - Turn and reposition patient & offload bony prominences every  hours   - Utilize friction reducing device or surface for transfers   - Consider consults to  interdisciplinary teams such as   - Use incontinent care products after each incontinent episode such as   - Consider nutrition services referral as needed  Outcome: Progressing     Problem: SAFETY,RESTRAINT: NV/NON-SELF DESTRUCTIVE BEHAVIOR  Goal: Remains free of harm/injury (restraint for non violent/non self-detsructive behavior)  Description: INTERVENTIONS:  - Instruct patient/family regarding restraint use   - Assess and monitor physiologic and psychological status   - Provide interventions and comfort measures to meet assessed patient needs   - Identify and implement measures to help patient regain control  - Assess readiness for release of restraint   Outcome: Progressing  Goal: Returns to optimal restraint-free functioning  Description: INTERVENTIONS:  - Assess the patient's behavior and symptoms that indicate continued need for restraint  - Identify and implement measures to help patient regain control  - Assess readiness for release of restraint   Outcome: Progressing     Problem: COPING  Goal: Pt/Family able to verbalize concerns and demonstrate effective coping strategies  Description: INTERVENTIONS:  - Assist patient/family to identify coping skills, available support systems and cultural and spiritual values  - Provide emotional support, including active listening and acknowledgement of concerns of patient and caregivers  - Reduce environmental stimuli, as able  - Provide patient education  - Assess for spiritual pain/suffering and initiate spiritual care, including notification of Pastoral Care or beau based community as needed  - Assess effectiveness of coping strategies  Outcome: Progressing  Goal: Will report anxiety at manageable levels  Description: INTERVENTIONS:  - Administer medication as ordered  - Teach and encourage coping skills  - Provide emotional support  - Assess patient/family for anxiety and ability to cope  Outcome: Progressing Problem: CONFUSION/THOUGHT DISTURBANCE  Goal: Thought disturbances (confusion, delirium, depression, dementia or psychosis) are managed to maintain or return to baseline mental status and functional level  Description: INTERVENTIONS:  - Assess for possible contributors to  thought disturbance, including but not limited to medications, infection, impaired vision or hearing, underlying metabolic abnormalities, dehydration, respiratory compromise,  psychiatric diagnoses and notify attending PHYSICAN/AP  - Monitor and intervene to maintain adequate nutrition, hydration, elimination, sleep and activity  - Decrease environmental stimuli, including noise as appropriate  - Provide frequent contacts to provide refocusing, direction and reassurance as needed  Approach patient calmly with eye contact and at their level  - [place] high risk fall precautions, aspiration precautions and other safety measures, as indicated  - If delirium suspected, notify physician/AP of change in condition and request immediate in-person evaluation  - Pursue consults as appropriate including Geriatric (campus dependent), OT for cognitive evaluation/activity planning, psychiatric, pastoral care, etc   Outcome: Progressing     Problem: BEHAVIOR  Goal: Pt/Family maintain appropriate behavior and adhere to behavioral management agreement, if implemented  Description: INTERVENTIONS:  - Assess the family dynamic   - Encourage verbalization of thoughts and concerns in a socially appropriate manner  - Assess patient/family's coping skills and non-compliant behavior (including use of illegal substances)  - Utilize positive, consistent limit setting strategies supporting safety of patient, staff and others  - Initiate consult with Case Management, Spiritual Care or other ancillary services as appropriate  - If a patient's/visitor's behavior jeopardizes the safety of the patient, staff, or others, refer to organization procedure     - Notify Security of behavior or suspected illegal substances which indicate the need for search of the patient and/or belongings  - Encourage participation in the decision making process about a behavioral management agreement; implement if patient meets criteria  Outcome: Progressing

## 2022-12-11 NOTE — ASSESSMENT & PLAN NOTE
POA with mild leukocytosis and tachycardia  Procalcitonin WNL at 0 15   · COVID/flu/RSV negative   · Portable CXR: Probable small left effusion   Slightly limited but otherwise unremarkable examination     · UA with nitrites, leukocytes and bacteria concerning for UTI  · Noted with left heel pressure ulcer on admission however no clinical signs of cellulitis, see related plan  · PA/lateral CXR: No acute cardiopulmonary disease   · Continue with IV ceftriaxone (day 4) pending final urine culture / sensitivities   · Trend temps, WBC, hemodynamics   Appreciate ID input yesterday - off all abx

## 2022-12-11 NOTE — ASSESSMENT & PLAN NOTE
ID input noted     No metabolic derangement noted   Discussed with neurology not further imaging   Today patient is improving

## 2022-12-11 NOTE — ASSESSMENT & PLAN NOTE
Patient with bilateral lower extremity edema which appears to be longstanding and related to venous stasis, managed by [facility] cardiology  Recently edema was worsening and associated with weeping  Patient received 80 mg IV Lasix x 1 in office on 11/9 and again on 11/23  Subsequently prescribed higher dose Lasix 40 mg BID    · Lasix held on admission due to hyponatremia, continue to hold pending improvement in oral intake   · Continue with compression stockings   · Encourage LE elevation   · Monitor clinically

## 2022-12-12 NOTE — ASSESSMENT & PLAN NOTE
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

## 2022-12-12 NOTE — PROGRESS NOTES
Progress Note - Infectious Disease   Satya Hussein 82 y o  male MRN: [id number]  Unit/Bed#: [facility] 902-23 Encounter: [id number]         IMPRESSION & RECOMMENDATIONS:   Impression/Recommendations:  1  Bacteriuria  Urine culture grew multiple organisms including Enterococcus, E coli, Morganella, Proteus  These organisms likely reflect chronic bladder colonization in the setting of known diagnosis of BPH  Patient remains without fevers or other signs of sepsis  WBC count was 10 2 initially and quickly normalized  Patient has received total of 6 days of IV antibiotics with appropriate coverage for organisms isolated in urine culture  If UTI was the etiology of persistent encephalopathy, we would have seen improvement in mentation by now  We will continue to observe closely off antibiotics     -continue to observe off anymore antibiotics  -urinary retention protocol  -follow-up temperatures and CBC     2  Pressure ulceration left heel, POA  X-ray negative for osteomyelitis  Podiatry input noted with ongoing low suspicion for acute infection      -no antibiotic indicated  -daily local wound care, frequent offloading  -serial exams     3  Lateral left leg DTI  Follows with outpatient wound care  Podiatry/vascular surgery input noted with ongoing low suspicion for acute infection      -no antibiotic indicated  -daily local wound care  -serial exams     4  Acute encephalopathy, with stroke-like symptoms  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  Infectious workup has been unremarkable, as above  Low clinical suspicion for primary CNS infection  Patient has remained afebrile and clinically stable from ID standpoint      -continue to observe off anymore antibiotics  -neurology follow-up ongoing  -follow mentation  -serial neuro exams        Antibiotics:  Off antibiotic 2         Subjective:  Patient is more communicative today  He denies abdominal pain, flank pain, burning with urination    On dysphagia diet   No fevers  Objective:  Vitals:  Temp:  [97 2 °F (36 2 °C)-97 3 °F (36 3 °C)] 97 3 °F (36 3 °C)  HR:  [] 102  BP: (138-141)/(76-80) 138/80  SpO2:  [94 %-100 %] 100 %  Temp (24hrs), Av 3 °F (36 3 °C), Min:97 2 °F (36 2 °C), Max:97 3 °F (36 3 °C)  Current: Temperature: (!) 97 3 °F (36 3 °C)    Physical Exam:   General:  No acute distress, elderly and debilitated, nontoxic  HEENT:  Atraumatic normocephalic  Neck:  Trachea midline  Psychiatric:  Awake and alert  Pulmonary:  Normal respiratory excursion without accessory muscle use  Abdomen:  Soft, nontender  Extremities:  No edema  Skin:  No rashes or draining wounds  Neuro: Moves all extremities spontaneously    Lab Results:  I have personally reviewed pertinent labs  Results from last 7 days   Lab Units 224 12/10/22  1310 22  0503 22  0431 22  0520 22  0515   POTASSIUM mmol/L 3 8 3 8  --  4 0   < > 3 7   CHLORIDE mmol/L 111* 113*  --  110*   < > 104   CO2 mmol/L 33* 27  --  28   < > 31   BUN mg/dL 14 16  --  15   < > 19   CREATININE mg/dL 0 85 0 84  --  0 71   < > 0 86   EGFR ml/min/1 73sq m 79 79  --  85   < > 79   CALCIUM mg/dL 9 6 9 5  --  9 3   < > 9 4   AST U/L 41  --  40  --   --  29   ALT U/L 39  --  33  --   --  30   ALK PHOS U/L 109  --  111  --   --  111    < > = values in this interval not displayed  Results from last 7 days   Lab Units 22  0444 22  0532 12/10/22  1310   WBC Thousand/uL 11 00* 9 80 8 94   HEMOGLOBIN g/dL 13 7 13 3 13 2   PLATELETS Thousands/uL 267 266 267           Imaging Studies:   I have personally reviewed pertinent imaging study reports and images in PACS  EKG, Pathology, and Other Studies:   I have personally reviewed pertinent reports

## 2022-12-12 NOTE — SPEECH THERAPY NOTE
Speech Language/Pathology    Speech/Language Pathology Progress Note    Patient Name: Claudio Cope  [id number] Date: 12/12/2022     Problem List  Principal Problem:    Stroke-like symptoms  Active Problems:    Anterior dislocation of left shoulder    Atrial fibrillation (HCC)    Bilateral lower extremity edema    Nicotine dependence    Acute metabolic encephalopathy    Pressure injury of deep tissue of left heel    SIRS vs Sepsis ([facility] )    Stenosis of both internal carotid arteries    Prediabetes    Oropharyngeal dysphagia    PAD (peripheral artery disease) ([facility] )    Acute cystitis without hematuria       Past Medical History  Past Medical History:   Diagnosis Date   • Atrial fibrillation ([facility] )    • Cancer ([facility] )    • Hypertension         Past Surgical History  Past Surgical History:   Procedure Laterality Date   • REPLACEMENT TOTAL HIP W/  RESURFACING IMPLANTS           Subjective:  "Nothing is coming out of here" The patient is awake and alert  Objective: The patient continues on 3 L O2  He has we,t congested cough at rest  Oral care is provided with oral swabs and suction kit  Oral cavity is moist and a minimal amount of secretions are removed  SLP feeds patient  He is assessed with puree solids and honey thick liquids  He takes all via tsp  The patient has decreased lingual coordination to retrieve bolus from tsp for all trials  Transfer time is prolonged  Swallow initiation time is prolonged  Laryngeal rise is reduced upon palpation  Intermittent wet vocal quality is observed throughout session  No overt coughing and no change in O2 observed  Intermittent oral suction is provided for the removal of at least a min amount of oral residue  Intake is ~25 %  Assessment:  The patient continues with intermittent s/s aspiration, especially wet vocal quality, on conservative diet of puree and honey thick liquids  Plan/Recommendations:  Continue current diet  Continue ST to further assess

## 2022-12-12 NOTE — PHYSICAL THERAPY NOTE
Physical Therapy Re-evaluation and treatment     Patient Name: Stephen Valverde    OXFEF'G Date: 12/12/2022     Problem List  Principal Problem:    Stroke-like symptoms  Active Problems:    Anterior dislocation of left shoulder    Atrial fibrillation (HCC)    Bilateral lower extremity edema    Nicotine dependence    Acute metabolic encephalopathy    Pressure injury of deep tissue of left heel    SIRS vs Sepsis ([facility] )    Stenosis of both internal carotid arteries    Prediabetes    Oropharyngeal dysphagia    PAD (peripheral artery disease) (HCC)    Acute cystitis without hematuria       Past Medical History  Past Medical History:   Diagnosis Date   • Atrial fibrillation ([facility] )    • Cancer ([facility] )    • Hypertension         Past Surgical History  Past Surgical History:   Procedure Laterality Date   • REPLACEMENT TOTAL HIP W/  RESURFACING IMPLANTS        re-eval 6285-1349 (15min); tx 7990-0429 (23min)     12/12/22 1042   PT Last Visit   PT Visit Date 12/12/22   Note Type   Note type Re-Evaluation   Pain Assessment   Pain Assessment Tool 0-10   Pain Score No Pain   Restrictions/Precautions   Weight Bearing Precautions Per Order Yes   LLE Weight Bearing Per Order (S)  NWB  (per podiatry)   Other Precautions Cognitive; Chair Alarm; Bed Alarm;Multiple lines;Telemetry; Fall Risk  (L hemiparesis, L shoulder subluxation (chronic), L neglect/inattention)   Home Living   Bathroom Equipment Commode   Additional Comments per CM note, pt lives in [address] [place], [place] [address]e [facility] with [address] and [address] with wife  requires A for all ADLS and mobiltiy at baseline     Prior Function   Comments per CM note, lives with spouse however spouse no longer able to care for pt   General   Family/Caregiver Present No   Cognition   Overall Cognitive Status Impaired   Arousal/Participation Cooperative   Attention Difficulty attending to directions   Orientation Level Oriented to person;Disoriented to place;Oriented to time;Disoriented to situation   Following Commands Follows one step commands with increased time or repetition   Comments overall pleasant to work with   Subjective   Subjective "im not competing"   RLE Assessment   RLE Assessment   (3+/5 grossly)   LLE Assessment   LLE Assessment   (increase hamstring tone, 2/5 knee extension)   Light Touch   RLE Light Touch Grossly intact   LLE Light Touch Impaired   Bed Mobility   Supine to Sit 2  Maximal assistance   Additional items Assist x 2; Increased time required;Verbal cues;LE management   Sit to Supine 2  Maximal assistance   Additional items Assist x 2;Verbal cues; Increased time required;LE management   Transfers   Sit to Stand 2  Maximal assistance   Additional items Assist x 2; Increased time required;Verbal cues   Stand to Sit 2  Maximal assistance   Additional items Assist x 2; Increased time required;Verbal cues   Additional Comments R HHA and 2nd therapist anterior with L arm supported and L foot on therapist foot to ensure no WB  only able to clear buttock 25% from bed  Ambulation/Elevation   Gait pattern Not appropriate   Balance   Static Sitting Fair -   Dynamic Sitting Poor +   Static Standing Poor -   Endurance Deficit   Endurance Deficit Yes   Endurance Deficit Description fatigue, weakness   Activity Tolerance   Activity Tolerance Patient limited by fatigue   Medical Staff Made Aware Rubina BLANCA   Nurse Made Aware yes, cleared to mobilize   Assessment   Prognosis Guarded   Problem List Decreased strength;Decreased endurance; Impaired balance;Decreased range of motion;Decreased mobility; Decreased cognition; Impaired judgement;Decreased safety awareness; Impaired sensation; Impaired tone;Orthopedic restrictions   Assessment Pt seen for re-evaluation 2* change in LLE WBS to NWB since IE  Pt originally presented with stroke like symptoms including garbled speech, L facial droop, L sided weakness   Found to have "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, mesial temporal lobe, internal capsule and cerebral peduncle " per brain MRI  Other active problems include acute cystitis without hematuria, PAD, dysphagia, prediabetes, b/l internal carotid artery stenosis, SIRS vs sepsis, L heel pressure injury, acute metabolic encephalopathy, b/l LE edema, a-fib, anterior shoulder dislocation of L shoulder  Upon re-eval, remains maxAx2 for all mobility with increased difficulty performing STS while being NWB on LLE  Continues to be limited by decreased L>R strength, balance, endurance, activity tolerance, and mobility, and increased L UE and LE done  Will continue to benefit from skilled acute care PT to further address his limitations  The patient's AM-PAC Basic Mobility Inpatient Short Form Raw Score is 6  A Raw score of less than or equal to 16 suggests the patient may benefit from discharge to post-acute rehabilitation services  Please also refer to the recommendation of the Physical Therapist for safe discharge planning  D/C recommendations remain rehab  Barriers to Discharge Decreased caregiver support; Inaccessible home environment   Goals   Patient Goals to improve   STG Expiration Date 12/26/22  (goals extended, remain appropriate)   PT Treatment Day 2   Plan   Treatment/Interventions Functional transfer training;LE strengthening/ROM; Therapeutic exercise; Endurance training;Cognitive reorientation;Patient/family training;Equipment eval/education; Bed mobility;Spoke to nursing;Spoke to case management;OT;Spoke to advanced practitioner   PT Frequency 2-3x/wk   Recommendation   PT Discharge Recommendation Post acute rehabilitation services   AM-PAC Basic Mobility Inpatient   Turning in Bed Without Bedrails 1   Lying on Back to Sitting on Edge of Flat Bed 1   Moving Bed to Chair 1   Standing Up From Chair 1   Walk in Room 1   Climb 3-5 Stairs 1   Basic Mobility Inpatient Raw Score 6   Turning Head Towards Sound 2   Follow Simple Instructions 3 Low Function Basic Mobility Raw Score 11   Low Function Basic Mobility Standardized Score 16 55   Highest Level Of Mobility   -[facility] Goal 2: Bed activities/Dependent transfer   -[facility] Achieved 3: Sit at edge of bed   Modified [place] Scale   Modified Fan Scale 4   [place] Index   Feeding 5   Bathing 0   Grooming Score 0   Dressing Score 0   Bladder Score 5   Bowels Score 5   Toilet Use Score 0   Transfers (Bed/Chair) Score 0   Mobility (Level Surface) Score 0   Stairs Score 5   [place] Index Score 20   Additional Treatment Session   Start Time 1057   End Time 1120   Treatment Assessment Following re-eval, pt agreeable to perform treatment session which consisted of sitting EOB x20 min  S/CGA for static sitting and minAx1 for dynamic sitting which including reaching with RUE for objects to L side to promote attention to L x6 reps   Also lateral trunk lean onto L elbow to promote LUE WB with associated head turn to promote attention x3 reps  Seated LAQ AA x3 reps LLE, supine heelslides 2x10 RLE  Returned to supine with maxAx2  Supine with bed alarm and all needs within reach-prevelon boot donned LLE  Remains limited by limitations listed above in re-eval  Will continue to benefit from skilled PT  D/C plan rehab     Bryce Mendez, PT, DPT

## 2022-12-12 NOTE — PLAN OF CARE
Problem: DISCHARGE PLANNING  Goal: Discharge to home or other facility with appropriate resources  Description: INTERVENTIONS:  - Identify barriers to discharge w/patient and caregiver  - Arrange for needed discharge resources and transportation as appropriate  - Identify discharge learning needs (meds, wound care, etc )  - Arrange for interpretive services to assist at discharge as needed  - Refer to Case Management Department for coordinating discharge planning if the patient needs post-hospital services based on physician/advanced practitioner order or complex needs related to functional status, cognitive ability, or social support system  Outcome: Progressing     Problem: Knowledge Deficit  Goal: Patient/family/caregiver demonstrates understanding of disease process, treatment plan, medications, and discharge instructions  Description: Complete learning assessment and assess knowledge base  Interventions:  - Provide teaching at level of understanding  - Provide teaching via preferred learning methods  Outcome: Progressing     Problem: Neurological Deficit  Goal: Neurological status is stable or improving  Description: Interventions:  - Monitor and assess patient's level of consciousness, motor function, sensory function, and level of assistance needed for ADLs  - Monitor and report changes from baseline  Collaborate with interdisciplinary team to initiate plan and implement interventions as ordered  - Provide and maintain a safe environment  - Consider seizure precautions  - Consider fall precautions  - Consider aspiration precautions  - Consider bleeding precautions  Outcome: Progressing     Problem: Activity Intolerance/Impaired Mobility  Goal: Mobility/activity is maintained at optimum level for patient  Description: Interventions:  - Assess and monitor patient  barriers to mobility and need for assistive/adaptive devices  - Assess patient's emotional response to limitations    - Collaborate with interdisciplinary team and initiate plans and interventions as ordered  - Encourage independent activity per ability   - Maintain proper body alignment  - Perform active/passive rom as tolerated/ordered  - Plan activities to conserve energy   - Turn patient as appropriate  Outcome: Progressing     Problem: Communication Impairment  Goal: Ability to express needs and understand communication  Description: Assess patient's communication skills and ability to understand information  Patient will demonstrate use of effective communication techniques, alternative methods of communication and understanding even if not able to speak  - Encourage communication and provide alternate methods of communication as needed  - Collaborate with case management/ for discharge needs  - Include patient/family/caregiver in decisions related to communication  Outcome: Progressing     Problem: Potential for Aspiration  Goal: Non-ventilated patient's risk of aspiration is minimized  Description: Assess and monitor vital signs, respiratory status, and labs (WBC)  Monitor for signs of aspiration (tachypnea, cough, rales, wheezing, cyanosis, fever)  - Assess and monitor patient's ability to swallow  - Place patient up in chair to eat if possible  - HOB up at 90 degrees to eat if unable to get patient up into chair   - Supervise patient during oral intake  - Instruct patient/ family to take small bites  - Instruct patient/ family to take small single sips when taking liquids    - Follow patient-specific strategies generated by speech pathologist   Outcome: Progressing     Problem: MOBILITY - ADULT  Goal: Maintain or return to baseline ADL function  Description: INTERVENTIONS:  -  Assess patient's ability to carry out ADLs; assess patient's baseline for ADL function and identify physical deficits which impact ability to perform ADLs (bathing, care of mouth/teeth, toileting, grooming, dressing, etc )  - Assess/evaluate cause of self-care deficits   - Assess range of motion  - Assess patient's mobility; develop plan if impaired  - Assess patient's need for assistive devices and provide as appropriate  - Encourage maximum independence but intervene and supervise when necessary  - Involve family in performance of ADLs  - Assess for home care needs following discharge   - Consider OT consult to assist with ADL evaluation and planning for discharge  - Provide patient education as appropriate  Outcome: Progressing  Goal: Maintains/Returns to pre admission functional level  Description: INTERVENTIONS:  - Perform BMAT or MOVE assessment daily    - Set and communicate daily mobility goal to care team and patient/family/caregiver  - Collaborate with rehabilitation services on mobility goals if consulted  - Perform Range of Motion  times a day  - Reposition patient every  hours    - Dangle patient 2 times a day  - Stand patient 2 times a day  - Ambulate patient 2 times a day  - Out of bed to chair 2 times a day   - Out of bed for meals 2 times a day  - Out of bed for toileting  - Record patient progress and toleration of activity level   Outcome: Progressing     Problem: Prexisting or High Potential for Compromised Skin Integrity  Goal: Skin integrity is maintained or improved  Description: INTERVENTIONS:  - Identify patients at risk for skin breakdown  - Assess and monitor skin integrity  - Assess and monitor nutrition and hydration status  - Monitor labs   - Assess for incontinence   - Turn and reposition patient  - Assist with mobility/ambulation  - Relieve pressure over bony prominences  - Avoid friction and shearing  - Provide appropriate hygiene as needed including keeping skin clean and dry  - Evaluate need for skin moisturizer/barrier cream  - Collaborate with interdisciplinary team   - Patient/family teaching  - Consider wound care consult   Outcome: Progressing     Problem: Nutrition/Hydration-ADULT  Goal: Nutrient/Hydration intake appropriate for improving, restoring or maintaining nutritional needs  Description: Monitor and assess patient's nutrition/hydration status for malnutrition  Collaborate with interdisciplinary team and initiate plan and interventions as ordered  Monitor patient's weight and dietary intake as ordered or per policy  Utilize nutrition screening tool and intervene as necessary  Determine patient's food preferences and provide high-protein, high-caloric foods as appropriate       INTERVENTIONS:  - Monitor oral intake, urinary output, labs, and treatment plans  - Assess nutrition and hydration status and recommend course of action  - Evaluate amount of meals eaten  - Assist patient with eating if necessary   - Allow adequate time for meals  - Recommend/ encourage appropriate diets, oral nutritional supplements, and vitamin/mineral supplements  - Order, calculate, and assess calorie counts as needed  - Recommend, monitor, and adjust tube feedings and TPN/PPN based on assessed needs  - Assess need for intravenous fluids  - Provide specific nutrition/hydration education as appropriate  - Include patient/family/caregiver in decisions related to nutrition  Outcome: Progressing     Problem: PAIN - ADULT  Goal: Verbalizes/displays adequate comfort level or baseline comfort level  Description: Interventions:  - Encourage patient to monitor pain and request assistance  - Assess pain using appropriate pain scale  - Administer analgesics based on type and severity of pain and evaluate response  - Implement non-pharmacological measures as appropriate and evaluate response  - Consider cultural and social influences on pain and pain management  - Notify physician/advanced practitioner if interventions unsuccessful or patient reports new pain  Outcome: Progressing     Problem: INFECTION - ADULT  Goal: Absence or prevention of progression during hospitalization  Description: INTERVENTIONS:  - Assess and monitor for signs and symptoms of infection  - Monitor lab/diagnostic results  - Monitor all insertion sites, i e  indwelling lines, tubes, and drains  - Monitor endotracheal if appropriate and nasal secretions for changes in amount and color  - [place] appropriate cooling/warming therapies per order  - Administer medications as ordered  - Instruct and encourage patient and family to use good hand hygiene technique  - Identify and instruct in appropriate isolation precautions for identified infection/condition  Outcome: Progressing     Problem: Potential for Falls  Goal: Patient will remain free of falls  Description: INTERVENTIONS:  - Educate patient/family on patient safety including physical limitations  - Instruct patient to call for assistance with activity   - Consult OT/PT to assist with strengthening/mobility   - Keep Call bell within reach  - Keep bed low and locked with side rails adjusted as appropriate  - Keep care items and personal belongings within reach  - Initiate and maintain comfort rounds  - Make Fall Risk Sign visible to staff  - Offer Toileting every  Hours, in advance of need  - Initiate/Maintain bed alarm  - Obtain necessary fall risk management equipment:   - Apply yellow socks and bracelet for high fall risk patients  - Consider moving patient to room near nurses station  Outcome: Progressing     Problem: SKIN/TISSUE INTEGRITY - ADULT  Goal: Pressure injury heals and does not worsen  Description: Interventions:  - Implement low air loss mattress or specialty surface (Criteria met)  - Apply silicone foam dressing  - Instruct/assist with weight shifting every  minutes when in chair   - Limit chair time to  hour intervals  - Use special pressure reducing interventions such as  when in chair   - Apply fecal or urinary incontinence containment device   - Perform passive or active ROM every   - Turn and reposition patient & offload bony prominences every hours   - Utilize friction reducing device or surface for transfers   - Consider consults to  interdisciplinary teams such as   - Use incontinent care products after each incontinent episode such as   - Consider nutrition services referral as needed  Outcome: Progressing     Problem: SAFETY,RESTRAINT: NV/NON-SELF DESTRUCTIVE BEHAVIOR  Goal: Remains free of harm/injury (restraint for non violent/non self-detsructive behavior)  Description: INTERVENTIONS:  - Instruct patient/family regarding restraint use   - Assess and monitor physiologic and psychological status   - Provide interventions and comfort measures to meet assessed patient needs   - Identify and implement measures to help patient regain control  - Assess readiness for release of restraint   Outcome: Progressing  Goal: Returns to optimal restraint-free functioning  Description: INTERVENTIONS:  - Assess the patient's behavior and symptoms that indicate continued need for restraint  - Identify and implement measures to help patient regain control  - Assess readiness for release of restraint   Outcome: Progressing     Problem: COPING  Goal: Pt/Family able to verbalize concerns and demonstrate effective coping strategies  Description: INTERVENTIONS:  - Assist patient/family to identify coping skills, available support systems and cultural and spiritual values  - Provide emotional support, including active listening and acknowledgement of concerns of patient and caregivers  - Reduce environmental stimuli, as able  - Provide patient education  - Assess for spiritual pain/suffering and initiate spiritual care, including notification of Pastoral Care or beau based community as needed  - Assess effectiveness of coping strategies  Outcome: Progressing  Goal: Will report anxiety at manageable levels  Description: INTERVENTIONS:  - Administer medication as ordered  - Teach and encourage coping skills  - Provide emotional support  - Assess patient/family for anxiety and ability to cope  Outcome: Progressing Problem: CONFUSION/THOUGHT DISTURBANCE  Goal: Thought disturbances (confusion, delirium, depression, dementia or psychosis) are managed to maintain or return to baseline mental status and functional level  Description: INTERVENTIONS:  - Assess for possible contributors to  thought disturbance, including but not limited to medications, infection, impaired vision or hearing, underlying metabolic abnormalities, dehydration, respiratory compromise,  psychiatric diagnoses and notify attending PHYSICAN/AP  - Monitor and intervene to maintain adequate nutrition, hydration, elimination, sleep and activity  - Decrease environmental stimuli, including noise as appropriate  - Provide frequent contacts to provide refocusing, direction and reassurance as needed  Approach patient calmly with eye contact and at their level  - [place] high risk fall precautions, aspiration precautions and other safety measures, as indicated  - If delirium suspected, notify physician/AP of change in condition and request immediate in-person evaluation  - Pursue consults as appropriate including Geriatric (campus dependent), OT for cognitive evaluation/activity planning, psychiatric, pastoral care, etc   Outcome: Progressing     Problem: BEHAVIOR  Goal: Pt/Family maintain appropriate behavior and adhere to behavioral management agreement, if implemented  Description: INTERVENTIONS:  - Assess the family dynamic   - Encourage verbalization of thoughts and concerns in a socially appropriate manner  - Assess patient/family's coping skills and non-compliant behavior (including use of illegal substances)  - Utilize positive, consistent limit setting strategies supporting safety of patient, staff and others  - Initiate consult with Case Management, Spiritual Care or other ancillary services as appropriate  - If a patient's/visitor's behavior jeopardizes the safety of the patient, staff, or others, refer to organization procedure     - Notify Security of behavior or suspected illegal substances which indicate the need for search of the patient and/or belongings  - Encourage participation in the decision making process about a behavioral management agreement; implement if patient meets criteria  Outcome: Progressing

## 2022-12-12 NOTE — PLAN OF CARE
Problem: DISCHARGE PLANNING  Goal: Discharge to home or other facility with appropriate resources  Description: INTERVENTIONS:  - Identify barriers to discharge w/patient and caregiver  - Arrange for needed discharge resources and transportation as appropriate  - Identify discharge learning needs (meds, wound care, etc )  - Arrange for interpretive services to assist at discharge as needed  - Refer to Case Management Department for coordinating discharge planning if the patient needs post-hospital services based on physician/advanced practitioner order or complex needs related to functional status, cognitive ability, or social support system  Outcome: Progressing     Problem: Knowledge Deficit  Goal: Patient/family/caregiver demonstrates understanding of disease process, treatment plan, medications, and discharge instructions  Description: Complete learning assessment and assess knowledge base    Interventions:  - Provide teaching at level of understanding  - Provide teaching via preferred learning methods  Outcome: Progressing     Problem: PAIN - ADULT  Goal: Verbalizes/displays adequate comfort level or baseline comfort level  Description: Interventions:  - Encourage patient to monitor pain and request assistance  - Assess pain using appropriate pain scale  - Administer analgesics based on type and severity of pain and evaluate response  - Implement non-pharmacological measures as appropriate and evaluate response  - Consider cultural and social influences on pain and pain management  - Notify physician/advanced practitioner if interventions unsuccessful or patient reports new pain  Outcome: Progressing     Problem: INFECTION - ADULT  Goal: Absence or prevention of progression during hospitalization  Description: INTERVENTIONS:  - Assess and monitor for signs and symptoms of infection  - Monitor lab/diagnostic results  - Monitor all insertion sites, i e  indwelling lines, tubes, and drains  - Monitor endotracheal if appropriate and nasal secretions for changes in amount and color  - [place] appropriate cooling/warming therapies per order  - Administer medications as ordered  - Instruct and encourage patient and family to use good hand hygiene technique  - Identify and instruct in appropriate isolation precautions for identified infection/condition  Outcome: Progressing     Problem: Neurological Deficit  Goal: Neurological status is stable or improving  Description: Interventions:  - Monitor and assess patient's level of consciousness, motor function, sensory function, and level of assistance needed for ADLs  - Monitor and report changes from baseline  Collaborate with interdisciplinary team to initiate plan and implement interventions as ordered  - Provide and maintain a safe environment  - Consider seizure precautions  - Consider fall precautions  - Consider aspiration precautions  - Consider bleeding precautions    Outcome: Progressing     Problem: Potential for Falls  Goal: Patient will remain free of falls  Description: INTERVENTIONS:  - Educate patient/family on patient safety including physical limitations  - Instruct patient to call for assistance with activity   - Consult OT/PT to assist with strengthening/mobility   - Keep Call bell within reach  - Keep bed low and locked with side rails adjusted as appropriate  - Keep care items and personal belongings within reach  - Initiate and maintain comfort rounds  - Make Fall Risk Sign visible to staff  - Offer Toileting every  Hours, in advance of need  - Initiate/Maintain alarm  - Obtain necessary fall risk management equipment:   - Apply yellow socks and bracelet for high fall risk patients  - Consider moving patient to room near nurses station  Outcome: Progressing     Problem: Nutrition/Hydration-ADULT  Goal: Nutrient/Hydration intake appropriate for improving, restoring or maintaining nutritional needs  Description: Monitor and assess patient's nutrition/hydration status for malnutrition  Collaborate with interdisciplinary team and initiate plan and interventions as ordered  Monitor patient's weight and dietary intake as ordered or per policy  Utilize nutrition screening tool and intervene as necessary  Determine patient's food preferences and provide high-protein, high-caloric foods as appropriate       INTERVENTIONS:  - Monitor oral intake, urinary output, labs, and treatment plans  - Assess nutrition and hydration status and recommend course of action  - Evaluate amount of meals eaten  - Assist patient with eating if necessary   - Allow adequate time for meals  - Recommend/ encourage appropriate diets, oral nutritional supplements, and vitamin/mineral supplements  - Order, calculate, and assess calorie counts as needed  - Recommend, monitor, and adjust tube feedings and TPN/PPN based on assessed needs  - Assess need for intravenous fluids  - Provide specific nutrition/hydration education as appropriate  - Include patient/family/caregiver in decisions related to nutrition  Outcome: Progressing     Problem: SKIN/TISSUE INTEGRITY - ADULT  Goal: Pressure injury heals and does not worsen  Description: Interventions:  - Implement low air loss mattress or specialty surface (Criteria met)  - Apply silicone foam dressing  - Instruct/assist with weight shifting every  minutes when in chair   - Limit chair time to  hour intervals  - Use special pressure reducing interventions such as  when in chair   - Apply fecal or urinary incontinence containment device   - Perform passive or active ROM every   - Turn and reposition patient & offload bony prominences every  hours   - Utilize friction reducing device or surface for transfers   - Consider consults to  interdisciplinary teams such as   - Use incontinent care products after each incontinent episode such as  Consider nutrition services referral as needed  Outcome: Progressing

## 2022-12-12 NOTE — PLAN OF CARE
Problem: DISCHARGE PLANNING  Goal: Discharge to home or other facility with appropriate resources  Description: INTERVENTIONS:  - Identify barriers to discharge w/patient and caregiver  - Arrange for needed discharge resources and transportation as appropriate  - Identify discharge learning needs (meds, wound care, etc )  - Arrange for interpretive services to assist at discharge as needed  - Refer to Case Management Department for coordinating discharge planning if the patient needs post-hospital services based on physician/advanced practitioner order or complex needs related to functional status, cognitive ability, or social support system  Outcome: Progressing     Problem: Knowledge Deficit  Goal: Patient/family/caregiver demonstrates understanding of disease process, treatment plan, medications, and discharge instructions  Description: Complete learning assessment and assess knowledge base  Interventions:  - Provide teaching at level of understanding  - Provide teaching via preferred learning methods  Outcome: Progressing     Problem: Neurological Deficit  Goal: Neurological status is stable or improving  Description: Interventions:  - Monitor and assess patient's level of consciousness, motor function, sensory function, and level of assistance needed for ADLs  - Monitor and report changes from baseline  Collaborate with interdisciplinary team to initiate plan and implement interventions as ordered  - Provide and maintain a safe environment  - Consider seizure precautions  - Consider fall precautions  - Consider aspiration precautions  - Consider bleeding precautions  Outcome: Progressing     Problem: Activity Intolerance/Impaired Mobility  Goal: Mobility/activity is maintained at optimum level for patient  Description: Interventions:  - Assess and monitor patient  barriers to mobility and need for assistive/adaptive devices  - Assess patient's emotional response to limitations    - Collaborate with interdisciplinary team and initiate plans and interventions as ordered  - Encourage independent activity per ability   - Maintain proper body alignment  - Perform active/passive rom as tolerated/ordered  - Plan activities to conserve energy   - Turn patient as appropriate  Outcome: Progressing     Problem: Communication Impairment  Goal: Ability to express needs and understand communication  Description: Assess patient's communication skills and ability to understand information  Patient will demonstrate use of effective communication techniques, alternative methods of communication and understanding even if not able to speak  - Encourage communication and provide alternate methods of communication as needed  - Collaborate with case management/ for discharge needs  - Include patient/family/caregiver in decisions related to communication  Outcome: Progressing     Problem: Potential for Aspiration  Goal: Non-ventilated patient's risk of aspiration is minimized  Description: Assess and monitor vital signs, respiratory status, and labs (WBC)  Monitor for signs of aspiration (tachypnea, cough, rales, wheezing, cyanosis, fever)  - Assess and monitor patient's ability to swallow  - Place patient up in chair to eat if possible  - HOB up at 90 degrees to eat if unable to get patient up into chair   - Supervise patient during oral intake  - Instruct patient/ family to take small bites  - Instruct patient/ family to take small single sips when taking liquids    - Follow patient-specific strategies generated by speech pathologist   Outcome: Progressing     Problem: MOBILITY - ADULT  Goal: Maintain or return to baseline ADL function  Description: INTERVENTIONS:  -  Assess patient's ability to carry out ADLs; assess patient's baseline for ADL function and identify physical deficits which impact ability to perform ADLs (bathing, care of mouth/teeth, toileting, grooming, dressing, etc )  - Assess/evaluate cause of self-care deficits   - Assess range of motion  - Assess patient's mobility; develop plan if impaired  - Assess patient's need for assistive devices and provide as appropriate  - Encourage maximum independence but intervene and supervise when necessary  - Involve family in performance of ADLs  - Assess for home care needs following discharge   - Consider OT consult to assist with ADL evaluation and planning for discharge  - Provide patient education as appropriate  Outcome: Progressing  Goal: Maintains/Returns to pre admission functional level  Description: INTERVENTIONS:  - Perform BMAT or MOVE assessment daily    - Set and communicate daily mobility goal to care team and patient/family/caregiver  - Collaborate with rehabilitation services on mobility goals if consulted    - Out of bed for toileting  - Record patient progress and toleration of activity level   Outcome: Progressing     Problem: Prexisting or High Potential for Compromised Skin Integrity  Goal: Skin integrity is maintained or improved  Description: INTERVENTIONS:  - Identify patients at risk for skin breakdown  - Assess and monitor skin integrity  - Assess and monitor nutrition and hydration status  - Monitor labs   - Assess for incontinence   - Turn and reposition patient  - Assist with mobility/ambulation  - Relieve pressure over bony prominences  - Avoid friction and shearing  - Provide appropriate hygiene as needed including keeping skin clean and dry  - Evaluate need for skin moisturizer/barrier cream  - Collaborate with interdisciplinary team   - Patient/family teaching  - Consider wound care consult   Outcome: Progressing     Problem: Nutrition/Hydration-ADULT  Goal: Nutrient/Hydration intake appropriate for improving, restoring or maintaining nutritional needs  Description: Monitor and assess patient's nutrition/hydration status for malnutrition   Collaborate with interdisciplinary team and initiate plan and interventions as ordered  Monitor patient's weight and dietary intake as ordered or per policy  Utilize nutrition screening tool and intervene as necessary  Determine patient's food preferences and provide high-protein, high-caloric foods as appropriate       INTERVENTIONS:  - Monitor oral intake, urinary output, labs, and treatment plans  - Assess nutrition and hydration status and recommend course of action  - Evaluate amount of meals eaten  - Assist patient with eating if necessary   - Allow adequate time for meals  - Recommend/ encourage appropriate diets, oral nutritional supplements, and vitamin/mineral supplements  - Order, calculate, and assess calorie counts as needed  - Recommend, monitor, and adjust tube feedings and TPN/PPN based on assessed needs  - Assess need for intravenous fluids  - Provide specific nutrition/hydration education as appropriate  - Include patient/family/caregiver in decisions related to nutrition  Outcome: Progressing     Problem: PAIN - ADULT  Goal: Verbalizes/displays adequate comfort level or baseline comfort level  Description: Interventions:  - Encourage patient to monitor pain and request assistance  - Assess pain using appropriate pain scale  - Administer analgesics based on type and severity of pain and evaluate response  - Implement non-pharmacological measures as appropriate and evaluate response  - Consider cultural and social influences on pain and pain management  - Notify physician/advanced practitioner if interventions unsuccessful or patient reports new pain  Outcome: Progressing     Problem: INFECTION - ADULT  Goal: Absence or prevention of progression during hospitalization  Description: INTERVENTIONS:  - Assess and monitor for signs and symptoms of infection  - Monitor lab/diagnostic results  - Monitor all insertion sites, i e  indwelling lines, tubes, and drains  - Monitor endotracheal if appropriate and nasal secretions for changes in amount and color  - [place] appropriate cooling/warming therapies per order  - Administer medications as ordered  - Instruct and encourage patient and family to use good hand hygiene technique  - Identify and instruct in appropriate isolation precautions for identified infection/condition  Outcome: Progressing     Problem: Potential for Falls  Goal: Patient will remain free of falls  Description: INTERVENTIONS:  - Educate patient/family on patient safety including physical limitations  - Instruct patient to call for assistance with activity   - Consult OT/PT to assist with strengthening/mobility   - Keep Call bell within reach  - Keep bed low and locked with side rails adjusted as appropriate  - Keep care items and personal belongings within reach  - Initiate and maintain comfort rounds  - Make Fall Risk Sign visible to staff  - Offer Toileting every 2 Hours, in advance of need  - Initiate/Maintain bed alarm  - Obtain necessary fall risk management equipment:   - Apply yellow socks and bracelet for high fall risk patients  - Consider moving patient to room near nurses station  Outcome: Progressing     Problem: SKIN/TISSUE INTEGRITY - ADULT  Goal: Pressure injury heals and does not worsen  Description: Interventions:  - Implement low air loss mattress or specialty surface (Criteria met)  - Apply silicone foam dressing    ed  Outcome: Progressing     Problem: SAFETY,RESTRAINT: NV/NON-SELF DESTRUCTIVE BEHAVIOR  Goal: Remains free of harm/injury (restraint for non violent/non self-detsructive behavior)  Description: INTERVENTIONS:  - Instruct patient/family regarding restraint use   - Assess and monitor physiologic and psychological status   - Provide interventions and comfort measures to meet assessed patient needs   - Identify and implement measures to help patient regain control  - Assess readiness for release of restraint   Outcome: Progressing  Goal: Returns to optimal restraint-free functioning  Description: INTERVENTIONS:  - Assess the patient's behavior and symptoms that indicate continued need for restraint  - Identify and implement measures to help patient regain control  - Assess readiness for release of restraint   Outcome: Progressing     Problem: COPING  Goal: Pt/Family able to verbalize concerns and demonstrate effective coping strategies  Description: INTERVENTIONS:  - Assist patient/family to identify coping skills, available support systems and cultural and spiritual values  - Provide emotional support, including active listening and acknowledgement of concerns of patient and caregivers  - Reduce environmental stimuli, as able  - Provide patient education  - Assess for spiritual pain/suffering and initiate spiritual care, including notification of Pastoral Care or beau based community as needed  - Assess effectiveness of coping strategies  Outcome: Progressing  Goal: Will report anxiety at manageable levels  Description: INTERVENTIONS:  - Administer medication as ordered  - Teach and encourage coping skills  - Provide emotional support  - Assess patient/family for anxiety and ability to cope  Outcome: Progressing     Problem: CONFUSION/THOUGHT DISTURBANCE  Goal: Thought disturbances (confusion, delirium, depression, dementia or psychosis) are managed to maintain or return to baseline mental status and functional level  Description: INTERVENTIONS:  - Assess for possible contributors to  thought disturbance, including but not limited to medications, infection, impaired vision or hearing, underlying metabolic abnormalities, dehydration, respiratory compromise,  psychiatric diagnoses and notify attending PHYSICAN/AP  - Monitor and intervene to maintain adequate nutrition, hydration, elimination, sleep and activity  - Decrease environmental stimuli, including noise as appropriate  - Provide frequent contacts to provide refocusing, direction and reassurance as needed   Approach patient calmly with eye contact and at their level   - [place] high risk fall precautions, aspiration precautions and other safety measures, as indicated  - If delirium suspected, notify physician/AP of change in condition and request immediate in-person evaluation  - Pursue consults as appropriate including Geriatric (campus dependent), OT for cognitive evaluation/activity planning, psychiatric, pastoral care, etc   Outcome: Progressing     Problem: BEHAVIOR  Goal: Pt/Family maintain appropriate behavior and adhere to behavioral management agreement, if implemented  Description: INTERVENTIONS:  - Assess the family dynamic   - Encourage verbalization of thoughts and concerns in a socially appropriate manner  - Assess patient/family's coping skills and non-compliant behavior (including use of illegal substances)  - Utilize positive, consistent limit setting strategies supporting safety of patient, staff and others  - Initiate consult with Case Management, Spiritual Care or other ancillary services as appropriate  - If a patient's/visitor's behavior jeopardizes the safety of the patient, staff, or others, refer to organization procedure     - Notify Security of behavior or suspected illegal substances which indicate the need for search of the patient and/or belongings  - Encourage participation in the decision making process about a behavioral management agreement; implement if patient meets criteria  Outcome: Progressing

## 2022-12-12 NOTE — PLAN OF CARE
Problem: DISCHARGE PLANNING  Goal: Discharge to home or other facility with appropriate resources  Description: INTERVENTIONS:  - Identify barriers to discharge w/patient and caregiver  - Arrange for needed discharge resources and transportation as appropriate  - Identify discharge learning needs (meds, wound care, etc )  - Arrange for interpretive services to assist at discharge as needed  - Refer to Case Management Department for coordinating discharge planning if the patient needs post-hospital services based on physician/advanced practitioner order or complex needs related to functional status, cognitive ability, or social support system  Outcome: Progressing     Problem: Knowledge Deficit  Goal: Patient/family/caregiver demonstrates understanding of disease process, treatment plan, medications, and discharge instructions  Description: Complete learning assessment and assess knowledge base    Interventions:  - Provide teaching at level of understanding  - Provide teaching via preferred learning methods  Outcome: Progressing     Problem: PAIN - ADULT  Goal: Verbalizes/displays adequate comfort level or baseline comfort level  Description: Interventions:  - Encourage patient to monitor pain and request assistance  - Assess pain using appropriate pain scale  - Administer analgesics based on type and severity of pain and evaluate response  - Implement non-pharmacological measures as appropriate and evaluate response  - Consider cultural and social influences on pain and pain management  - Notify physician/advanced practitioner if interventions unsuccessful or patient reports new pain  Outcome: Progressing     Problem: INFECTION - ADULT  Goal: Absence or prevention of progression during hospitalization  Description: INTERVENTIONS:  - Assess and monitor for signs and symptoms of infection  - Monitor lab/diagnostic results  - Monitor all insertion sites, i e  indwelling lines, tubes, and drains  - Monitor endotracheal if appropriate and nasal secretions for changes in amount and color  - [place] appropriate cooling/warming therapies per order  - Administer medications as ordered  - Instruct and encourage patient and family to use good hand hygiene technique  - Identify and instruct in appropriate isolation precautions for identified infection/condition  Outcome: Progressing     Problem: Neurological Deficit  Goal: Neurological status is stable or improving  Description: Interventions:  - Monitor and assess patient's level of consciousness, motor function, sensory function, and level of assistance needed for ADLs  - Monitor and report changes from baseline  Collaborate with interdisciplinary team to initiate plan and implement interventions as ordered  - Provide and maintain a safe environment  - Consider seizure precautions  - Consider fall precautions  - Consider aspiration precautions  - Consider bleeding precautions  Outcome: Progressing     Problem: Activity Intolerance/Impaired Mobility  Goal: Mobility/activity is maintained at optimum level for patient  Description: Interventions:  - Assess and monitor patient  barriers to mobility and need for assistive/adaptive devices  - Assess patient's emotional response to limitations  - Collaborate with interdisciplinary team and initiate plans and interventions as ordered  - Encourage independent activity per ability   - Maintain proper body alignment  - Perform active/passive rom as tolerated/ordered  - Plan activities to conserve energy   - Turn patient as appropriate  Outcome: Progressing     Problem: Communication Impairment  Goal: Ability to express needs and understand communication  Description: Assess patient's communication skills and ability to understand information  Patient will demonstrate use of effective communication techniques, alternative methods of communication and understanding even if not able to speak       - Encourage communication and provide alternate methods of communication as needed  - Collaborate with case management/ for discharge needs  - Include patient/family/caregiver in decisions related to communication  Outcome: Progressing     Problem: Potential for Aspiration  Goal: Non-ventilated patient's risk of aspiration is minimized  Description: Assess and monitor vital signs, respiratory status, and labs (WBC)  Monitor for signs of aspiration (tachypnea, cough, rales, wheezing, cyanosis, fever)  - Assess and monitor patient's ability to swallow  - Place patient up in chair to eat if possible  - HOB up at 90 degrees to eat if unable to get patient up into chair   - Supervise patient during oral intake  - Instruct patient/ family to take small bites  - Instruct patient/ family to take small single sips when taking liquids    - Follow patient-specific strategies generated by speech pathologist   Outcome: Progressing     Problem: MOBILITY - ADULT  Goal: Maintain or return to baseline ADL function  Description: INTERVENTIONS:  -  Assess patient's ability to carry out ADLs; assess patient's baseline for ADL function and identify physical deficits which impact ability to perform ADLs (bathing, care of mouth/teeth, toileting, grooming, dressing, etc )  - Assess/evaluate cause of self-care deficits   - Assess range of motion  - Assess patient's mobility; develop plan if impaired  - Assess patient's need for assistive devices and provide as appropriate  - Encourage maximum independence but intervene and supervise when necessary  - Involve family in performance of ADLs  - Assess for home care needs following discharge   - Consider OT consult to assist with ADL evaluation and planning for discharge  - Provide patient education as appropriate  Outcome: Progressing  Goal: Maintains/Returns to pre admission functional level  Description: INTERVENTIONS:  - Perform BMAT or MOVE assessment daily    - Set and communicate daily mobility goal to care team and patient/family/caregiver  - Collaborate with rehabilitation services on mobility goals if consulted  - Perform Range of Motion 2 times a day  - Reposition patient every 2 hours    - Dangle patient 2 times a day  - Stand patient 2 times a day  - Ambulate patient 2 times a day  - Out of bed to chair 2 times a day   - Out of bed for meals 2 times a day  - Out of bed for toileting  - Record patient progress and toleration of activity level   Outcome: Progressing

## 2022-12-12 NOTE — WOUND OSTOMY CARE
Progress Note - Wound   Ashley Gutierrez 82 y o  male MRN: [id number]  [facility] Encounter: [id number]      Assessment:  Wound care consulted for assessment of sacral and RLE wounds  Patient admitted with stroke-like symptoms  History of -  A-fib, HTN, and cancer  Patient seen in bed, alert and oriented x 2, cooperative for the assessment  Dependent for care  Max assist of one with turning for the assessment  Incontinent of bowel and bladder - condom catheter managing urine  Foam wedges are in use for repositioning  Podiatry is managing the L heel wound  R heel is dry and intact without redness or wounds, dry skin noted  1  RLE is dry and intact with dry scaly skin and hemosiderin staining  No open aspect, redness or drainage noted  Recommend to apply skin nourishing cream        2  B/l sacro-buttocks and posterior thighs are dry and intact with blanchable pink/hyperpigmented skin  All aspects are intact and blanchable including the linear aspect to L sacro-buttock  No pressure injury appreciated  There is no maceration to the sacro-intergluteal crease  Continue with preventative skin care plan  No induration, fluctuance, odor, warmth/temperature differences, redness, or purulence noted to the above noted skin areas assessed  Patient tolerated assessment well- denies pain and no s/s of non-verbal pain or discomfort observed during the encounter  Primary nurse aware of plan of care  Plan:   1-Apply hydraguard to the b/l sacro-buttocks, posterior thighs and R heel TID and PRN   2-Turn/reposition q2h or when medically stable for pressure re-distribution on skin   3-Elevate heels to offload pressure  4-Moisturize skin daily with skin nourishing cream  5-Ehob cushion in chair when out of bed  6-Left Heel: Per Podiatry Recommendations      Objective:    Vitals: Blood pressure 138/80, pulse 102, temperature (!) 97 3 °F (36 3 °C), resp   rate 16, height 5' 8" (1 727 m), weight 101 kg (223 lb 1 7 oz), SpO2 100 %  ,Body mass index is 33 92 kg/m²  Wound care will sign off at this time, please re-consult if needed    Lopez Campbell RN MSN [facility]ON

## 2022-12-12 NOTE — PLAN OF CARE
Problem: PHYSICAL THERAPY ADULT  Goal: Performs mobility at highest level of function for planned discharge setting  See evaluation for individualized goals  Description: Treatment/Interventions: Functional transfer training, LE strengthening/ROM, Endurance training, Therapeutic exercise, Cognitive reorientation, Patient/family training, Equipment eval/education, Bed mobility, Spoke to nursing, Spoke to case management, OT  Equipment Recommended:  (TBD)       See flowsheet documentation for full assessment, interventions and recommendations  Outcome: Not Progressing  Note: Prognosis: Guarded  Problem List: Decreased strength, Decreased endurance, Impaired balance, Decreased range of motion, Decreased mobility, Decreased cognition, Impaired judgement, Decreased safety awareness, Impaired sensation, Impaired tone, Orthopedic restrictions  Assessment: Pt seen for re-evaluation 2* change in LLE WBS to NWB since IE  Pt originally presented with stroke like symptoms including garbled speech, L facial droop, L sided weakness  Found to have "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, mesial temporal lobe, internal capsule and cerebral peduncle " per brain MRI  Other active problems include acute cystitis without hematuria, PAD, dysphagia, prediabetes, b/l internal carotid artery stenosis, SIRS vs sepsis, L heel pressure injury, acute metabolic encephalopathy, b/l LE edema, a-fib, anterior shoulder dislocation of L shoulder  Upon re-eval, remains maxAx2 for all mobility with increased difficulty performing STS while being NWB on LLE  Continues to be limited by decreased L>R strength, balance, endurance, activity tolerance, and mobility, and increased L UE and LE done  Will continue to benefit from skilled acute care PT to further address his limitations  The patient's AM-PAC Basic Mobility Inpatient Short Form Raw Score is 6   A Raw score of less than or equal to 16 suggests the patient may benefit from discharge to post-acute rehabilitation services  Please also refer to the recommendation of the Physical Therapist for safe discharge planning  D/C recommendations remain rehab  Barriers to Discharge: Decreased caregiver support, Inaccessible home environment     PT Discharge Recommendation: Post acute rehabilitation services    See flowsheet documentation for full assessment

## 2022-12-12 NOTE — PROGRESS NOTES
[address] [facility]  Progress Note - Jimmie Olmstead 1935, 82 y o  male MRN: [id number]  Unit/Bed#: [facility] 733-01 Encounter: [id number]  Primary Care Provider: Viridiana Rojas MD   Date and time admitted to hospital: 12/4/2022  5:26 PM    Acute cystitis without hematuria  Assessment & Plan  · Presenting with AMS and meeting sepsis criteria as above   · Urine culture preliminarily growing Enterococcus faecalis, E coli and Morganella morganii    · All abx stopped by neurology    PAD (peripheral artery disease) ([facility] )  Assessment & Plan  · Follows with [facility]  · Most recent LEADs in May consistent with 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  · Repeat LEADs 12/6 significant for LLE > 75% stenosis of distal superficial femoral artery and diffuse disease throughout the remaining femoral-popliteal  · vessels  · Continue with ASA, statin    Oropharyngeal dysphagia  Assessment & Plan  · Recommended NPO yesterday   · IV heparin and metoprolol     Prediabetes  Assessment & Plan  Evidenced by A1c of 5 9%  · Monitor glucose on AM labs   · Encourage diet and lifestyle modifications once DC    Stenosis of both internal carotid arteries  Assessment & Plan  · CTA head/neck on admission (limited due to motion): Atherosclerotic changes are seen within the carotid bifurcations and intracranial internal carotid arteries bilaterally   Suspected moderate stenosis of the right ICA origin and mild narrowing of the left carotid bifurcation   Intracranially there is also right greater than left stenosis, moderate on the left and mild on the right    · Appreciate neurosurgery consult and recommendations   · No need for surgical intervention at this time   · Outpatient follow-up with repeat CTA in 2 weeks   · Continue asa rectal, statin when tolerating PO and heparin need neuro input regarding preferred heparin       SIRS vs Sepsis ([facility] )  Assessment & Plan  POA with mild leukocytosis and tachycardia  Procalcitonin WNL at 0 15   · COVID/flu/RSV negative   · Portable CXR: Probable small left effusion   Slightly limited but otherwise unremarkable examination  · UA with nitrites, leukocytes and bacteria concerning for UTI  · Noted with left heel pressure ulcer on admission however no clinical signs of cellulitis, see related plan  · PA/lateral CXR: No acute cardiopulmonary disease   · Continue with IV ceftriaxone (day 4) pending final urine culture / sensitivities   · Trend temps, WBC, hemodynamics   Appreciate ID input yesterday - off all abx      Pressure injury of deep tissue of left heel  Assessment & Plan  · POA, followed by wound care as outpatient  · No clinical signs of infection at this time   · Appreciate Podiatry consult and recommendations   · XR heel/calcaneous:  Soft tissue swelling   No evidence of acute osteomyelitis  · LEADs noted diffuse disease in LLE, see below   · No plans for intervention at this time   · Continue with local wound care, offloading     Acute metabolic encephalopathy  Assessment & Plan  ID input noted  No metabolic derangement noted   Discussed with neurology not further imaging   Today patient is improving         Nicotine dependence  Assessment & Plan  · Patient admits to smoking approx 1 ppd currently   · Suspect has underlying COPD   · Respiratory protocol   · Cessation counseling     Bilateral lower extremity edema  Assessment & Plan  Patient with bilateral lower extremity edema which appears to be longstanding and related to venous stasis, managed by [facility] cardiology  Recently edema was worsening and associated with weeping  Patient received 80 mg IV Lasix x 1 in office on 11/9 and again on 11/23  Subsequently prescribed higher dose Lasix 40 mg BID    · Lasix held on admission due to hyponatremia, continue to hold pending improvement in oral intake   · Continue with compression stockings   · Encourage LE elevation   · Monitor clinically     Atrial fibrillation [facility])  Assessment & Plan  · Hospital day 1 patient in atrial fibrillation with RVR - now improved   · Continue rate control with metoprolol 50 mg q12h   · Speech changed diet to purreed from NPO yesterday - ? Swallow safe  · Echocardiogram 12/6 with LVEF 55%, normal wall motion, normal systolic/diastolic function, mild-moderately increased RV pressure  · Now on Xarelto     Anterior dislocation of left shoulder  Assessment & Plan  · Injury occurred in December 2019, was following with  orthopedics and managed conservatively; seems to have been lost to follow-up with repeat XRs  · 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     * Stroke-like symptoms  Assessment & Plan  Patient presented via EMS as stroke alert due to concern for acute onset of left-sided weakness and garbled speech noticed by patient's wife  Patient is not a candidate for tPA due to bleeding risk  · Admitted under stroke pathway   · CTA head/neck negative for occlusion, but did note moderate R ICA and mild L ICA stenosis, see below   · MRI brain negative for acute infarct, but did note mild chronic microangiopathic ischemic changes  · Echocardiogram essentially unremarkable   · Lipid panel: cholesterol 150, , LDL 97  · Appreciate neurology consult and recommendations   · Continue with aspirin, statin, Eliquis   · P2Y12 checked -> 268  · Routine EEG without evidence of seizure   · Concern for possible TIA vs seizure and/or infectious/metabolic etiology   · See below regarding infectious workup/management   · PT/OT recommending rehab, PMR following  · Monitor neurochecks   Discussed with neurology again today given ID not in favor of further abx  Regarding change in mental status- today infact improved so no further imaging at this time          VTE Pharmacologic Prophylaxis: VTE Score: 6 Moderate Risk (Score 3-4) - Pharmacological DVT Prophylaxis Ordered: heparin      Patient Centered Rounds: I performed bedside rounds with nursing staff today  Discussions with Specialists or Other Care Team Provider: Discussed with neurology     Education and Discussions with Family / Patient: Updated  (wife) at bedside  Time Spent for Care: 30 minutes  More than 50% of total time spent on counseling and coordination of care as described above  Current Length of Stay: 8 day(s)  Current Patient Status: Inpatient   Certification Statement: The patient will continue to require additional inpatient hospital stay due to likely medically stable over next 24-48 hours if no further imaging/neuro work up   Discharge Plan: Anticipate discharge in 24-48 hrs to rehab as per CM and PT    Code Status: Level 3 - DNAR and DNI    Subjective:   Patient seen and examined  No new symptoms   No complaints    Objective:     Vitals:   Temp (24hrs), Av 3 °F (36 3 °C), Min:97 3 °F (36 3 °C), Max:97 3 °F (36 3 °C)    Temp:  [97 3 °F (36 3 °C)] 97 3 °F (36 3 °C)  HR:  [] 102  BP: (138)/(80) 138/80  SpO2:  [94 %-100 %] 100 %  Body mass index is 33 92 kg/m²  Input and Output Summary (last 24 hours):   No intake or output data in the 24 hours ending 22 1727    Physical Exam:   Physical Exam  Cardiovascular:      Rate and Rhythm: Normal rate  Heart sounds: No murmur heard  No friction rub  No gallop  Pulmonary:      Effort: Pulmonary effort is normal  No respiratory distress  Breath sounds: No stridor  No wheezing, rhonchi or rales  Chest:      Chest wall: No tenderness  Abdominal:      General: Abdomen is flat  Skin:     Coloration: Skin is pale  Skin is not jaundiced  Findings: No bruising, erythema, lesion or rash  Neurological:      Mental Status: He is alert        Comments: Left sided weakness, neglect, also with expressive aphasia     Psychiatric:         Mood and Affect: Mood normal           Additional Data:     Labs:  Results from last 7 days   Lab Units 22  0444   WBC Thousand/uL 11 00* HEMOGLOBIN g/dL 13 7   HEMATOCRIT % 46 2   PLATELETS Thousands/uL 267   NEUTROS PCT % 81*   LYMPHS PCT % 7*   MONOS PCT % 8   EOS PCT % 2     Results from last 7 days   Lab Units 12/12/22  0444   SODIUM mmol/L 145   POTASSIUM mmol/L 3 8   CHLORIDE mmol/L 111*   CO2 mmol/L 33*   BUN mg/dL 14   CREATININE mg/dL 0 85   ANION GAP mmol/L 1*   CALCIUM mg/dL 9 6   ALBUMIN g/dL 2 2*   TOTAL BILIRUBIN mg/dL 0 42   ALK PHOS U/L 109   ALT U/L 39   AST U/L 41   GLUCOSE RANDOM mg/dL 106     Results from last 7 days   Lab Units 12/08/22  1736   INR  1 21*             Results from last 7 days   Lab Units 12/10/22  1310   LACTIC ACID mmol/L 1 4       Lines/Drains:  Invasive Devices     Peripheral Intravenous Line  Duration           Peripheral IV 12/09/22 Distal;Dorsal (posterior); Left Forearm 3 days          Drain  Duration           External Urinary Catheter Small 7 days                      Imaging: No pertinent imaging reviewed      Recent Cultures (last 7 days):         Last 24 Hours Medication List:   Current Facility-Administered Medications   Medication Dose Route Frequency Provider Last Rate   • acetaminophen  975 mg Oral Q6H PRN Caty Grijalva PA-C     • albuterol  2 puff Inhalation Q4H PRN Salvador Murrell MD     • allopurinol  100 mg Oral Daily Jatinder Menezes MD     • aspirin  81 mg Oral Daily Jatinder Menezes MD     • atorvastatin  40 mg Oral QPM Jatinder Menezes MD     • cholecalciferol  400 Units Oral Daily Jatinder Menezes MD     • dextromethorphan-guaiFENesin  10 mL Oral Q4H PRN Caty Grijalva PA-C     • docusate sodium  100 mg Oral BID Jatinder Menezes MD     • lactulose  10 g Oral BID Salvador Murrell MD     • metoprolol  5 mg Intravenous [facility] Salvador Murrell MD     • nicotine  14 mg Transdermal Daily Caty Grijalva PA-C     • ondansetron  4 mg Intravenous Q6H PRN Jatinder Menezes MD     • polyethylene glycol  17 g Oral Daily Jatinder Menezes MD     • QUEtiapine  12 5 mg Oral HS August Pascal MD     • QUEtiapine  12 5 mg Oral HS PRN Nathalie Kendall MD     • rivaroxaban  20 mg Oral Daily With Hoaner Chrsisy Weinstein DO          Today, Patient Was Seen By: Frances Padron MD    **Please Note: This note may have been constructed using a voice recognition system  **

## 2022-12-12 NOTE — ASSESSMENT & PLAN NOTE
Patient with bilateral lower extremity edema which appears to be longstanding and related to venous stasis, managed by [facility] cardiology  Recently edema was worsening and associated with weeping  Patient received 80 mg IV Lasix x 1 in office on 11/9 and again on 11/23  Subsequently prescribed higher dose Lasix 40 mg BID    · Lasix held on admission due to hyponatremia, continue to hold pending improvement in oral intake   · Continue with compression stockings   · Encourage LE elevation   · Monitor clinically

## 2022-12-12 NOTE — PROGRESS NOTES
NEUROLOGY RESIDENCY PROGRESS NOTE     Name: Aimee King   Age & Sex: 82 y o  male   MRN: [id number]  [facility]   Encounter: [id number]    Aimee King will need follow up in in 4 weeks with neurovascular AP  He will not require outpatient neurological testing  Pending for discharge: Per Primary Team    ASSESSMENT & PLAN     * Stroke-like symptoms  Assessment & Plan  Assessment:  Aimee King is a 82 y o  male with a PMH of aFib (on eliquis), BPH, COPD, Gout, HTN, Basal cell adenocarcinoma of salviatory gland, PAD and PVD, who presents as a stoke alert    It is reported by EMS that the patient's Amalia Kathy was 12/4/2022 1600 after which there was acute onset left sided facial droop, left UA and LE weakness, and garbled speech  The patient is on ASA and Eliquis at baseline  His presenting BP was 133/88, glucose 121  His NIHSS on initial evaluation was 11  Initial CTH was not demonstrative of  acute intracranial abnormality, and showed table mild chronic microangiopathic changes within the brain  CTA HN showed atherosclerotic changes are seen within the carotid bifurcations and intracranial internal carotid arteries bilaterally  Suspected moderate stenosis of the right ICA origin and mild narrowing of the left carotid bifurcation  Intracranially there is also right greater than left stenosis, moderate on the left and mild on the right  The patient on subsequent examination was becoming much more distractible and was not able to cooperate with examination  He had difficulty following commands consistently  Over the last few days the patient has shown improvement in overall exam, with hospital delirium being present with the changes in mental status and behaviour in the day versus the night      Imaging:  - CTH was not demonstrative of  acute intracranial abnormality, and showed table mild chronic microangiopathic changes within the brain   - CTA HN showed atherosclerotic changes are seen within the carotid bifurcations and intracranial internal carotid arteries bilaterally  Suspected moderate stenosis of the right ICA origin and mild narrowing of the left carotid bifurcation  Intracranially there is also right greater than left stenosis, moderate on the left and mild on the right   - MRI Brain wo: Motion degraded examination  No acute infarction, edema, or mass effect  Mild chronic microangiopathic ischemic changes   - TTE:  Left Ventricle: Left ventricular cavity size is normal  Wall thickness is mildly increased  There is concentric remodeling  The left ventricular ejection fraction is 65%  Systolic function is normal  Wall motion is normal  Diastolic function is normal   IVS: Septal motion is not well visualized  Right Ventricle: Right ventricular cavity size is mildly dilated  Systolic function is normal  Right Atrium: The atrium is mildly dilated  Mitral Valve: There is mild annular calcification  There is trace regurgitation  Tricuspid Valve: There is moderate regurgitation  The right ventricular systolic pressure is moderately elevated  The estimated right ventricular systolic pressure is 29 53 mmHg  - MRI Brain wwo: 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, mesial temporal lobe, internal capsule and cerebral peduncle  No mass effect or hemorrhagic conversion  Workup:  - A1C: 5 9  - Lipid panel: CHOL 150, , HDL 22, LDL 97  - Ammonia: 17  - 4-Plex: negative  - CXR: Probable small left effusion  Slightly limited but otherwise unremarkable examination   - UA w reflec sxope and cx: innumerable WBC, innumerable bacteria, innumerable RBCs, large leuks  - TEG Platelet mapping: Abnormal  - rEEG: Slowing of the posteriorly dominant rhythm suggests mild nonspecific diffuse cerebral dysfunction    - vEEG: Generalized slowing    Impression:  Considering the sudden onset of the symptoms and the imaging studies that are not suggestive of LVO, haemorrhage or infarct  Imaging studies did reveal that there are atherosclerotic changes within the carotid bifurcations and intracranial internal carotid arteries bilaterally with both intracranial and extracranial ICA stenosis for which neurosurgery recommended medical management  MRI Brain demonstrated multiple strokes that could be embolic in nature  A rEEG and vEEG was completed to look for a seizure/postictal state contributing to his confusion, these demonstrated slowing of the PDR suggesting diffuse cerebral dysfunction  UTI was found and was treated  With fluctuation in mentation hospital delirium is suspected  12/11/2022: Primary team requested neurology to see and assess patient due to consistent worsening mentation  However, patient's mentation was overall improved per nursing and patient was alert and oriented x4  And appropriately followed commands and answered questions  Patient was quite sleepy however was arousable to verbal stimuli  Plan:  - Obtain STAT CTH wo for acute changes in mental status or neurologic examination and please notify neurology  - Stroke Pathway  --- Continue ASA 81 QD  --- Continue Atorvastatin 40mg QD  --- Continue Xarelto 20mg QHS  - Frequent neuro checks  - Telemetry  - Fall precautions  - Goals: Normotension, normothermia, euglycaemia  - PT/OT/ST/PMR as able  - Rest per primary team; appreciated!  - No further IP recommendations from neurology  Please contact for any questions  Acute metabolic encephalopathy  Assessment & Plan  Assessment:  During assessment after initial NIHSS and imaging studies were completed it was noted that the patient had more trouble focusing on conversations, had more tangential speech, and was not able to follow commands as before  On initial examination the patient kept perseverating on the numbers 9 and 14 which is the month and day respectively of his birthday   On examination 12/12/2022 the patient was examined in the early AM, he was alert, oriented to only person and place, and did seem confused  As the patient does seem to improve as the day progresses and then seem to become confused and disoriented again, hospital delirium is suspected  Impression:  A UTI with three organisms was been identified and treated  Continued waxing and waning suggests hospital delirium  Plan:  - Correct metabolic derangements  - Infectious workup and treatment of 3 organism UTI completed  - Currently monitoring off antimicrobials  - Low dose Quetiapine 12 5mg PRN for agitation   - If patient still seems to be tired during the day and confusion persists, we suggest stopping the HS [facility] quetiapine and keeping the PRN for agitation    - Keep wake-sleep cycles managed  - Use melatonin 3mg QHS for sleep instead of sedatives  - Minimize use of sedatives and restraints as able  - Per primary team; appreciated!  - No further IP recommendations from neurology  Please contact for any questions  SUBJECTIVE     Patient was seen and examined this AM during visitation  The patient in in bed with all clothes removed, after approaching and introductions were made I assisted in dressing the patient who was actively attempting to have his clothes removed  He was alert, oriented to person and place, however not time and event  He was able to complete calculations, and repeat phrases however not spelling  He did not report and HA, CP, SOB, ABD-pain, N/V or new changes in sensorium  The patient did demonstrate a RUE tremor that seems to be an intention tremor  Review of Systems   Constitutional: Positive for fatigue  Negative for chills, diaphoresis and fever  HENT: Negative for drooling, ear discharge, nosebleeds, sinus pressure, sinus pain, sneezing and sore throat  Eyes: Negative for pain, discharge and redness  Respiratory: Negative for cough, chest tightness, shortness of breath and wheezing  Cardiovascular: Negative for chest pain, palpitations and leg swelling  Gastrointestinal: Negative for anal bleeding, blood in stool, diarrhea, nausea and vomiting  Genitourinary: Negative for flank pain, hematuria and urgency  Musculoskeletal: Positive for back pain  Skin: Positive for wound (on LLE, foot in boot  )  Negative for pallor  Neurological: Positive for tremors, weakness and numbness (Inconsistent on examination)  Negative for dizziness, seizures, syncope, facial asymmetry, speech difficulty, light-headedness and headaches  Psychiatric/Behavioral: Positive for confusion, decreased concentration and sleep disturbance  Negative for behavioral problems, dysphoric mood, hallucinations, self-injury and suicidal ideas  The patient is not nervous/anxious and is not hyperactive  OBJECTIVE     Patient ID: Young Mcgarry is a 82 y o  male  Vitals:    22 0752 22 1450 22 2237 22 2240   BP: 142/88 141/76 138/80 138/80   Pulse: 96 (!) 106 55 102   Resp: 16      Temp: 97 6 °F (36 4 °C) (!) 97 2 °F (36 2 °C) (!) 97 3 °F (36 3 °C)    TempSrc:       SpO2: 96% 95% 94% 100%   Weight:       Height:          Temperature:   Temp (24hrs), Av 3 °F (36 3 °C), Min:97 2 °F (36 2 °C), Max:97 3 °F (36 3 °C)    Temperature: (!) 97 3 °F (36 3 °C)    Physical Exam  Vitals and nursing note reviewed  HENT:      Head: Normocephalic  Mouth/Throat:      Mouth: Mucous membranes are dry  Pharynx: No oropharyngeal exudate  Eyes:      Extraocular Movements: Extraocular movements intact and EOM normal       Pupils: Pupils are equal, round, and reactive to light  Cardiovascular:      Rate and Rhythm: Normal rate  Pulmonary:      Effort: Pulmonary effort is normal    Musculoskeletal:         General: Swelling, tenderness and signs of injury present  Cervical back: Normal range of motion  Skin:     General: Skin is warm  Neurological:      Mental Status: He is alert  He is disoriented  Sensory: No sensory deficit  Motor: Weakness present  Coordination: Heel to NIX [facility] Test abnormal (Able to complete with RLE, not with LLE)  Finger-Nose-Finger Test normal       Gait: Gait abnormal       Deep Tendon Reflexes:      Reflex Scores:       Tricep reflexes are 2+ on the right side and 2+ on the left side  Bicep reflexes are 2+ on the right side and 2+ on the left side  Brachioradialis reflexes are 2+ on the right side and 2+ on the left side  Patellar reflexes are 1+ on the right side and 1+ on the left side  Achilles reflexes are 1+ on the right side and 1+ on the left side  Neurologic Exam     Mental Status   Oriented to person  Disoriented to place  Disoriented to year, month and date  Follows 1 step commands  Attention: decreased  Concentration: decreased  Level of consciousness: alert  Able to perform simple calculations  Able to name object  Able to repeat  - Speech is dysarthric     Cranial Nerves     CN II   Right visual field deficit: none  Left visual field deficit: none     CN III, IV, VI   Pupils are equal, round, and reactive to light  Extraocular motions are normal    Right pupil: Size: 2 mm  Reactivity: brisk  Left pupil: Size: 3 mm  Reactivity: brisk     CN III: no CN III palsy  CN VI: no CN VI palsy  Nystagmus: none   Upgaze: normal  Downgaze: normal  Conjugate gaze: present    CN V   Right facial sensation deficit: none  Left facial sensation deficit: none    CN VII   Right facial weakness: none  Left facial weakness: central    CN VIII   Hearing: impaired    CN IX, X   Palate: symmetric    CN XI   Right sternocleidomastoid strength: normal  Left sternocleidomastoid strength: normal  Right trapezius strength: normal  Left trapezius strength: normal    CN XII   Tongue: not atrophic  Fasciculations: absent  Tongue deviation: none    Motor Exam   Muscle bulk: normal  Overall muscle tone: normal  Right arm tone: normal  Left arm tone: normal  Right leg tone: normal  Left leg tone: normal    Strength   Right deltoid: 5/5  Left deltoid: 4/5  Right biceps: 5/5  Left biceps: 4/5  Right triceps: 5/5  Left triceps: 4/5  Right wrist flexion: 5/5  Left wrist flexion: 4/5  Right wrist extension: 5/5  Left wrist extension: 4/5  Right interossei: 5/5  Left interossei: 4/5  Right iliopsoas: 5/5  Left iliopsoas: 3/5  Right quadriceps: 5/5  Left quadriceps: 3/5  Right hamstrin/5  Left hamstring: 3/5  Right anterior tibial: 5/5  Right posterior tibial: 5/5  Right gastroc: 5/5  - LLE in boot  - LLE, better movement noted on examination in foot and toes  Examination limited 2/2 pain and boot on left foot  Sensory Exam   Light touch normal      Gait, Coordination, and Reflexes     Coordination   Finger to nose coordination: normal  Heel to shin coordination: abnormal (Able to complete with RLE, not with LLE)    Tremor   Resting tremor: present (on LUE)  Intention tremor: present (on SINDHU)    Reflexes   Right brachioradialis: 2+  Left brachioradialis: 2+  Right biceps: 2+  Left biceps: 2+  Right triceps: 2+  Left triceps: 2+  Right patellar: 1+  Left patellar: 1+  Right achilles: 1+  Left achilles: 1+  Right plantar: equivocal  Right Stevens: absent  Left Stevens: absent  Right ankle clonus: absent  - Gait not assessed, risk of falling   - Plantar and clonus testing avoided d/t pain in left foot  LABORATORY DATA     Labs: I have personally reviewed pertinent reports         Results from last 7 days   Lab Units 22  0444 22  0532 12/10/22  1310 22  0404   WBC Thousand/uL 11 00* 9 80 8 94 8 21   HEMOGLOBIN g/dL 13 7 13 3 13 2 13 8   HEMATOCRIT % 46 2 45 9 42 8 45 8   PLATELETS Thousands/uL 267 266 267 245   NEUTROS PCT % 81*  --  78* 79*   MONOS PCT % 8  --  9 9      Results from last 7 days   Lab Units 22  0444 12/10/22  1310 22  0503 22  0431 22  0520 22  0515   SODIUM mmol/L 145 146  --  142   < > 141   POTASSIUM mmol/L 3 8 3 8  --  4 0   < > 3 7 CHLORIDE mmol/L 111* 113*  --  110*   < > 104   CO2 mmol/L 33* 27  --  28   < > 31   BUN mg/dL 14 16  --  15   < > 19   CREATININE mg/dL 0 85 0 84  --  0 71   < > 0 86   CALCIUM mg/dL 9 6 9 5  --  9 3   < > 9 4   ALK PHOS U/L 109  --  111  --   --  111   ALT U/L 39  --  33  --   --  30   AST U/L 41  --  40  --   --  29    < > = values in this interval not displayed  Results from last 7 days   Lab Units 12/10/22  1310 12/10/22  0122 12/09/22  2117 12/09/22  0209 12/08/22  1736 12/05/22  2249 12/05/22  1540   INR   --   --   --   --  1 21*  --  1 57*   PTT seconds 56* 67* 59*   < > 35   < > 43*    < > = values in this interval not displayed  Results from last 7 days   Lab Units 12/10/22  1310   LACTIC ACID mmol/L 1 4           IMAGING & DIAGNOSTIC TESTING     Radiology Results: I have personally reviewed pertinent reports  MRI brain wo contrast   Final Result by Aicha Hinojosa MD (12/08 0108)      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, mesial temporal lobe, internal capsule and cerebral peduncle  No mass effect or hemorrhagic conversion  The study was marked in [facility] for immediate notification  Workstation performed: [id number]         FL barium swallow video w speech   Final Result by SYSTEMGENERATED, DOCUMENTATION (12/07 1414)      XR chest pa & lateral   Final Result by Ellen Man MD (12/07 0830)      No acute cardiopulmonary disease  Workstation performed: [id number]         VAS lower limb arterial duplex, limited/unilateral   Final Result by Ector Pyle MD (12/06 1310)      XR heel / calcaneus 2+ vw left   Final Result by Mikala Melo MD (12/06 1046)      Soft tissue swelling  No evidence of acute osteomyelitis  If osteomyelitis remains a clinical concern, consider further evaluation with MRI              Workstation performed: [id number]         MRI brain wo contrast   Final Result by Saniya Johnson MD (12/05 1451)   Motion degraded examination  No acute infarction, edema, or mass effect  Mild chronic microangiopathic ischemic changes  Workstation performed: [id number]         XR chest portable   Final Result by Yan Zuniga MD (38/19 0428)      Probable small left effusion  Slightly limited but otherwise unremarkable examination  Workstation performed: [id number]         CTA stroke alert (head/neck)   Final Result by Gene Denney Seip, DO (12/04 6113)      Unfortunately this examination is significantly limited due to patient motion  Atherosclerotic changes are seen within the carotid bifurcations and intracranial internal carotid arteries bilaterally  Suspected moderate stenosis of the right ICA origin and mild narrowing of the left carotid bifurcation  Intracranially there is    also right greater than left stenosis, moderate on the left and mild on the right      No occlusive disease or thrombosis identified  Findings were directly discussed with neurology resident at approximately 5:40 PM                            Workstation performed: [id number]         CT stroke alert brain   Final Result by Gene Denney Seip, DO (12/04 1755)      No acute intracranial abnormality  Stable mild chronic microangiopathic changes within the brain  Findings were directly discussed with neurology resident at 5:40 PM       Workstation performed: [id number]           Other Diagnostic Testing: I have personally reviewed pertinent reports        ACTIVE MEDICATIONS     Current Facility-Administered Medications   Medication Dose Route Frequency   • acetaminophen (TYLENOL) tablet 975 mg  975 mg Oral Q6H PRN   • albuterol (PROVENTIL HFA,VENTOLIN HFA) inhaler 2 puff  2 puff Inhalation Q4H PRN   • allopurinol (ZYLOPRIM) tablet 100 mg  100 mg Oral Daily   • aspirin chewable tablet 81 mg  81 mg Oral Daily   • atorvastatin (LIPITOR) tablet 40 mg  40 mg Oral QPM   • cholecalciferol (VITAMIN D3) tablet 400 Units  400 Units Oral Daily   • dextromethorphan-guaiFENesin (ROBITUSSIN DM) oral syrup 10 mL  10 mL Oral Q4H PRN   • docusate sodium (COLACE) capsule 100 mg  100 mg Oral BID   • lactulose oral solution 10 g  10 g Oral BID   • metoprolol (LOPRESSOR) injection 5 mg  5 mg Intravenous Q8H [address]   • nicotine (NICODERM CQ) 14 mg/24hr TD 24 hr patch 14 mg  14 mg Transdermal Daily   • ondansetron (ZOFRAN) injection 4 mg  4 mg Intravenous Q6H PRN   • polyethylene glycol (MIRALAX) packet 17 g  17 g Oral Daily   • QUEtiapine (SEROquel) tablet 12 5 mg  12 5 mg Oral HS   • QUEtiapine (SEROquel) tablet 12 5 mg  12 5 mg Oral HS PRN   • rivaroxaban (XARELTO) tablet 20 mg  20 mg Oral Daily With Dinner     Prior to Admission medications    Medication Sig Start Date End Date Taking?  Authorizing Provider   colchicine (COLCRYS) 0 6 mg tablet Take 0 6 mg by mouth daily   Yes Historical Provider, MD   furosemide (LASIX) 40 mg tablet Take 40 mg by mouth daily   Yes Historical Provider, MD   potassium chloride (KLOR-CON) 20 mEq packet Take 20 mEq by mouth 2 (two) times a day   Yes Historical Provider, MD   acetaminophen (TYLENOL) 325 mg tablet Take 3 tablets (975 mg total) by mouth every 8 (eight) hours 12/9/19   Tere Schwartz PA-C   allopurinol (ZYLOPRIM) 100 mg tablet Take 100 mg by mouth daily    Historical Provider, MD   apixaban (ELIQUIS) 5 mg Take 5 mg by mouth 2 (two) times a day    Historical Provider, MD   aspirin (ECOTRIN LOW STRENGTH) 81 mg EC tablet Take 81 mg by mouth daily    Historical Provider, MD   cholecalciferol (VITAMIN D3) 400 units tablet Take 400 Units by mouth daily    Historical Provider, MD   docusate sodium (COLACE) 100 mg capsule Take 1 capsule (100 mg total) by mouth 2 (two) times a day 12/9/19   Abhishek Shaw PA-C   metoprolol tartrate (LOPRESSOR) 50 mg tablet Take 50 mg by mouth every 12 (twelve) hours Historical Provider, MD   vitamin A 10,000 units capsule Take 10,000 Units by mouth daily    Historical Provider, MD     VTE Pharmacologic Prophylaxis: Rivaroxaban (Xarelto)  VTE Mechanical Prophylaxis: sequential compression device    ==  Particia Apley, DO [address] Neurology Residency, PGY-2

## 2022-12-13 NOTE — ASSESSMENT & PLAN NOTE
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

## 2022-12-13 NOTE — PROGRESS NOTES
[address] [facility]  Progress Note - Sujata Meek 1935, 82 y o  male MRN: [id number]  Unit/Bed#: [facility] 733-01 Encounter: [id number]  Primary Care Provider: Guerda Garces MD   Date and time admitted to hospital: 12/4/2022  5:26 PM    Acute cystitis without hematuria  Assessment & Plan  · Presenting with AMS and meeting sepsis criteria as above   · Urine culture preliminarily growing Enterococcus faecalis, E coli and Morganella morganii    · All abx stopped by neurology    PAD (peripheral artery disease) ([facility] [address] )  Assessment & Plan  · Follows with [facility] outpatient  · Most recent LEADs in May consistent with 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  · Repeat LEADs 12/6 significant for LLE > 75% stenosis of distal superficial femoral artery and diffuse disease throughout the remaining femoral-popliteal  · vessels  · Continue with ASA, statin    Oropharyngeal dysphagia  Assessment & Plan  · Improved  Continue dysphagia level 1 pureed diet  Prediabetes  Assessment & Plan  Evidenced by A1c of 5 9%  · Monitor glucose on AM labs   · Encourage diet and lifestyle modifications once DC    Stenosis of both internal carotid arteries  Assessment & Plan  · CTA head/neck on admission suspected moderate stenosis of the right ICA origin and mild narrowing of the left carotid bifurcation   Intracranially there is also right greater than left stenosis, moderate on the left and mild on the right  · Appreciate neurosurgery consult and recommendations   · No need for surgical intervention at this time   · Outpatient follow-up with repeat CTA in 2 weeks   · Continue asa, statin and Xarelto        Pressure injury of deep tissue of left heel  Assessment & Plan  · POA, followed by wound care as outpatient  · No clinical signs of infection at this time   · Appreciate Podiatry consult and recommendations   · XR heel/calcaneous:  Soft tissue swelling   No evidence of acute osteomyelitis  · LEADs noted diffuse disease in LLE, see below   · No plans for intervention at this time   · Continue with local wound care, offloading     Acute metabolic encephalopathy  Assessment & Plan  ID input noted  No metabolic derangement noted   Discussed with neurology not further imaging   Today patient is improving         Nicotine dependence  Assessment & Plan  · Patient admits to smoking approx 1 ppd currently   · Continue Nicotine patch  · Suspect has underlying COPD   · Respiratory protocol   · Cessation counseling     Bilateral lower extremity edema  Assessment & Plan  Patient with bilateral lower extremity edema which appears to be longstanding and related to venous stasis, managed by [facility] cardiology  Recently edema was worsening and associated with weeping  Patient received 80 mg IV Lasix x 1 in office on 11/9 and again on 11/23  Subsequently prescribed higher dose Lasix 40 mg BID  · Lasix held on admission due to hyponatremia  · Will resume lasix at  Lower dose of 20 mg today  · Continue with compression stockings   · Encourage LE elevation   · Monitor clinically     Atrial fibrillation [facility])  Assessment & Plan  · Hospital day 1 patient in atrial fibrillation with RVR - now improved   · Continue rate control with metoprolol 50 mg q12h   · Speech changed diet to purreed- ? Swallow safe  · Echocardiogram 12/6 with LVEF 55%, normal wall motion, normal systolic/diastolic function, mild-moderately increased RV pressure  · Now on Xarelto     Anterior dislocation of left shoulder  Assessment & Plan  · Injury occurred in December 2019, was following with  orthopedics and managed conservatively; seems to have been lost to follow-up with repeat XRs  · 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     * Stroke-like symptoms  Assessment & Plan  Patient presented via EMS as stroke alert due to concern for acute onset of left-sided weakness and garbled speech noticed by patient's wife  Patient is not a candidate for tPA due to bleeding risk  · Admitted under stroke pathway   · CTA head/neck negative for occlusion, but did note moderate R ICA and mild L ICA stenosis, see below   · MRI brain negative for acute infarct, but did note mild chronic microangiopathic ischemic changes  · Echocardiogram essentially unremarkable   · Lipid panel: cholesterol 150, , LDL 97  · Appreciate neurology consult and recommendations   · Continue with aspirin, statin, Eliquis   · P2Y12 checked -> 268  · Routine EEG without evidence of seizure   · Concern for possible TIA vs seizure and/or infectious/metabolic etiology   · See below regarding infectious workup/management   · PT/OT recommending rehab, PMR following  · Monitor neurochecks  VTE Pharmacologic Prophylaxis: VTE Score: 6 Moderate Risk (Score 3-4) - Pharmacological DVT Prophylaxis Ordered: rivaroxaban (Xarelto)  Patient Centered Rounds: I performed bedside rounds with nursing staff today  Discussions with Specialists or Other Care Team Provider: CM    Education and Discussions with Family / Patient: Updated  (wife) via phone  Time Spent for Care: 30 minutes  More than 50% of total time spent on counseling and coordination of care as described above  Current Length of Stay: 9 day(s)  Current Patient Status: Inpatient   Certification Statement: The patient will continue to require additional inpatient hospital stay due to Pending placement  Discharge Plan: Anticipate discharge in 24-48 hrs to rehab facility  Code Status: Level 3 - DNAR and DNI    Subjective:   Pt examined at bedside, no overnight events reported  Not much communicative during my encounter but does reply with yes and no      Objective:     Vitals:   Temp (24hrs), Av °F (36 7 °C), Min:97 5 °F (36 4 °C), Max:98 3 °F (36 8 °C)    Temp:  [97 5 °F (36 4 °C)-98 3 °F (36 8 °C)] 97 5 °F (36 4 °C)  HR:  [] 92  Resp:  [16] 16  BP: (134-153)/(73-76) 134/76  SpO2:  [92 %-93 %] 93 %  Body mass index is 33 92 kg/m²  Input and Output Summary (last 24 hours):   No intake or output data in the 24 hours ending 12/13/22 1245    Physical Exam:   Physical Exam  Constitutional:       Appearance: Normal appearance  HENT:      Head: Normocephalic and atraumatic  Mouth/Throat:      Mouth: Mucous membranes are moist       Pharynx: Oropharynx is clear  Eyes:      Conjunctiva/sclera: Conjunctivae normal       Pupils: Pupils are equal, round, and reactive to light  Cardiovascular:      Rate and Rhythm: Normal rate and regular rhythm  Pulmonary:      Effort: Pulmonary effort is normal       Breath sounds: Normal breath sounds  Abdominal:      General: Abdomen is flat  Bowel sounds are normal    Skin:     General: Skin is dry  Findings: Rash present  Comments: Chronic skin changes  Neurological:      Mental Status: He is disoriented  Motor: Weakness (left sided weakness ) present           Additional Data:     Labs:  Results from last 7 days   Lab Units 12/12/22  0444   WBC Thousand/uL 11 00*   HEMOGLOBIN g/dL 13 7   HEMATOCRIT % 46 2   PLATELETS Thousands/uL 267   NEUTROS PCT % 81*   LYMPHS PCT % 7*   MONOS PCT % 8   EOS PCT % 2     Results from last 7 days   Lab Units 12/12/22  0444   SODIUM mmol/L 145   POTASSIUM mmol/L 3 8   CHLORIDE mmol/L 111*   CO2 mmol/L 33*   BUN mg/dL 14   CREATININE mg/dL 0 85   ANION GAP mmol/L 1*   CALCIUM mg/dL 9 6   ALBUMIN g/dL 2 2*   TOTAL BILIRUBIN mg/dL 0 42   ALK PHOS U/L 109   ALT U/L 39   AST U/L 41   GLUCOSE RANDOM mg/dL 106     Results from last 7 days   Lab Units 12/08/22  1736   INR  1 21*             Results from last 7 days   Lab Units 12/10/22  1310   LACTIC ACID mmol/L 1 4       Lines/Drains:  Invasive Devices     Peripheral Intravenous Line  Duration           Peripheral IV 12/13/22 Distal;Right;Upper;Ventral (anterior) Arm <1 day                      Imaging: No pertinent imaging reviewed  Recent Cultures (last 7 days):         Last 24 Hours Medication List:   Current Facility-Administered Medications   Medication Dose Route Frequency Provider Last Rate   • acetaminophen  975 mg Oral Q6H PRN Caty Grijalva PA-C     • albuterol  2 puff Inhalation Q4H PRN Margarito Manuel MD     • allopurinol  100 mg Oral Daily Jose Puri MD     • aspirin  81 mg Oral Daily Jose Puri MD     • atorvastatin  40 mg Oral QPM Jose Puri MD     • cholecalciferol  400 Units Oral Daily Jose Puri MD     • dextromethorphan-guaiFENesin  10 mL Oral Q4H PRN Caty Grijalva PA-C     • docusate sodium  100 mg Oral BID Jose Puri MD     • furosemide  20 mg Oral Daily Kasey Day MD     • lactulose  10 g Oral BID Margarito Manuel MD     • metoprolol tartrate  50 mg Oral Q12H [address] Kasey Day MD     • nicotine  14 mg Transdermal Daily Caty Grijalva PA-C     • ondansetron  4 mg Intravenous Q6H PRN Jose Puri MD     • polyethylene glycol  17 g Oral Daily Jose Puri MD     • QUEtiapine  12 5 mg Oral HS Ruby Mccrary MD     • QUEtiapine  12 5 mg Oral HS PRN Ruby Mccrary MD     • rivaroxaban  20 mg Oral Daily With Dinner Amador Melo DO          Today, Patient Was Seen By: Kasey Day MD    **Please Note: This note may have been constructed using a voice recognition system  **

## 2022-12-13 NOTE — ASSESSMENT & PLAN NOTE
· Hospital day 1 patient in atrial fibrillation with RVR - now improved   · Continue rate control with metoprolol 50 mg q12h   · Speech changed diet to purreed- ?  Swallow safe  · Echocardiogram 12/6 with LVEF 55%, normal wall motion, normal systolic/diastolic function, mild-moderately increased RV pressure  · Now on Xarelto

## 2022-12-13 NOTE — ASSESSMENT & PLAN NOTE
· CTA head/neck on admission suspected moderate stenosis of the right ICA origin and mild narrowing of the left carotid bifurcation   Intracranially there is also right greater than left stenosis, moderate on the left and mild on the right  · Appreciate neurosurgery consult and recommendations   · No need for surgical intervention at this time   · Outpatient follow-up with repeat CTA in 2 weeks   · Continue asa, statin and Xarelto

## 2022-12-13 NOTE — ASSESSMENT & PLAN NOTE
Patient with bilateral lower extremity edema which appears to be longstanding and related to venous stasis, managed by [facility] cardiology  Recently edema was worsening and associated with weeping  Patient received 80 mg IV Lasix x 1 in office on 11/9 and again on 11/23  Subsequently prescribed higher dose Lasix 40 mg BID  · Lasix held on admission due to hyponatremia  · Will resume lasix at  Lower dose of 20 mg today    · Continue with compression stockings   · Encourage LE elevation   · Monitor clinically

## 2022-12-13 NOTE — PLAN OF CARE
Problem: DISCHARGE PLANNING  Goal: Discharge to home or other facility with appropriate resources  Description: INTERVENTIONS:  - Identify barriers to discharge w/patient and caregiver  - Arrange for needed discharge resources and transportation as appropriate  - Identify discharge learning needs (meds, wound care, etc )  - Refer to Case Management Department for coordinating discharge planning if the patient needs post-hospital services based on physician/advanced practitioner order or complex needs related to functional status, cognitive ability, or social support system  Outcome: Progressing     Problem: Knowledge Deficit  Goal: Patient/family/caregiver demonstrates understanding of disease process, treatment plan, medications, and discharge instructions  Description: Complete learning assessment and assess knowledge base    Interventions:  - Provide teaching at level of understanding  - Provide teaching via preferred learning methods  Outcome: Progressing     Problem: PAIN - ADULT  Goal: Verbalizes/displays adequate comfort level or baseline comfort level  Description: Interventions:  - Encourage patient to monitor pain and request assistance  - Assess pain using appropriate pain scale  - Administer analgesics based on type and severity of pain and evaluate response  - Implement non-pharmacological measures as appropriate and evaluate response  - Notify physician/advanced practitioner if interventions unsuccessful or patient reports new pain  Outcome: Progressing     Problem: INFECTION - ADULT  Goal: Absence or prevention of progression during hospitalization  Description: INTERVENTIONS:  - Assess and monitor for signs and symptoms of infection  - Monitor lab/diagnostic results  - Monitor all insertion sites, i e  indwelling lines, tubes, and drains  - [place] appropriate cooling/warming therapies per order  - Administer medications as ordered  - Instruct and encourage patient and family to use good hand hygiene technique  - Identify and instruct in appropriate isolation precautions for identified infection/condition  Outcome: Progressing     Problem: Neurological Deficit  Goal: Neurological status is stable or improving  Description: Interventions:  - Monitor and assess patient's level of consciousness, motor function, sensory function, and level of assistance needed for ADLs  - Monitor and report changes from baseline  Collaborate with interdisciplinary team to initiate plan and implement interventions as ordered  - Provide and maintain a safe environment  - Consider seizure precautions  - Consider fall precautions  - Consider aspiration precautions  - Consider bleeding precautions  Outcome: Progressing     Problem: Activity Intolerance/Impaired Mobility  Goal: Mobility/activity is maintained at optimum level for patient  Description: Interventions:  - Assess and monitor patient  barriers to mobility and need for assistive/adaptive devices  - Assess patient's emotional response to limitations  - Collaborate with interdisciplinary team and initiate plans and interventions as ordered  - Encourage independent activity per ability   - Maintain proper body alignment  - Perform active/passive rom as tolerated/ordered  - Plan activities to conserve energy   - Turn patient as appropriate  Outcome: Progressing     Problem: Communication Impairment  Goal: Ability to express needs and understand communication  Description: Assess patient's communication skills and ability to understand information  Patient will demonstrate use of effective communication techniques, alternative methods of communication and understanding even if not able to speak  - Encourage communication and provide alternate methods of communication as needed  - Collaborate with case management/ for discharge needs  - Include patient/family/caregiver in decisions related to communication    Outcome: Progressing Problem: Potential for Aspiration  Goal: Non-ventilated patient's risk of aspiration is minimized  Description: Assess and monitor vital signs, respiratory status, and labs (WBC)  Monitor for signs of aspiration (tachypnea, cough, rales, wheezing, cyanosis, fever)  - Assess and monitor patient's ability to swallow  - Place patient up in chair to eat if possible  - HOB up at 90 degrees to eat if unable to get patient up into chair   - Supervise patient during oral intake  - Instruct patient/ family to take small bites  - Instruct patient/ family to take small single sips when taking liquids  - Follow patient-specific strategies generated by speech pathologist   Outcome: Progressing     Problem: MOBILITY - ADULT  Goal: Maintain or return to baseline ADL function  Description: INTERVENTIONS:  -  Assess patient's ability to carry out ADLs; assess patient's baseline for ADL function and identify physical deficits which impact ability to perform ADLs (bathing, care of mouth/teeth, toileting, grooming, dressing, etc )  - Assess/evaluate cause of self-care deficits   - Assess range of motion  - Assess patient's mobility; develop plan if impaired  - Assess patient's need for assistive devices and provide as appropriate  - Encourage maximum independence but intervene and supervise when necessary  - Involve family in performance of ADLs  - Assess for home care needs following discharge   - Consider OT consult to assist with ADL evaluation and planning for discharge  - Provide patient education as appropriate  Outcome: Progressing  Goal: Maintains/Returns to pre admission functional level  Description: INTERVENTIONS:  - Perform BMAT or MOVE assessment daily    - Set and communicate daily mobility goal to care team and patient/family/caregiver  - Collaborate with rehabilitation services on mobility goals if consulted  - Perform Range of Motion 3 times a day  - Reposition patient every 2 hours    - Record patient progress and toleration of activity level   Outcome: Progressing     Problem: Prexisting or High Potential for Compromised Skin Integrity  Goal: Skin integrity is maintained or improved  Description: INTERVENTIONS:  - Identify patients at risk for skin breakdown  - Assess and monitor skin integrity  - Assess and monitor nutrition and hydration status  - Monitor labs   - Assess for incontinence   - Turn and reposition patient  - Assist with mobility/ambulation  - Relieve pressure over bony prominences  - Avoid friction and shearing  - Provide appropriate hygiene as needed including keeping skin clean and dry  - Evaluate need for skin moisturizer/barrier cream  - Collaborate with interdisciplinary team   - Patient/family teaching  - Consider wound care consult   Outcome: Progressing     Problem: Potential for Falls  Goal: Patient will remain free of falls  Description: INTERVENTIONS:  - Educate patient/family on patient safety including physical limitations  - Instruct patient to call for assistance with activity   - Consult OT/PT to assist with strengthening/mobility   - Keep Call bell within reach  - Keep bed low and locked with side rails adjusted as appropriate  - Keep care items and personal belongings within reach  - Initiate and maintain comfort rounds  - Make Fall Risk Sign visible to staff  - Offer Toileting every 2 Hours, in advance of need  - Initiate/Maintain bed alarm  - Obtain necessary fall risk management equipment  - Apply yellow socks and bracelet for high fall risk patients  - Consider moving patient to room near nurses station  Outcome: Progressing     Problem: Nutrition/Hydration-ADULT  Goal: Nutrient/Hydration intake appropriate for improving, restoring or maintaining nutritional needs  Description: Monitor and assess patient's nutrition/hydration status for malnutrition  Collaborate with interdisciplinary team and initiate plan and interventions as ordered    Monitor patient's weight and dietary intake as ordered or per policy  Utilize nutrition screening tool and intervene as necessary  Determine patient's food preferences and provide high-protein, high-caloric foods as appropriate       INTERVENTIONS:  - Monitor oral intake, urinary output, labs, and treatment plans  - Assess nutrition and hydration status and recommend course of action  - Evaluate amount of meals eaten  - Assist patient with eating if necessary   - Allow adequate time for meals  - Recommend/ encourage appropriate diets, oral nutritional supplements, and vitamin/mineral supplements  - Order, calculate, and assess calorie counts as needed  - Assess need for intravenous fluids  - Provide specific nutrition/hydration education as appropriate  - Include patient/family/caregiver in decisions related to nutrition  Outcome: Progressing     Problem: SKIN/TISSUE INTEGRITY - ADULT  Goal: Pressure injury heals and does not worsen  Description: Interventions:  - Implement low air loss mattress or specialty surface (Criteria met)  - Apply silicone foam dressing  - Use special pressure reducing interventions such as prevalon boot  - Apply fecal or urinary incontinence containment device   - Perform passive or active ROM every 2 hours  - Turn and reposition patient & offload bony prominences every 2 hours   - Utilize friction reducing device or surface for transfers   - Consider consults to  interdisciplinary teams such as wound care  - Use incontinent care products after each incontinent episode such as barrier cream  - Consider nutrition services referral as needed  Outcome: Progressing     Problem: SAFETY,RESTRAINT: NV/NON-SELF DESTRUCTIVE BEHAVIOR  Goal: Remains free of harm/injury (restraint for non violent/non self-detsructive behavior)  Description: INTERVENTIONS:  - Instruct patient/family regarding restraint use   - Assess and monitor physiologic and psychological status   - Provide interventions and comfort measures to meet assessed patient needs   - Identify and implement measures to help patient regain control  - Assess readiness for release of restraint   Outcome: Completed  Goal: Returns to optimal restraint-free functioning  Description: INTERVENTIONS:  - Assess the patient's behavior and symptoms that indicate continued need for restraint  - Identify and implement measures to help patient regain control  - Assess readiness for release of restraint   Outcome: Completed     Problem: COPING  Goal: Pt/Family able to verbalize concerns and demonstrate effective coping strategies  Description: INTERVENTIONS:  - Assist patient/family to identify coping skills, available support systems and cultural and spiritual values  - Provide emotional support, including active listening and acknowledgement of concerns of patient and caregivers  - Reduce environmental stimuli, as able  - Provide patient education  - Assess for spiritual pain/suffering and initiate spiritual care, including notification of Pastoral Care or beau based community as needed  - Assess effectiveness of coping strategies  Outcome: Progressing  Goal: Will report anxiety at manageable levels  Description: INTERVENTIONS:  - Administer medication as ordered  - Teach and encourage coping skills  - Provide emotional support  - Assess patient/family for anxiety and ability to cope  Outcome: Progressing     Problem: CONFUSION/THOUGHT DISTURBANCE  Goal: Thought disturbances (confusion, delirium, depression, dementia or psychosis) are managed to maintain or return to baseline mental status and functional level  Description: INTERVENTIONS:  - Assess for possible contributors to  thought disturbance, including but not limited to medications, infection, impaired vision or hearing, underlying metabolic abnormalities, dehydration, respiratory compromise,  psychiatric diagnoses and notify attending PHYSICAN/AP  - Monitor and intervene to maintain adequate nutrition, hydration, elimination, sleep and activity  - Decrease environmental stimuli, including noise as appropriate  - Provide frequent contacts to provide refocusing, direction and reassurance as needed  Approach patient calmly with eye contact and at their level  - [place] high risk fall precautions, aspiration precautions and other safety measures, as indicated  - If delirium suspected, notify physician/AP of change in condition and request immediate in-person evaluation  - Pursue consults as appropriate including Geriatric (campus dependent), OT for cognitive evaluation/activity planning, psychiatric, pastoral care, etc   Outcome: Progressing     Problem: BEHAVIOR  Goal: Pt/Family maintain appropriate behavior and adhere to behavioral management agreement, if implemented  Description: INTERVENTIONS:  - Assess the family dynamic   - Encourage verbalization of thoughts and concerns in a socially appropriate manner  - Assess patient/family's coping skills and non-compliant behavior (including use of illegal substances)  - Utilize positive, consistent limit setting strategies supporting safety of patient, staff and others  - Initiate consult with Case Management, Spiritual Care or other ancillary services as appropriate  - If a patient's/visitor's behavior jeopardizes the safety of the patient, staff, or others, refer to organization procedure     - Notify Security of behavior or suspected illegal substances which indicate the need for search of the patient and/or belongings  - Encourage participation in the decision making process about a behavioral management agreement; implement if patient meets criteria  Outcome: Progressing

## 2022-12-13 NOTE — OCCUPATIONAL THERAPY NOTE
Occupational Therapy Treatment Note     12/13/22 0939   OT Last Visit   OT Visit Date 12/13/22   Note Type   Note Type Treatment   Pain Assessment   Pain Assessment Tool 0-10   Pain Score No Pain   Restrictions/Precautions   Weight Bearing Precautions Per Order Yes   LLE Weight Bearing Per Order NWB   Lifestyle   Autonomy Per CM note pt required A for ADLs, IADLs, and functional mobility  Prior to admission pt was living with spouse in [address] [place] [address] with 5 JARRED  Reciprocal Relationships Spouse   Service to Others Retired   Intrinsic Gratification continue assessing   ADL   Where Assessed Edge of bed   Grooming Assistance 3  Moderate Assistance   Grooming Deficit Shaving; Wash/dry face   LB Dressing Assistance 4  Minimal Assistance   LB Dressing Deficit Don/doff R sock   Bed Mobility   Supine to Sit 3  Moderate assistance   Additional items Assist x 2   Sit to Supine 3  Moderate assistance   Additional items Assist x 2   Cognition   Overall Cognitive Status Impaired   Arousal/Participation Arousable;Lethargic;Persistent stimuli required  (Difficulty maintaining eyes open in begiging of session  Pt attention and arousal increased when seated EOB )   Attention Difficulty attending to directions   Orientation Level Disoriented to situation;Disoriented to time   Memory Decreased recall of precautions;Decreased recall of recent events;Decreased short term memory   Following Commands Follows one step commands with increased time or repetition   Assessment   Assessment Pt seen for Occupational Therapy session with focus on activity tolerance, bed mob, functional transfers/mob, sitting balance and tolerance and standing tolerance and balance for pt engagement in UB/LB self-care tasks  Pt cleared by RN/Mehnaz for pt participated in OT session  Pt presented HOB raised pt awake/alert and agreeable to participate in therapy following pt identifiers confirmed  Pt was unable to report his therapy goal 2* pt cognitive impairments    Pt required assist x 2 for bed mob  2* decreased strength and coordination  He was able to complete donning his R sock with increased time allowed and assist  Pt will require post acute rehab service to continue to address these above noted pt deficit which currently impair pt ADL and functional mob  Pt return to bed post session, bed  alarm activated and all needs within reach  Plan   Treatment Interventions ADL retraining; Endurance training   Goal Expiration Date 12/19/22   OT Treatment Day 2   OT Frequency 3-5x/wk   Recommendation   OT Discharge Recommendation Post acute rehabilitation services   AM-PAC Daily Activity Inpatient   Lower Body Dressing 1   Bathing 1   Toileting 1   Upper Body Dressing 1   Grooming 1   Eating 2   Daily Activity Raw Score 7   Turning Head Towards Sound 2   Follow Simple Instructions 2   Low Function Daily Activity Raw Score 11   Low Function Daily Activity Standardized Score 19 45   AM-PAC Applied Cognition Inpatient   Following a Speech/Presentation 1   Understanding Ordinary Conversation 2   Taking Medications 1   Remembering Where Things Are Placed or Put Away 1   Remembering List of 4-5 Errands 1   Taking Care of Complicated Tasks 1   Applied Cognition Raw Score 7   Applied Cognition Standardized Score 15 17   Barthel Index   Feeding 5   Bathing 0   Grooming Score 0   Dressing Score 0   Bladder Score 5   Bowels Score 5   Toilet Use Score 0   Transfers (Bed/Chair) Score 0   Mobility (Level Surface) Score 0   Stairs Score 0   Barthel Index Score 15       Nahun JORGE

## 2022-12-13 NOTE — ASSESSMENT & PLAN NOTE
· Patient admits to smoking approx 1 ppd currently   · Continue Nicotine patch     · Suspect has underlying COPD   · Respiratory protocol   · Cessation counseling

## 2022-12-13 NOTE — RESTORATIVE TECHNICIAN NOTE
Restorative Technician Note      Patient Name: Shane Patton     Note Type: Mobility  Patient Position Upon Consult: Supine  Activity Performed: Dangled  Assistive Device: Other (Comment) (Assist x2 sit EOB )  Education Provided: Yes  Patient Position at End of Consult: Supine;  All needs within reach; Bed/Chair alarm activated    Perfecto Situ BS, Restorative Technician, [employer]

## 2022-12-13 NOTE — PLAN OF CARE
Problem: OCCUPATIONAL THERAPY ADULT  Goal: Performs self-care activities at highest level of function for planned discharge setting  See evaluation for individualized goals  Description: Treatment Interventions: ADL retraining, Visual perceptual retraining, Functional transfer training, UE strengthening/ROM, Endurance training, Cognitive reorientation, Patient/family training, Equipment evaluation/education, Compensatory technique education, Energy conservation, Activityengagement, Continued evaluation, Neuromuscular reeducation, Fine motor coordination activities          See flowsheet documentation for full assessment, interventions and recommendations  Outcome: Progressing  Note: Limitation: Decreased ADL status, Decreased Safe judgement during ADL, Decreased cognition, Decreased endurance, Decreased self-care trans, Decreased high-level ADLs, Decreased fine motor control, Decreased UE strength, Decreased UE ROM, Visual deficit  Prognosis: Fair  Assessment: Pt seen for Occupational Therapy session with focus on activity tolerance, bed mob, functional transfers/mob, sitting balance and tolerance and standing tolerance and balance for pt engagement in UB/LB self-care tasks  Pt cleared by RN/Mehnaz for pt participated in OT session  Pt presented HOB raised pt awake/alert and agreeable to participate in therapy following pt identifiers confirmed  Pt was unable to report his therapy goal 2* pt cognitive impairments  Pt required assist x 2 for bed mob  2* decreased strength and coordination  He was able to complete donning his R sock with increased time allowed and assist  Pt will require post acute rehab service to continue to address these above noted pt deficit which currently impair pt ADL and functional mob  Pt return to bed post session, bed  alarm activated and all needs within reach       OT Discharge Recommendation: Post acute rehabilitation services

## 2022-12-13 NOTE — CASE MANAGEMENT
Case Management Discharge Planning Note    Patient name Allie Lloyd  Location [address] [place] [address] [id number]/PPHP [id number] MRN [id number]  : 1935 Date 2022       Current Admission Date: 2022  Current Admission Diagnosis:Stroke-like symptoms   Patient Active Problem List    Diagnosis Date Noted   • PAD (peripheral artery disease) ([facility] ) 2022   • Acute cystitis without hematuria 2022   • Stenosis of both internal carotid arteries 2022   • Prediabetes 2022   • Oropharyngeal dysphagia 2022   • Stroke-like symptoms 2022   • Acute metabolic encephalopathy 5937   • Pressure injury of deep tissue of left heel 2022   • SIRS vs Sepsis ([facility] ) 2022   • Nicotine dependence 2019   • Bilateral lower extremity edema 2019   • Recurrent falls 2019   • Other insomnia 2019   • Benign hypertension 2019   • Benign prostatic hyperplasia 2019   • Anterior dislocation of left shoulder 2019   • Closed head injury with concussion 2019   • Vesicocolonic fistula 2019   • Postural dizziness with near syncope 2019   • Ambulatory dysfunction 2019   • Atrial fibrillation ([facility] ) 2019   • Gout 2019      LOS (days): 9  Geometric Mean LOS (GMLOS) (days): 4 60  Days to GMLOS:-4 2     OBJECTIVE:  Risk of Unplanned Readmission Score: 14 4         Current admission status: Inpatient   Preferred Pharmacy:   [facility]/pharmacy [address] [address]  Phone: [phone] Fax: [phone]    Primary Care Provider: Varinder Beaulieu MD    Primary Insurance: MEDICARE  [facility]    DISCHARGE DETAILS:    Other Referral/Resources/Interventions Provided:  Referral Comments: Phone call to [facility] [phone]  Discussed pt and CRE needing placement  Yann told CM to make a referral and he will review Unable to guarentee anything at this time   [facility] does have facilities that accept CRE   CM sent referral via [employer]

## 2022-12-14 NOTE — WOUND OSTOMY CARE
Consult Note - Wound   Gissell Erb 82 y o  male MRN: [id number]  Unit/Bed#: [facility] 733-01 Encounter: [id number]        History and Present Illness:  Patient is a 81 yo male that was admitted to the hospital for treatment of stroke-like symptoms  Patient has a PMH of A-fib, cancer, and HTN  Patient is a moderate assist x 2 for turning and repositioning- dependent for care needs  Patient is incontinent of bowel and bladder as seen on assessment- samra-care performed  On assessment, patient is lying supine on regular mattress  Left heel in prevalon boot  Wound Care was consulted for re-evaluation of left lower extremity wound  Assessment Findings:   B/L sacro-buttocks are dry intact and kaya with no skin loss or wounds present  Recommend preventative Hydraguard Cream and proper offloading/ repositioning  Right heel is dry, intact, and blanches  Podiatry following left heel wound  1  POA Left Heel Pressure Injury- remains 100% black dry eschar  No drainage noted  Podiatry orders placed  2  Left Posterior Tibia: Area is 95% black, dry, well adhered eschar with scattered areas of pink tissue  Samra-wound intact  Area was cleansed, painted with betadine, covered with an ABD and secured with russel wrap  Likely vascular in etiology but cannot fully rule out pressure component  3  Left Foot Medial/ Lateral Ankle and Foot & Anterior Foot: Scattered irregular various intact areas of light purple non-blanchable erythema  Samra-wound is pink in color  No drainage noted  Area was protected with and ABD and secured with russel wrap  Likely vascular in etiology but cannot fully rule out pressure component  Attending made aware of findings  No induration, fluctuance, odor, warmth/temperature differences, redness, or purulence noted to the above noted wounds and skin areas assessed  New dressings applied per orders listed below   Patient tolerated well- no s/s of non-verbal pain or discomfort observed during the encounter  Bedside nurse aware of plan of care  See flow sheets for more detailed assessment findings  Orders listed below and wound care will continue to follow, call or tiger text with questions  Skin Care Plan:  1-Preventative hydraguard to B/L Sacro-Buttocks and Right Heel BID and PRN  2-Turn/reposition q2h or when medically stable for pressure re-distribution on skin   3-Elevate heels to offload pressure  4-Moisturize skin daily with skin nourishing cream  5-Ehob cushion in chair when out of bed  6-Left Heel: Per Podiatry Recommendations   7-Left Foot/ Posterior Leg: Paint betadine only over eschar on left posterior leg  Cover eschar and areas of light purple non-blanchable erythema with ABDS and secure with russel  Perform daily or PRN soilage or displacement  8- P-500 Specialty mattress ordered for patient     Wounds:  Wound 12/05/22 Pressure Injury Heel Left (Active)   Wound Image   12/14/22 0839   Wound Description Black;Eschar 12/14/22 0839   Pressure Injury Stage U 12/14/22 0839   Samra-wound Assessment Intact 12/14/22 0839   Wound Length (cm) 5 cm 12/14/22 0839   Wound Width (cm) 6 cm 12/14/22 0839   Wound Depth (cm) 0 3 cm 12/14/22 0839   Wound Surface Area (cm^2) 30 cm^2 12/14/22 0839   Wound Volume (cm^3) 9 cm^3 12/14/22 0839   Calculated Wound Volume (cm^3) 9 cm^3 12/14/22 0839   Change in Wound Size % -11 11 12/14/22 0839   Drainage Amount None 12/14/22 0839   Drainage Description Foul smelling 12/13/22 1645   Non-staged Wound Description Full thickness 12/14/22 0839   Treatments Cleansed;Site care 12/14/22 0839   Dressing Dry dressing 12/14/22 0839   Wound packed? No 12/14/22 0839   Dressing Changed New 12/14/22 0839   Patient Tolerance Tolerated well 12/14/22 0839   Dressing Status Clean;Dry; Intact 12/14/22 0839       Wound 12/09/22 Tibial Distal;Left;Posterior (Active)   Wound Image   12/14/22 0841   Wound Description Black;Pink;Eschar 12/14/22 0839   Samra-wound Assessment Intact 12/14/22 0839   Wound Length (cm) 17 cm 12/14/22 0841   Wound Width (cm) 3 5 cm 12/14/22 0841   Wound Depth (cm) 0 cm 12/14/22 0841   Wound Surface Area (cm^2) 59 5 cm^2 12/14/22 0841   Wound Volume (cm^3) 0 cm^3 12/14/22 0841   Calculated Wound Volume (cm^3) 0 cm^3 12/14/22 0841   Drainage Amount None 12/14/22 0839   Drainage Description Other (Comment) 12/14/22 0839   Treatments Cleansed;Site care 12/14/22 0839   Dressing Dry dressing 12/14/22 0839   Wound packed? No 12/14/22 0839   Dressing Changed New 12/14/22 0839   Patient Tolerance Tolerated well 12/14/22 0839   Dressing Status Clean;Dry; Intact 12/14/22 0839       Wound 12/14/22 Foot Left;Lateral (Active)   Wound Image   12/14/22 0842   Wound Description Light purple;Non-blanchable erythema 12/14/22 0839   Samra-wound Assessment Intact 12/14/22 0839   Wound Length (cm) 15 cm 12/14/22 0842   Wound Width (cm) 3 cm 12/14/22 0842   Wound Depth (cm) 0 cm 12/14/22 0842   Wound Surface Area (cm^2) 45 cm^2 12/14/22 0842   Wound Volume (cm^3) 0 cm^3 12/14/22 0842   Calculated Wound Volume (cm^3) 0 cm^3 12/14/22 0842   Drainage Amount None 12/14/22 0839   Non-staged Wound Description Not applicable 70/95/66 8295   Dressing Dry dressing;ABD 12/14/22 0839   Wound packed? No 12/14/22 0839   Dressing Changed New 12/14/22 0839   Patient Tolerance Tolerated well 12/14/22 0839   Dressing Status Clean;Dry; Intact 12/14/22 0839       Wound 12/14/22 Foot Anterior; Left (Active)   Wound Image   12/14/22 0843   Wound Description Light purple;Non-blanchable erythema 12/14/22 0839   Samra-wound Assessment Intact;Dry 12/14/22 0839   Wound Length (cm) 2 cm 12/14/22 0843   Wound Width (cm) 1 cm 12/14/22 0843   Wound Depth (cm) 0 cm 12/14/22 0843   Wound Surface Area (cm^2) 2 cm^2 12/14/22 0843   Wound Volume (cm^3) 0 cm^3 12/14/22 0843   Calculated Wound Volume (cm^3) 0 cm^3 12/14/22 0843   Drainage Amount None 12/14/22 0839   Non-staged Wound Description Not applicable 84/31/11 4270   Treatments Cleansed;Site care 12/14/22 0839   Dressing ABD;Dry dressing 12/14/22 0839   Dressing Changed New 12/14/22 0839   Patient Tolerance Tolerated well 12/14/22 0839   Dressing Status Clean;Dry; Intact 12/14/22 0839       Wound 12/14/22 Foot Left;Medial (Active)   Wound Image   12/14/22 0844   Wound Description Light purple;Non-blanchable erythema 12/14/22 0839   Samra-wound Assessment Intact; Pink 12/14/22 0839   Wound Length (cm) 12 cm 12/14/22 0844   Wound Width (cm) 3 cm 12/14/22 0844   Wound Depth (cm) 0 cm 12/14/22 0844   Wound Surface Area (cm^2) 36 cm^2 12/14/22 0844   Wound Volume (cm^3) 0 cm^3 12/14/22 0844   Calculated Wound Volume (cm^3) 0 cm^3 12/14/22 0844   Drainage Amount None 12/14/22 0839   Non-staged Wound Description Not applicable 93/76/12 8402   Treatments Cleansed;Site care 12/14/22 0839   Dressing ABD;Dry dressing 12/14/22 0839   Dressing Changed New 12/14/22 0839   Patient Tolerance Tolerated well 12/14/22 0839   Dressing Status Clean;Dry; Intact 12/14/22 0839               Doug Arellano RN, BSN

## 2022-12-14 NOTE — PLAN OF CARE
Problem: Nutrition/Hydration-ADULT  Goal: Nutrient/Hydration intake appropriate for improving, restoring or maintaining nutritional needs  Description: Monitor and assess patient's nutrition/hydration status for malnutrition  Collaborate with interdisciplinary team and initiate plan and interventions as ordered  Monitor patient's weight and dietary intake as ordered or per policy  Utilize nutrition screening tool and intervene as necessary  Determine patient's food preferences and provide high-protein, high-caloric foods as appropriate       INTERVENTIONS:  - Monitor oral intake, urinary output, labs, and treatment plans  - Assess nutrition and hydration status and recommend course of action  - Evaluate amount of meals eaten  - Assist patient with eating if necessary   - Allow adequate time for meals  - Recommend/ encourage appropriate diets, oral nutritional supplements, and vitamin/mineral supplements  - Order, calculate, and assess calorie counts as needed  - Assess need for intravenous fluids  - Provide specific nutrition/hydration education as appropriate  - Include patient/family/caregiver in decisions related to nutrition  12/13/2022 2347 by Jamie Ayala RD  Outcome: Progressing  12/13/2022 2347 by Jamie Ayala RD  Outcome: Progressing

## 2022-12-14 NOTE — ASSESSMENT & PLAN NOTE
POA with mild leukocytosis and tachycardia  Procalcitonin WNL at 0 15   · COVID/flu/RSV negative   · Portable CXR: Probable small left effusion   Slightly limited but otherwise unremarkable examination  · UA with nitrites, leukocytes and bacteria concerning for UTI  · Noted with left heel pressure ulcer on admission however no clinical signs of cellulitis, see related plan  · PA/lateral CXR: No acute cardiopulmonary disease   · Continue with IV ceftriaxone (day 4) pending final urine culture / sensitivities   · Trend temps, WBC, hemodynamics   · Id input appreciated, monitor off anti biotics

## 2022-12-14 NOTE — PROGRESS NOTES
Progress Note - Infectious Disease   Serge Gildardo 82 y o  male MRN: [id number]  Unit/Bed#: [facility] 733-01 Encounter: [id number]      IMPRESSION & RECOMMENDATIONS:   Impression/Recommendations:  1  Bacteriuria   Urine culture grew multiple organisms including Enterococcus, E coli, Morganella, Proteus   These organisms likely reflect chronic bladder colonization in the setting of known diagnosis of BPH  Patient remains without fevers or other signs of sepsis  WBC count was 10 2 initially and quickly normalized  Patient has received total of 6 days of IV antibiotics with appropriate coverage for organisms isolated in urine culture  If UTI was the etiology of persistent encephalopathy, we would have seen improvement in mentation by now  We will continue to observe closely off antibiotics     -continue to observe off anymore antibiotics  -urinary retention protocol  -follow-up temperatures and CBC     2  Pressure ulceration left heel, POA   X-ray negative for osteomyelitis   Podiatry input noted with ongoing low suspicion for acute infection      -no antibiotic indicated  -daily local wound care, frequent offloading  -serial exams     3  Lateral left leg DTI   Follows with outpatient wound care   Podiatry/vascular surgery input noted with ongoing low suspicion for acute infection      -no antibiotic indicated  -daily local wound care  -serial exams     4  Acute encephalopathy, with stroke-like symptoms   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   Infectious workup has been unremarkable, as above   Low clinical suspicion for primary CNS infection   Patient has remained afebrile and clinically stable from ID standpoint      -continue to observe off anymore antibiotics  -neurology follow-up ongoing  -follow mentation  -serial neuro exams        Antibiotics:  Off antibiotic 4     No ongoing acute ID issues  We will sign off  Please call us with any new questions          Subjective:  No new reported overnight events  Remains without fevers  On dysphagia diet  Objective:  Vitals:  Temp:  [97 7 °F (36 5 °C)-98 3 °F (36 8 °C)] 97 8 °F (36 6 °C)  HR:  [100-116] 110  Resp:  [17-18] 18  BP: (131-156)/(77-89) 134/79  SpO2:  [92 %-95 %] 95 %  Temp (24hrs), Av 9 °F (36 6 °C), Min:97 7 °F (36 5 °C), Max:98 3 °F (36 8 °C)  Current: Temperature: 97 8 °F (36 6 °C)    Physical Exam:   General:  No acute distress, elderly and debilitated  HEENT:  Atraumatic normocephalic  Neck:  Trachea midline  Psychiatric:  Awake and alert  Pulmonary:  Normal respiratory excursion without accessory muscle use  Abdomen:  Soft, nontender  Extremities:  No edema  Skin:  No rashes or visible draining wound  Neuro: Moves all extremities spontaneously    Lab Results:  I have personally reviewed pertinent labs  Results from last 7 days   Lab Units 12/14/22  0433 12/12/22  0444 12/10/22  1310 22  0503   POTASSIUM mmol/L 3 6 3 8 3 8  --    CHLORIDE mmol/L 110* 111* 113*  --    CO2 mmol/L 28 33* 27  --    BUN mg/dL 16 14 16  --    CREATININE mg/dL 0 79 0 85 0 84  --    EGFR ml/min/1 73sq m 81 79 79  --    CALCIUM mg/dL 9 3 9 6 9 5  --    AST U/L  --  41  --  40   ALT U/L  --  39  --  33   ALK PHOS U/L  --  109  --  111     Results from last 7 days   Lab Units 223 224 22  0532   WBC Thousand/uL 10 69* 11 00* 9 80   HEMOGLOBIN g/dL 12 9 13 7 13 3   PLATELETS Thousands/uL 255 267 266           Imaging Studies:   I have personally reviewed pertinent imaging study reports and images in PACS  EKG, Pathology, and Other Studies:   I have personally reviewed pertinent reports

## 2022-12-14 NOTE — PLAN OF CARE
Problem: DISCHARGE PLANNING  Goal: Discharge to home or other facility with appropriate resources  Description: INTERVENTIONS:  - Identify barriers to discharge w/patient and caregiver  - Arrange for needed discharge resources and transportation as appropriate  - Identify discharge learning needs (meds, wound care, etc )  - Refer to Case Management Department for coordinating discharge planning if the patient needs post-hospital services based on physician/advanced practitioner order or complex needs related to functional status, cognitive ability, or social support system  Outcome: Progressing

## 2022-12-14 NOTE — SPEECH THERAPY NOTE
Speech Language/Pathology    Speech/Language Pathology Progress Note    Patient Name: Keyla Burroughs  [id number] Date: 12/14/2022     Problem List  Principal Problem:    Stroke-like symptoms  Active Problems:    Anterior dislocation of left shoulder    Atrial fibrillation (HCC)    Bilateral lower extremity edema    Nicotine dependence    Acute metabolic encephalopathy    Pressure injury of deep tissue of left heel    SIRS vs Sepsis ([facility] )    Stenosis of both internal carotid arteries    Prediabetes    Oropharyngeal dysphagia    PAD (peripheral artery disease) ([facility] )    Acute cystitis without hematuria       Past Medical History  Past Medical History:   Diagnosis Date   • Atrial fibrillation ([facility] )    • Cancer ([facility] )    • Hypertension         Past Surgical History  Past Surgical History:   Procedure Laterality Date   • REPLACEMENT TOTAL HIP W/  RESURFACING IMPLANTS           Subjective:  "Do you have orange juice?" Patient awake and alert  Objective: The patient is seen for dysphagia therapy  Initially, RN gives liquid medication via cup  The patient is reclined and is observed with small cough response  Patient sat upright for additional trials  He is assessed with ~2oz honey thick liquids and 1 tsp pudding  Oral closure for items via tsp and straw is adequate  Transfer time appears min prolonged with all, with suspected delayed swallow initiation time  The patient immediate dislikes pudding and refuses additional tsp  No coughing or throat clear observed  The patient continues with intermittent congested cough, but this may be baseline per patient's wife  Assessment:  The patient tolerated honey thick liquids well today  Plan/Recommendations:  Continue puree diet with honey thick liquids  Continue ST to further assess tolerance and trial nectar thick liquids

## 2022-12-14 NOTE — ASSESSMENT & PLAN NOTE
Patient with bilateral lower extremity edema which appears to be longstanding and related to venous stasis, managed by [facility] cardiology  Recently edema was worsening and associated with weeping  Patient received 80 mg IV Lasix x 1 in office on 11/9 and again on 11/23  Subsequently prescribed higher dose Lasix 40 mg BID  · Lasix held on admission due to hyponatremia  · Will resume lasix at  Lower dose of 20 mg today    · Continue with compression stockings   · Encourage LE elevation   · Monitor clinically

## 2022-12-14 NOTE — PROGRESS NOTES
[address] [facility]  Progress Note - Escobar Peralta 1935, 82 y o  male MRN: [id number]  Unit/Bed#: [facility] 733-01 Encounter: [id number]  Primary Care Provider: Lis Griffin MD   Date and time admitted to hospital: 12/4/2022  5:26 PM    Acute cystitis without hematuria  Assessment & Plan  · Presenting with AMS and meeting sepsis criteria as above   · Urine culture preliminarily growing Enterococcus faecalis, E coli and Morganella morganii    · All abx stopped by neurology    PAD (peripheral artery disease) (RUST 75 )  Assessment & Plan  · Follows with [facility] outpatient  · Most recent LEADs in May consistent with 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  · Repeat LEADs 12/6 significant for LLE > 75% stenosis of distal superficial femoral artery and diffuse disease throughout the remaining femoral-popliteal  · vessels  · Continue with ASA, statin    Oropharyngeal dysphagia  Assessment & Plan  · Improved  Continue dysphagia level 1 pureed diet  Prediabetes  Assessment & Plan  Evidenced by A1c of 5 9%  · Monitor glucose on AM labs   · Encourage diet and lifestyle modifications once DC    Stenosis of both internal carotid arteries  Assessment & Plan  · CTA head/neck on admission suspected moderate stenosis of the right ICA origin and mild narrowing of the left carotid bifurcation   Intracranially there is also right greater than left stenosis, moderate on the left and mild on the right  · Appreciate neurosurgery consult and recommendations   · No need for surgical intervention at this time   · Outpatient follow-up with repeat CTA in 2 weeks   · Continue asa, statin and Xarelto  SIRS vs Sepsis (RUST 75 )  Assessment & Plan  POA with mild leukocytosis and tachycardia  Procalcitonin WNL at 0 15   · COVID/flu/RSV negative   · Portable CXR: Probable small left effusion   Slightly limited but otherwise unremarkable examination     · UA with nitrites, leukocytes and bacteria concerning for UTI  · Noted with left heel pressure ulcer on admission however no clinical signs of cellulitis, see related plan  · PA/lateral CXR: No acute cardiopulmonary disease   · Continue with IV ceftriaxone (day 4) pending final urine culture / sensitivities   · Trend temps, WBC, hemodynamics   · Id input appreciated, monitor off anti biotics  Pressure injury of deep tissue of left heel  Assessment & Plan  · POA, followed by wound care as outpatient  · No clinical signs of infection at this time   · Appreciate Podiatry consult and recommendations   · XR heel/calcaneous:  Soft tissue swelling   No evidence of acute osteomyelitis  · LEADs noted diffuse disease in LLE, see below   · No plans for intervention at this time   · Continue with local wound care, offloading  · Will re consult Vascular  Acute metabolic encephalopathy  Assessment & Plan  ID input noted  No metabolic derangement noted   Discussed with neurology not further imaging       Nicotine dependence  Assessment & Plan  · Patient admits to smoking approx 1 ppd currently   · Continue Nicotine patch  · Suspect has underlying COPD   · Respiratory protocol   · Cessation counseling     Bilateral lower extremity edema  Assessment & Plan  Patient with bilateral lower extremity edema which appears to be longstanding and related to venous stasis, managed by [facility] cardiology  Recently edema was worsening and associated with weeping  Patient received 80 mg IV Lasix x 1 in office on 11/9 and again on 11/23  Subsequently prescribed higher dose Lasix 40 mg BID  · Lasix held on admission due to hyponatremia  · Will resume lasix at  Lower dose of 20 mg today    · Continue with compression stockings   · Encourage LE elevation   · Monitor clinically     Atrial fibrillation [facility])  Assessment & Plan  · Hospital day 1 patient in atrial fibrillation with RVR - now improved   · Continue rate control with metoprolol 50 mg q12h · Speech changed diet to purreed- ? Swallow safe  · Echocardiogram 12/6 with LVEF 55%, normal wall motion, normal systolic/diastolic function, mild-moderately increased RV pressure  · Now on Xarelto     Anterior dislocation of left shoulder  Assessment & Plan  · Injury occurred in December 2019, was following with  orthopedics and managed conservatively; seems to have been lost to follow-up with repeat XRs  · 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     * Stroke-like symptoms  Assessment & Plan  Patient presented via EMS as stroke alert due to concern for acute onset of left-sided weakness and garbled speech noticed by patient's wife  Patient is not a candidate for tPA due to bleeding risk  · Admitted under stroke pathway   · CTA head/neck negative for occlusion, but did note moderate R ICA and mild L ICA stenosis, see below   · MRI brain negative for acute infarct, but did note mild chronic microangiopathic ischemic changes  · Echocardiogram essentially unremarkable   · Lipid panel: cholesterol 150, , LDL 97  · Appreciate neurology consult and recommendations   · Continue with aspirin, statin, Eliquis   · P2Y12 checked -> 268  · Routine EEG without evidence of seizure   · Concern for possible TIA vs seizure and/or infectious/metabolic etiology   · See below regarding infectious workup/management   · PT/OT recommending rehab, PMR following  · Monitor neurochecks  VTE Pharmacologic Prophylaxis: VTE Score: 6 Moderate Risk (Score 3-4) - Pharmacological DVT Prophylaxis Ordered: rivaroxaban (Xarelto)  Patient Centered Rounds: I performed bedside rounds with nursing staff today  Discussions with Specialists or Other Care Team Provider: Vascular    Education and Discussions with Family / Patient: Updated  (wife) at bedside  Time Spent for Care: 30 minutes  More than 50% of total time spent on counseling and coordination of care as described above      Current Length of Stay: 10 day(s)  Current Patient Status: Inpatient   Certification Statement: The patient will continue to require additional inpatient hospital stay due to pending placement  Discharge Plan: Anticipate discharge in 24-48 hrs to rehab facility  Code Status: Level 3 - DNAR and DNI    Subjective:   Pt seen at bedside  Lying comfortably on bed  Slow to respond and appears lethargic  Objective:     Vitals:   Temp (24hrs), Av 9 °F (36 6 °C), Min:97 7 °F (36 5 °C), Max:98 3 °F (36 8 °C)    Temp:  [97 7 °F (36 5 °C)-98 3 °F (36 8 °C)] 97 8 °F (36 6 °C)  HR:  [100-116] 110  Resp:  [17-18] 18  BP: (131-156)/(77-89) 134/79  SpO2:  [92 %-95 %] 95 %  Body mass index is 33 92 kg/m²  Input and Output Summary (last 24 hours): Intake/Output Summary (Last 24 hours) at 2022 1024  Last data filed at 2022 0806  Gross per 24 hour   Intake 120 ml   Output --   Net 120 ml       Physical Exam:   Physical Exam  Constitutional:       Appearance: Normal appearance  HENT:      Head: Normocephalic and atraumatic  Mouth/Throat:      Mouth: Mucous membranes are moist       Pharynx: Oropharynx is clear  Eyes:      Conjunctiva/sclera: Conjunctivae normal    Cardiovascular:      Rate and Rhythm: Normal rate and regular rhythm  Pulses: Normal pulses  Heart sounds: Normal heart sounds  Pulmonary:      Effort: Pulmonary effort is normal       Breath sounds: Normal breath sounds  Abdominal:      General: Abdomen is flat  Bowel sounds are normal    Skin:     General: Skin is warm and dry  Neurological:      Mental Status: He is disoriented  Comments: Lethargic but arousable to verbal stimuli             Additional Data:     Labs:  Results from last 7 days   Lab Units 22  0433 22  0444   WBC Thousand/uL 10 69* 11 00*   HEMOGLOBIN g/dL 12 9 13 7   HEMATOCRIT % 44 2 46 2   PLATELETS Thousands/uL 255 267   NEUTROS PCT %  --  81*   LYMPHS PCT %  --  7*   MONOS PCT %  --  8   EOS PCT %  --  2 Results from last 7 days   Lab Units 12/14/22  0433 12/12/22  0444   SODIUM mmol/L 146 145   POTASSIUM mmol/L 3 6 3 8   CHLORIDE mmol/L 110* 111*   CO2 mmol/L 28 33*   BUN mg/dL 16 14   CREATININE mg/dL 0 79 0 85   ANION GAP mmol/L 8 1*   CALCIUM mg/dL 9 3 9 6   ALBUMIN g/dL  --  2 2*   TOTAL BILIRUBIN mg/dL  --  0 42   ALK PHOS U/L  --  109   ALT U/L  --  39   AST U/L  --  41   GLUCOSE RANDOM mg/dL 91 106     Results from last 7 days   Lab Units 12/08/22  1736   INR  1 21*             Results from last 7 days   Lab Units 12/10/22  1310   LACTIC ACID mmol/L 1 4       Lines/Drains:  Invasive Devices     Peripheral Intravenous Line  Duration           Peripheral IV 12/13/22 Distal;Right;Upper;Ventral (anterior) Arm 1 day                      Imaging: No pertinent imaging reviewed      Recent Cultures (last 7 days):         Last 24 Hours Medication List:   Current Facility-Administered Medications   Medication Dose Route Frequency Provider Last Rate   • acetaminophen  975 mg Oral Q6H PRN Caty Grijalva PA-C     • albuterol  2 puff Inhalation Q4H PRN Marc Veras MD     • allopurinol  100 mg Oral Daily Jeanna Stewart MD     • aspirin  81 mg Oral Daily Jeanna Stewart MD     • atorvastatin  40 mg Oral QPM Jeanna Stewart MD     • cholecalciferol  400 Units Oral Daily Jeanna Stewart MD     • dextromethorphan-guaiFENesin  10 mL Oral Q4H PRN Caty Grijalva PA-C     • docusate sodium  100 mg Oral BID Jeanna Stewart MD     • furosemide  20 mg Oral Daily Abdiaziz Pulido MD     • lactulose  10 g Oral BID Marc Veras MD     • metoprolol tartrate  50 mg Oral Q12H [address] Abdiaziz Pulido MD     • nicotine  14 mg Transdermal Daily Caty Grijalva PA-C     • ondansetron  4 mg Intravenous Q6H PRN Jeanna Stewart MD     • polyethylene glycol  17 g Oral Daily Jeanna Stewart MD     • QUEtiapine  12 5 mg Oral HS Lazarus Oas, MD     • QUEtiapine  12 5 mg Oral HS PRN Lazarus Oas, MD     • rivaroxaban  20 mg Oral Daily With [employer] Francisco Appiah DO          Today, Patient Was Seen By: Hollie Angel MD    **Please Note: This note may have been constructed using a voice recognition system  **

## 2022-12-14 NOTE — RESTORATIVE TECHNICIAN NOTE
Restorative Technician Note      Patient Name: Gissell Selby     Note Type: Mobility  Patient Position Upon Consult: Supine  Activity Performed: Repositioned  Education Provided: Yes  Patient Position at End of Consult: Supine;  All needs within reach; Bed/Chair alarm activated    Jj LOVE, Restorative Technician, [employer]

## 2022-12-14 NOTE — ASSESSMENT & PLAN NOTE
· POA, followed by wound care as outpatient  · No clinical signs of infection at this time   · Appreciate Podiatry consult and recommendations   · XR heel/calcaneous:  Soft tissue swelling   No evidence of acute osteomyelitis  · LEADs noted diffuse disease in LLE, see below   · No plans for intervention at this time   · Continue with local wound care, offloading  · Will re consult Vascular

## 2022-12-14 NOTE — PLAN OF CARE
Problem: DISCHARGE PLANNING  Goal: Discharge to home or other facility with appropriate resources  Description: INTERVENTIONS:  - Identify barriers to discharge w/patient and caregiver  - Arrange for needed discharge resources and transportation as appropriate  - Identify discharge learning needs (meds, wound care, etc )  - Refer to Case Management Department for coordinating discharge planning if the patient needs post-hospital services based on physician/advanced practitioner order or complex needs related to functional status, cognitive ability, or social support system  Outcome: Progressing     Problem: Knowledge Deficit  Goal: Patient/family/caregiver demonstrates understanding of disease process, treatment plan, medications, and discharge instructions  Description: Complete learning assessment and assess knowledge base  Interventions:  - Provide teaching at level of understanding  - Provide teaching via preferred learning methods  Outcome: Progressing     Problem: Neurological Deficit  Goal: Neurological status is stable or improving  Description: Interventions:  - Monitor and assess patient's level of consciousness, motor function, sensory function, and level of assistance needed for ADLs  - Monitor and report changes from baseline  Collaborate with interdisciplinary team to initiate plan and implement interventions as ordered  - Provide and maintain a safe environment  - Consider seizure precautions  - Consider fall precautions  - Consider aspiration precautions  - Consider bleeding precautions  Outcome: Progressing     Problem: Activity Intolerance/Impaired Mobility  Goal: Mobility/activity is maintained at optimum level for patient  Description: Interventions:  - Assess and monitor patient  barriers to mobility and need for assistive/adaptive devices  - Assess patient's emotional response to limitations    - Collaborate with interdisciplinary team and initiate plans and interventions as ordered  - Encourage independent activity per ability   - Maintain proper body alignment  - Perform active/passive rom as tolerated/ordered  - Plan activities to conserve energy   - Turn patient as appropriate  Outcome: Progressing     Problem: Communication Impairment  Goal: Ability to express needs and understand communication  Description: Assess patient's communication skills and ability to understand information  Patient will demonstrate use of effective communication techniques, alternative methods of communication and understanding even if not able to speak  - Encourage communication and provide alternate methods of communication as needed  - Collaborate with case management/ for discharge needs  - Include patient/family/caregiver in decisions related to communication  Outcome: Progressing     Problem: Potential for Aspiration  Goal: Non-ventilated patient's risk of aspiration is minimized  Description: Assess and monitor vital signs, respiratory status, and labs (WBC)  Monitor for signs of aspiration (tachypnea, cough, rales, wheezing, cyanosis, fever)  - Assess and monitor patient's ability to swallow  - Place patient up in chair to eat if possible  - HOB up at 90 degrees to eat if unable to get patient up into chair   - Supervise patient during oral intake  - Instruct patient/ family to take small bites  - Instruct patient/ family to take small single sips when taking liquids    - Follow patient-specific strategies generated by speech pathologist   Outcome: Progressing     Problem: MOBILITY - ADULT  Goal: Maintain or return to baseline ADL function  Description: INTERVENTIONS:  -  Assess patient's ability to carry out ADLs; assess patient's baseline for ADL function and identify physical deficits which impact ability to perform ADLs (bathing, care of mouth/teeth, toileting, grooming, dressing, etc )  - Assess/evaluate cause of self-care deficits   - Assess range of motion  - Assess patient's mobility; develop plan if impaired  - Assess patient's need for assistive devices and provide as appropriate  - Encourage maximum independence but intervene and supervise when necessary  - Involve family in performance of ADLs  - Assess for home care needs following discharge   - Consider OT consult to assist with ADL evaluation and planning for discharge  - Provide patient education as appropriate  Outcome: Progressing  Goal: Maintains/Returns to pre admission functional level  Description: INTERVENTIONS:  - Perform BMAT or MOVE assessment daily    - Set and communicate daily mobility goal to care team and patient/family/caregiver  - Collaborate with rehabilitation services on mobility goals if consulted  - Perform Range of Motion 3 times a day  - Reposition patient every 2 hours  - Record patient progress and toleration of activity level   Outcome: Progressing     Problem: Prexisting or High Potential for Compromised Skin Integrity  Goal: Skin integrity is maintained or improved  Description: INTERVENTIONS:  - Identify patients at risk for skin breakdown  - Assess and monitor skin integrity  - Assess and monitor nutrition and hydration status  - Monitor labs   - Assess for incontinence   - Turn and reposition patient  - Assist with mobility/ambulation  - Relieve pressure over bony prominences  - Avoid friction and shearing  - Provide appropriate hygiene as needed including keeping skin clean and dry  - Evaluate need for skin moisturizer/barrier cream  - Collaborate with interdisciplinary team   - Patient/family teaching  - Consider wound care consult   Outcome: Progressing     Problem: Nutrition/Hydration-ADULT  Goal: Nutrient/Hydration intake appropriate for improving, restoring or maintaining nutritional needs  Description: Monitor and assess patient's nutrition/hydration status for malnutrition   Collaborate with interdisciplinary team and initiate plan and interventions as ordered  Monitor patient's weight and dietary intake as ordered or per policy  Utilize nutrition screening tool and intervene as necessary  Determine patient's food preferences and provide high-protein, high-caloric foods as appropriate       INTERVENTIONS:  - Monitor oral intake, urinary output, labs, and treatment plans  - Assess nutrition and hydration status and recommend course of action  - Evaluate amount of meals eaten  - Assist patient with eating if necessary   - Allow adequate time for meals  - Recommend/ encourage appropriate diets, oral nutritional supplements, and vitamin/mineral supplements  - Order, calculate, and assess calorie counts as needed  - Assess need for intravenous fluids  - Provide specific nutrition/hydration education as appropriate  - Include patient/family/caregiver in decisions related to nutrition  Outcome: Progressing     Problem: PAIN - ADULT  Goal: Verbalizes/displays adequate comfort level or baseline comfort level  Description: Interventions:  - Encourage patient to monitor pain and request assistance  - Assess pain using appropriate pain scale  - Administer analgesics based on type and severity of pain and evaluate response  - Implement non-pharmacological measures as appropriate and evaluate response  - Notify physician/advanced practitioner if interventions unsuccessful or patient reports new pain  Outcome: Progressing     Problem: INFECTION - ADULT  Goal: Absence or prevention of progression during hospitalization  Description: INTERVENTIONS:  - Assess and monitor for signs and symptoms of infection  - Monitor lab/diagnostic results  - Monitor all insertion sites, i e  indwelling lines, tubes, and drains  - [place] appropriate cooling/warming therapies per order  - Administer medications as ordered  - Instruct and encourage patient and family to use good hand hygiene technique  - Identify and instruct in appropriate isolation precautions for identified infection/condition  Outcome: Progressing     Problem: Potential for Falls  Goal: Patient will remain free of falls  Description: INTERVENTIONS:  - Educate patient/family on patient safety including physical limitations  - Instruct patient to call for assistance with activity   - Consult OT/PT to assist with strengthening/mobility   - Keep Call bell within reach  - Keep bed low and locked with side rails adjusted as appropriate  - Keep care items and personal belongings within reach  - Initiate and maintain comfort rounds  - Make Fall Risk Sign visible to staff  - Offer Toileting every 2 Hours, in advance of need  - Initiate/Maintain bed alarm  - Obtain necessary fall risk management equipment    - Apply yellow socks and bracelet for high fall risk patients  - Consider moving patient to room near nurses station  Outcome: Progressing     Problem: SKIN/TISSUE INTEGRITY - ADULT  Goal: Pressure injury heals and does not worsen  Description: Interventions:  - Implement low air loss mattress or specialty surface (Criteria met)  - Apply silicone foam dressing  - Use special pressure reducing interventions such as prevalon boot  - Apply fecal or urinary incontinence containment device   - Perform passive or active ROM every 2 hours  - Turn and reposition patient & offload bony prominences every 2 hours   - Utilize friction reducing device or surface for transfers   - Consider consults to  interdisciplinary teams such as wound care  - Use incontinent care products after each incontinent episode such as barrier cream  - Consider nutrition services referral as needed  Outcome: Progressing     Problem: COPING  Goal: Pt/Family able to verbalize concerns and demonstrate effective coping strategies  Description: INTERVENTIONS:  - Assist patient/family to identify coping skills, available support systems and cultural and spiritual values  - Provide emotional support, including active listening and acknowledgement of concerns of patient and caregivers  - Reduce environmental stimuli, as able  - Provide patient education  - Assess for spiritual pain/suffering and initiate spiritual care, including notification of Pastoral Care or beau based community as needed  - Assess effectiveness of coping strategies  Outcome: Progressing  Goal: Will report anxiety at manageable levels  Description: INTERVENTIONS:  - Administer medication as ordered  - Teach and encourage coping skills  - Provide emotional support  - Assess patient/family for anxiety and ability to cope  Outcome: Progressing     Problem: CONFUSION/THOUGHT DISTURBANCE  Goal: Thought disturbances (confusion, delirium, depression, dementia or psychosis) are managed to maintain or return to baseline mental status and functional level  Description: INTERVENTIONS:  - Assess for possible contributors to  thought disturbance, including but not limited to medications, infection, impaired vision or hearing, underlying metabolic abnormalities, dehydration, respiratory compromise,  psychiatric diagnoses and notify attending PHYSICAN/AP  - Monitor and intervene to maintain adequate nutrition, hydration, elimination, sleep and activity  - Decrease environmental stimuli, including noise as appropriate  - Provide frequent contacts to provide refocusing, direction and reassurance as needed  Approach patient calmly with eye contact and at their level    - [place] high risk fall precautions, aspiration precautions and other safety measures, as indicated  - If delirium suspected, notify physician/AP of change in condition and request immediate in-person evaluation  - Pursue consults as appropriate including Geriatric (campus dependent), OT for cognitive evaluation/activity planning, psychiatric, pastoral care, etc   Outcome: Progressing     Problem: BEHAVIOR  Goal: Pt/Family maintain appropriate behavior and adhere to behavioral management agreement, if implemented  Description: INTERVENTIONS:  - Assess the family dynamic   - Encourage verbalization of thoughts and concerns in a socially appropriate manner  - Assess patient/family's coping skills and non-compliant behavior (including use of illegal substances)  - Utilize positive, consistent limit setting strategies supporting safety of patient, staff and others  - Initiate consult with Case Management, Spiritual Care or other ancillary services as appropriate  - If a patient's/visitor's behavior jeopardizes the safety of the patient, staff, or others, refer to organization procedure     - Notify Security of behavior or suspected illegal substances which indicate the need for search of the patient and/or belongings  - Encourage participation in the decision making process about a behavioral management agreement; implement if patient meets criteria  Outcome: Progressing

## 2022-12-14 NOTE — ASSESSMENT & PLAN NOTE
· Follows with [facility] outpatient  · Most recent LEADs in May consistent with 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  · Repeat LEADs 12/6 significant for LLE > 75% stenosis of distal superficial femoral artery and diffuse disease throughout the remaining femoral-popliteal  · vessels    · Continue with ASA, statin

## 2022-12-15 NOTE — ASSESSMENT & PLAN NOTE
POA with mild leukocytosis and tachycardia  Procalcitonin WNL at 0 15   · COVID/flu/RSV negative   · Portable CXR: Probable small left effusion   Slightly limited but otherwise unremarkable examination  · UA with nitrites, leukocytes and bacteria concerning for UTI  · Noted with left heel pressure ulcer on admission however no clinical signs of cellulitis, see related plan  · PA/lateral CXR: No acute cardiopulmonary disease   · Pt was started on ceftriaxone, dc'd on 12/9 as infectious work up was negative  · Trend temps, WBC, hemodynamics   · Id input appreciated, monitor off anti biotics

## 2022-12-15 NOTE — QUICK NOTE
I tried calling patient's wife multiple times throughout the day but every time was directed to voicemail  Later in the evening patient's wife visited him when she found him more confused  I had extensive discussion regarding his waxing and waning mental status but patient's wife was concerned about the severe confusion as she never saw him like this  Despite trying to explain her over 20 minutes she kept on asking reason for his change in mental status  I spoke with neurology and requested reevaluation and discussion with patient's wife  Also discussed case with vascular as of yesterday patient's wife wanted further evaluation for nonhealing wounds  I got a message from patient's daughter Keli Tanner and called back on provided number [phone], and updated her regarding ongoing management and further plans  After extensive discussion patient's wife would like family meeting with primary team and palliative care to discuss goals of care, pending discussions she would decide either to proceed for nonhealing wounds with vascular evaluation versus withdraw active care  Will consult palliative care

## 2022-12-15 NOTE — ASSESSMENT & PLAN NOTE
· POA, followed by wound care as outpatient  · No clinical signs of infection at this time   · Appreciate Podiatry consult and recommendations   · XR heel/calcaneous:  Soft tissue swelling   No evidence of acute osteomyelitis  · LEADs noted diffuse disease in LLE, see below   · No plans for intervention at this time   · Continue with local wound care, offloading  · Discussed with vascular, team to discuss with family regarding further options

## 2022-12-15 NOTE — ASSESSMENT & PLAN NOTE
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

## 2022-12-15 NOTE — PLAN OF CARE
Problem: DISCHARGE PLANNING  Goal: Discharge to home or other facility with appropriate resources  Description: INTERVENTIONS:  - Identify barriers to discharge w/patient and caregiver  - Arrange for needed discharge resources and transportation as appropriate  - Identify discharge learning needs (meds, wound care, etc )  - Refer to Case Management Department for coordinating discharge planning if the patient needs post-hospital services based on physician/advanced practitioner order or complex needs related to functional status, cognitive ability, or social support system  Outcome: Progressing     Problem: Knowledge Deficit  Goal: Patient/family/caregiver demonstrates understanding of disease process, treatment plan, medications, and discharge instructions  Description: Complete learning assessment and assess knowledge base  Interventions:  - Provide teaching at level of understanding  - Provide teaching via preferred learning methods  Outcome: Progressing     Problem: Neurological Deficit  Goal: Neurological status is stable or improving  Description: Interventions:  - Monitor and assess patient's level of consciousness, motor function, sensory function, and level of assistance needed for ADLs  - Monitor and report changes from baseline  Collaborate with interdisciplinary team to initiate plan and implement interventions as ordered  - Provide and maintain a safe environment  - Consider seizure precautions  - Consider fall precautions  - Consider aspiration precautions  - Consider bleeding precautions  Outcome: Progressing     Problem: Activity Intolerance/Impaired Mobility  Goal: Mobility/activity is maintained at optimum level for patient  Description: Interventions:  - Assess and monitor patient  barriers to mobility and need for assistive/adaptive devices  - Assess patient's emotional response to limitations    - Collaborate with interdisciplinary team and initiate plans and interventions as ordered  - Encourage independent activity per ability   - Maintain proper body alignment  - Perform active/passive rom as tolerated/ordered  - Plan activities to conserve energy   - Turn patient as appropriate  Outcome: Progressing     Problem: Communication Impairment  Goal: Ability to express needs and understand communication  Description: Assess patient's communication skills and ability to understand information  Patient will demonstrate use of effective communication techniques, alternative methods of communication and understanding even if not able to speak  - Encourage communication and provide alternate methods of communication as needed  - Collaborate with case management/ for discharge needs  - Include patient/family/caregiver in decisions related to communication  Outcome: Progressing     Problem: Potential for Aspiration  Goal: Non-ventilated patient's risk of aspiration is minimized  Description: Assess and monitor vital signs, respiratory status, and labs (WBC)  Monitor for signs of aspiration (tachypnea, cough, rales, wheezing, cyanosis, fever)  - Assess and monitor patient's ability to swallow  - Place patient up in chair to eat if possible  - HOB up at 90 degrees to eat if unable to get patient up into chair   - Supervise patient during oral intake  - Instruct patient/ family to take small bites  - Instruct patient/ family to take small single sips when taking liquids    - Follow patient-specific strategies generated by speech pathologist   Outcome: Progressing     Problem: MOBILITY - ADULT  Goal: Maintain or return to baseline ADL function  Description: INTERVENTIONS:  -  Assess patient's ability to carry out ADLs; assess patient's baseline for ADL function and identify physical deficits which impact ability to perform ADLs (bathing, care of mouth/teeth, toileting, grooming, dressing, etc )  - Assess/evaluate cause of self-care deficits   - Assess range of motion  - Assess patient's mobility; develop plan if impaired  - Assess patient's need for assistive devices and provide as appropriate  - Encourage maximum independence but intervene and supervise when necessary  - Involve family in performance of ADLs  - Assess for home care needs following discharge   - Consider OT consult to assist with ADL evaluation and planning for discharge  - Provide patient education as appropriate  Outcome: Progressing  Goal: Maintains/Returns to pre admission functional level  Description: INTERVENTIONS:  - Perform BMAT or MOVE assessment daily    - Set and communicate daily mobility goal to care team and patient/family/caregiver  - Collaborate with rehabilitation services on mobility goals if consulted  - Perform Range of Motion 3 times a day  - Reposition patient every 2 hours  - Record patient progress and toleration of activity level   Outcome: Progressing     Problem: Prexisting or High Potential for Compromised Skin Integrity  Goal: Skin integrity is maintained or improved  Description: INTERVENTIONS:  - Identify patients at risk for skin breakdown  - Assess and monitor skin integrity  - Assess and monitor nutrition and hydration status  - Monitor labs   - Assess for incontinence   - Turn and reposition patient  - Assist with mobility/ambulation  - Relieve pressure over bony prominences  - Avoid friction and shearing  - Provide appropriate hygiene as needed including keeping skin clean and dry  - Evaluate need for skin moisturizer/barrier cream  - Collaborate with interdisciplinary team   - Patient/family teaching  - Consider wound care consult   Outcome: Progressing     Problem: Nutrition/Hydration-ADULT  Goal: Nutrient/Hydration intake appropriate for improving, restoring or maintaining nutritional needs  Description: Monitor and assess patient's nutrition/hydration status for malnutrition   Collaborate with interdisciplinary team and initiate plan and interventions as ordered  Monitor patient's weight and dietary intake as ordered or per policy  Utilize nutrition screening tool and intervene as necessary  Determine patient's food preferences and provide high-protein, high-caloric foods as appropriate       INTERVENTIONS:  - Monitor oral intake, urinary output, labs, and treatment plans  - Assess nutrition and hydration status and recommend course of action  - Evaluate amount of meals eaten  - Assist patient with eating if necessary   - Allow adequate time for meals  - Recommend/ encourage appropriate diets, oral nutritional supplements, and vitamin/mineral supplements  - Order, calculate, and assess calorie counts as needed  - Assess need for intravenous fluids  - Provide specific nutrition/hydration education as appropriate  - Include patient/family/caregiver in decisions related to nutrition  Outcome: Progressing     Problem: PAIN - ADULT  Goal: Verbalizes/displays adequate comfort level or baseline comfort level  Description: Interventions:  - Encourage patient to monitor pain and request assistance  - Assess pain using appropriate pain scale  - Administer analgesics based on type and severity of pain and evaluate response  - Implement non-pharmacological measures as appropriate and evaluate response  - Notify physician/advanced practitioner if interventions unsuccessful or patient reports new pain  Outcome: Progressing     Problem: INFECTION - ADULT  Goal: Absence or prevention of progression during hospitalization  Description: INTERVENTIONS:  - Assess and monitor for signs and symptoms of infection  - Monitor lab/diagnostic results  - Monitor all insertion sites, i e  indwelling lines, tubes, and drains  - [place] appropriate cooling/warming therapies per order  - Administer medications as ordered  - Instruct and encourage patient and family to use good hand hygiene technique  - Identify and instruct in appropriate isolation precautions for identified infection/condition  Outcome: Progressing     Problem: Potential for Falls  Goal: Patient will remain free of falls  Description: INTERVENTIONS:  - Educate patient/family on patient safety including physical limitations  - Instruct patient to call for assistance with activity   - Consult OT/PT to assist with strengthening/mobility   - Keep Call bell within reach  - Keep bed low and locked with side rails adjusted as appropriate  - Keep care items and personal belongings within reach  - Initiate and maintain comfort rounds  - Make Fall Risk Sign visible to staff  - Offer Toileting every 2 Hours, in advance of need  - Initiate/Maintain bed alarm  - Obtain necessary fall risk management equipment    - Apply yellow socks and bracelet for high fall risk patients  - Consider moving patient to room near nurses station  Outcome: Progressing     Problem: SKIN/TISSUE INTEGRITY - ADULT  Goal: Pressure injury heals and does not worsen  Description: Interventions:  - Implement low air loss mattress or specialty surface (Criteria met)  - Apply silicone foam dressing  - Use special pressure reducing interventions such as prevalon boot  - Apply fecal or urinary incontinence containment device   - Perform passive or active ROM every 2 hours  - Turn and reposition patient & offload bony prominences every 2 hours   - Utilize friction reducing device or surface for transfers   - Consider consults to  interdisciplinary teams such as wound care  - Use incontinent care products after each incontinent episode such as barrier cream  - Consider nutrition services referral as needed  Outcome: Progressing     Problem: COPING  Goal: Pt/Family able to verbalize concerns and demonstrate effective coping strategies  Description: INTERVENTIONS:  - Assist patient/family to identify coping skills, available support systems and cultural and spiritual values  - Provide emotional support, including active listening and acknowledgement of concerns of patient and caregivers  - Reduce environmental stimuli, as able  - Provide patient education  - Assess for spiritual pain/suffering and initiate spiritual care, including notification of Pastoral Care or beau based community as needed  - Assess effectiveness of coping strategies  Outcome: Progressing  Goal: Will report anxiety at manageable levels  Description: INTERVENTIONS:  - Administer medication as ordered  - Teach and encourage coping skills  - Provide emotional support  - Assess patient/family for anxiety and ability to cope  Outcome: Progressing     Problem: CONFUSION/THOUGHT DISTURBANCE  Goal: Thought disturbances (confusion, delirium, depression, dementia or psychosis) are managed to maintain or return to baseline mental status and functional level  Description: INTERVENTIONS:  - Assess for possible contributors to  thought disturbance, including but not limited to medications, infection, impaired vision or hearing, underlying metabolic abnormalities, dehydration, respiratory compromise,  psychiatric diagnoses and notify attending PHYSICAN/AP  - Monitor and intervene to maintain adequate nutrition, hydration, elimination, sleep and activity  - Decrease environmental stimuli, including noise as appropriate  - Provide frequent contacts to provide refocusing, direction and reassurance as needed  Approach patient calmly with eye contact and at their level    - [place] high risk fall precautions, aspiration precautions and other safety measures, as indicated  - If delirium suspected, notify physician/AP of change in condition and request immediate in-person evaluation  - Pursue consults as appropriate including Geriatric (campus dependent), OT for cognitive evaluation/activity planning, psychiatric, pastoral care, etc   Outcome: Progressing     Problem: BEHAVIOR  Goal: Pt/Family maintain appropriate behavior and adhere to behavioral management agreement, if implemented  Description: INTERVENTIONS:  - Assess the family dynamic   - Encourage verbalization of thoughts and concerns in a socially appropriate manner  - Assess patient/family's coping skills and non-compliant behavior (including use of illegal substances)  - Utilize positive, consistent limit setting strategies supporting safety of patient, staff and others  - Initiate consult with Case Management, Spiritual Care or other ancillary services as appropriate  - If a patient's/visitor's behavior jeopardizes the safety of the patient, staff, or others, refer to organization procedure     - Notify Security of behavior or suspected illegal substances which indicate the need for search of the patient and/or belongings  - Encourage participation in the decision making process about a behavioral management agreement; implement if patient meets criteria  Outcome: Progressing

## 2022-12-15 NOTE — PROGRESS NOTES
[facility] [facility]  Progress Note - Gissell Bal 1935, 82 y o  male MRN: [id number]  Unit/Bed#: [facility] 733-01 Encounter: [id number]  Primary Care Provider: Federico Hayes MD   Date and time admitted to hospital: 12/4/2022  5:26 PM    Acute cystitis without hematuria  Assessment & Plan  · Presenting with AMS and meeting sepsis criteria as above   · Urine culture preliminarily growing Enterococcus faecalis, E coli and Morganella morganii    · All abx stopped by neurology    PAD (peripheral artery disease) ([facility] )  Assessment & Plan  · Follows with [facility] Vascular outpatient  · Most recent LEADs in May consistent with 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  · Repeat LEADs 12/6 significant for LLE > 75% stenosis of distal superficial femoral artery and diffuse disease throughout the remaining femoral-popliteal  · vessels  · Continue with ASA, statin    Oropharyngeal dysphagia  Assessment & Plan  · Improved  Continue dysphagia level 1 pureed diet  Prediabetes  Assessment & Plan  Evidenced by A1c of 5 9%  · Monitor glucose on AM labs   · Encourage diet and lifestyle modifications once DC    Stenosis of both internal carotid arteries  Assessment & Plan  · CTA head/neck on admission suspected moderate stenosis of the right ICA origin and mild narrowing of the left carotid bifurcation   Intracranially there is also right greater than left stenosis, moderate on the left and mild on the right  · Appreciate neurosurgery consult and recommendations   · No need for surgical intervention at this time   · Outpatient follow-up with repeat CTA in 2 weeks   · Continue asa, statin and Xarelto  SIRS vs Sepsis ([facility] 75 )  Assessment & Plan  POA with mild leukocytosis and tachycardia  Procalcitonin WNL at 0 15   · COVID/flu/RSV negative   · Portable CXR: Probable small left effusion   Slightly limited but otherwise unremarkable examination     · UA with nitrites, leukocytes and bacteria concerning for UTI  · Noted with left heel pressure ulcer on admission however no clinical signs of cellulitis, see related plan  · PA/lateral CXR: No acute cardiopulmonary disease   · Pt was started on ceftriaxone, dc'd on 12/9 as infectious work up was negative  · Trend temps, WBC, hemodynamics   · Id input appreciated, monitor off anti biotics  Pressure injury of deep tissue of left heel  Assessment & Plan  · POA, followed by wound care as outpatient  · No clinical signs of infection at this time   · Appreciate Podiatry consult and recommendations   · XR heel/calcaneous:  Soft tissue swelling   No evidence of acute osteomyelitis  · LEADs noted diffuse disease in LLE, see below   · No plans for intervention at this time   · Continue with local wound care, offloading  · Discussed with vascular, team to discuss with family regarding further options  Acute metabolic encephalopathy  Assessment & Plan  ID input noted  No metabolic derangement noted   Discussed with neurology not further imaging       Nicotine dependence  Assessment & Plan  · Patient admits to smoking approx 1 ppd currently   · Continue Nicotine patch  · Suspect has underlying COPD   · Respiratory protocol   · Cessation counseling     Bilateral lower extremity edema  Assessment & Plan  Patient with bilateral lower extremity edema which appears to be longstanding and related to venous stasis, managed by [facility] cardiology  Recently edema was worsening and associated with weeping  Patient received 80 mg IV Lasix x 1 in office on 11/9 and again on 11/23  Subsequently prescribed higher dose Lasix 40 mg BID  · Lasix held on admission due to hyponatremia  · Resumed lasix at 20 mg, continue same     · Continue with compression stockings   · Encourage LE elevation   · Monitor clinically     Atrial fibrillation [facility])  Assessment & Plan  · Hospital day 1 patient in atrial fibrillation with RVR - now improved · Continue rate control with metoprolol 50 mg q12h   · Speech changed diet to purreed- ? Swallow safe  · Echocardiogram 12/6 with LVEF 55%, normal wall motion, normal systolic/diastolic function, mild-moderately increased RV pressure  · Now on Xarelto     * Stroke-like symptoms  Assessment & Plan  Patient presented via EMS as stroke alert due to concern for acute onset of left-sided weakness and garbled speech noticed by patient's wife  Patient is not a candidate for tPA due to bleeding risk  · Admitted under stroke pathway   · CTA head/neck negative for occlusion, but did note moderate R ICA and mild L ICA stenosis, see below   · MRI brain negative for acute infarct, but did note mild chronic microangiopathic ischemic changes  · Echocardiogram essentially unremarkable   · Lipid panel: cholesterol 150, , LDL 97  · Appreciate neurology consult and recommendations   · Continue with aspirin, statin, Eliquis   · P2Y12 checked -> 268  · Routine EEG without evidence of seizure   · Concern for possible TIA vs seizure and/or infectious/metabolic etiology   · See below regarding infectious workup/management   · PT/OT recommending rehab, PMR following  · Monitor neurochecks  VTE Pharmacologic Prophylaxis: VTE Score: 6 Moderate Risk (Score 3-4) - Pharmacological DVT Prophylaxis Ordered: rivaroxaban (Xarelto)  Patient Centered Rounds: I performed bedside rounds with nursing staff today  Discussions with Specialists or Other Care Team Provider: Vascular and CM    Education and Discussions with Family / Patient: Attempted to update  (wife) via phone  Unable to contact  Time Spent for Care: 30 minutes  More than 50% of total time spent on counseling and coordination of care as described above      Current Length of Stay: 11 day(s)  Current Patient Status: Inpatient   Certification Statement: The patient will continue to require additional inpatient hospital stay due to pending vascular assesement and placement  Discharge Plan: Anticipate discharge in 24-48 hrs to rehab facility  Code Status: Level 3 - DNAR and DNI    Subjective:   Pt seen and examined at bedside, appears confused but denies active complains at this point of time  Objective:     Vitals:   Temp (24hrs), Av 2 °F (36 8 °C), Min:98 2 °F (36 8 °C), Max:98 2 °F (36 8 °C)    Temp:  [98 2 °F (36 8 °C)] 98 2 °F (36 8 °C)  HR:  [] 102  BP: (112-153)/(75-91) 153/91  SpO2:  [91 %-95 %] 91 %  Body mass index is 33 92 kg/m²  Input and Output Summary (last 24 hours):   No intake or output data in the 24 hours ending 12/15/22 1445    Physical Exam:   Physical Exam  Constitutional:       Appearance: Normal appearance  HENT:      Head: Normocephalic and atraumatic  Mouth/Throat:      Mouth: Mucous membranes are moist       Pharynx: Oropharynx is clear  Eyes:      Conjunctiva/sclera: Conjunctivae normal       Pupils: Pupils are equal, round, and reactive to light  Cardiovascular:      Rate and Rhythm: Normal rate and regular rhythm  Pulses: Normal pulses  Pulmonary:      Effort: Pulmonary effort is normal       Breath sounds: Normal breath sounds  Abdominal:      General: Abdomen is flat  Palpations: Abdomen is soft  Musculoskeletal:         General: Normal range of motion  Skin:     General: Skin is warm and dry  Neurological:      Mental Status: He is disoriented            Additional Data:     Labs:  Results from last 7 days   Lab Units 223 22  0444   WBC Thousand/uL 10 69* 11 00*   HEMOGLOBIN g/dL 12 9 13 7   HEMATOCRIT % 44 2 46 2   PLATELETS Thousands/uL 255 267   NEUTROS PCT %  --  81*   LYMPHS PCT %  --  7*   MONOS PCT %  --  8   EOS PCT %  --  2     Results from last 7 days   Lab Units 22  0433 22  0444   SODIUM mmol/L 146 145   POTASSIUM mmol/L 3 6 3 8   CHLORIDE mmol/L 110* 111*   CO2 mmol/L 28 33*   BUN mg/dL 16 14   CREATININE mg/dL 0 79 0 85   ANION GAP mmol/L 8 1*   CALCIUM mg/dL 9 3 9 6   ALBUMIN g/dL  --  2 2*   TOTAL BILIRUBIN mg/dL  --  0 42   ALK PHOS U/L  --  109   ALT U/L  --  39   AST U/L  --  41   GLUCOSE RANDOM mg/dL 91 106     Results from last 7 days   Lab Units 12/08/22  1736   INR  1 21*             Results from last 7 days   Lab Units 12/10/22  1310   LACTIC ACID mmol/L 1 4       Lines/Drains:  Invasive Devices     None                       Imaging: No pertinent imaging reviewed  Recent Cultures (last 7 days):         Last 24 Hours Medication List:   Current Facility-Administered Medications   Medication Dose Route Frequency Provider Last Rate   • acetaminophen  975 mg Oral Q6H PRN Caty Grijalva PA-C     • albuterol  2 puff Inhalation Q4H PRN Matt Gill MD     • allopurinol  100 mg Oral Daily Gita Underwood MD     • aspirin  81 mg Oral Daily Gita Underwood MD     • atorvastatin  40 mg Oral QPM Gita Underwood MD     • cholecalciferol  400 Units Oral Daily Gita Underwood MD     • dextromethorphan-guaiFENesin  10 mL Oral Q4H PRN Caty Grijalva PA-C     • docusate sodium  100 mg Oral BID Gita Underwood MD     • furosemide  20 mg Oral Daily Daily Tsai MD     • lactulose  10 g Oral BID Matt Gill MD     • metoprolol tartrate  50 mg Oral Q12H [address] Daily Tsai MD     • nicotine  14 mg Transdermal Daily Caty Grijalva PA-C     • ondansetron  4 mg Intravenous Q6H PRN Gita Underwood MD     • polyethylene glycol  17 g Oral Daily Gita Underwood MD     • QUEtiapine  12 5 mg Oral HS Jen Juan MD     • QUEtiapine  12 5 mg Oral HS PRN Jen Juan MD     • rivaroxaban  20 mg Oral Daily With Dinner Stormy Johnson, DO          Today, Patient Was Seen By: Daily Tsai MD    **Please Note: This note may have been constructed using a voice recognition system  **

## 2022-12-15 NOTE — SPEECH THERAPY NOTE
Speech Language/Pathology    Speech/Language Pathology Progress Note    Patient Name: Ashley Gutierrez  QCLSV'S Date: 12/15/2022     Problem List  Principal Problem:    Stroke-like symptoms  Active Problems:    Anterior dislocation of left shoulder    Atrial fibrillation (HCC)    Bilateral lower extremity edema    Nicotine dependence    Acute metabolic encephalopathy    Pressure injury of deep tissue of left heel    S[facility] vs Sepsis ([facility] )    Stenosis of both internal carotid arteries    Prediabetes    Oropharyngeal dysphagia    PAD (peripheral artery disease) ([facility] 75 )    Acute cystitis without hematuria       Past Medical History  Past Medical History:   Diagnosis Date   • Atrial fibrillation ([facility] )    • Cancer ([facility] )    • Hypertension         Past Surgical History  Past Surgical History:   Procedure Laterality Date   • REPLACEMENT TOTAL HIP W/  RESURFACING IMPLANTS           Subjective:  "I haven't eaten all day" Patient is awake and alert  Objective:  Patient cleaned and repositioned by nursing team  Patient seen at lunch meal for dysphagia therapy  He is assessed with puree solids, and is trialed with nectar thick liquids  The patient is able to feed himself today, with some assistance for tray set up and finding items on tray  The patient does take larger bites, but has good oral control  An intermittent amount of oral residue is observed, but eventually cleared  The patient takes large, consecutive sips of nectar thick liquids via straw  Swallow appears timely  Voice is clear today with no coughing, congestion or changes in O2  Assessment:  The patient is able to feed himself today and tolerated puree solids and nectar thick liquids well  Plan/Recommendations:  Recommend diet change to puree with nectar thick liquids  Continue ST to further assess tolerance

## 2022-12-15 NOTE — ASSESSMENT & PLAN NOTE
Patient with bilateral lower extremity edema which appears to be longstanding and related to venous stasis, managed by [facility] cardiology  Recently edema was worsening and associated with weeping  Patient received 80 mg IV Lasix x 1 in office on 11/9 and again on 11/23  Subsequently prescribed higher dose Lasix 40 mg BID  · Lasix held on admission due to hyponatremia  · Resumed lasix at 20 mg, continue same     · Continue with compression stockings   · Encourage LE elevation   · Monitor clinically

## 2022-12-16 NOTE — PROGRESS NOTES
[facility]  Progress Note - Redgie Mow 1935, 82 y o  male MRN: [id number]  [facility] Encounter: [id number]  Primary Care Provider: Cr Parks MD   Date and time admitted to hospital: 12/4/2022  5:26 PM    Acute cystitis without hematuria  Assessment & Plan  · Presenting with AMS and meeting sepsis criteria as above   · Urine culture preliminarily growing Enterococcus faecalis, E coli and Morganella morganii    · All abx stopped by neurology    PAD (peripheral artery disease) ([facility] )  Assessment & Plan  · Follows with [facility] Vascular outpatient  · Most recent LEADs in May consistent with 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  · Repeat LEADs 12/6 significant for LLE > 75% stenosis of distal superficial femoral artery and diffuse disease throughout the remaining femoral-popliteal  · vessels  · Continue with ASA    Oropharyngeal dysphagia  Assessment & Plan  · Improved  Continue dysphagia level 1 pureed diet  Stenosis of both internal carotid arteries  Assessment & Plan  · CTA head/neck on admission suspected moderate stenosis of the right ICA origin and mild narrowing of the left carotid bifurcation   Intracranially there is also right greater than left stenosis, moderate on the left and mild on the right  · Appreciate neurosurgery consult and recommendations   · No need for surgical intervention at this time   · Outpatient follow-up with repeat CTA in 2 weeks  · Pt transitioned to comfort care, Statin and xarelto discontinued  Pressure injury of deep tissue of left heel  Assessment & Plan  · POA, followed by wound care as outpatient  · No clinical signs of infection at this time   · Appreciate Podiatry consult and recommendations   · XR heel/calcaneous:  Soft tissue swelling   No evidence of acute osteomyelitis     · LEADs noted diffuse disease in LLE, see below   · No plans for intervention at this time · Continue with local wound care, offloading  · Discussed with vascular, no further interventions given poor prognosis    Acute metabolic encephalopathy  Assessment & Plan  · ID input noted  · No metabolic derangement noted   · Discussed with neurology not further imaging   · Patient's mental status waxing and waning, given persistent encephalopathy and poor prognosis and background of peripheral arterial disease, nonhealing wound had family meeting today palliative care, primary, neurology, nurse management and case management  · Patient transition to comfort care  Atrial fibrillation [facility])  Assessment & Plan  · Hospital day 1 patient in atrial fibrillation with RVR - now improved   · Continue rate control with metoprolol 50 mg q12h   · Speech changed diet to purreed- ? Swallow safe  · Echocardiogram 12/6 with LVEF 55%, normal wall motion, normal systolic/diastolic function, mild-moderately increased RV pressure    * Stroke-like symptoms  Assessment & Plan  Patient presented via EMS as stroke alert due to concern for acute onset of left-sided weakness and garbled speech noticed by patient's wife  Patient is not a candidate for tPA due to bleeding risk  · Admitted under stroke pathway   · CTA head/neck negative for occlusion, but did note moderate R ICA and mild L ICA stenosis, see below   · MRI brain negative for acute infarct, but did note mild chronic microangiopathic ischemic changes  · Echocardiogram essentially unremarkable   · Appreciate neurology consult and recommendations    · PT/OT recommending rehab, PMR following  · Monitor neurochecks  · Given poor prognosis and multiple co morbities,  had family meeting today with palliative care and patient is transition to comfort care  VTE Pharmacologic Prophylaxis: VTE Score: 6 Was on Xarelto that has been discontinued as he is transition to comfort care      Patient Centered Rounds: I performed bedside rounds with nursing staff today   Discussions with Specialists or Other Care Team Provider: Neurology, palliative care, case management  Education and Discussions with Family / Patient: Updated  (wife, son and daughter) at bedside  Time Spent for Care: 60 minutes  More than 50% of total time spent on counseling and coordination of care as described above  Current Length of Stay: 12 day(s)  Current Patient Status: Inpatient   Certification Statement: The patient will continue to require additional inpatient hospital stay due to pending placement  Discharge Plan: Anticipate discharge in 48-72 hrs to rehab facility  Code Status: Level 4 - Comfort Care    Subjective:   Patient examined at bedside, alert  today but is not able to recall yesterday's conversation  I had discussion with multiple family members including wife, daughter and son during meeting that involved palliative care, neurology, nursing management and case management  Family was informed regarding prognosis, as he was evaluated by neurology and Vascular yesterday  Given his poor prognosis, family decided to make comfort care  All their questions and concerns were addressed to their satisfaction  Objective:     Vitals:   Temp (24hrs), Av 2 °F (36 8 °C), Min:97 7 °F (36 5 °C), Max:98 4 °F (36 9 °C)    Temp:  [97 7 °F (36 5 °C)-98 4 °F (36 9 °C)] 98 4 °F (36 9 °C)  HR:  [] 89  Resp:  [16-18] 18  BP: (122-128)/(63-78) 123/74  SpO2:  [94 %-97 %] 96 %  Body mass index is 33 92 kg/m²  Input and Output Summary (last 24 hours): Intake/Output Summary (Last 24 hours) at 2022 1416  Last data filed at 12/15/2022 2001  Gross per 24 hour   Intake 120 ml   Output --   Net 120 ml       Physical Exam:   Physical Exam  HENT:      Head: Atraumatic  Mouth/Throat:      Mouth: Mucous membranes are moist       Pharynx: Oropharynx is clear     Eyes:      Conjunctiva/sclera: Conjunctivae normal    Cardiovascular:      Rate and Rhythm: Normal rate  Rhythm irregular  Pulses: Normal pulses  Heart sounds: Normal heart sounds  Pulmonary:      Effort: Pulmonary effort is normal       Comments: Faint ronchi  Abdominal:      General: Abdomen is flat  Bowel sounds are normal    Skin:     General: Skin is warm and dry  Neurological:      Mental Status: He is alert  He is disoriented  Additional Data:     Labs:  Results from last 7 days   Lab Units 12/16/22 0422 12/14/22 0433 12/12/22  0444   WBC Thousand/uL 9 03   < > 11 00*   HEMOGLOBIN g/dL 12 4   < > 13 7   HEMATOCRIT % 41 2   < > 46 2   PLATELETS Thousands/uL 268   < > 267   NEUTROS PCT %  --   --  81*   LYMPHS PCT %  --   --  7*   MONOS PCT %  --   --  8   EOS PCT %  --   --  2    < > = values in this interval not displayed  Results from last 7 days   Lab Units 12/16/22 0422 12/14/22 0433 12/12/22 0444   SODIUM mmol/L 143   < > 145   POTASSIUM mmol/L 4 1   < > 3 8   CHLORIDE mmol/L 110*   < > 111*   CO2 mmol/L 28   < > 33*   BUN mg/dL 14   < > 14   CREATININE mg/dL 0 77   < > 0 85   ANION GAP mmol/L 5   < > 1*   CALCIUM mg/dL 9 3   < > 9 6   ALBUMIN g/dL  --   --  2 2*   TOTAL BILIRUBIN mg/dL  --   --  0 42   ALK PHOS U/L  --   --  109   ALT U/L  --   --  39   AST U/L  --   --  41   GLUCOSE RANDOM mg/dL 117   < > 106    < > = values in this interval not displayed  Results from last 7 days   Lab Units 12/10/22  1310   LACTIC ACID mmol/L 1 4       Lines/Drains:  Invasive Devices     None                       Imaging: No pertinent imaging reviewed      Recent Cultures (last 7 days):         Last 24 Hours Medication List:   Current Facility-Administered Medications   Medication Dose Route Frequency Provider Last Rate   • albuterol  2 puff Inhalation Q4H PRN Umair Govea MD     • aspirin  81 mg Oral Daily Ania Denney MD     • bisacodyl  10 mg Rectal Daily PRN Estuardo Pool PA-C     • dextromethorphan-guaiFENesin  10 mL Oral Q4H PRN Pamela Grijalva PA-C     • furosemide  20 mg Oral Daily Angus Tovar MD     • glycopyrrolate  0 1 mg Intravenous Q4H PRN Tiny Mcmahan PA-C     • haloperidol lactate  0 5 mg Intravenous Q2H PRN Tiny Mcmahan PA-C     • HYDROmorphone  0 3 mg Intravenous Q2H PRN Tiny Mcmahan PA-C     • LORazepam  0 5 mg Oral Q4H PRN Tiny Mcmahan PA-C     • metoprolol tartrate  50 mg Oral Q12H [address] Angus Tovar MD     • nicotine  14 mg Transdermal Daily Caty Grijalva PA-C     • ondansetron  4 mg Intravenous Q6H PRN Germania Suarez MD     • oxyCODONE  5 mg Oral Q2H PRN Soledad Woodruff PA-C     • QUEtiapine  12 5 mg Oral HS Darling Delarosa MD          Today, Patient Was Seen By: Angus Tovar MD    **Please Note: This note may have been constructed using a voice recognition system  **

## 2022-12-16 NOTE — PLAN OF CARE
Problem: PAIN - ADULT  Goal: Verbalizes/displays adequate comfort level or baseline comfort level  Description: Interventions:  - Encourage patient to monitor pain and request assistance  - Assess pain using appropriate pain scale  - Administer analgesics based on type and severity of pain and evaluate response  - Implement non-pharmacological measures as appropriate and evaluate response  - Notify physician/advanced practitioner if interventions unsuccessful or patient reports new pain  Outcome: Progressing     Problem: INFECTION - ADULT  Goal: Absence or prevention of progression during hospitalization  Description: INTERVENTIONS:  - Assess and monitor for signs and symptoms of infection  - Monitor lab/diagnostic results  - Monitor all insertion sites, i e  indwelling lines, tubes, and drains  - [place] appropriate cooling/warming therapies per order  - Administer medications as ordered  - Instruct and encourage patient and family to use good hand hygiene technique  - Identify and instruct in appropriate isolation precautions for identified infection/condition  Outcome: Progressing     Problem: Potential for Aspiration  Goal: Non-ventilated patient's risk of aspiration is minimized  Description: Assess and monitor vital signs, respiratory status, and labs (WBC)  Monitor for signs of aspiration (tachypnea, cough, rales, wheezing, cyanosis, fever)  - Assess and monitor patient's ability to swallow  - Place patient up in chair to eat if possible  - HOB up at 90 degrees to eat if unable to get patient up into chair   - Supervise patient during oral intake  - Instruct patient/ family to take small bites  - Instruct patient/ family to take small single sips when taking liquids    - Follow patient-specific strategies generated by speech pathologist   Outcome: Progressing     Problem: RESPIRATORY - ADULT  Goal: Achieves optimal ventilation and oxygenation  Description: INTERVENTIONS:  - Assess for changes in respiratory status  - Assess for changes in mentation and behavior  - Position to facilitate oxygenation and minimize respiratory effort  - Oxygen administered by appropriate delivery if ordered  - Initiate smoking cessation education as indicated  - Encourage broncho-pulmonary hygiene including cough, deep breathe, Incentive Spirometry  - Assess the need for suctioning and aspirate as needed  - Assess and instruct to report SOB or any respiratory difficulty  - Respiratory Therapy support as indicated  Outcome: Progressing

## 2022-12-16 NOTE — ASSESSMENT & PLAN NOTE
· ID input noted  · No metabolic derangement noted   · Discussed with neurology not further imaging   · Patient's mental status waxing and waning, given persistent encephalopathy and poor prognosis and background of peripheral arterial disease, nonhealing wound had family meeting today palliative care, primary, neurology, nurse management and case management  · Patient transition to comfort care

## 2022-12-16 NOTE — CASE MANAGEMENT
Case Management Discharge Planning Note    Patient name Fortunato Andrade  Location [address] 733/PPHP 413-00 MRN [id number]  : 1935 Date 2022       Current Admission Date: 2022  Current Admission Diagnosis:Stroke-like symptoms   Patient Active Problem List    Diagnosis Date Noted   • PAD (peripheral artery disease) ([facility] [address] ) 2022   • Acute cystitis without hematuria 2022   • Stenosis of both internal carotid arteries 2022   • Prediabetes 2022   • Oropharyngeal dysphagia 2022   • Stroke-like symptoms 2022   • Acute metabolic encephalopathy    • Pressure injury of deep tissue of left heel 2022   • SIRS vs Sepsis ([facility] ) 2022   • Nicotine dependence 2019   • Bilateral lower extremity edema 2019   • Recurrent falls 2019   • Other insomnia 2019   • Benign hypertension 2019   • Benign prostatic hyperplasia 2019   • Anterior dislocation of left shoulder 2019   • Closed head injury with concussion 2019   • Vesicocolonic fistula 2019   • Postural dizziness with near syncope 2019   • Ambulatory dysfunction 2019   • Atrial fibrillation ([facility] ) 2019   • Gout 2019      LOS (days): 12  Geometric Mean LOS (GMLOS) (days): 5 00  Days to GMLOS:-6 9     OBJECTIVE:  Risk of Unplanned Readmission Score: 15 38         Current admission status: Inpatient   Preferred Pharmacy:   [facility]pharmacy [facility] [address] [place] [address] Street  [facility] [address] [address]  Phone: [phone] Fax: [phone]    Primary Care Provider: Gisell Newton MD    Primary Insurance: MEDICARE  Secondary Insurance: [facility]    DISCHARGE DETAILS:      Additional Comments: Goals of Care Meeting today with [address], Charge Nurse, SLIMS provider, nurse on duty along with family members Tish Krause (spouse), Ramiro Webster and Andrae  Family has decided to go with Hospice Care and will send to an accepting SNF facility  Family completed a generic application which was uploaded to a blanket referral of SNF's in [address]  Spouse lives in [facility] and request a facility close to her home  David Stover could also not make a decision on a hospice choice at this time  She is aware that we have [facility] at that the facility can assist with Hospice Choices as well  In the meantime, she agreed to send out a blanket referral for SNF's     Pending acceptance in [place]

## 2022-12-16 NOTE — OCCUPATIONAL THERAPY NOTE
Occupational Therapy Progress Note     Patient Name: Shane Patton  TNFKL'C Date: 12/16/2022  Problem List  Principal Problem:    Stroke-like symptoms  Active Problems:    Anterior dislocation of left shoulder    Atrial fibrillation (HCC)    Bilateral lower extremity edema    Nicotine dependence    Acute metabolic encephalopathy    Pressure injury of deep tissue of left heel    SIRS vs Sepsis ([facility] [address] )    Stenosis of both internal carotid arteries    Prediabetes    Oropharyngeal dysphagia    PAD (peripheral artery disease) ([facility])    Acute cystitis without hematuria        12/16/22 1015   OT Last Visit   OT Visit Date 12/16/22   Note Type   Note Type Treatment   Pain Assessment   Pain Assessment Tool FLACC   Pain Location/Orientation Orientation: Left; Location: Leg  (heel)   Hospital Pain Intervention(s) Repositioned; Ambulation/increased activity; Emotional support;Rest;Elevated   Pain Rating: FLACC (Rest) - Face 0   Pain Rating: FLACC (Rest) - Legs 0   Pain Rating: FLACC (Rest) - Activity 0   Pain Rating: FLACC (Rest) - Cry 0   Pain Rating: FLACC (Rest) - Consolability 0   Score: FLACC (Rest) 0   Pain Rating: FLACC (Activity) - Face 1   Pain Rating: FLACC (Activity) - Legs 1   Pain Rating: FLACC (Activity) - Activity 1   Pain Rating: FLACC (Activity) - Cry 1   Pain Rating: FLACC (Activity) - Consolability 0   Score: FLACC (Activity) 4   Restrictions/Precautions   Weight Bearing Precautions Per Order Yes   LLE Weight Bearing Per Order (S)  NWB   Other Precautions Cognitive; Chair Alarm; Bed Alarm;Telemetry; Fall Risk;Pain   Lifestyle   Autonomy Per CM note pt required A for ADLs, IADLs, and functional mobility  Prior to admission pt was living with spouse in [address] [place] [address] with 5 JARRED     Reciprocal Relationships Spouse   Service to Others Retired   Intrinsic Gratification continue assessing   ADL   Grooming Assistance 2  Maximal Assistance   Grooming Deficit Wash/dry hands   LB Dressing Assistance 1  Total Assistance   LB Dressing Deficit Don/doff R sock; Don/doff L sock   Bed Mobility   Supine to Sit 2  Maximal assistance   Additional items Assist x 2   Sit to Supine 2  Maximal assistance   Additional items Assist x 2   Additional Comments pt performed EOB sitting with mod a progressing to CS/CG for approx~15-20 minutes   Transfers   Additional Comments unable to assess 2* to pt safety/NWB status to LLE  Functional Mobility   Additional Comments unsafe to assess   ROM - Left Upper Extremities    L Shoulder PROM  (scapular mobilization with shoulder elevation/depression/abduction/adduction +scapular winging, +one finger glenohumeral subluxation)   L Elbow PROM;Elbow flexion;Elbow extension   L Wrist PROM   L Hand AAROM; Index finger; Long finger;Ring finger;Little finger; Thumb   L Position Seated;Against gravity   LUE ROM Comment pt demonstrated AROM with finger flexion and elbow flexion 2x fingers>elbow   Cognition   Overall Cognitive Status Impaired   Arousal/Participation Alert; Responsive;Arousable   Attention Difficulty attending to directions   Orientation Level Oriented to person;Disoriented to place; Disoriented to time;Disoriented to situation   Memory Decreased recall of precautions;Decreased recall of recent events;Decreased short term memory   Following Commands Follows one step commands with increased time or repetition   Comments pt agreeable to therapy, lethargic at times with eyes closing, fluctuating confusion/disorientation, attention limited by fatigue/pain, verbal cues for safety, ADL engagement, self awareness  Activity Tolerance   Activity Tolerance Patient limited by fatigue;Patient limited by pain   Medical Staff Made Aware RN cleared pt for therapy   Assessment   Assessment Patient participated in Skilled OT session this date with interventions consisting of self care tasks, functional bed transfers, sitting tolerance/balance   Patient agreeable to OT treatment session, upon arrival patient was found supine in bed    In comparison to previous session, patient limited by cognitive deficits/lethargy, pain to LLE   Patient requiring verbal cues for safety, verbal cues for correct technique, verbal cues for pacing thru activity steps, one step directives and frequent rest periods  Patient continues to be functioning below baseline level, occupational performance remains limited secondary to factors listed above and increased risk for falls and injury  From OT standpoint, recommendation at time of d/c would be Short Term Rehab  The patient's raw score on the AM-PAC Daily Activity inpatient short form is 13, standardized score is 32 03, less than 39 4  Patients at this level are likely to benefit from discharge to post-acute rehabilitation services  Please refer to the recommendation of the Occupational Therapist for safe discharge planning  Patient to benefit from continued Occupational Therapy treatment while in the hospital to address deficits as defined above and maximize level of functional independence with ADLs and functional mobility  Plan   Treatment Interventions ADL retraining;Functional transfer training;UE strengthening/ROM; Endurance training;Cognitive reorientation;Patient/family training;Equipment evaluation/education; Compensatory technique education; Energy conservation; Activityengagement   Goal Expiration Date 12/19/22   OT Treatment Day 3   OT Frequency 1-2x/wk  (frequency change 2* to decreased ADL engagement/confusion with sessions )   Recommendation   OT Discharge Recommendation Post acute rehabilitation services   AM-[facility] Daily Activity Inpatient   Lower Body Dressing 2   Bathing 2   Toileting 2   Upper Body Dressing 2   Grooming 2   Eating 3   Daily Activity Raw Score 13   Daily Activity Standardized Score (Calc for Raw Score >=11) 32 03   Turning Head Towards Sound 3   Follow Simple Instructions 2   Low Function Daily Activity Raw Score 18   Low Function Daily Activity Standardized Score 30 17   AM-PAC Applied Cognition Inpatient   Following a Speech/Presentation 1   Understanding Ordinary Conversation 3   Taking Medications 1   Remembering Where Things Are Placed or Put Away 2   Remembering List of 4-5 Errands 1   Taking Care of Complicated Tasks 1   Applied Cognition Raw Score 9   Applied Cognition Standardized Score 22 48   Berenice Low MOT, OTR/L

## 2022-12-16 NOTE — CONSULTS
Consultation - Palliative and Supportive Care   Charanjit Gutierrez 82 y o  male [phone]    Patient Active Problem List   Diagnosis   • Benign hypertension   • Benign prostatic hyperplasia   • Anterior dislocation of left shoulder   • Closed head injury with concussion   • Vesicocolonic fistula   • Postural dizziness with near syncope   • Ambulatory dysfunction   • Atrial fibrillation (HCC)   • Gout   • Recurrent falls   • Other insomnia   • Bilateral lower extremity edema   • Nicotine dependence   • Stroke-like symptoms   • Acute metabolic encephalopathy   • Pressure injury of deep tissue of left heel   • SIRS vs Sepsis ([facility])   • Stenosis of both internal carotid arteries   • Prediabetes   • Oropharyngeal dysphagia   • PAD (peripheral artery disease) ([facility])   • Acute cystitis without hematuria     Active issues specifically addressed today include:   • Palliative care encounter  • Goals of care discussion  • PAD  • 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  • Comfort measures only status      Plan:  1  Symptom management -   • Medications reconciled to reflect comfort measures only  • Continue lasix  • seroquel 12 5mg HS  • Albuterol Q4H PRN  • Dulcolax PRN constipation  • Robinul 0 1mg IV Q4H PRN secretions  • Haldol 0 5mg IV Q2H PRN agitation  • oxyIR 5mg Q2H PRN moderate-severe pain or increased work of breathing  • Dilaudid 0 3mg IV Q2H PRN BT pain    2  Goals -   • Met with patient's spouse Roberta Tena) and children Reinaldo Beckettjose angel and Nancy Barrios) along with Dr Kristopher Jade (SLIM), Arely (CM), Dr Ambrocio Ham (neurology), and Ambrocio Salazar (RN manager)  Family collectively appeared to have a very realistic understanding of his worsening condition and poor candidacy for further interventions  They ultimately express the desire to prioritize his comfort and quality of life  • Comfort measures only while admitted, CM to assist with SNF placement on hospice        Code Status: comfort - Level 4   Decisional apparatus:  Patient is not competent on my exam today  If competence is lost, patient's substitute decision maker would default to spouse by [place]  Advance Directive / Living Will / POLST:  On file, reviewed personally  Names spouse Alyce Bryant) as primary [place] and daughter Deion Leon) as secondary  3  Social support  • Patient is supported by significant other Alyce Bryant) and children Deion Leon and Andrae) from prior union  Sheldon Navarro also has a daughter of her own who she relies on for support  • Time spent providing supportive listening    4  Follow-up  • Palliative care is available for symptom management while patient is awaiting hospice placement  Otherwise will defer symptom management and support to hospice upon discharge  I have reviewed the patient's controlled substance dispensing history in the Prescription Drug Monitoring Program in compliance with the [facility] regulations before prescribing any controlled substances  We appreciate the invitation to be involved in this patient's care  We will continue to follow  Please do not hesitate to reach our on call provider through our clinic answering service at  should you have acute symptom control concerns  Malik Omer PA-C  Palliative and Supportive Care  Clinic/Answering Service: [phone]  You can find me on [employer]! IDENTIFICATION:  Consults  Physician Requesting Consult: Brandi Dangelo MD  Reason for Consult / Principal Problem: goals of care  Hx and PE limited by: encephalopathy  Supplemented by chart review, communication with primary team, and family    HISTORY OF PRESENT ILLNESS:       Obdulia Stein is a 82 y o  male with a fib, HTN, and worsening ambulatory dysfunction at home presented [facility] on 12/4 with altered mental status  Patient's spouse witnessed a sudden onset of garbled speech while watching TV  Patient was found to have ICA stenosis along with worsening b/l LE edema   MRI without any large areas of acute infarct, btu consistent with chronic microangiopathic ischemic changes  Patient was treated for UTI as his presentation was out of proportion to image findings  Seizures were ruled out  Speech therapy also evaluated and recommended dysphagia diet  Stay complicated by patient's agitation requiring restraints  Vascular surgery was consulted for PAD and also carotid stenosis, but felt he would be high risk for any interventions based on his total condition  Palliative care was consulted to assist with goals of care discussion and support  Prior to admission patient was residing with spouse Garcia Hollingsworth) who was his primary caretaker  He has 2 children from prior marriage Jesse Jama and Andrae)  They all describe Bruce Bird as a edvin who liked to be in control and independent  They do not feel he would want to prolong his life in the current situation or would accept being bed bound following an amputation  This is in accordance with written wishes on advanced directive       Review of Systems   Unable to perform ROS: mental status change       Past Medical History:   Diagnosis Date   • Atrial fibrillation ([facility] [address] )    • Cancer ([facility] [address] )    • Hypertension      Past Surgical History:   Procedure Laterality Date   • REPLACEMENT TOTAL HIP W/  RESURFACING IMPLANTS       Social History     Socioeconomic History   • Marital status: /Civil Union     Spouse name: Not on file   • Number of children: Not on file   • Years of education: Not on file   • Highest education level: Not on file   Occupational History   • Not on file   Tobacco Use   • Smoking status: Never   • Smokeless tobacco: Never   Substance and Sexual Activity   • Alcohol use: Not Currently   • Drug use: Not Currently   • Sexual activity: Not on file   Other Topics Concern   • Not on file   Social History Narrative   • Not on file     Social Determinants of Health     Financial Resource Strain: Not on file   Food Insecurity: No Food Insecurity   • Worried About Running Out of Food in the Last Year: Never true   • Ran Out of Food in the Last Year: Never true   Transportation Needs: No Transportation Needs   • Lack of Transportation (Medical): No   • Lack of Transportation (Non-Medical): No   Physical Activity: Not on file   Stress: Not on file   Social Connections: Not on file   Intimate Partner Violence: Not on file   Housing Stability: Low Risk    • Unable to Pay for Housing in the Last Year: No   • Number of Places Lived in the Last Year: 1   • Unstable Housing in the Last Year: No     Family History   Problem Relation Age of Onset   • Stroke Mother        MEDICATIONS / ALLERGIES:    all current active meds have been reviewed    Allergies   Allergen Reactions   • Balsam Dermatitis   • Neomycin-Bacitracin Zn-Polymyx Hives and Rash       OBJECTIVE:    Physical Exam  Physical Exam  Constitutional:       Appearance: He is ill-appearing  HENT:      Head: Atraumatic  Eyes:      Conjunctiva/sclera: Conjunctivae normal    Cardiovascular:      Rate and Rhythm: Normal rate  Pulmonary:      Effort: No respiratory distress  Abdominal:      Tenderness: There is no guarding  Skin:     Comments: B/l LE with ischemic changes indicative of PAD   Neurological:      Comments: Encepahlopathic  Oriented to himself only   Psychiatric:      Comments: Agitated upon entering the room  Lab Results:  Results from last 7 days   Lab Units 12/16/22  0422 12/14/22  0433 12/12/22  0444 12/11/22  0532 12/10/22  1310   WBC Thousand/uL 9 03 10 69* 11 00*   < > 8 94   HEMOGLOBIN g/dL 12 4 12 9 13 7   < > 13 2   HEMATOCRIT % 41 2 44 2 46 2   < > 42 8   PLATELETS Thousands/uL 268 255 267   < > 267   NEUTROS PCT %  --   --  81*  --  78*   MONOS PCT %  --   --  8  --  9    < > = values in this interval not displayed       Results from last 7 days   Lab Units 12/16/22  0422 12/14/22  0433 12/12/22  0444   POTASSIUM mmol/L 4 1 3 6 3 8   CHLORIDE mmol/L 110* 110* 111*   CO2 mmol/L 28 28 33*   BUN mg/dL 14 16 14   CREATININE mg/dL 0 77 0 79 0 85 CALCIUM mg/dL 9 3 9 3 9 6   ALK PHOS U/L  --   --  109   ALT U/L  --   --  39   AST U/L  --   --  41         Imaging Studies: Reviewed pertinent studies  EKG, Pathology, and Other Studies: reviewed pertinent studies    Counseling / Coordination of Care    Total floor / unit time spent today 60 minutes  Greater than 50% of total time was spent with the patient and / or family counseling and / or coordination of care   A description of the counseling / coordination of care: time spent assessing patient, chart review, communication with primary team, neurology, surgery, CM, and in family meeting from approximately 1pm-1:35pm

## 2022-12-16 NOTE — ASSESSMENT & PLAN NOTE
· Hospital day 1 patient in atrial fibrillation with RVR - now improved   · Continue rate control with metoprolol 50 mg q12h   · Speech changed diet to purreed- ?  Swallow safe  · Echocardiogram 12/6 with LVEF 55%, normal wall motion, normal systolic/diastolic function, mild-moderately increased RV pressure

## 2022-12-16 NOTE — PROGRESS NOTES
NEUROLOGY RESIDENCY PROGRESS NOTE     Name: Jana Miranda   Age & Sex: 82 y o  male   MRN: [id number]  Unit/Bed#: [facility] 733-01   Encounter: [id number]    Patient transitioned to Hospice care and made comfort care  Will not require neurology follow up      Pending for discharge: Per Primary Team    ASSESSMENT & PLAN     * Stroke-like symptoms  Assessment & Plan  Assessment:  Jana Miranda is a 82 y o  male with a PMH of aFib (on eliquis), BPH, COPD, Gout, HTN, Basal cell adenocarcinoma of salviatory gland, PAD and PVD, who presents as a stoke alert    It is reported by EMS that the patient's Claudia Wily was 12/4/2022 1600 after which there was acute onset left sided facial droop, left UA and LE weakness, and garbled speech  The patient is on ASA and Eliquis at baseline  His presenting BP was 133/88, glucose 121  His NIHSS on initial evaluation was 11  Initial CTH was not demonstrative of  acute intracranial abnormality, and showed table mild chronic microangiopathic changes within the brain  CTA HN showed atherosclerotic changes are seen within the carotid bifurcations and intracranial internal carotid arteries bilaterally  Suspected moderate stenosis of the right ICA origin and mild narrowing of the left carotid bifurcation  Intracranially there is also right greater than left stenosis, moderate on the left and mild on the right  The patient on subsequent examination was becoming much more distractible and was not able to cooperate with examination  He had difficulty following commands consistently  Over the last few days the patient has shown improvement in overall exam, with hospital delirium being present with the changes in mental status and behaviour in the day versus the night      Imaging:  - CTH was not demonstrative of  acute intracranial abnormality, and showed table mild chronic microangiopathic changes within the brain   - CTA HN showed atherosclerotic changes are seen within the carotid bifurcations and intracranial internal carotid arteries bilaterally  Suspected moderate stenosis of the right ICA origin and mild narrowing of the left carotid bifurcation  Intracranially there is also right greater than left stenosis, moderate on the left and mild on the right   - MRI Brain wo: Motion degraded examination  No acute infarction, edema, or mass effect  Mild chronic microangiopathic ischemic changes   - TTE:  Left Ventricle: Left ventricular cavity size is normal  Wall thickness is mildly increased  There is concentric remodeling  The left ventricular ejection fraction is 65%  Systolic function is normal  Wall motion is normal  Diastolic function is normal   IVS: Septal motion is not well visualized  Right Ventricle: Right ventricular cavity size is mildly dilated  Systolic function is normal  Right Atrium: The atrium is mildly dilated  Mitral Valve: There is mild annular calcification  There is trace regurgitation  Tricuspid Valve: There is moderate regurgitation  The right ventricular systolic pressure is moderately elevated  The estimated right ventricular systolic pressure is 44 83 mmHg  - MRI Brain wwo: 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, mesial temporal lobe, internal capsule and cerebral peduncle  No mass effect or hemorrhagic conversion  Workup:  - A1C: 5 9  - Lipid panel: CHOL 150, , HDL 22, LDL 97  - Ammonia: 17  - 4-Plex: negative  - CXR: Probable small left effusion  Slightly limited but otherwise unremarkable examination   - UA w reflec sxope and cx: innumerable WBC, innumerable bacteria, innumerable RBCs, large leuks  - TEG Platelet mapping: Abnormal  - rEEG: Slowing of the posteriorly dominant rhythm suggests mild nonspecific diffuse cerebral dysfunction  - vEEG: Generalized slowing    Impression:  Considering the sudden onset of the symptoms and the imaging studies that are not suggestive of LVO, haemorrhage or infarct   Imaging studies did reveal that there are atherosclerotic changes within the carotid bifurcations and intracranial internal carotid arteries bilaterally with both intracranial and extracranial ICA stenosis for which neurosurgery recommended medical management  MRI Brain demonstrated multiple strokes that could be embolic in nature  A rEEG and vEEG was completed to look for a seizure/postictal state contributing to his confusion, these demonstrated slowing of the PDR suggesting diffuse cerebral dysfunction  UTI was found and was treated  With fluctuation in mentation hospital delirium is suspected  Plan:  - A multidisciplinary discussion resulted in transitioning to OP hospice care and level 4 comfort measures being most appropriate that aligns with patient's wishes   - No further IP recommendations from neurology  Please contact for any questions  Acute metabolic encephalopathy  Assessment & Plan  Assessment:  During assessment after initial NIHSS and imaging studies were completed it was noted that the patient had more trouble focusing on conversations, had more tangential speech, and was not able to follow commands as before  On initial examination the patient kept perseverating on the numbers 9 and 14 which is the month and day respectively of his birthday  On examination 12/12/2022 the patient was examined in the early AM, he was alert, oriented to only person and place, and did seem confused  As the patient does seem to improve as the day progresses and then seem to become confused and disoriented again, hospital delirium is suspected  Plan:  - A multidisciplinary discussion resulted in transitioning to OP hospice care and level 4 comfort measures being most appropriate that aligns with patient's wishes   - No further IP recommendations from neurology  Please contact for any questions  SUBJECTIVE     Patient was seen and examined this AM during visitation   The patient was alert however was closing his eyes often during the examination  He required multiple requests to complete a command for physical and neurological examination  More often than not the patient would state that he is in too much pain to move extremities, or simply would not comply with requests  A conversation was held with family, palliative, neurology, and internal medicine where an extensive discussion resulted on the spouse, son and daughter decided collaboratively that transitioning to comfort care with transition to OP hospice care is the best course of action for the patient and that it would fit the patient's wishes  Review of Systems   Constitutional: Positive for fatigue  Negative for chills, diaphoresis and fever  HENT: Negative for drooling, ear discharge, nosebleeds, sinus pressure, sinus pain, sneezing and sore throat  Eyes: Negative for pain, discharge and redness  Respiratory: Negative for cough, chest tightness, shortness of breath and wheezing  Cardiovascular: Negative for chest pain, palpitations and leg swelling  Gastrointestinal: Negative for anal bleeding, blood in stool, diarrhea, nausea and vomiting  Genitourinary: Negative for flank pain, hematuria and urgency  Musculoskeletal: Positive for back pain  Skin: Positive for wound (on LLE, foot in boot  )  Negative for pallor  Neurological: Positive for tremors, weakness and numbness (Inconsistent on examination)  Negative for dizziness, seizures, syncope, facial asymmetry, speech difficulty, light-headedness and headaches  Psychiatric/Behavioral: Positive for confusion, decreased concentration and sleep disturbance  Negative for behavioral problems, dysphoric mood, hallucinations, self-injury and suicidal ideas  The patient is not nervous/anxious and is not hyperactive  OBJECTIVE     Patient ID: Keyla Burroughs is a 82 y o  male      Vitals:    12/15/22 1524 12/15/22 1928 12/15/22 2050 12/16/22 0711   BP: 122/63 128/78 127/78 123/74   BP Location: Right arm    Pulse: 92 105 99 89   Resp: 16  18 18   Temp: 97 7 °F (36 5 °C) 98 4 °F (36 9 °C) 98 2 °F (36 8 °C) 98 4 °F (36 9 °C)   TempSrc:   Oral    SpO2: 94% 94% 97% 96%   Weight:       Height:          Temperature:   Temp (24hrs), Av 3 °F (36 8 °C), Min:98 2 °F (36 8 °C), Max:98 4 °F (36 9 °C)    Temperature: 98 4 °F (36 9 °C)    Physical Exam  Vitals reviewed  HENT:      Head: Normocephalic  Nose: Nose normal    Eyes:      Pupils: Pupils are equal, round, and reactive to light  Cardiovascular:      Rate and Rhythm: Normal rate  Pulmonary:      Effort: Pulmonary effort is normal    Skin:     General: Skin is warm  Neurological:      Mental Status: He is alert  He is disoriented  Motor: Weakness present  Deep Tendon Reflexes: Reflexes abnormal       Reflex Scores:       Bicep reflexes are 2+ on the right side and 2+ on the left side  Brachioradialis reflexes are 2+ on the right side and 2+ on the left side  Patellar reflexes are 1+ on the right side and 1+ on the left side  Neurologic Exam     Mental Status   Oriented to person  Oriented to place  Disoriented to year, month and date  Follows 1 step commands  Attention: decreased  Concentration: decreased  Level of consciousness: alert  Unable to perform simple calculations  Unable to name object  Unable to repeat  - Speech is dysarthric  - Patient was not cooperating with examination  Cranial Nerves     CN III, IV, VI   Pupils are equal, round, and reactive to light  Right pupil: Size: 2 mm  Reactivity: brisk  Left pupil: Size: 3 mm  Reactivity: brisk  CN VII   Right facial weakness: none  Left facial weakness: none    CN VIII   Hearing: impaired    CN XI   Right sternocleidomastoid strength: weak  Left sternocleidomastoid strength: weak  Right trapezius strength: weak  Left trapezius strength: weak  - Patient was not cooperating with examination    Most of the CN examination could not be completed as patient would not reply to questions appropriately and stated that he has pain 'all over'  Motor Exam   Muscle bulk: normal  Overall muscle tone: normal  Right arm tone: increased  Left arm tone: increased  Right leg tone: normal  Left leg tone: normal  - LLE in boot  - Patient was not cooperating with examination  Most of the motor examination could not be completed as patient would not reply to questions appropriately and stated that he has pain 'all over'  The patient was not able to elevate the LEs and reported that his UEs were too painful to move  Sensory Exam   Patient did not cooperate with sensory examination and did not respond well to questions in regards to being able to feel touch  Gait, Coordination, and Reflexes     Tremor   Resting tremor: present (on RUE)  Intention tremor: present    Reflexes   Right brachioradialis: 2+  Left brachioradialis: 2+  Right biceps: 2+  Left biceps: 2+  Right patellar: 1+  Left patellar: 1+  - Gait not assessed, risk of falling   - Plantar and clonus testing avoided d/t pain in left foot  - Patient was not cooperating with examination and reflexes, and coordination could not accurately be completed as patient would stated that he has pain 'all over'  LABORATORY DATA     Labs: I have personally reviewed pertinent reports  Results from last 7 days   Lab Units 12/16/22 0422 12/14/22  0433 12/12/22 0444 12/11/22  0532 12/10/22  1310   WBC Thousand/uL 9 03 10 69* 11 00*   < > 8 94   HEMOGLOBIN g/dL 12 4 12 9 13 7   < > 13 2   HEMATOCRIT % 41 2 44 2 46 2   < > 42 8   PLATELETS Thousands/uL 268 255 267   < > 267   NEUTROS PCT %  --   --  81*  --  78*   MONOS PCT %  --   --  8  --  9    < > = values in this interval not displayed        Results from last 7 days   Lab Units 12/16/22 0422 12/14/22  0433 12/12/22  0444   SODIUM mmol/L 143 146 145   POTASSIUM mmol/L 4 1 3 6 3 8   CHLORIDE mmol/L 110* 110* 111*   CO2 mmol/L 28 28 33*   BUN mg/dL 14 16 14   CREATININE mg/dL 0 77 0 79 0 85   CALCIUM mg/dL 9 3 9 3 9 6   ALK PHOS U/L  --   --  109   ALT U/L  --   --  39   AST U/L  --   --  41              Results from last 7 days   Lab Units 12/10/22  1310 12/10/22  0122 12/09/22  2117   PTT seconds 56* 67* 59*     Results from last 7 days   Lab Units 12/10/22  1310   LACTIC ACID mmol/L 1 4           IMAGING & DIAGNOSTIC TESTING     Radiology Results: I have personally reviewed pertinent reports  MRI brain wo contrast   Final Result by Antonette Perez MD (12/08 0108)      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, mesial temporal lobe, internal capsule and cerebral peduncle  No mass effect or hemorrhagic conversion  The study was marked in [facility] for immediate notification  Workstation performed: [id number]         FL barium swallow video w speech   Final Result by SYSTEMILIA, DAVID (12/07 1414)      XR chest pa & lateral   Final Result by Alexia Eller MD (12/07 0830)      No acute cardiopulmonary disease  Workstation performed: [id number]         VAS lower limb arterial duplex, limited/unilateral   Final Result by Suzi Millard MD (12/06 1310)      XR heel / calcaneus 2+ vw left   Final Result by Vick Costello MD (12/06 1046)      Soft tissue swelling  No evidence of acute osteomyelitis  If osteomyelitis remains a clinical concern, consider further evaluation with MRI  Workstation performed: [id number]         MRI brain wo contrast   Final Result by Justin Bryant MD (12/05 4800)   Motion degraded examination  No acute infarction, edema, or mass effect  Mild chronic microangiopathic ischemic changes  Workstation performed: [id number]         XR chest portable   Final Result by Isabella Hall MD (46/91 4005)      Probable small left effusion    Slightly limited but otherwise unremarkable examination  Workstation performed: [id number]         CTA stroke alert (head/neck)   Final Result by Lico Verde DO (12/04 9372)      Unfortunately this examination is significantly limited due to patient motion  Atherosclerotic changes are seen within the carotid bifurcations and intracranial internal carotid arteries bilaterally  Suspected moderate stenosis of the right ICA origin and mild narrowing of the left carotid bifurcation  Intracranially there is    also right greater than left stenosis, moderate on the left and mild on the right      No occlusive disease or thrombosis identified  Findings were directly discussed with neurology resident at approximately 5:40 PM                            Workstation performed: [id number]         CT stroke alert brain   Final Result by Lico Verde DO (12/04 1755)      No acute intracranial abnormality  Stable mild chronic microangiopathic changes within the brain  Findings were directly discussed with neurology resident at 5:40 PM       Workstation performed: [id number]           Other Diagnostic Testing: I have personally reviewed pertinent reports        ACTIVE MEDICATIONS     Current Facility-Administered Medications   Medication Dose Route Frequency   • albuterol (PROVENTIL HFA,VENTOLIN HFA) inhaler 2 puff  2 puff Inhalation Q4H PRN   • aspirin chewable tablet 81 mg  81 mg Oral Daily   • bisacodyl (DULCOLAX) rectal suppository 10 mg  10 mg Rectal Daily PRN   • dextromethorphan-guaiFENesin (ROBITUSSIN DM) oral syrup 10 mL  10 mL Oral Q4H PRN   • furosemide (LASIX) tablet 20 mg  20 mg Oral Daily   • glycopyrrolate (ROBINUL) injection 0 1 mg  0 1 mg Intravenous Q4H PRN   • haloperidol lactate (HALDOL) injection 0 5 mg  0 5 mg Intravenous Q2H PRN   • HYDROmorphone (DILAUDID) injection 0 3 mg  0 3 mg Intravenous Q2H PRN   • LORazepam (ATIVAN) tablet 0 5 mg  0 5 mg Oral Q4H PRN   • [START ON 12/17/2022] metoprolol tartrate (LOPRESSOR) tablet 50 mg  50 mg Oral Daily   • nicotine (NICODERM CQ) 14 mg/24hr TD 24 hr patch 14 mg  14 mg Transdermal Daily   • ondansetron (ZOFRAN) injection 4 mg  4 mg Intravenous Q6H PRN   • oxyCODONE (ROXICODONE) IR tablet 5 mg  5 mg Oral Q2H PRN   • QUEtiapine (SEROquel) tablet 12 5 mg  12 5 mg Oral HS     Prior to Admission medications    Medication Sig Start Date End Date Taking?  Authorizing Provider   colchicine (COLCRYS) 0 6 mg tablet Take 0 6 mg by mouth daily   Yes Historical Provider, MD   furosemide (LASIX) 40 mg tablet Take 40 mg by mouth daily   Yes Historical Provider, MD   potassium chloride (KLOR-CON) 20 mEq packet Take 20 mEq by mouth 2 (two) times a day   Yes Historical Provider, MD   acetaminophen (TYLENOL) 325 mg tablet Take 3 tablets (975 mg total) by mouth every 8 (eight) hours 12/9/19   Tere Goodman PA-C   allopurinol (ZYLOPRIM) 100 mg tablet Take 100 mg by mouth daily    Historical Provider, MD   apixaban (ELIQUIS) 5 mg Take 5 mg by mouth 2 (two) times a day    Historical Provider, MD   aspirin (ECOTRIN LOW STRENGTH) 81 mg EC tablet Take 81 mg by mouth daily    Historical Provider, MD   cholecalciferol (VITAMIN D3) 400 units tablet Take 400 Units by mouth daily    Historical Provider, MD   docusate sodium (COLACE) 100 mg capsule Take 1 capsule (100 mg total) by mouth 2 (two) times a day 12/9/19   Brandon Shaw PA-C   metoprolol tartrate (LOPRESSOR) 50 mg tablet Take 50 mg by mouth every 12 (twelve) hours    Historical Provider, MD   vitamin A 10,000 units capsule Take 10,000 Units by mouth daily    Historical Provider, MD     VTE Pharmacologic Prophylaxis: Rivaroxaban (Xarelto)  VTE Mechanical Prophylaxis: sequential compression device    ==  DO Mayra Farah 75 Neurology Residency, PGY-2

## 2022-12-16 NOTE — PHYSICAL THERAPY NOTE
PHYSICAL THERAPY NOTE          Patient Name: Anita Butterfield  GCSYR'O Date: 12/16/2022 12/16/22 1024   PT Last Visit   PT Visit Date 12/16/22   Note Type   Note Type Treatment   Pain Assessment   Pain Assessment Tool FLACC   Pain Location/Orientation Orientation: Left  (heel wound+generalized)   Hospital Pain Intervention(s) Repositioned; Ambulation/increased activity;Relaxation technique   Pain Rating: FLACC (Rest) - Face 0   Pain Rating: FLACC (Rest) - Legs 0   Pain Rating: FLACC (Rest) - Activity 0   Pain Rating: FLACC (Rest) - Cry 0   Pain Rating: FLACC (Rest) - Consolability 0   Score: FLACC (Rest) 0   Pain Rating: FLACC (Activity) - Face 1   Pain Rating: FLACC (Activity) - Legs 1   Pain Rating: FLACC (Activity) - Activity 1   Pain Rating: FLACC (Activity) - Cry 1   Pain Rating: FLACC (Activity) - Consolability 1   Score: FLACC (Activity) 5   Restrictions/Precautions   Weight Bearing Precautions Per Order Yes   LLE Weight Bearing Per Order (S)  NWB   Other Precautions Cognitive; Bed Alarm;Multiple lines;Telemetry; Fall Risk;Pain  (L prevelon boot, L sided weakness, L GHJ subluxation)   General   Chart Reviewed Yes   Family/Caregiver Present No   Cognition   Overall Cognitive Status Impaired   Arousal/Participation Arousable   Attention Difficulty attending to directions   Orientation Level Oriented to person;Oriented to place;Oriented to time   Following Commands Follows one step commands with increased time or repetition   Comments overall pleasant-very limited by pain   Subjective   Subjective pt reporting generalized pain throughout session   Bed Mobility   Supine to Sit 2  Maximal assistance   Additional items Assist x 2; Increased time required;Verbal cues;LE management   Sit to Supine 2  Maximal assistance   Additional items Assist x 2; Increased time required;Verbal cues;LE management   Balance   Static Sitting Poor + Dynamic Sitting Poor +   Endurance Deficit   Endurance Deficit Yes   Endurance Deficit Description fatigue, weakness, pain   Activity Tolerance   Activity Tolerance Patient limited by fatigue;Patient limited by pain   Medical Staff Made Aware co-tx with OT STEVE 2* medical complexity and decreased activity tolerance   Nurse Made Aware yes-cleared to mobilize   Exercises   Knee AROM Long Arc Quad Sitting;5 reps;AAROM; Right   Balance training  EOB with S/minAx1 x20 min   Assessment   Prognosis Guarded   Problem List Decreased strength;Decreased endurance; Impaired balance;Decreased mobility; Decreased range of motion;Decreased coordination;Decreased cognition; Impaired judgement;Decreased safety awareness; Impaired tone; Impaired sensation;Pain;Orthopedic restrictions;Decreased skin integrity; Impaired hearing; Impaired vision   Assessment Pt agreeable to participate in PT session  Pt performed functional mobility and therex as outlined above  However very limited by pain and unable to tolerate much more activity than sitting EOB  B/l hamstring tightness noted  S EOB at times however majority of session minAx1  Per chart review, palliative discussion this PM  Pt left supine in bed with bed alarm, call bell, phone, and all personal needs within reach  Pt will continue to benefit from skilled acute care PT to further address their functional mobility limitations  D/C recommendations remain rehab  Barriers to Discharge Decreased caregiver support; Inaccessible home environment   Goals   Patient Goals less pain   STG Expiration Date 12/26/22   PT Treatment Day 3   Plan   Treatment/Interventions Functional transfer training;LE strengthening/ROM; Therapeutic exercise; Endurance training;Cognitive reorientation;Patient/family training;Equipment eval/education; Bed mobility;Spoke to nursing;Spoke to case management;OT   Progress Slow progress, decreased activity tolerance   PT Frequency 2-3x/wk   Recommendation   PT Discharge Recommendation Post acute rehabilitation services   AM-PAC Basic Mobility Inpatient   Turning in Bed Without Bedrails 1   Lying on Back to Sitting on Edge of Flat Bed 1   Moving Bed to Chair 1   Standing Up From Chair 1   Walk in Room 1   Climb 3-5 Stairs 1   Basic Mobility Inpatient Raw Score 6   Turning Head Towards Sound 3   Follow Simple Instructions 2   Low Function Basic Mobility Raw Score 11   Low Function Basic Mobility Standardized Score 16 55   Highest Level Of Mobility   JH-HLM Goal 2: Bed activities/Dependent transfer   -HLM Achieved 3: Sit at edge of bed   Junito Francois, PT, DPT      *following PT session this AM, pt transitioned to level 4-comfort care; will d/c PT orders   Thank you

## 2022-12-16 NOTE — PLAN OF CARE
Problem: OCCUPATIONAL THERAPY ADULT  Goal: Performs self-care activities at highest level of function for planned discharge setting  See evaluation for individualized goals  Description: Treatment Interventions: ADL retraining, Visual perceptual retraining, Functional transfer training, UE strengthening/ROM, Endurance training, Cognitive reorientation, Patient/family training, Equipment evaluation/education, Compensatory technique education, Energy conservation, Activityengagement, Continued evaluation, Neuromuscular reeducation, Fine motor coordination activities          See flowsheet documentation for full assessment, interventions and recommendations  Note: Limitation: Decreased ADL status, Decreased Safe judgement during ADL, Decreased cognition, Decreased endurance, Decreased self-care trans, Decreased high-level ADLs, Decreased fine motor control, Decreased UE strength, Decreased UE ROM, Visual deficit  Prognosis: Fair  Assessment: Patient participated in Skilled OT session this date with interventions consisting of self care tasks, functional bed transfers, sitting tolerance/balance   Patient agreeable to OT treatment session, upon arrival patient was found supine in bed  In comparison to previous session, patient limited by cognitive deficits/lethargy, pain to LLE   Patient requiring verbal cues for safety, verbal cues for correct technique, verbal cues for pacing thru activity steps, one step directives and frequent rest periods  Patient continues to be functioning below baseline level, occupational performance remains limited secondary to factors listed above and increased risk for falls and injury  From OT standpoint, recommendation at time of d/c would be Short Term Rehab  The patient's raw score on the AM-PAC Daily Activity inpatient short form is 13, standardized score is 32 03, less than 39 4  Patients at this level are likely to benefit from discharge to post-acute rehabilitation services  Please refer to the recommendation of the Occupational Therapist for safe discharge planning  Patient to benefit from continued Occupational Therapy treatment while in the hospital to address deficits as defined above and maximize level of functional independence with ADLs and functional mobility       OT Discharge Recommendation: Post acute rehabilitation services

## 2022-12-16 NOTE — PROGRESS NOTES
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  2  Lateral left leg DTI  3  Medial ankle DTI  4  Stroke-like symptoms  5  Peripheral Arterial disease  6  UTI    PLAN:    · Plan continued local wound care to pressure ulcer of left heel and DTI on lateral lower leg  Eschar on left heel is stable, without any acute clinical signs of infection present at this time  · DTI of lateral left leg appears to be progressing to eschar, no open wound or drainage present, no acute clinical signs of infection present  · DTI to medial ankle adjacent to medial malleolus  No open wound or clinical signs of infection  Will offload and protect to prevent progression to open wound  · Recommend continued offloading, with strict pressure and friction reduction to the left lower extremity  · Continue local wound care, Betadine DSD, appreciate nursing assistance with dressing changes  · Elevation and offloading on green foam wedges or pillows when non-ambulatory  Prevalon boot to left lower extremity  · Rest of care per primary team      Weightbearing status: Non-weightbearing left  foot    SUBJECTIVE:     The patient was seen, evaluated, and assessed at bedside today  The patient was awake, and in no acute distress  No acute events overnight  The patient reported mild pain with dressing change today  Patient denies N/V/F/chills/SOB/CP  OBJECTIVE:     Vitals:   /74   Pulse 89   Temp 98 4 °F (36 9 °C)   Resp 18   Ht 5' 8" (1 727 m)   Wt 101 kg (223 lb 1 7 oz)   SpO2 96%   BMI 33 92 kg/m²     Temp (24hrs), Av 2 °F (36 8 °C), Min:97 7 °F (36 5 °C), Max:98 4 °F (36 9 °C)      Physical Exam:     Lungs: Non labored breathing  Abdomen: Soft, non-tender    Lower Extremity:  Cardiovascular status at baseline from admission  Neurological status at baseline from admission  Musculoskeletal status at baseline from admission  No calf tenderness noted  Dermatological:     - Lateral left lower leg DTI, with dry stable eschar  Non-blanchable, and no open wound present    - DTI adjacent to medial malleolus, purple non-blanchable skin  - Skin is dry and flaky throughout bilateral lower extremities  Wound #: 1  Location: Left Heel  Length 3 5cm: Width 3 0cm: Depth unable to determine  Deepest Tissue Noted in Base: Eschar   Probe to Bone: No  Peripheral Skin Description: Attached  Granulation: 0% Fibrotic Tissue: 0% Necrotic Tissue: 100%   Drainage Amount: none  Signs of Infection: No    Clinical Images 12/16/22:    Left lower extremity:               Additional Data:     Labs:    Results from last 7 days   Lab Units 12/16/22 0422 12/14/22 0433 12/12/22 0444   WBC Thousand/uL 9 03   < > 11 00*   HEMOGLOBIN g/dL 12 4   < > 13 7   HEMATOCRIT % 41 2   < > 46 2   PLATELETS Thousands/uL 268   < > 267   NEUTROS PCT %  --   --  81*   LYMPHS PCT %  --   --  7*   MONOS PCT %  --   --  8   EOS PCT %  --   --  2    < > = values in this interval not displayed  Results from last 7 days   Lab Units 12/16/22 0422 12/14/22 0433 12/12/22 0444   POTASSIUM mmol/L 4 1   < > 3 8   CHLORIDE mmol/L 110*   < > 111*   CO2 mmol/L 28   < > 33*   BUN mg/dL 14   < > 14   CREATININE mg/dL 0 77   < > 0 85   CALCIUM mg/dL 9 3   < > 9 6   ALK PHOS U/L  --   --  109   ALT U/L  --   --  39   AST U/L  --   --  41    < > = values in this interval not displayed  * I Have Reviewed All Lab Data Listed Above  Recent Cultures (last 7 days):               Imaging: I have personally reviewed pertinent films in PACS  EKG, Pathology, and Other Studies: I have personally reviewed pertinent reports  ** Please Note: Portions of the record may have been created with voice recognition software   Occasional wrong word or "sound a like" substitutions may have occurred due to the inherent limitations of voice recognition software  Read the chart carefully and recognize, using context, where substitutions have occurred   **

## 2022-12-16 NOTE — PROGRESS NOTES
Pastoral Care Progress Note    2022  Patient: Jose F Benitez : 1935  Admission Date & Time: 2022 1726  MRN: [id number] CSN: [id number]      Late Entry:  Giles Kirby from [facility] on [address] provided pt with anointing of the sick and last rites on 22  Chaplaincy Interventions Utilized:       Ritual: Provided ritual       22 1700   Clinical Encounter Type   Visited With Patient   Referral From [address] [place] [address] of Sick-Anointing Visiting  anointed patient  (annointing was given on  by Fr Maribeth Tejada)   Sacrament Other   (blessing)

## 2022-12-16 NOTE — PLAN OF CARE
Problem: PHYSICAL THERAPY ADULT  Goal: Performs mobility at highest level of function for planned discharge setting  See evaluation for individualized goals  Description: Treatment/Interventions: Functional transfer training, LE strengthening/ROM, Endurance training, Therapeutic exercise, Cognitive reorientation, Patient/family training, Equipment eval/education, Bed mobility, Spoke to nursing, Spoke to case management, OT  Equipment Recommended:  (TBD)       See flowsheet documentation for full assessment, interventions and recommendations  Outcome: Completed  Note: Prognosis: Guarded  Problem List: Decreased strength, Decreased endurance, Impaired balance, Decreased mobility, Decreased range of motion, Decreased coordination, Decreased cognition, Impaired judgement, Decreased safety awareness, Impaired tone, Impaired sensation, Pain, Orthopedic restrictions, Decreased skin integrity, Impaired hearing, Impaired vision  Assessment: Pt agreeable to participate in PT session  Pt performed functional mobility and therex as outlined above  However very limited by pain and unable to tolerate much more activity than sitting EOB  B/l hamstring tightness noted  S EOB at times however majority of session minAx1  Per chart review, palliative discussion this PM  Pt left supine in bed with bed alarm, call bell, phone, and all personal needs within reach  Pt will continue to benefit from skilled acute care PT to further address their functional mobility limitations  D/C recommendations remain rehab  Barriers to Discharge: Decreased caregiver support, Inaccessible home environment     PT Discharge Recommendation: Post acute rehabilitation services    See flowsheet documentation for full assessment

## 2022-12-16 NOTE — PROGRESS NOTES
Vascular Surgery  Progress Note   Marichuy Fuller 82 y o  male MRN: [id number]  Unit/Bed#: [facility] 733-01 Encounter: [id number]    Assessment:  Patient is a 82 y o  male admitted for encephalopathy found to have had a stroke w/ a PMHx of PAD for which he follows as an outpatient a [facility] and a left heal pressure ulcer for which he follows with wound care as an outpatient  Evaluated by podiatry for this ulcer for this heal ulcer; recommending local wound care  LEADs were obtained and are grossly unchanged from his last study on in May of 2022 showing occlusion of the distal L SFA w/ ABIs of 0 43  Vascular surgery consulted for the nonhealing L calcaneal pressure ulcer in the setting of PAD  Initial discussion with family revealed a strong opinion towards non-intervention but after discussing again with them yesterday they just " want him to be comfortable" but are finding it difficult to make up their mind  Family meeting is planned for today to establish goals of care  Given poor functional status and comorbidities pt is a poor candidate for revascularization regardless of families choice  Pt may ultimately require amputation  Given concern for stroke at presentation and poor quality of his CTA neck, which did reveal atherosclerotic disease at the carotid bifurcation, would recommend carotid duplex pending goals of care  Pt continues to be encephalopathic with labile mental status and is currently being treated for cystitis  Continues to have left sided weakness  Although Left calcaneal wound is necrotic is not currently infected or causing sepsis       Afebrile, VSS  WBC: 9; Hb 4; Cr: 0 77    Plan:  - Poor surgical candidate for open or endovascular revascularization  - Will await family decision regarding further work up   - Continue ASA/Statin  - Wound care per podiatry   - Rest of care per primary  Subjective/Objective     Subjective: Patient seen and examined at bedside, in no acute distress   No acute events overnight  Remains encephalopathic  Patient's pain is well controlled  Objective:     Vitals:Blood pressure 123/74, pulse 89, temperature 98 4 °F (36 9 °C), resp  rate 18, height 5' 8" (1 727 m), weight 101 kg (223 lb 1 7 oz), SpO2 96 %  ,Body mass index is 33 92 kg/m²  Temp (24hrs), Av 2 °F (36 8 °C), Min:97 7 °F (36 5 °C), Max:98 4 °F (36 9 °C)  Current: Temperature: 98 4 °F (36 9 °C)      Intake/Output Summary (Last 24 hours) at 2022 1037  Last data filed at 12/15/2022 2001  Gross per 24 hour   Intake 120 ml   Output --   Net 120 ml       Invasive Devices     None                 General: No acute distress, alert and oriented  CV: Well perfused, irregular rhythm  Lungs: Normal work of breathing, no increased respiratory effort  Abdomen: Soft, non-tender, non-distended  Extremities: Necrotic ulceration of the left heel approximately 3 5 cm x 3 cm with overlying eschar; mostly dry without purulent drainage, or increased warmth compared to the contralateral foot   Chronic venous stasis changes bilateral lower extremity      Pulse exam:  Femoral: Right: palpable  Left: palpable  Popliteal: Right: doppler signal Left: doppler signal  DP: Right: doppler signal Left: doppler signal  PT: Right: doppler signal Left: doppler signal     Lab Results: Results: I have personally reviewed all pertinent laboratory/tests results  VTE Prophylaxis: Sequential compression device Lauren League)  and Reason for no pharmacologic prophylaxis therputic AC with Pj Ibrahim MD  2022

## 2022-12-16 NOTE — ASSESSMENT & PLAN NOTE
· POA, followed by wound care as outpatient  · No clinical signs of infection at this time   · Appreciate Podiatry consult and recommendations   · XR heel/calcaneous:  Soft tissue swelling   No evidence of acute osteomyelitis  · LEADs noted diffuse disease in LLE, see below   · No plans for intervention at this time   · Continue with local wound care, offloading    · Discussed with vascular, no further interventions given poor prognosis

## 2022-12-16 NOTE — Clinical Note
Pt agreeable to participate in PT session  Pt performed functional mobility and therex as outlined above  However very limited by pain and unable to tolerate much more activity than sitting EOB  B/l hamstring tightness noted  S EOB at times however majority of session minAx1  Per chart review, palliative discussion this PM  Pt left supine in bed with bed alarm, call bell, phone, and all personal needs within reach  Pt will continue to benefit from skilled acute care PT to further address their functional mobility limitations  D/C recommendations remain rehab

## 2022-12-16 NOTE — SPEECH THERAPY NOTE
Speech Language/Pathology    Speech/Language Pathology Progress Note    Patient Name: Jose F Benitez  YATFL'A Date: 12/16/2022     Problem List  Principal Problem:    Stroke-like symptoms  Active Problems:    Anterior dislocation of left shoulder    Atrial fibrillation (HCC)    Bilateral lower extremity edema    Nicotine dependence    Acute metabolic encephalopathy    Pressure injury of deep tissue of left heel    SIRS vs Sepsis ([facility] )    Stenosis of both internal carotid arteries    Prediabetes    Oropharyngeal dysphagia    PAD (peripheral artery disease) ([facility] )    Acute cystitis without hematuria       Past Medical History  Past Medical History:   Diagnosis Date   • Atrial fibrillation ([facility] )    • Cancer ([facility] )    • Hypertension         Past Surgical History  Past Surgical History:   Procedure Laterality Date   • REPLACEMENT TOTAL HIP W/  RESURFACING IMPLANTS           Subjective:  "It's getting worse (cough)" Patient is awake and alert  Objective: The patient is positioned upright in bed  He is seen for dysphagia therapy at lunch meal  The patient is assessed with puree solids and honey thick liquids  He is able to feed himself  Bite size and rate is adequate  Transfer time is at least min prolonged with suspected delayed swallow initiation time  He takes large, consecutive sips of nectar and honey thick liquids via straw  The patient is observed with wet, congested cough throughout  Congestion appears worse than yesterday  Question if due to change to nectar thick liquids  Oral suction is also intermittently performed for the removal of a min amount of oral residue  Patient states "there must be something in my mouth"  Intake is ~25% today  Would recommend diet downgrade due to increased congestion  Assessment:  Patient has more wet congestion today  Plan/Recommendations:  Recommend diet change back to puree with honey thick liquids  MD reports family meeting planned for today to determine plan of care  ST will f/u as able

## 2022-12-16 NOTE — RESTORATIVE TECHNICIAN NOTE
Restorative Technician Note      Patient Name: Anita Butterfield     Note Type: Mobility  Patient Position Upon Consult: Supine  Activity Performed: Repositioned  Patient Position at End of Consult: Supine; Bed/Chair alarm activated;  All needs within reach    Stevie LOVE, Restorative Technician, [employer]

## 2022-12-16 NOTE — ASSESSMENT & PLAN NOTE
· CTA head/neck on admission suspected moderate stenosis of the right ICA origin and mild narrowing of the left carotid bifurcation   Intracranially there is also right greater than left stenosis, moderate on the left and mild on the right  · Appreciate neurosurgery consult and recommendations   · No need for surgical intervention at this time   · Outpatient follow-up with repeat CTA in 2 weeks  · Pt transitioned to comfort care, Statin and xarelto discontinued

## 2022-12-16 NOTE — ASSESSMENT & PLAN NOTE
Patient presented via EMS as stroke alert due to concern for acute onset of left-sided weakness and garbled speech noticed by patient's wife  Patient is not a candidate for tPA due to bleeding risk  · Admitted under stroke pathway   · CTA head/neck negative for occlusion, but did note moderate R ICA and mild L ICA stenosis, see below   · MRI brain negative for acute infarct, but did note mild chronic microangiopathic ischemic changes  · Echocardiogram essentially unremarkable   · Appreciate neurology consult and recommendations    · PT/OT recommending rehab, PMR following  · Monitor neurochecks  · Given poor prognosis and multiple co morbities,  had family meeting today with palliative care and patient is transition to comfort care

## 2022-12-16 NOTE — ASSESSMENT & PLAN NOTE
· Follows with [facility] Vascular outpatient  · Most recent LEADs in May consistent with 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  · Repeat LEADs 12/6 significant for LLE > 75% stenosis of distal superficial femoral artery and diffuse disease throughout the remaining femoral-popliteal  · vessels    · Continue with ASA

## 2022-12-17 PROBLEM — Z71.89 GOALS OF CARE, COUNSELING/DISCUSSION: Status: ACTIVE | Noted: 2022-01-01

## 2022-12-17 NOTE — CASE MANAGEMENT
Case Management Discharge Planning Note    Patient name Soledad Davidson  Location [address] 733/PPHP 181-98 MRN [id number]  : 1935 Date 2022       Current Admission Date: 2022  Current Admission Diagnosis:Stroke-like symptoms   Patient Active Problem List    Diagnosis Date Noted   • PAD (peripheral artery disease) ([facility] ) 2022   • Acute cystitis without hematuria 2022   • Stenosis of both internal carotid arteries 2022   • Prediabetes 2022   • Oropharyngeal dysphagia 2022   • Stroke-like symptoms 2022   • Acute metabolic encephalopathy    • Pressure injury of deep tissue of left heel 2022   • SIRS vs Sepsis ([facility] ) 2022   • Nicotine dependence 2019   • Bilateral lower extremity edema 2019   • Recurrent falls 2019   • Other insomnia 2019   • Benign hypertension 2019   • Benign prostatic hyperplasia 2019   • Anterior dislocation of left shoulder 2019   • Closed head injury with concussion 2019   • Vesicocolonic fistula 2019   • Postural dizziness with near syncope 2019   • Ambulatory dysfunction 2019   • Atrial fibrillation ([facility] ) 2019   • Gout 2019      LOS (days): 13  Geometric Mean LOS (GMLOS) (days): 5 00  Days to GMLOS:-7 7     OBJECTIVE:  Risk of Unplanned Readmission Score: 15 14         Current admission status: Inpatient   Preferred Pharmacy:   [address] [address]  Phone: [phone] Fax: [phone]    Primary Care Provider: Romi Vasquez MD    Primary Insurance: MEDICARE  Secondary Insurance: AARP    DISCHARGE DETAILS:    Discharge planning discussed with[de-identified] Brii  Freedom of Choice: Yes  Comments - Freedom of Choice: received message from Hospital  to call patients wife  CM called Wife disclosed additional sources of information   Source of Income $  [address] [employer] and [employer] [facility] [facility] $[phone]  Checking account Balance $28, 681  He has a money market [employer] $[phone]  Investments with a   I told the wife to talk to the  on monday  Updated referrals in [place] and will print out an dgive the wife an application for [facility] when she comes in today  12/17 1530    Provided patients wife with a copy of the [facility] admissions application

## 2022-12-17 NOTE — PROGRESS NOTES
Progress note - Palliative and Supportive Care   Devon Larsen 82 y o  male [id number]    Patient Active Problem List   Diagnosis   • Benign hypertension   • Benign prostatic hyperplasia   • Anterior dislocation of left shoulder   • Closed head injury with concussion   • Vesicocolonic fistula   • Postural dizziness with near syncope   • Ambulatory dysfunction   • Atrial fibrillation (HCC)   • Gout   • Recurrent falls   • Other insomnia   • Bilateral lower extremity edema   • Nicotine dependence   • Stroke-like symptoms   • Acute metabolic encephalopathy   • Pressure injury of deep tissue of left heel   • SIRS vs Sepsis (HCC)   • Stenosis of both internal carotid arteries   • Prediabetes   • Oropharyngeal dysphagia   • PAD (peripheral artery disease) (HCC)   • Acute cystitis without hematuria     Active issues specifically addressed today include:   Palliative care encounter  End-of-life care  Comfort measures only status  Back pain    Plan:  1  Symptom management -   • Continue Lasix for comfort  • Oxycodone 5 mg p o  every 8 hours scheduled and every 2 hours as needed moderate to severe pain  • Dilaudid 0 3 mg IV every 2 hours as needed breakthrough pain  • Seroquel 12 5 mg at bedtime  • Ativan 0 5 mg p o  every 4 hours as needed anxiety or insomnia  • Haldol 0 5 mg IV every 2 hours as needed agitation  • Zofran 4 mg IV every 6 hours as needed nausea or vomiting  • Suppository as needed constipation    2  Goals -   • Comfort measures only while admitted, case management assisting with hospice discharge to SNF  Code Status: comfort - Level 4   Decisional apparatus:  Patient is not competent on my exam today  If competence is lost, patient's substitute decision maker would default to spouse [place]  Advance Directive / Living Will / POLST: On file, reviewed personally    3  Social support-  • No family at bedside during time of encounter    Time spent providing supportive listening to patient  • Family has contacted the  to provide Last Rites    4  Follow-up  • Palliative care remains available for ongoing symptom management on an as-needed basis over this hospitalization, otherwise will defer discharge planning to case management    Interval history:       Patient utilized 3 as needed doses of oxycodone in the past 24 hours  At time of evaluation he endorses low back pain and asks to be repositioned  Otherwise, he mumbles and has somewhat incomprehensible speech  He remains confused  Offered assistance to eat lunch, but he declined  He did except assistance taking several sips of water  MEDICATIONS / ALLERGIES:     all current active meds have been reviewed    Allergies   Allergen Reactions   • Balsam Dermatitis   • Neomycin-Bacitracin Zn-Polymyx Hives and Rash       OBJECTIVE:    Physical Exam  Physical Exam  HENT:      Head: Atraumatic  Eyes:      Conjunctiva/sclera: Conjunctivae normal    Cardiovascular:      Rate and Rhythm: Normal rate  Pulmonary:      Effort: No respiratory distress  Abdominal:      Tenderness: There is no guarding  Skin:     Comments: Discoloration of LE consistent with PAD   Neurological:      Mental Status: He is alert  Comments: Oriented to himself but otherwise confused  Able to communicate his need for pain medication   Psychiatric:      Comments: No agitation         Lab Results:   Results from last 7 days   Lab Units 12/16/22  0422 12/14/22  0433 12/12/22  0444 12/11/22  0532 12/10/22  1310   WBC Thousand/uL 9 03 10 69* 11 00*   < > 8 94   HEMOGLOBIN g/dL 12 4 12 9 13 7   < > 13 2   HEMATOCRIT % 41 2 44 2 46 2   < > 42 8   PLATELETS Thousands/uL 268 255 267   < > 267   NEUTROS PCT %  --   --  81*  --  78*   MONOS PCT %  --   --  8  --  9    < > = values in this interval not displayed       Results from last 7 days   Lab Units 12/16/22  0422 12/14/22  0433 12/12/22  0444   POTASSIUM mmol/L 4 1 3 6 3 8   CHLORIDE mmol/L 110* 110* 111*   CO2 mmol/L 28 28 33*   BUN mg/dL 14 16 14   CREATININE mg/dL 0 77 0 79 0 85   CALCIUM mg/dL 9 3 9 3 9 6   ALK PHOS U/L  --   --  109   ALT U/L  --   --  39   AST U/L  --   --  41       Imaging Studies: reviewed pertinent studies   EKG, Pathology, and Other Studies: reviewed pertinent studies    Counseling / Coordination of Care    Total floor / unit time spent today 20 minutes  Greater than 50% of total time was spent with the patient and / or family counseling and / or coordination of care  A description of the counseling / coordination of care: Time spent assessing patient, symptom management, coordination of care with RN

## 2022-12-17 NOTE — RESTORATIVE TECHNICIAN NOTE
Restorative Technician Note      Patient Name: Brandy Andrews     Note Type: Mobility  Patient Position Upon Consult: Supine  Activity Performed: Repositioned  Patient Position at End of Consult: Supine;  All needs within reach; Bed/Chair alarm activated    Keke LOVE, Restorative Technician, [employer]

## 2022-12-18 NOTE — ASSESSMENT & PLAN NOTE
· Follows with [facility] V[facility] outpatient  · Most recent LEADs in May consistent with 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  · Repeat LEADs 12/6 significant for LLE > 75% stenosis of distal superficial femoral artery and diffuse disease throughout the remaining femoral-popliteal  · vessels    · Continue with ASA

## 2022-12-18 NOTE — ASSESSMENT & PLAN NOTE
· I had discussions with multiple family members on 12/15, and involve palliative care  · Had family meeting on 12/16 where patient's wife, daughter and son were present  · Appreciate palliative care, neurology, nurse management and case management participation in meeting  · Patient made comfort care  · Continue oxycodone, Dilaudid as per palliative care recommendations  · Hospice consult in place

## 2022-12-18 NOTE — PROGRESS NOTES
[address] [facility]  Progress Note - Young Mcgarry 1935, 82 y o  male MRN: [id number]  Unit/Bed#: [facility] 733-01 Encounter: [id number]  Primary Care Provider: Janay Medrano MD   Date and time admitted to hospital: 12/4/2022  5:26 PM    Goals of care, counseling/discussion  Assessment & Plan  · I had discussions with multiple family members on 12/15, and involve palliative care  · Had family meeting on 12/16 where patient's wife, daughter and son were present  · Appreciate palliative care, neurology, nurse management and case management participation in meeting  · Patient made comfort care  · Continue oxycodone, Dilaudid as per palliative care recommendations  · Hospice consult in place  Acute cystitis without hematuria  Assessment & Plan  · Presenting with AMS and meeting sepsis criteria as above   · Urine culture preliminarily growing Enterococcus faecalis, E coli and Morganella morganii    · All abx stopped by neurology    PAD (peripheral artery disease) ([facility] [address] )  Assessment & Plan  · Follows with [facility] outpatient  · Most recent LEADs in May consistent with 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  · Repeat LEADs 12/6 significant for LLE > 75% stenosis of distal superficial femoral artery and diffuse disease throughout the remaining femoral-popliteal  · vessels  · Continue with ASA    Oropharyngeal dysphagia  Assessment & Plan  · Improved  Continue dysphagia level 1 pureed diet  Stenosis of both internal carotid arteries  Assessment & Plan  · CTA head/neck on admission suspected moderate stenosis of the right ICA origin and mild narrowing of the left carotid bifurcation   Intracranially there is also right greater than left stenosis, moderate on the left and mild on the right    · Appreciate neurosurgery consult and recommendations   · No need for surgical intervention at this time   · Outpatient follow-up with repeat CTA in 2 weeks    · Pt transitioned to comfort care, Statin and xarelto discontinued  Pressure injury of deep tissue of left heel  Assessment & Plan  · POA, followed by wound care as outpatient  · No clinical signs of infection at this time   · Appreciate Podiatry consult and recommendations   · XR heel/calcaneous:  Soft tissue swelling   No evidence of acute osteomyelitis  · LEADs noted diffuse disease in LLE, see below   · No plans for intervention at this time   · Continue with local wound care, offloading  · Discussed with vascular, no further interventions given poor prognosis    Acute metabolic encephalopathy  Assessment & Plan  · ID input noted  · No metabolic derangement noted   · Discussed with neurology not further imaging   · Patient's mental status waxing and waning, given persistent encephalopathy and poor prognosis and background of peripheral arterial disease, nonhealing wound had family meeting today palliative care, primary, neurology, nurse management and case management  · Patient transition to comfort care  Bilateral lower extremity edema  Assessment & Plan  Patient with bilateral lower extremity edema which appears to be longstanding and related to venous stasis, managed by [facility] cardiology  Recently edema was worsening and associated with weeping  Patient received 80 mg IV Lasix x 1 in office on 11/9 and again on 11/23  Subsequently prescribed higher dose Lasix 40 mg BID  · Lasix held on admission due to hyponatremia  · Resumed lasix at 20 mg, continue same  · Continue with compression stockings   · Encourage LE elevation   · Monitor clinically     Atrial fibrillation [facility])  Assessment & Plan  · Hospital day 1 patient in atrial fibrillation with RVR - now improved   · Continue rate control with metoprolol 50 mg q12h   · Speech changed diet to purreed- ?  Swallow safe  · Echocardiogram 12/6 with LVEF 55%, normal wall motion, normal systolic/diastolic function, mild-moderately increased RV pressure    * Stroke-like symptoms  Assessment & Plan  Patient presented via EMS as stroke alert due to concern for acute onset of left-sided weakness and garbled speech noticed by patient's wife  Patient is not a candidate for tPA due to bleeding risk  · Admitted under stroke pathway   · CTA head/neck negative for occlusion, but did note moderate R ICA and mild L ICA stenosis, see below   · MRI brain negative for acute infarct, but did note mild chronic microangiopathic ischemic changes  · Echocardiogram essentially unremarkable   · Appreciate neurology consult and recommendations    · PT/OT recommending rehab, PMR following  · Monitor neurochecks  · Given poor prognosis and multiple co morbities,  had family meeting with palliative care and patient is transition to comfort care on 12/16  VTE Pharmacologic Prophylaxis: VTE Score: 6 Patient is comfort care  Patient Centered Rounds: I evaluated the patient without nursing staff present due to discussed outside pt's room  Discussions with Specialists or Other Care Team Provider:    Education and Discussions with Family / Patient: Updated  (daughter) at bedside  Time Spent for Care: 30 minutes  More than 50% of total time spent on counseling and coordination of care as described above  Current Length of Stay: 13 day(s)  Current Patient Status: Inpatient   Certification Statement: The patient will continue to require additional inpatient hospital stay due to pending placement  Discharge Plan: Anticipate discharge in 48-72 hrs to rehab facility  Code Status: Level 4 - Comfort Care    Subjective:   Pt examined at bedside  Appears resting comfortable  No overnight events reported  Objective:     Vitals:   No data recorded  HR:  [88] 88  Resp:  [18] 18  Body mass index is 33 92 kg/m²  Input and Output Summary (last 24 hours):      Intake/Output Summary (Last 24 hours) at 12/17/2022 190  Last data filed at 12/17/2022 0951  Gross per 24 hour   Intake --   Output 550 ml   Net -550 ml       Physical Exam:   Physical Exam  Constitutional:       Comments: Appears lethargic but resting comfortably on bed  HENT:      Head: Normocephalic  Mouth/Throat:      Mouth: Mucous membranes are dry  Pharynx: Oropharynx is clear  Cardiovascular:      Rate and Rhythm: Normal rate and regular rhythm  Pulses: Normal pulses  Heart sounds: Normal heart sounds  Pulmonary:      Effort: Pulmonary effort is normal       Comments: Scattered rhonchi heard more on right side compared to left  Abdominal:      General: Abdomen is flat  Bowel sounds are normal    Neurological:      Comments: Patient lethargic, arousable to verbal stimuli  Not able to follow any commands  Additional Data:     Labs:  Results from last 7 days   Lab Units 12/16/22 0422 12/14/22 0433 12/12/22 0444   WBC Thousand/uL 9 03   < > 11 00*   HEMOGLOBIN g/dL 12 4   < > 13 7   HEMATOCRIT % 41 2   < > 46 2   PLATELETS Thousands/uL 268   < > 267   NEUTROS PCT %  --   --  81*   LYMPHS PCT %  --   --  7*   MONOS PCT %  --   --  8   EOS PCT %  --   --  2    < > = values in this interval not displayed  Results from last 7 days   Lab Units 12/16/22 0422 12/14/22 0433 12/12/22 0444   SODIUM mmol/L 143   < > 145   POTASSIUM mmol/L 4 1   < > 3 8   CHLORIDE mmol/L 110*   < > 111*   CO2 mmol/L 28   < > 33*   BUN mg/dL 14   < > 14   CREATININE mg/dL 0 77   < > 0 85   ANION GAP mmol/L 5   < > 1*   CALCIUM mg/dL 9 3   < > 9 6   ALBUMIN g/dL  --   --  2 2*   TOTAL BILIRUBIN mg/dL  --   --  0 42   ALK PHOS U/L  --   --  109   ALT U/L  --   --  39   AST U/L  --   --  41   GLUCOSE RANDOM mg/dL 117   < > 106    < > = values in this interval not displayed  Lines/Drains:  Invasive Devices     None                       Imaging: No pertinent imaging reviewed      Recent Cultures (last 7 days):         Last 24 Hours Medication List: Current Facility-Administered Medications   Medication Dose Route Frequency Provider Last Rate   • albuterol  2 puff Inhalation Q4H PRN Mazin Arteaga MD     • aspirin  81 mg Oral Daily Malika Berger MD     • bisacodyl  10 mg Rectal Daily PRN Geno Odell PA-C     • dextromethorphan-guaiFENesin  10 mL Oral Q4H PRN Caty Grijalva PA-C     • furosemide  20 mg Oral Daily Alex Carlton MD     • glycopyrrolate  0 1 mg Intravenous Q4H PRN Lauren Ramiro [place], PA-C     • haloperidol lactate  0 5 mg Intravenous Q2H PRN Lauren Ramiro [place], PA-C     • HYDROmorphone  0 3 mg Intravenous Q2H PRN Lauren Ramiro [place], PA-C     • LORazepam  0 5 mg Oral Q4H PRN Geno Odell PA-C     • metoprolol tartrate  50 mg Oral Daily Lauren Ramiro [place], PA-C     • nicotine  14 mg Transdermal Daily Caty Grijalva PA-C     • ondansetron  4 mg Intravenous Q6H PRN Malika Berger MD     • oxyCODONE  5 mg Oral Q2H PRN Geno Odell PA-C     • oxyCODONE  5 mg Oral Q8H [address] Lauren Ramiro [place], PA-C     • QUEtiapine  12 5 mg Oral HS Brandt Serna MD          Today, Patient Was Seen By: Alex Carlton MD    **Please Note: This note may have been constructed using a voice recognition system  **

## 2022-12-18 NOTE — PROGRESS NOTES
[address] [facility]  Progress Note - [place] 1935, 82 y o  male MRN: [id number]  Unit/Bed#: [facility] 733-01 Encounter: [id number]  Primary Care Provider: Maik Braxton MD   Date and time admitted to hospital: 12/4/2022  5:26 PM    Goals of care, counseling/discussion  Assessment & Plan  · I had discussions with multiple family members on 12/15, and involve palliative care  · Had family meeting on 12/16 where patient's wife, daughter and son were present  · Appreciate palliative care, neurology, nurse management and case management participation in meeting  · Patient made comfort care  · Continue oxycodone, Dilaudid as per palliative care recommendations  · Hospice consult in place  PAD (peripheral artery disease) ([facility])  Assessment & Plan  · Follows with [facility] outpatient  · Most recent LEADs in May consistent with 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  · Repeat LEADs 12/6 significant for LLE > 75% stenosis of distal superficial femoral artery and diffuse disease throughout the remaining femoral-popliteal  · vessels  · Continue with ASA    Oropharyngeal dysphagia  Assessment & Plan  · Improved  Continue dysphagia level 1 pureed diet  Stenosis of both internal carotid arteries  Assessment & Plan  · CTA head/neck on admission suspected moderate stenosis of the right ICA origin and mild narrowing of the left carotid bifurcation   Intracranially there is also right greater than left stenosis, moderate on the left and mild on the right  · Appreciate neurosurgery consult and recommendations   · No need for surgical intervention at this time   · Outpatient follow-up with repeat CTA in 2 weeks  · Pt transitioned to comfort care, Statin and xarelto discontinued  SIRS vs Sepsis ([facility] [address] )  Assessment & Plan  POA with mild leukocytosis and tachycardia   Procalcitonin WNL at 0 15   · COVID/flu/RSV negative   · Portable CXR: Probable small left effusion   Slightly limited but otherwise unremarkable examination  · UA with nitrites, leukocytes and bacteria concerning for UTI  · Noted with left heel pressure ulcer on admission however no clinical signs of cellulitis, see related plan  · PA/lateral CXR: No acute cardiopulmonary disease   · Pt was started on ceftriaxone, dc'd on 12/9 as infectious work up was negative  · Trend temps, WBC, hemodynamics   · Id input appreciated, monitor off anti biotics  Pressure injury of deep tissue of left heel  Assessment & Plan  · POA, followed by wound care as outpatient  · No clinical signs of infection at this time   · Appreciate Podiatry consult and recommendations   · XR heel/calcaneous:  Soft tissue swelling   No evidence of acute osteomyelitis  · LEADs noted diffuse disease in LLE, see below   · No plans for intervention at this time   · Continue with local wound care, offloading  · Discussed with vascular, no further interventions given poor prognosis  · Pt transitioned to comfort care  Acute metabolic encephalopathy  Assessment & Plan  · ID input noted  · No metabolic derangement noted   · Discussed with neurology not further imaging   · Patient's mental status waxing and waning, given persistent encephalopathy and poor prognosis and background of peripheral arterial disease, nonhealing wound had family meeting today palliative care, primary, neurology, nurse management and case management  · Patient transition to comfort care  * Stroke-like symptoms  Assessment & Plan  Patient presented via EMS as stroke alert due to concern for acute onset of left-sided weakness and garbled speech noticed by patient's wife  Patient is not a candidate for tPA due to bleeding risk    · Admitted under stroke pathway   · CTA head/neck negative for occlusion, but did note moderate R ICA and mild L ICA stenosis, see below   · MRI brain negative for acute infarct, but did note mild chronic microangiopathic ischemic changes  · Echocardiogram essentially unremarkable   · Appreciate neurology consult and recommendations    · PT/OT recommending rehab, PMR following  · Monitor neurochecks  · Given poor prognosis and multiple co morbities,  had family meeting with palliative care and patient is transition to comfort care on   VTE Pharmacologic Prophylaxis: VTE Score: 6 pt on comfort care  Patient Centered Rounds: I evaluated the patient without nursing staff present due to discussed outside pt's room  Discussions with Specialists or Other Care Team Provider:     Education and Discussions with Family / Patient: Pt is comfort care, family will be updated as needed basis        Time Spent for Care: 30 minutes  More than 50% of total time spent on counseling and coordination of care as described above  Current Length of Stay: 14 day(s)  Current Patient Status: Inpatient   Certification Statement: The patient will continue to require additional inpatient hospital stay due to pending placement  Discharge Plan: Anticipate discharge in 24-48 hrs to rehab facility  Code Status: Level 4 - Comfort Care    Subjective:   Pt seen at bedside, appears lethargic  Resting comfortably in bed  Objective:     Vitals:   Temp (24hrs), Av 3 °F (36 3 °C), Min:97 3 °F (36 3 °C), Max:97 3 °F (36 3 °C)    Temp:  [97 3 °F (36 3 °C)] 97 3 °F (36 3 °C)  BP: (138)/(73) 138/73  Body mass index is 33 92 kg/m²  Input and Output Summary (last 24 hours):   No intake or output data in the 24 hours ending 22 1252    Physical Exam:   Physical Exam  Constitutional:       Comments: cachectic appearing      HENT:      Head: Normocephalic and atraumatic  Eyes:      Conjunctiva/sclera: Conjunctivae normal    Cardiovascular:      Rate and Rhythm: Normal rate and regular rhythm  Pulses: Normal pulses  Heart sounds: Normal heart sounds     Pulmonary:      Effort: Pulmonary effort is normal  Breath sounds: Normal breath sounds  Abdominal:      General: Abdomen is flat  Bowel sounds are normal    Skin:     General: Skin is warm and dry  Neurological:      Comments: Lethargic, arousable to tactile stimuli  Additional Data:     Labs:  Results from last 7 days   Lab Units 12/16/22 0422 12/14/22 0433 12/12/22 0444   WBC Thousand/uL 9 03   < > 11 00*   HEMOGLOBIN g/dL 12 4   < > 13 7   HEMATOCRIT % 41 2   < > 46 2   PLATELETS Thousands/uL 268   < > 267   NEUTROS PCT %  --   --  81*   LYMPHS PCT %  --   --  7*   MONOS PCT %  --   --  8   EOS PCT %  --   --  2    < > = values in this interval not displayed  Results from last 7 days   Lab Units 12/16/22 0422 12/14/22 0433 12/12/22 0444   SODIUM mmol/L 143   < > 145   POTASSIUM mmol/L 4 1   < > 3 8   CHLORIDE mmol/L 110*   < > 111*   CO2 mmol/L 28   < > 33*   BUN mg/dL 14   < > 14   CREATININE mg/dL 0 77   < > 0 85   ANION GAP mmol/L 5   < > 1*   CALCIUM mg/dL 9 3   < > 9 6   ALBUMIN g/dL  --   --  2 2*   TOTAL BILIRUBIN mg/dL  --   --  0 42   ALK PHOS U/L  --   --  109   ALT U/L  --   --  39   AST U/L  --   --  41   GLUCOSE RANDOM mg/dL 117   < > 106    < > = values in this interval not displayed  Lines/Drains:  Invasive Devices     None                       Imaging: No pertinent imaging reviewed      Recent Cultures (last 7 days):         Last 24 Hours Medication List:   Current Facility-Administered Medications   Medication Dose Route Frequency Provider Last Rate   • albuterol  2 puff Inhalation Q4H PRN Magui Aragon MD     • aspirin  81 mg Oral Daily Teodoro Cantu MD     • atropine  2 drop Sublingual Q2H PRN Rocco Fields PA-C     • bisacodyl  10 mg Rectal Daily PRN Triny Bryan PA-C     • dextromethorphan-guaiFENesin  10 mL Oral Q4H PRN Caty Grijalva PA-C     • furosemide  20 mg Oral Daily Dm Willams MD     • glycopyrrolate  0 1 mg Intravenous Q4H PRN Triny Bryan PA-C     • haloperidol lactate 0 5 mg Intravenous Q2H PRN Shari Redd PA-C     • HYDROmorphone  0 3 mg Intravenous Q2H PRN Tiny Mullins PA-C     • LORazepam  0 5 mg Oral Q4H PRN Shari Redd PA-C     • metoprolol tartrate  50 mg Oral Daily Tiny Pack PA-C     • Morphine Sulfate (Concentrate)  5 mg Oral Q8H [facility] Tiny Pack PA-C     • Morphine Sulfate (Concentrate)  5 mg Oral Q2H PRN Tiny Mullins PA-C     • nicotine  14 mg Transdermal Daily Caty Grijalva PA-C     • ondansetron  4 mg Intravenous Q6H PRN Jose Puri MD     • QUEtiapine  12 5 mg Oral HS Ruby Mccrary MD          Today, Patient Was Seen By: Kasey Day MD    **Please Note: This note may have been constructed using a voice recognition system  **

## 2022-12-18 NOTE — PROGRESS NOTES
Progress note - Palliative and Supportive Care   Allie Lloyd 82 y o  male [id number]    Patient Active Problem List   Diagnosis   • Benign hypertension   • Benign prostatic hyperplasia   • Anterior dislocation of left shoulder   • Closed head injury with concussion   • Vesicocolonic fistula   • Postural dizziness with near syncope   • Ambulatory dysfunction   • Atrial fibrillation (HCC)   • Gout   • Recurrent falls   • Other insomnia   • Bilateral lower extremity edema   • Nicotine dependence   • Stroke-like symptoms   • Acute metabolic encephalopathy   • Pressure injury of deep tissue of left heel   • SIRS vs Sepsis ([facility])   • Stenosis of both internal carotid arteries   • Prediabetes   • Oropharyngeal dysphagia   • PAD (peripheral artery disease) ([facility])   • Acute cystitis without hematuria   • Goals of care, counseling/discussion     Active issues specifically addressed today include:   Palliative care encounter  End-of-life care  Comfort measures only status  Back pain    Plan:  1  Symptom management -   • Continue Lasix for comfort  • Oxycodone 5 mg p o  every 8 hours scheduled and every 2 hours as needed moderate to severe pain --> switched to roxanol today due to concern of patient's progressively worsening mental status and ability to consistently swallow pills  • Dilaudid 0 3 mg IV every 2 hours as needed breakthrough pain  • Seroquel 12 5 mg at bedtime  • Ativan 0 5 mg p o  every 4 hours as needed anxiety or insomnia --> may administer SL if necessary  • Haldol 0 5 mg IV every 2 hours as needed agitation  • Zofran 4 mg IV every 6 hours as needed nausea or vomiting  • Suppository as needed constipation    2  Goals -   • Comfort measures only while admitted, case management assisting with hospice discharge to SNF  • Prescriptions for comfort medications left on chart in anticipation of d/c to SNF     Code Status: comfort - Level 4   Decisional apparatus:  Patient is not competent on my exam today    If competence is lost, patient's substitute decision maker would default to spouse by [place]  Advance Directive / Living Will / POLST: On file, reviewed personally    3  Social support-  • No family at bedside during time of encounter  • Family has contacted the  to provide [place]    4  Follow-up  • Palliative care remains available for ongoing symptom management on an as-needed basis over this hospitalization, otherwise will defer discharge planning to case management    Interval history:       Patient utilized 4 doses of oxyIR in the past 24H  At time of evaluation he is more comfortable than last seen yesterday, but is also more drowsy  PO intake diminishing  RN present at bedside to assist with repositioning  MEDICATIONS / ALLERGIES:     all current active meds have been reviewed    Allergies   Allergen Reactions   • Balsam Dermatitis   • Neomycin-Bacitracin Zn-Polymyx Hives and Rash       OBJECTIVE:    Physical Exam  Physical Exam  Constitutional:       Appearance: He is ill-appearing  Cardiovascular:      Rate and Rhythm: Normal rate  Pulmonary:      Effort: No respiratory distress  Abdominal:      Tenderness: There is no guarding     Genitourinary:     Comments: Urinary catheter in place  Skin:     Comments: Brawny edema in b/l LE   Neurological:      Comments: Minimal eye opening with tactile/verbal stimuli   Psychiatric:      Comments: No agitation         Lab Results:   Results from last 7 days   Lab Units 12/16/22  0422 12/14/22 0433 12/12/22  0444   WBC Thousand/uL 9 03 10 69* 11 00*   HEMOGLOBIN g/dL 12 4 12 9 13 7   HEMATOCRIT % 41 2 44 2 46 2   PLATELETS Thousands/uL 268 255 267   NEUTROS PCT %  --   --  81*   MONOS PCT %  --   --  8     Results from last 7 days   Lab Units 12/16/22  0422 12/14/22 0433 12/12/22  0444   POTASSIUM mmol/L 4 1 3 6 3 8   CHLORIDE mmol/L 110* 110* 111*   CO2 mmol/L 28 28 33*   BUN mg/dL 14 16 14   CREATININE mg/dL 0 77 0 79 0 85   CALCIUM mg/dL 9 3 9 3 9 6   ALK PHOS U/L  --   --  109   ALT U/L  --   --  39   AST U/L  --   --  41       Imaging Studies: reviewed pertinent studies   EKG, Pathology, and Other Studies: reviewed pertinent studies    Counseling / Coordination of Care    Total floor / unit time spent today 15 minutes  Greater than 50% of total time was spent with the patient and / or family counseling and / or coordination of care  A description of the counseling / coordination of care: time spent assessing patient, complex symptom management, end of life care, discharge preparation

## 2022-12-18 NOTE — RESTORATIVE TECHNICIAN NOTE
Restorative Technician Note      Patient Name: Ashley Gutierrez     Note Type: Mobility  Patient Position Upon Consult: Supine  Activity Performed: Repositioned  Patient Position at End of Consult: Supine;  All needs within reach; Bed/Chair alarm activated    Trang LOVE, Restorative Technician, [employer]

## 2022-12-18 NOTE — ASSESSMENT & PLAN NOTE
Patient with bilateral lower extremity edema which appears to be longstanding and related to venous stasis, managed by [facility] cardiology  Recently edema was worsening and associated with weeping  Patient received 80 mg IV Lasix x 1 in office on 11/9 and again on 11/23  Subsequently prescribed higher dose Lasix 40 mg BID  · Lasix held on admission due to hyponatremia  · Resumed lasix at 20 mg, continue same     · Continue with compression stockings   · Encourage LE elevation   · Monitor clinically

## 2022-12-18 NOTE — ASSESSMENT & PLAN NOTE
· Follows with [facility]ascular outpatient  · Most recent LEADs in May consistent with 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  · Repeat LEADs 12/6 significant for LLE > 75% stenosis of distal superficial femoral artery and diffuse disease throughout the remaining femoral-popliteal  · vessels    · Continue with ASA

## 2022-12-18 NOTE — PLAN OF CARE
Problem: DISCHARGE PLANNING  Goal: Discharge to home or other facility with appropriate resources  Description: INTERVENTIONS:  - Identify barriers to discharge w/patient and caregiver  - Arrange for needed discharge resources and transportation as appropriate  - Identify discharge learning needs (meds, wound care, etc )  - Refer to Case Management Department for coordinating discharge planning if the patient needs post-hospital services based on physician/advanced practitioner order or complex needs related to functional status, cognitive ability, or social support system  Outcome: Progressing     Problem: Knowledge Deficit  Goal: Patient/family/caregiver demonstrates understanding of disease process, treatment plan, medications, and discharge instructions  Description: Complete learning assessment and assess knowledge base    Interventions:  - Provide teaching at level of understanding  - Provide teaching via preferred learning methods  Outcome: Progressing     Problem: PAIN - ADULT  Goal: Verbalizes/displays adequate comfort level or baseline comfort level  Description: Interventions:  - Encourage patient to monitor pain and request assistance  - Assess pain using appropriate pain scale  - Administer analgesics based on type and severity of pain and evaluate response  - Implement non-pharmacological measures as appropriate and evaluate response  - Notify physician/advanced practitioner if interventions unsuccessful or patient reports new pain  Outcome: Progressing     Problem: INFECTION - ADULT  Goal: Absence or prevention of progression during hospitalization  Description: INTERVENTIONS:  - Assess and monitor for signs and symptoms of infection  - Monitor lab/diagnostic results  - Monitor all insertion sites, i e  indwelling lines, tubes, and drains  - [place] appropriate cooling/warming therapies per order  - Administer medications as ordered  - Instruct and encourage patient and family to use good hand hygiene technique  - Identify and instruct in appropriate isolation precautions for identified infection/condition  Outcome: Progressing     Problem: Neurological Deficit  Goal: Neurological status is stable or improving  Description: Interventions:  - Monitor and assess patient's level of consciousness, motor function, sensory function, and level of assistance needed for ADLs  - Monitor and report changes from baseline  Collaborate with interdisciplinary team to initiate plan and implement interventions as ordered  - Provide and maintain a safe environment  - Consider seizure precautions  - Consider fall precautions  - Consider aspiration precautions  - Consider bleeding precautions  Outcome: Progressing     Problem: Activity Intolerance/Impaired Mobility  Goal: Mobility/activity is maintained at optimum level for patient  Description: Interventions:  - Assess and monitor patient  barriers to mobility and need for assistive/adaptive devices  - Assess patient's emotional response to limitations  - Collaborate with interdisciplinary team and initiate plans and interventions as ordered  - Encourage independent activity per ability   - Maintain proper body alignment  - Perform active/passive rom as tolerated/ordered  - Plan activities to conserve energy   - Turn patient as appropriate  Outcome: Progressing     Problem: Communication Impairment  Goal: Ability to express needs and understand communication  Description: Assess patient's communication skills and ability to understand information  Patient will demonstrate use of effective communication techniques, alternative methods of communication and understanding even if not able to speak  - Encourage communication and provide alternate methods of communication as needed  - Collaborate with case management/ for discharge needs  - Include patient/family/caregiver in decisions related to communication    Outcome: Progressing Problem: Potential for Aspiration  Goal: Non-ventilated patient's risk of aspiration is minimized  Description: Assess and monitor vital signs, respiratory status, and labs (WBC)  Monitor for signs of aspiration (tachypnea, cough, rales, wheezing, cyanosis, fever)  - Assess and monitor patient's ability to swallow  - Place patient up in chair to eat if possible  - HOB up at 90 degrees to eat if unable to get patient up into chair   - Supervise patient during oral intake  - Instruct patient/ family to take small bites  - Instruct patient/ family to take small single sips when taking liquids  - Follow patient-specific strategies generated by speech pathologist   Outcome: Progressing     Problem: MOBILITY - ADULT  Goal: Maintain or return to baseline ADL function  Description: INTERVENTIONS:  -  Assess patient's ability to carry out ADLs; assess patient's baseline for ADL function and identify physical deficits which impact ability to perform ADLs (bathing, care of mouth/teeth, toileting, grooming, dressing, etc )  - Assess/evaluate cause of self-care deficits   - Assess range of motion  - Assess patient's mobility; develop plan if impaired  - Assess patient's need for assistive devices and provide as appropriate  - Encourage maximum independence but intervene and supervise when necessary  - Involve family in performance of ADLs  - Assess for home care needs following discharge   - Consider OT consult to assist with ADL evaluation and planning for discharge  - Provide patient education as appropriate  Outcome: Progressing  Goal: Maintains/Returns to pre admission functional level  Description: INTERVENTIONS:  - Perform BMAT or MOVE assessment daily    - Set and communicate daily mobility goal to care team and patient/family/caregiver  - Collaborate with rehabilitation services on mobility goals if consulted  - Perform Range of Motion 3 times a day  - Reposition patient every 2 hours    - Record patient progress and toleration of activity level   Outcome: Progressing     Problem: Prexisting or High Potential for Compromised Skin Integrity  Goal: Skin integrity is maintained or improved  Description: INTERVENTIONS:  - Identify patients at risk for skin breakdown  - Assess and monitor skin integrity  - Assess and monitor nutrition and hydration status  - Monitor labs   - Assess for incontinence   - Turn and reposition patient  - Assist with mobility/ambulation  - Relieve pressure over bony prominences  - Avoid friction and shearing  - Provide appropriate hygiene as needed including keeping skin clean and dry  - Evaluate need for skin moisturizer/barrier cream  - Collaborate with interdisciplinary team   - Patient/family teaching  - Consider wound care consult   Outcome: Progressing     Problem: Potential for Falls  Goal: Patient will remain free of falls  Description: INTERVENTIONS:  - Educate patient/family on patient safety including physical limitations  - Instruct patient to call for assistance with activity   - Consult OT/PT to assist with strengthening/mobility   - Keep Call bell within reach  - Keep bed low and locked with side rails adjusted as appropriate  - Keep care items and personal belongings within reach  - Initiate and maintain comfort rounds  - Make Fall Risk Sign visible to staff  - Offer Toileting every 2 Hours, in advance of need  - Initiate/Maintain bed alarm  - Obtain necessary fall risk management equipment  - Apply yellow socks and bracelet for high fall risk patients  - Consider moving patient to room near nurses station  Outcome: Progressing     Problem: Nutrition/Hydration-ADULT  Goal: Nutrient/Hydration intake appropriate for improving, restoring or maintaining nutritional needs  Description: Monitor and assess patient's nutrition/hydration status for malnutrition  Collaborate with interdisciplinary team and initiate plan and interventions as ordered    Monitor patient's weight and dietary intake as ordered or per policy  Utilize nutrition screening tool and intervene as necessary  Determine patient's food preferences and provide high-protein, high-caloric foods as appropriate       INTERVENTIONS:  - Monitor oral intake, urinary output, labs, and treatment plans  - Assess nutrition and hydration status and recommend course of action  - Evaluate amount of meals eaten  - Assist patient with eating if necessary   - Allow adequate time for meals  - Recommend/ encourage appropriate diets, oral nutritional supplements, and vitamin/mineral supplements  - Order, calculate, and assess calorie counts as needed  - Assess need for intravenous fluids  - Provide specific nutrition/hydration education as appropriate  - Include patient/family/caregiver in decisions related to nutrition  Outcome: Progressing     Problem: SKIN/TISSUE INTEGRITY - ADULT  Goal: Pressure injury heals and does not worsen  Description: Interventions:  - Implement low air loss mattress or specialty surface (Criteria met)  - Apply silicone foam dressing  - Use special pressure reducing interventions such as prevalon boot  - Apply fecal or urinary incontinence containment device   - Perform passive or active ROM every 2 hours  - Turn and reposition patient & offload bony prominences every 2 hours   - Utilize friction reducing device or surface for transfers   - Consider consults to  interdisciplinary teams such as wound care  - Use incontinent care products after each incontinent episode such as barrier cream  - Consider nutrition services referral as needed  Outcome: Progressing     Problem: RESPIRATORY - ADULT  Goal: Achieves optimal ventilation and oxygenation  Description: INTERVENTIONS:  - Assess for changes in respiratory status  - Assess for changes in mentation and behavior  - Position to facilitate oxygenation and minimize respiratory effort  - Oxygen administered by appropriate delivery if ordered  - Initiate smoking cessation education as indicated  - Encourage broncho-pulmonary hygiene including cough, deep breathe, Incentive Spirometry  - Assess the need for suctioning and aspirate as needed  - Assess and instruct to report SOB or any respiratory difficulty  - Respiratory Therapy support as indicated  Outcome: Progressing     Problem: COPING  Goal: Pt/Family able to verbalize concerns and demonstrate effective coping strategies  Description: INTERVENTIONS:  - Assist patient/family to identify coping skills, available support systems and cultural and spiritual values  - Provide emotional support, including active listening and acknowledgement of concerns of patient and caregivers  - Reduce environmental stimuli, as able  - Provide patient education  - Assess for spiritual pain/suffering and initiate spiritual care, including notification of Pastoral Care or beau based community as needed  - Assess effectiveness of coping strategies  Outcome: Progressing  Goal: Will report anxiety at manageable levels  Description: INTERVENTIONS:  - Administer medication as ordered  - Teach and encourage coping skills  - Provide emotional support  - Assess patient/family for anxiety and ability to cope  Outcome: Progressing     Problem: CONFUSION/THOUGHT DISTURBANCE  Goal: Thought disturbances (confusion, delirium, depression, dementia or psychosis) are managed to maintain or return to baseline mental status and functional level  Description: INTERVENTIONS:  - Assess for possible contributors to  thought disturbance, including but not limited to medications, infection, impaired vision or hearing, underlying metabolic abnormalities, dehydration, respiratory compromise,  psychiatric diagnoses and notify attending PHYSICAN/AP  - Monitor and intervene to maintain adequate nutrition, hydration, elimination, sleep and activity  - Decrease environmental stimuli, including noise as appropriate    - Provide frequent contacts to provide refocusing, direction and reassurance as needed  Approach patient calmly with eye contact and at their level  - [place] high risk fall precautions, aspiration precautions and other safety measures, as indicated  - If delirium suspected, notify physician/AP of change in condition and request immediate in-person evaluation  - Pursue consults as appropriate including Geriatric (campus dependent), OT for cognitive evaluation/activity planning, psychiatric, pastoral care, etc   Outcome: Progressing     Problem: BEHAVIOR  Goal: Pt/Family maintain appropriate behavior and adhere to behavioral management agreement, if implemented  Description: INTERVENTIONS:  - Assess the family dynamic   - Encourage verbalization of thoughts and concerns in a socially appropriate manner  - Assess patient/family's coping skills and non-compliant behavior (including use of illegal substances)  - Utilize positive, consistent limit setting strategies supporting safety of patient, staff and others  - Initiate consult with Case Management, Spiritual Care or other ancillary services as appropriate  - If a patient's/visitor's behavior jeopardizes the safety of the patient, staff, or others, refer to organization procedure     - Notify Security of behavior or suspected illegal substances which indicate the need for search of the patient and/or belongings  - Encourage participation in the decision making process about a behavioral management agreement; implement if patient meets criteria  Outcome: Progressing

## 2022-12-18 NOTE — ASSESSMENT & PLAN NOTE
Patient presented via EMS as stroke alert due to concern for acute onset of left-sided weakness and garbled speech noticed by patient's wife  Patient is not a candidate for tPA due to bleeding risk  · Admitted under stroke pathway   · CTA head/neck negative for occlusion, but did note moderate R ICA and mild L ICA stenosis, see below   · MRI brain negative for acute infarct, but did note mild chronic microangiopathic ischemic changes  · Echocardiogram essentially unremarkable   · Appreciate neurology consult and recommendations    · PT/OT recommending rehab, PMR following  · Monitor neurochecks  · Given poor prognosis and multiple co morbities,  had family meeting with palliative care and patient is transition to comfort care on 12/16

## 2022-12-18 NOTE — ASSESSMENT & PLAN NOTE
· POA, followed by wound care as outpatient  · No clinical signs of infection at this time   · Appreciate Podiatry consult and recommendations   · XR heel/calcaneous:  Soft tissue swelling   No evidence of acute osteomyelitis  · LEADs noted diffuse disease in LLE, see below   · No plans for intervention at this time   · Continue with local wound care, offloading  · Discussed with vascular, no further interventions given poor prognosis  · Pt transitioned to comfort care

## 2022-12-19 NOTE — PLAN OF CARE
Problem: DISCHARGE PLANNING  Goal: Discharge to home or other facility with appropriate resources  Description: INTERVENTIONS:  - Identify barriers to discharge w/patient and caregiver  - Arrange for needed discharge resources and transportation as appropriate  - Identify discharge learning needs (meds, wound care, etc )  - Refer to Case Management Department for coordinating discharge planning if the patient needs post-hospital services based on physician/advanced practitioner order or complex needs related to functional status, cognitive ability, or social support system  Outcome: Not Progressing     Problem: Knowledge Deficit  Goal: Patient/family/caregiver demonstrates understanding of disease process, treatment plan, medications, and discharge instructions  Description: Complete learning assessment and assess knowledge base    Interventions:  - Provide teaching at level of understanding  - Provide teaching via preferred learning methods  Outcome: Not Progressing     Problem: PAIN - ADULT  Goal: Verbalizes/displays adequate comfort level or baseline comfort level  Description: Interventions:  - Encourage patient to monitor pain and request assistance  - Assess pain using appropriate pain scale  - Administer analgesics based on type and severity of pain and evaluate response  - Implement non-pharmacological measures as appropriate and evaluate response  - Notify physician/advanced practitioner if interventions unsuccessful or patient reports new pain  Outcome: Not Progressing     Problem: INFECTION - ADULT  Goal: Absence or prevention of progression during hospitalization  Description: INTERVENTIONS:  - Assess and monitor for signs and symptoms of infection  - Monitor lab/diagnostic results  - Monitor all insertion sites, i e  indwelling lines, tubes, and drains  - [place] appropriate cooling/warming therapies per order  - Administer medications as ordered  - Instruct and encourage patient and family to use good hand hygiene technique  - Identify and instruct in appropriate isolation precautions for identified infection/condition  Outcome: Not Progressing     Problem: Neurological Deficit  Goal: Neurological status is stable or improving  Description: Interventions:  - Monitor and assess patient's level of consciousness, motor function, sensory function, and level of assistance needed for ADLs  - Monitor and report changes from baseline  Collaborate with interdisciplinary team to initiate plan and implement interventions as ordered  - Provide and maintain a safe environment  - Consider seizure precautions  - Consider fall precautions  - Consider aspiration precautions  - Consider bleeding precautions  Outcome: Not Progressing     Problem: Activity Intolerance/Impaired Mobility  Goal: Mobility/activity is maintained at optimum level for patient  Description: Interventions:  - Assess and monitor patient  barriers to mobility and need for assistive/adaptive devices  - Assess patient's emotional response to limitations  - Collaborate with interdisciplinary team and initiate plans and interventions as ordered  - Encourage independent activity per ability   - Maintain proper body alignment  - Perform active/passive rom as tolerated/ordered  - Plan activities to conserve energy   - Turn patient as appropriate  Outcome: Not Progressing     Problem: Communication Impairment  Goal: Ability to express needs and understand communication  Description: Assess patient's communication skills and ability to understand information  Patient will demonstrate use of effective communication techniques, alternative methods of communication and understanding even if not able to speak  - Encourage communication and provide alternate methods of communication as needed  - Collaborate with case management/ for discharge needs  - Include patient/family/caregiver in decisions related to communication    Outcome: Not Progressing     Problem: Potential for Aspiration  Goal: Non-ventilated patient's risk of aspiration is minimized  Description: Assess and monitor vital signs, respiratory status, and labs (WBC)  Monitor for signs of aspiration (tachypnea, cough, rales, wheezing, cyanosis, fever)  - Assess and monitor patient's ability to swallow  - Place patient up in chair to eat if possible  - HOB up at 90 degrees to eat if unable to get patient up into chair   - Supervise patient during oral intake  - Instruct patient/ family to take small bites  - Instruct patient/ family to take small single sips when taking liquids  - Follow patient-specific strategies generated by speech pathologist   Outcome: Not Progressing     Problem: MOBILITY - ADULT  Goal: Maintain or return to baseline ADL function  Description: INTERVENTIONS:  -  Assess patient's ability to carry out ADLs; assess patient's baseline for ADL function and identify physical deficits which impact ability to perform ADLs (bathing, care of mouth/teeth, toileting, grooming, dressing, etc )  - Assess/evaluate cause of self-care deficits   - Assess range of motion  - Assess patient's mobility; develop plan if impaired  - Assess patient's need for assistive devices and provide as appropriate  - Encourage maximum independence but intervene and supervise when necessary  - Involve family in performance of ADLs  - Assess for home care needs following discharge   - Consider OT consult to assist with ADL evaluation and planning for discharge  - Provide patient education as appropriate  Outcome: Not Progressing  Goal: Maintains/Returns to pre admission functional level  Description: INTERVENTIONS:  - Perform BMAT or MOVE assessment daily    - Set and communicate daily mobility goal to care team and patient/family/caregiver  - Collaborate with rehabilitation services on mobility goals if consulted  - Perform Range of Motion 3 times a day    - Reposition patient every 2 hours   - Record patient progress and toleration of activity level   Outcome: Not Progressing     Problem: Prexisting or High Potential for Compromised Skin Integrity  Goal: Skin integrity is maintained or improved  Description: INTERVENTIONS:  - Identify patients at risk for skin breakdown  - Assess and monitor skin integrity  - Assess and monitor nutrition and hydration status  - Monitor labs   - Assess for incontinence   - Turn and reposition patient  - Assist with mobility/ambulation  - Relieve pressure over bony prominences  - Avoid friction and shearing  - Provide appropriate hygiene as needed including keeping skin clean and dry  - Evaluate need for skin moisturizer/barrier cream  - Collaborate with interdisciplinary team   - Patient/family teaching  - Consider wound care consult   Outcome: Not Progressing     Problem: Potential for Falls  Goal: Patient will remain free of falls  Description: INTERVENTIONS:  - Educate patient/family on patient safety including physical limitations  - Instruct patient to call for assistance with activity   - Consult OT/PT to assist with strengthening/mobility   - Keep Call bell within reach  - Keep bed low and locked with side rails adjusted as appropriate  - Keep care items and personal belongings within reach  - Initiate and maintain comfort rounds  - Make Fall Risk Sign visible to staff  - Offer Toileting every 2 Hours, in advance of need  - Initiate/Maintain bed alarm  - Obtain necessary fall risk management equipment  - Apply yellow socks and bracelet for high fall risk patients  - Consider moving patient to room near nurses station  Outcome: Not Progressing     Problem: Nutrition/Hydration-ADULT  Goal: Nutrient/Hydration intake appropriate for improving, restoring or maintaining nutritional needs  Description: Monitor and assess patient's nutrition/hydration status for malnutrition  Collaborate with interdisciplinary team and initiate plan and interventions as ordered  Monitor patient's weight and dietary intake as ordered or per policy  Utilize nutrition screening tool and intervene as necessary  Determine patient's food preferences and provide high-protein, high-caloric foods as appropriate       INTERVENTIONS:  - Monitor oral intake, urinary output, labs, and treatment plans  - Assess nutrition and hydration status and recommend course of action  - Evaluate amount of meals eaten  - Assist patient with eating if necessary   - Allow adequate time for meals  - Recommend/ encourage appropriate diets, oral nutritional supplements, and vitamin/mineral supplements  - Order, calculate, and assess calorie counts as needed  - Assess need for intravenous fluids  - Provide specific nutrition/hydration education as appropriate  - Include patient/family/caregiver in decisions related to nutrition  Outcome: Not Progressing     Problem: SKIN/TISSUE INTEGRITY - ADULT  Goal: Pressure injury heals and does not worsen  Description: Interventions:  - Implement low air loss mattress or specialty surface (Criteria met)  - Apply silicone foam dressing  - Use special pressure reducing interventions such as prevalon boot  - Apply fecal or urinary incontinence containment device   - Perform passive or active ROM every 2 hours  - Turn and reposition patient & offload bony prominences every 2 hours   - Utilize friction reducing device or surface for transfers   - Consider consults to  interdisciplinary teams such as wound care  - Use incontinent care products after each incontinent episode such as barrier cream  - Consider nutrition services referral as needed  Outcome: Not Progressing     Problem: RESPIRATORY - ADULT  Goal: Achieves optimal ventilation and oxygenation  Description: INTERVENTIONS:  - Assess for changes in respiratory status  - Assess for changes in mentation and behavior  - Position to facilitate oxygenation and minimize respiratory effort  - Oxygen administered by appropriate delivery if ordered  - Initiate smoking cessation education as indicated  - Encourage broncho-pulmonary hygiene including cough, deep breathe, Incentive Spirometry  - Assess the need for suctioning and aspirate as needed  - Assess and instruct to report SOB or any respiratory difficulty  - Respiratory Therapy support as indicated  Outcome: Not Progressing     Problem: COPING  Goal: Pt/Family able to verbalize concerns and demonstrate effective coping strategies  Description: INTERVENTIONS:  - Assist patient/family to identify coping skills, available support systems and cultural and spiritual values  - Provide emotional support, including active listening and acknowledgement of concerns of patient and caregivers  - Reduce environmental stimuli, as able  - Provide patient education  - Assess for spiritual pain/suffering and initiate spiritual care, including notification of Pastoral Care or beau based community as needed  - Assess effectiveness of coping strategies  Outcome: Not Progressing  Goal: Will report anxiety at manageable levels  Description: INTERVENTIONS:  - Administer medication as ordered  - Teach and encourage coping skills  - Provide emotional support  - Assess patient/family for anxiety and ability to cope  Outcome: Not Progressing     Problem: CONFUSION/THOUGHT DISTURBANCE  Goal: Thought disturbances (confusion, delirium, depression, dementia or psychosis) are managed to maintain or return to baseline mental status and functional level  Description: INTERVENTIONS:  - Assess for possible contributors to  thought disturbance, including but not limited to medications, infection, impaired vision or hearing, underlying metabolic abnormalities, dehydration, respiratory compromise,  psychiatric diagnoses and notify attending PHYSICAN/AP  - Monitor and intervene to maintain adequate nutrition, hydration, elimination, sleep and activity  - Decrease environmental stimuli, including noise as appropriate    - Provide frequent contacts to provide refocusing, direction and reassurance as needed  Approach patient calmly with eye contact and at their level  - [place] high risk fall precautions, aspiration precautions and other safety measures, as indicated  - If delirium suspected, notify physician/AP of change in condition and request immediate in-person evaluation  - Pursue consults as appropriate including Geriatric (campus dependent), OT for cognitive evaluation/activity planning, psychiatric, pastoral care, etc   Outcome: Not Progressing     Problem: BEHAVIOR  Goal: Pt/Family maintain appropriate behavior and adhere to behavioral management agreement, if implemented  Description: INTERVENTIONS:  - Assess the family dynamic   - Encourage verbalization of thoughts and concerns in a socially appropriate manner  - Assess patient/family's coping skills and non-compliant behavior (including use of illegal substances)  - Utilize positive, consistent limit setting strategies supporting safety of patient, staff and others  - Initiate consult with Case Management, Spiritual Care or other ancillary services as appropriate  - If a patient's/visitor's behavior jeopardizes the safety of the patient, staff, or others, refer to organization procedure     - Notify Security of behavior or suspected illegal substances which indicate the need for search of the patient and/or belongings  - Encourage participation in the decision making process about a behavioral management agreement; implement if patient meets criteria  Outcome: Not Progressing     Problem: BEHAVIOR  Goal: Pt/Family maintain appropriate behavior and adhere to behavioral management agreement, if implement  Description: INTERVENTIONS:  - Assess the family dynamic   - Encourage verbalization of thoughts and concerns in a socially appropriate manner  - Assess patient/family's coping skills and non-compliant behavior (including use of illegal substan  - Utilize positive, consistent limit setting strategies supporting safety of patient, staff and others  - Initiate consult with Case Management, Spiritual Care or other ancillary services as appropriate  - If a patient's/visitor's behavior jeopardizes the safety of the patient, staff, or others, refer to organization procedure     - Notify Security of behavior or suspected illegal substances which indicate the need for search of the patient and/or belongings  - Encourage participation in the decision making process about a behavioral management agreement; implement if patient meets criteria  Outcome: Not Progressing

## 2022-12-19 NOTE — RESTORATIVE TECHNICIAN NOTE
Restorative Technician Note      Patient Name: Jimmie Olmstead     Note Type: Mobility  Patient Position Upon Consult: Supine  Activity Performed: Repositioned  Patient Position at End of Consult: Supine;  All needs within reach; Bed/Chair alarm activated    Ripley Felty BS, Restorative Technician, [employer]

## 2022-12-19 NOTE — CASE MANAGEMENT
Case Management Discharge Planning Note    Patient name Keyla Burroughs  Location [address] [place] [address] [facility] [id number]  : 1935 Date 2022       Current Admission Date: 2022  Current Admission Diagnosis:Stroke-like symptoms   Patient Active Problem List    Diagnosis Date Noted   • Goals of care, counseling/discussion 2022   • PAD (peripheral artery disease) ([facility] [address] ) 2022   • Acute cystitis without hematuria 2022   • Stenosis of both internal carotid arteries 2022   • Prediabetes 2022   • Oropharyngeal dysphagia 2022   • Stroke-like symptoms 2022   • Acute metabolic encephalopathy    • Pressure injury of deep tissue of left heel 2022   • SIRS vs Sepsis ([facility] [address] ) 2022   • Nicotine dependence 2019   • Bilateral lower extremity edema 2019   • Recurrent falls 2019   • Other insomnia 2019   • Benign hypertension 2019   • Benign prostatic hyperplasia 2019   • Anterior dislocation of left shoulder 2019   • Closed head injury with concussion 2019   • Vesicocolonic fistula 2019   • Postural dizziness with near syncope 2019   • Ambulatory dysfunction 2019   • Atrial fibrillation ([facility] [address] ) 2019   • Gout 2019      LOS (days): 15  Geometric Mean LOS (GMLOS) (days): 5 00  Days to GMLOS:-9 9     OBJECTIVE:  Risk of Unplanned Readmission Score: 16 13         Current admission status: Inpatient   Preferred Pharmacy:   [address], [address]  [facility]  [facility] [address]  Phone: [phone] Fax: [phone]    Primary Care Provider: Godfrey Do MD    Primary Insurance: MEDICARE  Secondary Insurance: [facility]    DISCHARGE DETAILS:    Family notified[de-identified] CM psoke with pt spouse bedside  Spouse provided Covid card, CM copied and uploaded to [employer] and copy to chart  Spouse provided policy # and face amount for the Life Ins   She stated she spoke with the parisa today  Spouse provided [facility] application and uploaded to [address]

## 2022-12-19 NOTE — PROGRESS NOTES
Progress note - Palliative and Supportive Care   Francis Hightower 82 y o  male [phone]    Patient Active Problem List   Diagnosis   • Benign hypertension   • Benign prostatic hyperplasia   • Anterior dislocation of left shoulder   • Closed head injury with concussion   • Vesicocolonic fistula   • Postural dizziness with near syncope   • Ambulatory dysfunction   • Atrial fibrillation (HCC)   • Gout   • Recurrent falls   • Other insomnia   • Bilateral lower extremity edema   • Nicotine dependence   • Stroke-like symptoms   • Acute metabolic encephalopathy   • Pressure injury of deep tissue of left heel   • SIRS vs Sepsis (HCC)   • Stenosis of both internal carotid arteries   • Prediabetes   • Oropharyngeal dysphagia   • PAD (peripheral artery disease) (HCC)   • Acute cystitis without hematuria   • Goals of care, counseling/discussion     Active issues specifically addressed today include:   Palliative care encounter  End-of-life care  Comfort measures only status  Back pain    Plan:  1  Symptom management -   • Continue Lasix for comfort  • roxanol 5mg Q8H [address] and Q2H PRN pain or dyspnea   • Dilaudid 0 3 mg IV every 2 hours as needed breakthrough pain  • Seroquel 12 5 mg at bedtime  • Ativan 0 5 mg p o  every 4 hours as needed anxiety or insomnia  • Haldol 0 5 mg IV every 2 hours as needed agitation  • Zofran 4 mg IV every 6 hours as needed nausea or vomiting  • Immodium added QID PRN diarrhea today    2  Goals -   • Comfort measures only while admitted, case management assisting with hospice discharge to SNF  • Prescriptions for comfort medications left on chart in anticipation of d/c to SNF     Code Status: comfort - Level 4   Decisional apparatus:  Patient is not competent on my exam today  If competence is lost, patient's substitute decision maker would default to spouse by [place]  Advance Directive / Living Will / POLST: On file, reviewed personally    3   Social support-  • Extensive time spent providing supportive listening to spouse at bedside  Encouraged self-care  • Family has contacted the  to provide [place]    4  Follow-up  • Palliative care remains available for ongoing symptom management on an as-needed basis over this hospitalization, otherwise will defer discharge planning to case management    Interval history:       No events overnight  Patient required 1 as needed dose of Robinul, 1 dose of Ativan, and 1 dose of morphine in the past 24 hours  At time of encounter he is drowsy, but comfortable  Spouse at bedside states that he is tired out after being cleaned up from an episode of diarrhea  RN reports several episodes of diarrhea in the past 24 hours  MEDICATIONS / ALLERGIES:     all current active meds have been reviewed    Allergies   Allergen Reactions   • Balsam Dermatitis   • Neomycin-Bacitracin Zn-Polymyx Hives and Rash       OBJECTIVE:    Physical Exam  Physical Exam  Constitutional:       Appearance: He is ill-appearing  HENT:      Head: Atraumatic  Eyes:      Conjunctiva/sclera: Conjunctivae normal    Pulmonary:      Effort: No respiratory distress  Abdominal:      General: There is no distension  Tenderness: There is no guarding  Skin:     Comments: Brawny edema in LE   Neurological:      Mental Status: He is alert  Comments: Drowsy, occasional responses but confused   Psychiatric:      Comments: withdrawn         Lab Results: I have personally reviewed pertinent labs  , CBC: No results found for: WBC, HGB, HCT, MCV, PLT, ADJUSTEDWBC, MCH, MCHC, RDW, MPV, NRBC, CMP: No results found for: SODIUM, K, CL, CO2, ANIONGAP, BUN, CREATININE, GLUCOSE, CALCIUM, AST, ALT, ALKPHOS, PROT, BILITOT, EGFR  Imaging Studies: No new  EKG, Pathology, and Other Studies: No new    Counseling / Coordination of Care    Total floor / unit time spent today 45 minutes  Greater than 50% of total time was spent with the patient and / or family counseling and / or coordination of care   A description of the counseling / coordination of care: Time spent assessing patient, complex symptom management, coordination of care with RN, case management, primary team, spouse at bedside

## 2022-12-19 NOTE — PLAN OF CARE
Problem: Nutrition/Hydration-ADULT  Goal: Nutrient/Hydration intake appropriate for improving, restoring or maintaining nutritional needs  Description: Monitor and assess patient's nutrition/hydration status for malnutrition  Collaborate with interdisciplinary team and initiate plan and interventions as ordered  Monitor patient's weight and dietary intake as ordered or per policy  Utilize nutrition screening tool and intervene as necessary  Determine patient's food preferences and provide high-protein, high-caloric foods as appropriate       INTERVENTIONS:  - Monitor oral intake, urinary output, labs, and treatment plans  - Assess nutrition and hydration status and recommend course of action  - Evaluate amount of meals eaten  - Assist patient with eating if necessary   - Allow adequate time for meals  - Recommend/ encourage appropriate diets, oral nutritional supplements, and vitamin/mineral supplements  - Order, calculate, and assess calorie counts as needed  - Assess need for intravenous fluids  - Provide specific nutrition/hydration education as appropriate  - Include patient/family/caregiver in decisions related to nutrition  Outcome: Completed   Level 4 [place]

## 2022-12-20 NOTE — ASSESSMENT & PLAN NOTE
· I had discussions with multiple family members on 12/15, and involve palliative care  · Had family meeting on 12/16 where patient's wife, daughter and son were present  · Appreciate palliative care, neurology, nurse management and case management participation in meeting  · Patient made comfort care  · Continue oxycodone, Dilaudid as per palliative care recommendations

## 2022-12-20 NOTE — ASSESSMENT & PLAN NOTE
Patient with bilateral lower extremity edema which appears to be longstanding and related to venous stasis, managed by [facility] cardiology  Recently edema was worsening and associated with weeping  Patient received 80 mg IV Lasix x 1 in office on 11/9 and again on 11/23  Subsequently prescribed higher dose Lasix 40 mg BID  · Lasix held on admission due to hyponatremia  · Resumed lasix at 20 mg, continue same     · Continue with compression stockings   · Encourage LE elevation   · Monitor clinically

## 2022-12-20 NOTE — ASSESSMENT & PLAN NOTE
Patient presented via EMS as stroke alert due to concern for acute onset of left-sided weakness and garbled speech noticed by patient's wife  Patient is not a candidate for tPA due to bleeding risk    · Admitted under stroke pathway   · CTA head/neck negative for occlusion, but did note moderate R ICA and mild L ICA stenosis, see below   · MRI brain negative for acute infarct, but did note mild chronic microangiopathic ischemic changes  · Echocardiogram essentially unremarkable   · Appreciate neurology consult and recommendations    · PT/OT recommending rehab, PMR following  · has since been transitioned to comfort care

## 2022-12-20 NOTE — ASSESSMENT & PLAN NOTE
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

## 2022-12-20 NOTE — PROGRESS NOTES
[address] [facility]  Progress Note - Gilson Martinez 1935, 82 y o  male MRN: [id number]  [facility] Encounter: [id number]  Primary Care Provider: Josiah Whitman MD   Date and time admitted to hospital: 12/4/2022  5:26 PM    * Stroke-like symptoms  Assessment & Plan  Patient presented via EMS as stroke alert due to concern for acute onset of left-sided weakness and garbled speech noticed by patient's wife  Patient is not a candidate for tPA due to bleeding risk  · Admitted under stroke pathway   · CTA head/neck negative for occlusion, but did note moderate R ICA and mild L ICA stenosis, see below   · MRI brain negative for acute infarct, but did note mild chronic microangiopathic ischemic changes  · Echocardiogram essentially unremarkable   · Appreciate neurology consult and recommendations    · PT/OT recommending rehab, PMR following  · has since been transitioned to comfort care      VTE Pharmacologic Prophylaxis:   Pharmacologic: Pharmacologic VTE Prophylaxis contraindicated due to Comfort care  Mechanical VTE Prophylaxis in Place: Yes    Patient Centered Rounds: I have performed bedside rounds with nursing staff today  Discussions with Specialists or Other Care Team Provider: heidi tamayo    Education and Discussions with Family / Patient: pt    Time Spent for Care: 30 minutes  More than 50% of total time spent on counseling and coordination of care as described above  Current Length of Stay: 16 day(s)    Current Patient Status: Inpatient   Certification Statement: The patient will continue to require additional inpatient hospital stay due to see below    Discharge Plan: Comfort care    Code Status: Level 4 - Comfort Care      Subjective:   Resting comfortably  Objective:     Vitals:   No data recorded  Body mass index is 33 92 kg/m²  Input and Output Summary (last 24 hours):        Intake/Output Summary (Last 24 hours) at 12/20/2022 1153  Last data filed at 12/19/2022 1808  Gross per 24 hour   Intake 0 ml   Output --   Net 0 ml       Physical Exam:     Physical Exam  Deferred comfort care    Additional Data:     Labs:    Results from last 7 days   Lab Units 12/16/22  0422   WBC Thousand/uL 9 03   HEMOGLOBIN g/dL 12 4   HEMATOCRIT % 41 2   PLATELETS Thousands/uL 268     Results from last 7 days   Lab Units 12/16/22  0422   SODIUM mmol/L 143   POTASSIUM mmol/L 4 1   CHLORIDE mmol/L 110*   CO2 mmol/L 28   BUN mg/dL 14   CREATININE mg/dL 0 77   ANION GAP mmol/L 5   CALCIUM mg/dL 9 3   GLUCOSE RANDOM mg/dL 117                           * I Have Reviewed All Lab Data Listed Above  * Additional Pertinent Lab Tests Reviewed:  All Labs Within Last 24 Hours Reviewed    Imaging:    Imaging Reports Reviewed Today Include: na  Imaging Personally Reviewed by Myself Includes:  na    Recent Cultures (last 7 days):           Last 24 Hours Medication List:   Current Facility-Administered Medications   Medication Dose Route Frequency Provider Last Rate   • albuterol  2 puff Inhalation Q4H PRN Deon Adames MD     • aspirin  81 mg Oral Daily Tacho Moses MD     • atropine  2 drop Sublingual Q2H PRN Rocco Fields PA-C     • bisacodyl  10 mg Rectal Daily PRN Vola Home, CHARLEEN     • dextromethorphan-guaiFENesin  10 mL Oral Q4H PRN Caty Grijalva PA-C     • furosemide  20 mg Oral Daily Iona Domingo MD     • glycopyrrolate  1 mg Oral TID PRN Pavel Toy, CHARLEEN     • haloperidol  2 mg Oral Q4H PRN Pavel Toy, CHARLEEN     • loperamide  2 mg Oral 4x Daily PRN [facility]ola [facility], CHARLEEN     • LORazepam  0 5 mg Oral Q4H PRN [facility]ola [facility], CHARLEEN     • metoprolol tartrate  50 mg Oral Daily Tiny Pack PA-C     • Morphine Sulfate (Concentrate)  5 mg Oral Q8H [address] Tiny Pack PA-C     • Morphine Sulfate (Concentrate)  5 mg Oral Q2H PRN Vola [facility], CHARLEEN     • nicotine  14 mg Transdermal Daily Caty Grijalva, CHARLEEN     • ondansetron  4 mg Oral Q6H PRN Pavel Toy, CHARLEEN • QUEtiapine  12 5 mg Oral HS Keara Bneítez MD          Today, Patient Was Seen By: Lul King MD    ** Please Note: Dictation voice to text software may have been used in the creation of this document   **

## 2022-12-20 NOTE — PROGRESS NOTES
[address] [facility]  Progress Note - Gissell Bal 1935, 82 y o  male MRN: [id number]  Unit/Bed#: [facility] 733-01 Encounter: [id number]  Primary Care Provider: Federico Hayes MD   Date and time admitted to hospital: 12/4/2022  5:26 PM    Goals of care, counseling/discussion  Assessment & Plan  · I had discussions with multiple family members on 12/15, and involve palliative care  · Had family meeting on 12/16 where patient's wife, daughter and son were present  · Appreciate palliative care, neurology, nurse management and case management participation in meeting  · Patient made comfort care  · Continue oxycodone, Dilaudid as per palliative care recommendations  PAD (peripheral artery disease) ([facility])  Assessment & Plan  · Follows [facility] outpatient  · Most recent LEADs in May consistent with 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  · Repeat LEADs 12/6 significant for LLE > 75% stenosis of distal superficial femoral artery and diffuse disease throughout the remaining femoral-popliteal  · vessels  · Continue with ASA    Oropharyngeal dysphagia  Assessment & Plan  · Improved  Continue dysphagia level 1 pureed diet  Stenosis of both internal carotid arteries  Assessment & Plan  · CTA head/neck on admission suspected moderate stenosis of the right ICA origin and mild narrowing of the left carotid bifurcation   Intracranially there is also right greater than left stenosis, moderate on the left and mild on the right  · Appreciate neurosurgery consult and recommendations   · No need for surgical intervention at this time   · Outpatient follow-up with repeat CTA in 2 weeks  · Pt transitioned to comfort care, Statin and xarelto discontinued  Acute metabolic encephalopathy  Assessment & Plan  · ID input noted     · No metabolic derangement noted   · Discussed with neurology not further imaging   · Patient's mental status waxing and waning, given persistent encephalopathy and poor prognosis and background of peripheral arterial disease, nonhealing wound had family meeting today palliative care, primary, neurology, nurse management and case management  · Patient transition to comfort care  Bilateral lower extremity edema  Assessment & Plan  Patient with bilateral lower extremity edema which appears to be longstanding and related to venous stasis, managed by [facility] cardiology  Recently edema was worsening and associated with weeping  Patient received 80 mg IV Lasix x 1 in office on 11/9 and again on 11/23  Subsequently prescribed higher dose Lasix 40 mg BID  · Lasix held on admission due to hyponatremia  · Resumed lasix at 20 mg, continue same  · Continue with compression stockings   · Encourage LE elevation   · Monitor clinically     Atrial fibrillation [facility])  Assessment & Plan  · Hospital day 1 patient in atrial fibrillation with RVR - now improved   · Continue rate control with metoprolol 50 mg q12h   · Speech changed diet to purreed- ? Swallow safe  · Echocardiogram 12/6 with LVEF 55%, normal wall motion, normal systolic/diastolic function, mild-moderately increased RV pressure    * Stroke-like symptoms  Assessment & Plan  Patient presented via EMS as stroke alert due to concern for acute onset of left-sided weakness and garbled speech noticed by patient's wife  Patient is not a candidate for tPA due to bleeding risk  · Admitted under stroke pathway   · CTA head/neck negative for occlusion, but did note moderate R ICA and mild L ICA stenosis, see below   · MRI brain negative for acute infarct, but did note mild chronic microangiopathic ischemic changes  · Echocardiogram essentially unremarkable   · Appreciate neurology consult and recommendations    · PT/OT recommending rehab, PMR following  · Monitor neurochecks    · Given poor prognosis and multiple co morbities,  had family meeting with palliative care and patient is transition to comfort care on 12/16  VTE Pharmacologic Prophylaxis: VTE Score: 6 No VTE prophylaxis as patient is comfort care  Patient Centered Rounds: I performed bedside rounds with nursing staff today  Discussions with Specialists or Other Care Team Provider: Palliative care, CM  Education and Discussions with Family / Patient: Updated  (wife) at bedside  Time Spent for Care: 30 minutes  More than 50% of total time spent on counseling and coordination of care as described above  Current Length of Stay: 15 day(s)  Current Patient Status: Inpatient   Certification Statement: The patient will continue to require additional inpatient hospital stay due to Pending placement  Discharge Plan: Anticipate discharge in 48-72 hrs to rehab facility  Code Status: Level 4 - Comfort Care    Subjective:   Patient examined at bedside, remains comfortable  Spoke to patient's wife at bedside, answered all her questions regarding medication to her satisfaction  Objective:     Vitals:   No data recorded  HR:  [76-90] 90  Resp:  [20-22] 20  BP: (138)/(84) 138/84  SpO2:  [97 %] 97 %  Body mass index is 33 92 kg/m²  Input and Output Summary (last 24 hours): Intake/Output Summary (Last 24 hours) at 12/19/2022 2013  Last data filed at 12/19/2022 1808  Gross per 24 hour   Intake 0 ml   Output 250 ml   Net -250 ml       Physical Exam:   Physical Exam  Constitutional:       Comments: Lethargic and cachectic, lying comfortably in bed  HENT:      Head: Normocephalic and atraumatic  Eyes:      Conjunctiva/sclera: Conjunctivae normal    Cardiovascular:      Rate and Rhythm: Normal rate and regular rhythm  Pulses: Normal pulses  Heart sounds: Normal heart sounds  Pulmonary:      Effort: Pulmonary effort is normal       Comments: Faint rhonchi heard more on left apex  Abdominal:      General: Abdomen is flat  Bowel sounds are normal    Skin:     General: Skin is warm and dry  Comments: Chronic skin changes in bilateral lower extremities  Neurological:      Comments: Lethargic, opens eyes to tactile stimuli  Additional Data:     Labs:  Results from last 7 days   Lab Units 12/16/22  0422   WBC Thousand/uL 9 03   HEMOGLOBIN g/dL 12 4   HEMATOCRIT % 41 2   PLATELETS Thousands/uL 268     Results from last 7 days   Lab Units 12/16/22  0422   SODIUM mmol/L 143   POTASSIUM mmol/L 4 1   CHLORIDE mmol/L 110*   CO2 mmol/L 28   BUN mg/dL 14   CREATININE mg/dL 0 77   ANION GAP mmol/L 5   CALCIUM mg/dL 9 3   GLUCOSE RANDOM mg/dL 117                       Lines/Drains:  Invasive Devices     None                       Imaging: No pertinent imaging reviewed      Recent Cultures (last 7 days):         Last 24 Hours Medication List:   Current Facility-Administered Medications   Medication Dose Route Frequency Provider Last Rate   • albuterol  2 puff Inhalation Q4H PRN Delvin Collet, MD     • aspirin  81 mg Oral Daily Billie Gambino MD     • atropine  2 drop Sublingual Q2H PRN Rocco Fields PA-C     • bisacodyl  10 mg Rectal Daily PRN Frantz Nam PA-C     • dextromethorphan-guaiFENesin  10 mL Oral Q4H PRN Caty Grijalva PA-C     • furosemide  20 mg Oral Daily Francisca Ellington MD     • glycopyrrolate  1 mg Oral TID PRN Yaz Cooper PA-C     • haloperidol  2 mg Oral Q4H PRN Yaz Cooper PA-C     • loperamide  2 mg Oral 4x Daily PRN Frantz Nam PA-C     • LORazepam  0 5 mg Oral Q4H PRN Frantz Nam PA-C     • metoprolol tartrate  50 mg Oral Daily Tiny Pack PA-C     • Morphine Sulfate (Concentrate)  5 mg Oral Q8H [address] Tiny Pack PA-C     • Morphine Sulfate (Concentrate)  5 mg Oral Q2H PRN Frantz Nam PA-C     • nicotine  14 mg Transdermal Daily Caty Grijalva PA-C     • ondansetron  4 mg Oral Q6H PRN Yaz Cooper PA-C     • QUEtiapine  12 5 mg Oral HS Wendy Boyd MD          Today, Patient Was Seen By: Francisca Ellington MD    **Please Note: This note may have been constructed using a voice recognition system  **

## 2022-12-20 NOTE — PROGRESS NOTES
Progress note - Palliative and Supportive Care   Ivanhoe Coad 82 y o  male [id number]    Patient Active Problem List   Diagnosis   • Benign hypertension   • Benign prostatic hyperplasia   • Anterior dislocation of left shoulder   • Closed head injury with concussion   • Vesicocolonic fistula   • Postural dizziness with near syncope   • Ambulatory dysfunction   • Atrial fibrillation (HCC)   • Gout   • Recurrent falls   • Other insomnia   • Bilateral lower extremity edema   • Nicotine dependence   • Stroke-like symptoms   • Acute metabolic encephalopathy   • Pressure injury of deep tissue of left heel   • SIRS vs Sepsis ([facility])   • Stenosis of both internal carotid arteries   • Prediabetes   • Oropharyngeal dysphagia   • PAD (peripheral artery disease) ([facility])   • Acute cystitis without hematuria   • Goals of care, counseling/discussion     Active issues specifically addressed today include:   Palliative care encounter  End-of-life care  Comfort measures only status  Back pain    Plan:  1  Symptom management -   • Continue Lasix for comfort  • roxanol 5mg Q8H [address] and Q2H PRN pain or dyspnea   • Dilaudid 0 3 mg IV every 2 hours as needed breakthrough pain  • Seroquel 12 5 mg at bedtime  • Ativan 0 5 mg p o  every 4 hours as needed anxiety or insomnia  • Haldol 0 5 mg IV every 2 hours as needed agitation  • Zofran 4 mg IV every 6 hours as needed nausea or vomiting  • Immodium added QID PRN diarrhea     2  Goals -   • Comfort measures only while admitted, case management assisting with hospice discharge to SNF  • Prescriptions for comfort medications left on chart in anticipation of d/c to SNF     Code Status: comfort - Level 4   Decisional apparatus:  Patient is not competent on my exam today  If competence is lost, patient's substitute decision maker would default to spouse by [place]  Advance Directive / Living Will / POLST: On file, reviewed personally    3   Social support-  • Extensive time spent providing supportive listening to spouse at bedside on 12/19  Encouraged self-care  She was not present during today's visit  • Family has contacted the  to provide Last Rites    4  Follow-up  • Palliative care will sign off at this time as symptoms are well controlled and goals are well-defined  Please reconsult if symptoms become uncontrolled or further support needed  Interval history:       Patient used 1 PRN dose of morphine in addition to scheduled dosing, 1 dose of imodium yesterday  At time of encounter patient is resting comfortably without complaints  He does appear to have a dry mouth and enjoyed several ice chips and spoonfuls of water  He did have an episode of coughing after using the straw  MEDICATIONS / ALLERGIES:     all current active meds have been reviewed    Allergies   Allergen Reactions   • Balsam Dermatitis   • Neomycin-Bacitracin Zn-Polymyx Hives and Rash       OBJECTIVE:    Physical Exam  Physical Exam  HENT:      Head: Atraumatic  Mouth/Throat:      Comments: Dry mouth  Eyes:      Conjunctiva/sclera: Conjunctivae normal    Pulmonary:      Effort: No respiratory distress  Abdominal:      Tenderness: There is no guarding  Skin:     General: Skin is dry  Neurological:      Mental Status: He is alert  Comments: Pleasantly confused   Psychiatric:      Comments: Calm, not agitated         Lab Results: I have personally reviewed pertinent labs  Imaging Studies: no new  EKG, Pathology, and Other Studies: no new    Counseling / Coordination of Care    Total floor / unit time spent today 25 minutes  Greater than 50% of total time was spent with the patient and / or family counseling and / or coordination of care  A description of the counseling / coordination of care: Time spent assessing patient, assisting with wound care, providing supportive listening, symptom management

## 2022-12-20 NOTE — CASE MANAGEMENT
Case Management Discharge Planning Note    Patient name Ene Mckeon  Location [address] [place] [address] 733/[facility] 786-88 MRN [id number]  : 1935 Date 2022       Current Admission Date: 2022  Current Admission Diagnosis:Stroke-like symptoms   Patient Active Problem List    Diagnosis Date Noted   • Goals of care, counseling/discussion 2022   • PAD (peripheral artery disease) ([facility] [address] [facility] ) 2022   • Acute cystitis without hematuria 2022   • Stenosis of both internal carotid arteries 2022   • Prediabetes 2022   • Oropharyngeal dysphagia 2022   • Stroke-like symptoms 2022   • Acute metabolic encephalopathy    • Pressure injury of deep tissue of left heel 2022   • [facility] 2022   • Nicotine dependence 2019   • Bilateral lower extremity edema 2019   • Recurrent falls 2019   • Other insomnia 2019   • Benign hypertension 2019   • Benign prostatic hyperplasia 2019   • Anterior dislocation of left shoulder 2019   • Closed head injury with concussion 2019   • Vesicocolonic fistula 2019   • Postural dizziness with near syncope 2019   • Ambulatory dysfunction 2019   • Atrial fibrillation ([facility] [facility] ) 2019   • Gout 2019      LOS (days): 16  Geometric Mean LOS (GMLOS) (days): 5 00  Days to GMLOS:-10 9     OBJECTIVE:  Risk of Unplanned Readmission Score: 16 58         Current admission status: Inpatient   Preferred Pharmacy:   [address], [address]  [address]  [address] [place], [place] [id number]  Phone: [phone] Fax: [phone]    Primary Care Provider: Apolonia Monk MD    Primary Insurance: MEDICARE  Secondary Insurance: AARP    DISCHARGE DETAILS:    Family notified[de-identified] CM spoke wiht spouse bedside  Spouse is very upset and overwhelmed   CM told spouse that [facility] was accepted at [facility], they would like to get some aditional information, spouse stated she didn't want to talk to anyone, while ANDREAS was there her cell phone rang and it was Susi form   Spouse told Susi to let her know what the billis and she would pay it, she then told her she didn't want to talk and hung up  Additional Comments: ANDREAS spoke with Susi at   She is trying to tie up loose ends and understands spouse is upset  Kofi Capellan asked if pt can be evaluated by hospice since they are unable to handle a morphine drip   CM will request and have them reach out if the eval is done adam Goldman said neel can call [phone], it is her phone with a secure VM

## 2022-12-21 NOTE — CASE MANAGEMENT
Case Management Progress Note    Patient name Young Mcgarry  [facility] [id number]  : 1935 Date 2022       LOS (days): 17  Geometric Mean LOS (GMLOS) (days): 5 00  Days to GMLOS:-11 7        OBJECTIVE:        Current admission status: Inpatient  [facility] [address] [address]  Phone: [phone] Fax: [phone]    Primary Care Provider: Janay Medrano MD    Primary Insurance: MEDICARE  Secondary Insurance: AARP    PROGRESS NOTE: Discussion with hospice  Pt is on Po 1x/day  Hospice able to open with pt on 22  CM to reach out to [facility] and advise

## 2022-12-21 NOTE — WOUND OSTOMY CARE
Progress Note - Wound   Soledad Davidson 82 y o  male MRN: [id number]  Unit/Bed#: [facility] 733-01 Encounter: [id number]        Assessment:   Patient now has transitioned to level 4 comfort care  Wound care team will sign off at this time  Tiger text with concerns or questions  Orders placed and written in AVS- to be done as tolerated  Skin Care Plan:  1-Preventative hydraguard to B/L Sacro-Buttocks and Right Heel BID and PRN  2-Turn/reposition q2h or when medically stable for pressure re-distribution on skin   3-Elevate heels to offload pressure  4-Moisturize skin daily with skin nourishing cream  5-Ehob cushion in chair when out of bed  6-Left Heel: Per Podiatry Recommendations   7-Left Foot/ Posterior Leg: Paint betadine only over eschar on left posterior leg  Cover eschar and areas of light purple non-blanchable erythema with ABDS and secure with russel   Perform daily or PRN soilage or displacement     8- P-500 Specialty mattress ordered for patient         Randall Hernandez RN, BSN

## 2022-12-21 NOTE — PROGRESS NOTES
[facility]  Progress Note - Wang Mckoy 1935, 82 y o  male MRN: [id number]  [facility] En[facility]: [id number]  Primary Care Provider: Nikki Dawson MD   Date and time admitted to hospital: 12/4/2022  5:26 PM    * Stroke-like symptoms  Assessment & Plan  Patient presented via EMS as stroke alert due to concern for acute onset of left-sided weakness and garbled speech noticed by patient's wife  Patient is not a candidate for tPA due to bleeding risk  · Admitted under stroke pathway   · CTA head/neck negative for occlusion, but did note moderate R ICA and mild L ICA stenosis, see below   · MRI brain negative for acute infarct, but did note mild chronic microangiopathic ischemic changes  · Echocardiogram essentially unremarkable   · Appreciate neurology consult and recommendations    · Has since been transitioned to comfort care  Awaiting possible SNF placement versus inpatient hospice  · Follow-up with CM and hospice team  · Appreciate palliative recs      VTE Pharmacologic Prophylaxis:   Pharmacologic: Pharmacologic VTE Prophylaxis contraindicated due to Comfort care  Mechanical VTE Prophylaxis in Place: Yes    Patient Centered Rounds: I have performed bedside rounds with nursing staff today  Discussions with Specialists or Other Care Team Provider: cm, nursing    Education and Discussions with Family / Patient: pt    Time Spent for Care: 30 minutes  More than 50% of total time spent on counseling and coordination of care as described above  Current Length of Stay: 17 day(s)    Current Patient Status: Inpatient   Certification Statement: The patient will continue to require additional inpatient hospital stay due to See below    Discharge Plan: Awaiting placement to inpatient hospice versus SNF    Code Status: Level 4 - Comfort Care      Subjective:   Denies any complaints    Objective:     Vitals:   No data recorded  Body mass index is 33 92 kg/m²  Input and Output Summary (last 24 hours): Intake/Output Summary (Last 24 hours) at 12/21/2022 1131  Last data filed at 12/20/2022 1755  Gross per 24 hour   Intake 0 ml   Output --   Net 0 ml       Physical Exam:     Physical Exam  Constitutional:       General: He is not in acute distress  Appearance: He is well-developed  He is not diaphoretic  HENT:      Head: Normocephalic and atraumatic  Nose: Nose normal       Mouth/Throat:      Pharynx: No oropharyngeal exudate  Eyes:      General: No scleral icterus  Conjunctiva/sclera: Conjunctivae normal    Cardiovascular:      Rate and Rhythm: Normal rate and regular rhythm  Heart sounds: Normal heart sounds  No murmur heard  No friction rub  No gallop  Pulmonary:      Effort: Pulmonary effort is normal  No respiratory distress  Breath sounds: Normal breath sounds  No wheezing or rales  Chest:      Chest wall: No tenderness  Abdominal:      General: Bowel sounds are normal  There is no distension  Palpations: Abdomen is soft  Tenderness: There is no abdominal tenderness  There is no guarding  Musculoskeletal:         General: No tenderness or deformity  Normal range of motion  Cervical back: Normal range of motion and neck supple  Skin:     General: Skin is warm and dry  Findings: No erythema  Neurological:      Mental Status: He is alert  Mental status is at baseline  (Deferred comfort care  Additional Data:     Labs:    Results from last 7 days   Lab Units 12/16/22  0422   WBC Thousand/uL 9 03   HEMOGLOBIN g/dL 12 4   HEMATOCRIT % 41 2   PLATELETS Thousands/uL 268     Results from last 7 days   Lab Units 12/16/22  0422   SODIUM mmol/L 143   POTASSIUM mmol/L 4 1   CHLORIDE mmol/L 110*   CO2 mmol/L 28   BUN mg/dL 14   CREATININE mg/dL 0 77   ANION GAP mmol/L 5   CALCIUM mg/dL 9 3   GLUCOSE RANDOM mg/dL 117                           * I Have Reviewed All Lab Data Listed Above    * Additional Pertinent Lab Tests Reviewed: All Labs Within Last 24 Hours Reviewed    Imaging:    Imaging Reports Reviewed Today Include: na  Imaging Personally Reviewed by Myself Includes:  na    Recent Cultures (last 7 days):           Last 24 Hours Medication List:   Current Facility-Administered Medications   Medication Dose Route Frequency Provider Last Rate   • albuterol  2 puff Inhalation Q4H PRN Matt Gill MD     • aspirin  81 mg Oral Daily Gita Underwood MD     • atropine  2 drop Sublingual Q2H PRN Rocco Fields PA-C     • bisacodyl  10 mg Rectal Daily PRN Nabil Jacobsen PA-C     • dextromethorphan-guaiFENesin  10 mL Oral Q4H PRN Caty Grijalva PA-C     • furosemide  20 mg Oral Daily Daily Tsai MD     • glycopyrrolate  1 mg Oral TID PRN Demi Buckner PA-C     • haloperidol  2 mg Oral Q4H PRN Demi Buckner PA-C     • loperamide  2 mg Oral 4x Daily PRN Nabil Jacobsen PA-C     • LORazepam  0 5 mg Oral Q4H PRN Nabil Jacobsen PA-C     • metoprolol tartrate  50 mg Oral Daily Tiny Pack PA-C     • Morphine Sulfate (Concentrate)  5 mg Oral Q8H [address] Tiny Pack PA-C     • Morphine Sulfate (Concentrate)  5 mg Oral Q2H PRN Nabil Jacobsen PA-C     • nicotine  14 mg Transdermal Daily Caty Grijalva PA-C     • ondansetron  4 mg Oral Q6H PRN Demi Buckner PA-C     • QUEtiapine  12 5 mg Oral HS Jen Juan MD          Today, Patient Was Seen By: Maximo Coleman MD    ** Please Note: Dictation voice to text software may have been used in the creation of this document   **

## 2022-12-21 NOTE — HOSPICE NOTE
SPOKE WITH RACHEL PAYNE FOR [facility]  DME TO BE DELIVERED STAT  REQUESTED [facility] FOR ATIVAN 0 5MG TABS AND MORPHINE 5MG TABS FOR DISCHARGE  TRANSPORT IS CONFIRMED FOR 11 AM 12-22-22 WITH SOC SAME DAY

## 2022-12-21 NOTE — HOSPICE NOTE
PT HAS BEEN APPROVED FOR HOSPICE SERVICES  MET WITH PT AND FAMILY AT THE BEDSIDE  REVIEWED HOSPICE SERVICES PER MEDICARE GUIDELINES AND ALL QUESTIONS ANSWERED  CONSENTS OBTAINED AND FAXED TO THE A   [facility] FOR [facility]HEL [facility] AT   [facility]

## 2022-12-21 NOTE — ASSESSMENT & PLAN NOTE
Patient presented via EMS as stroke alert due to concern for acute onset of left-sided weakness and garbled speech noticed by patient's wife  Patient is not a candidate for tPA due to bleeding risk  · Admitted under stroke pathway   · CTA head/neck negative for occlusion, but did note moderate R ICA and mild L ICA stenosis, see below   · MRI brain negative for acute infarct, but did note mild chronic microangiopathic ischemic changes  · Echocardiogram essentially unremarkable   · Appreciate neurology consult and recommendations    · Has since been transitioned to comfort care    Awaiting possible SNF placement versus inpatient hospice  · Follow-up with CM and hospice team  · Appreciate palliative recs

## 2022-12-21 NOTE — RESTORATIVE TECHNICIAN NOTE
Restorative Technician Note      Patient Name: Charanjit Gutierrez     Note Type: Mobility  Patient Position Upon Consult: Supine  Activity Performed: Repositioned  Patient Position at End of Consult: Supine;  All needs within reach; Bed/Chair alarm activated    Veronica LOVE, Restorative Technician, [employer]

## 2022-12-21 NOTE — CASE COMMUNICATION
I have [address] [place]  [birth year] at [facility] for [facility]  He presented via EMS as stroke alert due to concern for acute onset of left-sided weakness and garbled speech noticed by patient's wife  Admitted under stroke pathway  CTA head/neck negative for occlusion  MRI brain negative for acute infarct  Vascular surgery consulted for the nonhealing L calcaneal pressure ulcer in the setting of PAD  Family decided not to pursue interventions and opted f or hospice care  Relevant comorbities HTN, SIRS vs Sepsis, Afib, BLE edema  PPS 30-20, ill appearing  Pt will take one spoonful of pureed foods with his medications only, tongue has a white coating/raised bumps  Has received 1 dose of 5 mg Morphine QD x 3 days  Pt denied pain when I saw him  Approve for RLOC?

## 2022-12-21 NOTE — CASE MANAGEMENT
Case Management Discharge Planning Note    Patient name Marichuy Fuller  Location [address] [place] [address] Rd 733/PPHP 035-82 MRN [id number]  : 1935 Date 2022       Current Admission Date: 2022  Current Admission Diagnosis:Stroke-like symptoms   Patient Active Problem List    Diagnosis Date Noted   • Goals of care, counseling/discussion 2022   • PAD (peripheral artery disease) ([facility] ) 2022   • Acute cystitis without hematuria 2022   • Stenosis of both internal carotid arteries 2022   • Prediabetes 2022   • Oropharyngeal dysphagia 2022   • Stroke-like symptoms 2022   • Acute metabolic encephalopathy    • Pressure injury of deep tissue of left heel 2022   • [facility] 2022   • Nicotine dependence 2019   • Bilateral lower extremity edema 2019   • Recurrent falls 2019   • Other insomnia 2019   • Benign hypertension 2019   • Benign prostatic hyperplasia 2019   • Anterior dislocation of left shoulder 2019   • Closed head injury with concussion 2019   • Vesicocolonic fistula 2019   • Postural dizziness with near syncope 2019   • Ambulatory dysfunction 2019   • Atrial fibrillation ([facility] [address] ) 2019   • Gout 2019      LOS (days): 17  Geometric Mean LOS (GMLOS) (days): 5 00  Days to GMLOS:-11 6     OBJECTIVE:  Risk of Unplanned Readmission Score: 16 75         Current admission status: Inpatient   Preferred Pharmacy:   [address], [address] [address]  [address] [place], [place] [address]  [facility] [address]  Phone: [phone] Fax: [phone]    Primary Care Provider: Seamus Moulton MD    Primary Insurance: MEDICARE  Secondary Insurance: AARP    DISCHARGE DETAILS:     Family notified[de-identified] Phone call from pt yoni Stover asked if she needs to sign any paperwork regarding the pt transfer  CM advised that this would be done at    David Stover hasn't reached out yet however she will call  She is aware she has to provide a check before he arrives  Leoneljoe Traore asked if a time is available  CM advised no transport yet  Pt will be evaluated by Hospice today  Leonel Traore stated that she believes the providers have written pt off  She stated she is not getting any information nor did she receive a breakdown of pt prognosis at the family meeting  CM asked if she would like to have another family meeting and she refused  Leonel Traore stated she is working on 2 hours sleep  CM told Leonel Traore she needs her rest  Leonel Traore stated she will call [facility] and see what she has to do  She feels it is too late to address the amputation  She said all her family are telling her she is doing the right thing  Additional Comments: Phone call to Marifer Santana at [facility] [phone]  Advised just had a conversation with pt spouse and she appeard to be willing to reach out to complete paperwork and provide what ever additional info is needed  CM told Susi that Hospice has not yet evaluated pt  CM will reach out once that has been completed  Ivis Suarez reminded CM that they are not able to handle the morphine jessika Suarez will be in the office after 10am  CM will reach out with an update

## 2022-12-21 NOTE — PLAN OF CARE
Problem: DISCHARGE PLANNING  Goal: Discharge to home or other facility with appropriate resources  Description: INTERVENTIONS:  - Identify barriers to discharge w/patient and caregiver  - Arrange for needed discharge resources and transportation as appropriate  - Identify discharge learning needs (meds, wound care, etc )  - Refer to Case Management Department for coordinating discharge planning if the patient needs post-hospital services based on physician/advanced practitioner order or complex needs related to functional status, cognitive ability, or social support system  Outcome: Progressing     Problem: Knowledge Deficit  Goal: Patient/family/caregiver demonstrates understanding of disease process, treatment plan, medications, and discharge instructions  Description: Complete learning assessment and assess knowledge base    Interventions:  - Provide teaching at level of understanding  - Provide teaching via preferred learning methods  Outcome: Progressing     Problem: PAIN - ADULT  Goal: Verbalizes/displays adequate comfort level or baseline comfort level  Description: Interventions:  - Encourage patient to monitor pain and request assistance  - Assess pain using appropriate pain scale  - Administer analgesics based on type and severity of pain and evaluate response  - Implement non-pharmacological measures as appropriate and evaluate response  - Notify physician/advanced practitioner if interventions unsuccessful or patient reports new pain  Outcome: Progressing

## 2022-12-22 NOTE — DISCHARGE SUMMARY
[facility]  Discharge- Arnoldo Bull 1935, 82 y o  male MRN: [id number]  Unit/Bed#: [facility] 733-01 Encounter: [id number]  Primary Care Provider: Breanna Chavis MD   Date and time admitted to hospital: 12/4/2022  5:26 PM    * Stroke-like symptoms  Assessment & Plan  Patient presented via EMS as stroke alert due to concern for acute onset of left-sided weakness and garbled speech noticed by patient's wife  Patient is not a candidate for tPA due to bleeding risk  · Admitted under stroke pathway   · CTA head/neck negative for occlusion, but did note moderate R ICA and mild L ICA stenosis, see below   · MRI brain negative for acute infarct, but did note mild chronic microangiopathic ischemic changes  · Echocardiogram essentially unremarkable   · Appreciate neurology consult and recommendations    · Has since been transitioned to comfort care  · Appreciate palliative recs  · Discharge to facility      Discharging Physician / Practitioner: Yong Herzog MD  PCP: Breanna Chavis MD  Admission Date:   Admission Orders (From admission, onward)     Ordered        12/04/22 1833  INPATIENT ADMISSION  Once                      Discharge Date: 12/22/22    Medical Problems     Resolved Problems  Date Reviewed: 12/22/2022   None         Consultations During Hospital Stay:  Palliative, neurology   procedures Performed:   · See chart    Significant Findings / Test Results:   XR chest portable    Result Date: 12/5/2022  Impression: Probable small left effusion  Slightly limited but otherwise unremarkable examination  Workstation performed: [id number]     XR chest pa & lateral    Result Date: 12/7/2022  Impression: No acute cardiopulmonary disease  Workstation performed: [id number]     XR heel / calcaneus 2+ vw left    Result Date: 12/6/2022  Impression: Soft tissue swelling  No evidence of acute osteomyelitis  If osteomyelitis remains a clinical concern, consider further evaluation with MRI  Workstation performed: [id number]     MRI brain wo contrast    Result Date: 12/8/2022  Impression: 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, mesial temporal lobe, internal capsule and cerebral peduncle  No mass effect or hemorrhagic conversion  The study was marked in [facility] for immediate notification  Workstation performed: [id number]     MRI brain wo contrast    Result Date: 12/5/2022  Impression: Motion degraded examination  No acute infarction, edema, or mass effect  Mild chronic microangiopathic ischemic changes  Workstation performed: [id number]     CT stroke alert brain    Result Date: 12/4/2022  Impression: No acute intracranial abnormality  Stable mild chronic microangiopathic changes within the brain  Findings were directly discussed with neurology resident at 5:40 PM  Workstation performed: [id number]     CTA stroke alert (head/neck)    Result Date: 12/4/2022  · Impression: Unfortunately this examination is significantly limited due to patient motion  Atherosclerotic changes are seen within the carotid bifurcations and intracranial internal carotid arteries bilaterally  Suspected moderate stenosis of the right ICA origin and mild narrowing of the left carotid bifurcation  Intracranially there is also right greater than left stenosis, moderate on the left and mild on the right No occlusive disease or thrombosis identified  Findings were directly discussed with neurology resident at approximately 5:40 PM  Workstation performed: [id number]     Incidental Findings:   · none     Test Results Pending at Discharge (will require follow up):    · Strokelike symptomsnoen     Outpatient Tests Requested:  · none    Complications:  none    Reason for Admission: Strokelike symptoms    Hospital Course:     Keyla Burroughs is a 82 y o  male patient who originally presented to the hospital on 12/4/2022 initially presented due to strokelike symptoms left-sided weakness and aphasia  Patient with history of peripheral artery disease mental status without significant improvement  I discussed with palliative care and ultimately agreed to comfort focused measures  Ultimately discharged to facility for comfort care  Please see chart for further details        Please see above list of diagnoses and related plan for additional information  Condition at Discharge: stable     Discharge Day Visit / Exam:     * Please refer to separate progress note for these details *    Discussion with Family: pt    Discharge instructions/Information to patient and family:   See after visit summary for information provided to patient and family  Provisions for Follow-Up Care:  See after visit summary for information related to follow-up care and any pertinent home health orders  Disposition:     Home    For Discharges to [facility] SNF:   · Not Applicable to this Patient - Not Applicable to this Patient    Planned Readmission: none     Discharge Statement:  I spent 60 minutes discharging the patient  This time was spent on the day of discharge  I had direct contact with the patient on the day of discharge  Greater than 50% of the total time was spent examining patient, answering all patient questions, arranging and discussing plan of care with patient as well as directly providing post-discharge instructions  Additional time then spent on discharge activities  Discharge Medications:  See after visit summary for reconciled discharge medications provided to patient and family        ** Please Note: This note has been constructed using a voice recognition system **

## 2022-12-22 NOTE — PLAN OF CARE
Problem: PAIN - ADULT  Goal: Verbalizes/displays adequate comfort level or baseline comfort level  Description: Interventions:  - Encourage patient to monitor pain and request assistance  - Assess pain using appropriate pain scale  - Administer analgesics based on type and severity of pain and evaluate response  - Implement non-pharmacological measures as appropriate and evaluate response  - Notify physician/advanced practitioner if interventions unsuccessful or patient reports new pain  Outcome: Progressing     Problem: Potential for Aspiration  Goal: Non-ventilated patient's risk of aspiration is minimized  Description: Assess and monitor vital signs, respiratory status, and labs (WBC)  Monitor for signs of aspiration (tachypnea, cough, rales, wheezing, cyanosis, fever)  - Assess and monitor patient's ability to swallow  - Place patient up in chair to eat if possible  - HOB up at 90 degrees to eat if unable to get patient up into chair   - Supervise patient during oral intake  - Instruct patient/ family to take small bites  - Instruct patient/ family to take small single sips when taking liquids    - Follow patient-specific strategies generated by speech pathologist   Outcome: Progressing

## 2022-12-22 NOTE — CASE MANAGEMENT
Case Management Discharge Planning Note    Patient name Aimee King  Location [address] [place] [address] [facility] [id number]  : 1935 Date 2022       Current Admission Date: 2022  Current Admission Diagnosis:Stroke-like symptoms   Patient Active Problem List    Diagnosis Date Noted   • Goals of care, counseling/discussion 2022   • PAD (peripheral artery disease) ([facility] [address] ) 2022   • Acute cystitis without hematuria 2022   • Stenosis of both internal carotid arteries 2022   • Prediabetes 2022   • Oropharyngeal dysphagia 2022   • Stroke-like symptoms 2022   • Acute metabolic encephalopathy    • Pressure injury of deep tissue of left heel 2022   • [facility] 2022   • Nicotine dependence 2019   • Bilateral lower extremity edema 2019   • Recurrent falls 2019   • Other insomnia 2019   • Benign hypertension 2019   • Benign prostatic hyperplasia 2019   • Anterior dislocation of left shoulder 2019   • Closed head injury with concussion 2019   • Vesicocolonic fistula 2019   • Postural dizziness with near syncope 2019   • Ambulatory dysfunction 2019   • Atrial fibrillation ([facility] [address] ) 2019   • Gout 2019      LOS (days): 18  Geometric Mean LOS (GMLOS) (days): 5 00  Days to GMLOS:-12 7     OBJECTIVE:  Risk of Unplanned Readmission Score: 16 5         Current admission status: Inpatient   Preferred Pharmacy:   [address]  [address]  [facility] [address]  Phone: [phone] Fax: [address] [facility], [address] [address]  Phone: [phone] Fax: [phone]    Primary Care Provider: Julia Gomez MD    Primary Insurance: MEDICARE  Secondary Insurance: AARP    DISCHARGE DETAILS:           Additional Comments:  The pt is medically stable for discharge and post acute care planning is finalized  Primary CM confirmed bed availability at  on 12/21 and arranged hospital discharge for this date at 11am  No further CM intevention is warranted

## 2022-12-22 NOTE — ASSESSMENT & PLAN NOTE
Patient presented via EMS as stroke alert due to concern for acute onset of left-sided weakness and garbled speech noticed by patient's wife  Patient is not a candidate for tPA due to bleeding risk  · Admitted under stroke pathway   · CTA head/neck negative for occlusion, but did note moderate R ICA and mild L ICA stenosis, see below   · MRI brain negative for acute infarct, but did note mild chronic microangiopathic ischemic changes  · Echocardiogram essentially unremarkable   · Appreciate neurology consult and recommendations    · Has since been transitioned to comfort care      · Appreciate palliative recs  · Discharge to facility

## 2022-12-22 NOTE — TELEPHONE ENCOUNTER
pharmacy [facility] [phone] he is looking for a script for roxanol script please advise  Sumanth Hunter he is at [facility] [birth year]

## 2022-12-22 NOTE — TELEPHONE ENCOUNTER
12/22/2022 4:39 PM -  Pt is opened to hospice services  Will forward to hospice team to address  I have no authority to write meds now that hospice is started

## 2022-12-22 NOTE — RESTORATIVE TECHNICIAN NOTE
Restorative Technician Note      Patient Name: Venessa Gavino     Note Type: Mobility  Patient Position Upon Consult: Supine  Activity Performed: Repositioned  Patient Position at End of Consult: Supine;  All needs within reach; Bed/Chair alarm activated    Derek LOVE, Restorative Technician, [employer]

## 2022-12-23 PROBLEM — Z51.5 HOSPICE CARE PATIENT: Status: ACTIVE | Noted: 2022-01-01

## 2022-12-23 NOTE — ASSESSMENT & PLAN NOTE
Recently transitioned to hospice care under [facility] hospice care team  Comfort care meds as per hospice

## 2022-12-23 NOTE — ASSESSMENT & PLAN NOTE
CTA of head/neck inpatient showed suspected moderate stenosis of the right ICA origin and mild narrowing of the left carotid bifurcation  Intracranially there is also right greater than left stenosis, moderate on the left and mild on the right  No occlusive disease or thrombosis identified    Patient was evaluated by vascular, neurosurgery, and neurology inpatient  Patient transitioned to hospice care

## 2022-12-23 NOTE — PROGRESS NOTES
Facility: [facility]  [address] [place] [address], [facility]   POS: 28 (LTC)  Hospice care patient  Progress Note    Chief Complaint/Reason for visit: Acute visit  Code status: DNI/DNR  History of Present Illness: 33-year-old male with a  PMH of atrial fibrillation, BPH, COPD, gout, hypertension, basal cell adenocarcinoma, PAD and PVD who originally presented to [place] [address] with stroke-like symptoms  Patient was evaluated by ID, neurology, vascular, neurology, and neurosurgery  Patient's condition did not improve and patient transitioned to hospice care  Patient was admitted to [facility] in [place] following hospitalization  He is under the care of [facility]  At time of examination, patient is resting in bed, and ill-appearing  He has a frequent moist nonproductive cough with scattered coarse rhonchi throughout bilateral lung fields  Nursing reports that patient becomes restless at times; has scheduled morphine and Ativan ordered  Past Medical History: unchanged from history and physical  Past Medical History:   Diagnosis Date   • Atrial fibrillation ([address] )    • Cancer ([address] )    • Hypertension      Family History: unchanged from history and physical  Social History: unchanged from history and physical  Review of systems: Review of Systems   Unable to perform ROS: Other     Medications: All medication and routine orders were reviewed and updated  Allergies: Reviewed and unchanged  Consults reviewed: Other  Labs/Diagnostics (reviewed by this provider): Copy in Chart    Imaging Reviewed: Acute,    Physical Exam    BP: 131/70    temp: 97 7   heart rate: 68   resp: 18  Constitutional: Pallor  Orientation:Person     Physical Exam  Vitals and nursing note reviewed  Constitutional:       General: He is not in acute distress  Appearance: He is ill-appearing  He is not toxic-appearing or diaphoretic  HENT:      Head: Normocephalic        Mouth/Throat:      Mouth: Mucous membranes are dry  Eyes:      General:         Right eye: No discharge  Left eye: No discharge  Extraocular Movements: Extraocular movements intact  Cardiovascular:      Rate and Rhythm: Rhythm irregular  Pulmonary:      Breath sounds: Rhonchi (Scattered coarse rhonchi throughout bilateral lung fields ) present  Comments: Respirations are labored  Frequent moist cough noted  Abdominal:      General: Bowel sounds are normal  There is no distension  Palpations: Abdomen is soft  Tenderness: There is no abdominal tenderness  There is no guarding  Musculoskeletal:      Cervical back: Neck supple  No rigidity  Right lower leg: No edema  Left lower leg: No edema  Comments: Moves all 4 extremities  Lymphadenopathy:      Cervical: No cervical adenopathy  Skin:     Capillary Refill: Capillary refill takes less than 2 seconds  Comments: Scabbed areas lower extremities  Bilateral lower extremities are extremely dry  Dressing intact to left lower extremity  Foot drop protector in place to left foot  Neurological:      Mental Status: He is alert  Mental status is at baseline  Motor: Weakness present  Comments: Patient is awake  Expressive aphasia noted  Assessment/Plan:  59-year-old male with:    Acute metabolic encephalopathy  Patient presented to the hospital with left-sided weakness and aphasia, evaluated by vascular team, neurology, neurosurgery  MRI of brain negative for acute infarct  Mental status did not improve and patient transitioned to hospice care  No aggressive interventions as per family wishes    Stenosis of both internal carotid arteries  CTA of head/neck inpatient showed suspected moderate stenosis of the right ICA origin and mild narrowing of the left carotid bifurcation  Intracranially there is also right greater than left stenosis, moderate on the left and mild on the right  No occlusive disease or thrombosis identified  Patient was evaluated by vascular, neurosurgery, and neurology inpatient  Patient transitioned to hospice care    Pressure injury of deep tissue of left heel  Left calcaneal pressure ulcer in the setting of PAD  Continue wound care as ordered  Continue offloading    Atrial fibrillation ([address] )  Heart rate stable  Currently not on any rate control medication    Hospice care patient  Recently transitioned to hospice care under [facility] hosp[facility] care team  Comfort care meds as per hospice    This note was completed in part utilizing [employer] fluency direct voice recognition software  Grammatical errors, random word insertion, spelling mistakes, and incomplete sentences may be an occasional consequence of the system secondary to software limitations, ambient noise and hardware issues  At the time of dictation, efforts were made to edit, clarify and/or correct errors  Please read the chart carefully and recognize, using context, where substitutions have occurred  If you have any questions or concerns about the context, text or information contained within the body of this dictation, please contact myself, the provider, for further clarification      [address], [address] [facility]  28/02/672707:11 AM

## 2022-12-23 NOTE — ASSESSMENT & PLAN NOTE
Patient presented to the hospital with left-sided weakness and aphasia, evaluated by vascular team, neurology, neurosurgery    MRI of brain negative for acute infarct  Mental status did not improve and patient transitioned to hospice care  No aggressive interventions as per family wishes

## 2022-12-23 NOTE — ASSESSMENT & PLAN NOTE
Left calcaneal pressure ulcer in the setting of PAD  Continue wound care as ordered  Continue offloading

## 2022-12-25 NOTE — PROGRESS NOTES
Pastoral Care Progress Note    2022  Patient: Ene Mckeon : 1935  Admission Date & Time: 2022 1726  MRN: [id number] [facility]: [id number]           22 0800   Clinical Encounter Type   Visited With Patient   Yazdanism Encounters   Yazdanism Needs Prayer       Ciro Abel visited with the patient and provided prayers and blessings  No further needs were expressed at this time  Chaplains still remain available

## 2023-01-05 ENCOUNTER — HOME CARE VISIT (OUTPATIENT)
Dept: HOME HOSPICE | Facility: HOSPICE | Age: 88
End: 2023-01-05
